# Patient Record
Sex: MALE | Race: OTHER | NOT HISPANIC OR LATINO
[De-identification: names, ages, dates, MRNs, and addresses within clinical notes are randomized per-mention and may not be internally consistent; named-entity substitution may affect disease eponyms.]

---

## 2017-02-24 ENCOUNTER — APPOINTMENT (OUTPATIENT)
Dept: OPHTHALMOLOGY | Facility: CLINIC | Age: 57
End: 2017-02-24

## 2017-02-28 ENCOUNTER — APPOINTMENT (OUTPATIENT)
Dept: OPHTHALMOLOGY | Facility: CLINIC | Age: 57
End: 2017-02-28

## 2017-03-28 ENCOUNTER — APPOINTMENT (OUTPATIENT)
Dept: OPHTHALMOLOGY | Facility: CLINIC | Age: 57
End: 2017-03-28

## 2017-05-09 ENCOUNTER — APPOINTMENT (OUTPATIENT)
Dept: OPHTHALMOLOGY | Facility: CLINIC | Age: 57
End: 2017-05-09

## 2017-05-23 ENCOUNTER — APPOINTMENT (OUTPATIENT)
Dept: OPHTHALMOLOGY | Facility: CLINIC | Age: 57
End: 2017-05-23

## 2017-08-29 ENCOUNTER — APPOINTMENT (OUTPATIENT)
Dept: OPHTHALMOLOGY | Facility: CLINIC | Age: 57
End: 2017-08-29

## 2017-12-04 ENCOUNTER — FORM ENCOUNTER (OUTPATIENT)
Age: 57
End: 2017-12-04

## 2017-12-05 ENCOUNTER — APPOINTMENT (OUTPATIENT)
Age: 57
End: 2017-12-05
Payer: MEDICAID

## 2017-12-05 PROCEDURE — 36215Z: CUSTOM | Mod: 59

## 2017-12-05 PROCEDURE — 36902Z: CUSTOM

## 2017-12-21 ENCOUNTER — APPOINTMENT (OUTPATIENT)
Age: 57
End: 2017-12-21
Payer: MEDICAID

## 2017-12-21 PROCEDURE — 36902Z: CUSTOM

## 2018-02-06 ENCOUNTER — APPOINTMENT (OUTPATIENT)
Dept: VASCULAR SURGERY | Facility: CLINIC | Age: 58
End: 2018-02-06

## 2018-03-30 ENCOUNTER — APPOINTMENT (OUTPATIENT)
Dept: VASCULAR SURGERY | Facility: CLINIC | Age: 58
End: 2018-03-30
Payer: MEDICAID

## 2018-03-30 PROCEDURE — 93990 DOPPLER FLOW TESTING: CPT

## 2018-04-03 ENCOUNTER — APPOINTMENT (OUTPATIENT)
Dept: ENDOVASCULAR SURGERY | Facility: CLINIC | Age: 58
End: 2018-04-03

## 2018-04-30 ENCOUNTER — FORM ENCOUNTER (OUTPATIENT)
Age: 58
End: 2018-04-30

## 2018-05-01 ENCOUNTER — APPOINTMENT (OUTPATIENT)
Age: 58
End: 2018-05-01
Payer: MEDICAID

## 2018-05-01 PROCEDURE — 36902Z: CUSTOM

## 2018-05-01 PROCEDURE — 36215Z: CUSTOM | Mod: 59

## 2018-07-03 ENCOUNTER — APPOINTMENT (OUTPATIENT)
Dept: VASCULAR SURGERY | Facility: CLINIC | Age: 58
End: 2018-07-03

## 2018-07-12 ENCOUNTER — APPOINTMENT (OUTPATIENT)
Dept: VASCULAR SURGERY | Facility: CLINIC | Age: 58
End: 2018-07-12
Payer: MEDICARE

## 2018-07-12 PROCEDURE — 93990 DOPPLER FLOW TESTING: CPT

## 2018-07-13 ENCOUNTER — APPOINTMENT (OUTPATIENT)
Dept: VASCULAR SURGERY | Facility: CLINIC | Age: 58
End: 2018-07-13

## 2018-10-18 ENCOUNTER — APPOINTMENT (OUTPATIENT)
Dept: VASCULAR SURGERY | Facility: CLINIC | Age: 58
End: 2018-10-18
Payer: MEDICARE

## 2018-10-18 VITALS
BODY MASS INDEX: 23.77 KG/M2 | HEIGHT: 71 IN | HEART RATE: 68 BPM | WEIGHT: 169.75 LBS | DIASTOLIC BLOOD PRESSURE: 72 MMHG | SYSTOLIC BLOOD PRESSURE: 124 MMHG

## 2018-10-18 DIAGNOSIS — Z86.718 PERSONAL HISTORY OF OTHER VENOUS THROMBOSIS AND EMBOLISM: ICD-10-CM

## 2018-10-18 DIAGNOSIS — B19.20 UNSPECIFIED VIRAL HEPATITIS C W/OUT HEPATIC COMA: ICD-10-CM

## 2018-10-18 DIAGNOSIS — K80.20 CALCULUS OF GALLBLADDER W/OUT CHOLECYSTITIS W/OUT OBSTRUCTION: ICD-10-CM

## 2018-10-18 PROCEDURE — 93990 DOPPLER FLOW TESTING: CPT

## 2018-10-18 PROCEDURE — 99213 OFFICE O/P EST LOW 20 MIN: CPT

## 2018-10-18 RX ORDER — PREDNISONE 20 MG/1
20 TABLET ORAL
Qty: 6 | Refills: 3 | Status: COMPLETED | COMMUNITY
Start: 2017-12-01 | End: 2018-10-18

## 2018-10-18 RX ORDER — INSULIN ASPART 100 [IU]/ML
100 INJECTION, SOLUTION INTRAVENOUS; SUBCUTANEOUS
Refills: 0 | Status: ACTIVE | COMMUNITY

## 2018-10-18 RX ORDER — LIDOCAINE AND PRILOCAINE 25; 25 MG/G; MG/G
2.5-2.5 CREAM TOPICAL
Refills: 0 | Status: ACTIVE | COMMUNITY

## 2018-10-18 RX ORDER — CLOTRIMAZOLE AND BETAMETHASONE DIPROPIONATE 10; .5 MG/G; MG/G
1-0.05 CREAM TOPICAL TWICE DAILY
Qty: 90 | Refills: 2 | Status: COMPLETED | COMMUNITY
Start: 2018-04-03 | End: 2018-10-18

## 2018-11-14 ENCOUNTER — FORM ENCOUNTER (OUTPATIENT)
Age: 58
End: 2018-11-14

## 2018-11-15 ENCOUNTER — APPOINTMENT (OUTPATIENT)
Dept: ENDOVASCULAR SURGERY | Facility: CLINIC | Age: 58
End: 2018-11-15
Payer: MEDICARE

## 2018-11-15 PROCEDURE — 36902Z: CUSTOM

## 2019-02-14 ENCOUNTER — APPOINTMENT (OUTPATIENT)
Dept: VASCULAR SURGERY | Facility: CLINIC | Age: 59
End: 2019-02-14

## 2019-02-28 ENCOUNTER — APPOINTMENT (OUTPATIENT)
Dept: VASCULAR SURGERY | Facility: CLINIC | Age: 59
End: 2019-02-28
Payer: MEDICARE

## 2019-02-28 VITALS
DIASTOLIC BLOOD PRESSURE: 83 MMHG | WEIGHT: 169 LBS | BODY MASS INDEX: 23.66 KG/M2 | HEART RATE: 71 BPM | HEIGHT: 71 IN | SYSTOLIC BLOOD PRESSURE: 159 MMHG

## 2019-02-28 PROCEDURE — 93990 DOPPLER FLOW TESTING: CPT

## 2019-02-28 PROCEDURE — 99213 OFFICE O/P EST LOW 20 MIN: CPT

## 2019-02-28 NOTE — HISTORY OF PRESENT ILLNESS
[FreeTextEntry1] : 58-year-old gentleman currently on hemodialysis via left radiocephalic AV fistula. At this time patient reports intermittent clots seen during hemodialysis. He is here for evaluation. [] : left radiocephalic fistula

## 2019-02-28 NOTE — PHYSICAL EXAM
[Thrill] : thrill [Pulsatile Thrill] : no pulsatile thrill [Bleeding] : no bleeding [Hand well perfused] : hand well perfused [2+] : left 2+ [Normal] : coordination grossly intact, no focal deficits

## 2019-02-28 NOTE — DISCUSSION/SUMMARY
[FreeTextEntry1] : Patient underwent a duplex study which shows scattered stenoses throughout. \par no intervention needed at this time

## 2019-05-28 ENCOUNTER — APPOINTMENT (OUTPATIENT)
Dept: VASCULAR SURGERY | Facility: CLINIC | Age: 59
End: 2019-05-28
Payer: MEDICARE

## 2019-05-28 VITALS
BODY MASS INDEX: 23.27 KG/M2 | DIASTOLIC BLOOD PRESSURE: 91 MMHG | HEIGHT: 71 IN | TEMPERATURE: 97.9 F | HEART RATE: 71 BPM | SYSTOLIC BLOOD PRESSURE: 149 MMHG | WEIGHT: 166.23 LBS

## 2019-05-28 PROCEDURE — 93990 DOPPLER FLOW TESTING: CPT

## 2019-05-28 PROCEDURE — 99213 OFFICE O/P EST LOW 20 MIN: CPT

## 2019-05-28 NOTE — DISCUSSION/SUMMARY
[FreeTextEntry1] : Patient underwent a duplex study which shows scattered stenoses throughout. \par Very low flow detected. We'll schedule  fistulogram\par

## 2019-05-28 NOTE — PHYSICAL EXAM
[Thrill] : thrill [Pulsatile Thrill] : no pulsatile thrill [Bleeding] : no bleeding [Hand well perfused] : hand well perfused [2+] : left 2+ [Normal] : normoactive bowel sounds, soft and nontender, no hepatosplenomegaly or masses appreciated

## 2019-06-20 ENCOUNTER — APPOINTMENT (OUTPATIENT)
Dept: ENDOVASCULAR SURGERY | Facility: CLINIC | Age: 59
End: 2019-06-20
Payer: MEDICARE

## 2019-06-20 VITALS
HEART RATE: 83 BPM | DIASTOLIC BLOOD PRESSURE: 80 MMHG | WEIGHT: 166 LBS | HEIGHT: 71 IN | TEMPERATURE: 97.8 F | BODY MASS INDEX: 23.24 KG/M2 | SYSTOLIC BLOOD PRESSURE: 150 MMHG | OXYGEN SATURATION: 98 % | RESPIRATION RATE: 18 BRPM

## 2019-06-20 DIAGNOSIS — Z87.891 PERSONAL HISTORY OF NICOTINE DEPENDENCE: ICD-10-CM

## 2019-06-20 DIAGNOSIS — I10 ESSENTIAL (PRIMARY) HYPERTENSION: ICD-10-CM

## 2019-06-20 PROCEDURE — 36902Z: CUSTOM

## 2019-06-20 NOTE — HISTORY OF PRESENT ILLNESS
[] : left radiocephalic fistula [FreeTextEntry1] : alert and oriented x 3 \par accompanied by sister\par no reported falls \par reported blood sugar 151 [FreeTextEntry5] : yesterday 11 pm [FreeTextEntry6] : Dr Graves [FreeTextEntry4] : yesterday

## 2019-06-20 NOTE — PAST MEDICAL HISTORY
[No therapy indicated for cases scheduled for less than one hour] : No therapy indicated for cases scheduled for less than one hour. [Increasing age ( >40 years old)] : Increasing age ( >40 years old) [FreeTextEntry1] : Malignant Hyperthermia Screening Tool and Risk of Bleeding Assessment \par \par \par \par Mr. MARIETTA MAGALLANES denies family of unexpected death following Anesthesia or Exercise.\par Denies Family history of Malignant Hyperthermia, Muscle or Neuromuscular disorder and High Temperature  following exercise.\par \par Mr. MARIETTA MAGALLANES denies history of Muscle Spasm, Dark or Chocolate- Colored and Unanticipated fever immediately following anaesthesia or serious exercise.\par Mr. MARIETTA MAGALLANES also denies bleeding tendencies/Risks of Bleeding.\par

## 2019-06-20 NOTE — PROCEDURE
[Resume diet] : resume diet [Site check for bleeding/hematoma/thrill/bruit] : Site check for bleeding/hematoma/thrill/bruit [Vital signs on admission the q 15 mins x2] : Vital signs on admission the q 15 mins x2 [FSBS] : FSBS [FreeTextEntry1] : left arm fistula/fistulogram/angioplasty

## 2019-06-20 NOTE — ASSESSMENT
[FreeTextEntry1] : Pt with stenosis in fistula detected on surveillance for fistulogram and possible intervention.  Consent obtained and reinforced.  Will give IV steroids and benadryl prophylactically for allergy history.  Fistulogram demonstrates several stenoses within juxtaanastamotic region and distal body PTA to 6mm and 7 mm respectively with good result.  EBL 5 cc.  Full report to follow.

## 2019-08-15 ENCOUNTER — FORM ENCOUNTER (OUTPATIENT)
Age: 59
End: 2019-08-15

## 2019-08-15 ENCOUNTER — APPOINTMENT (OUTPATIENT)
Dept: VASCULAR SURGERY | Facility: CLINIC | Age: 59
End: 2019-08-15
Payer: MEDICARE

## 2019-08-15 PROCEDURE — 93990 DOPPLER FLOW TESTING: CPT

## 2019-08-15 PROCEDURE — 99213 OFFICE O/P EST LOW 20 MIN: CPT

## 2019-08-15 NOTE — PHYSICAL EXAM
[Thrill] : thrill [Hand well perfused] : hand well perfused [2+] : right 2+ [Normal] : coordination grossly intact, no focal deficits [Pulsatile Thrill] : no pulsatile thrill [Aneurysm] : no aneurysm [Bleeding] : no bleeding [Ulcer] : no ulcer [de-identified] : intact

## 2019-08-15 NOTE — HISTORY OF PRESENT ILLNESS
[] : left radiocephalic fistula [FreeTextEntry1] : 59 yo male with history of esrd on hd presents for follow up of left upper extremity radial cephalic avf.  pt states that he has been having trouble with hd via the left upper extremity avf.  they have been pulling clots that has been interfering with hd.  \par

## 2019-08-16 ENCOUNTER — APPOINTMENT (OUTPATIENT)
Dept: ENDOVASCULAR SURGERY | Facility: CLINIC | Age: 59
End: 2019-08-16
Payer: MEDICARE

## 2019-08-16 VITALS
SYSTOLIC BLOOD PRESSURE: 123 MMHG | BODY MASS INDEX: 23.24 KG/M2 | HEIGHT: 71 IN | RESPIRATION RATE: 16 BRPM | TEMPERATURE: 98.2 F | OXYGEN SATURATION: 100 % | WEIGHT: 166 LBS | DIASTOLIC BLOOD PRESSURE: 80 MMHG | HEART RATE: 97 BPM

## 2019-08-16 DIAGNOSIS — N18.6 END STAGE RENAL DISEASE: ICD-10-CM

## 2019-08-16 DIAGNOSIS — Z99.2 END STAGE RENAL DISEASE: ICD-10-CM

## 2019-08-16 PROCEDURE — 36215Z: CUSTOM | Mod: 59

## 2019-08-16 PROCEDURE — 36902Z: CUSTOM

## 2019-08-16 RX ORDER — FOLIC ACID/VIT B COMPLEX AND C 0.8 MG
TABLET ORAL
Refills: 0 | Status: ACTIVE | COMMUNITY

## 2019-08-16 RX ORDER — DILTIAZEM HYDROCHLORIDE 360 MG/1
360 CAPSULE, COATED, EXTENDED RELEASE ORAL
Refills: 0 | Status: DISCONTINUED | COMMUNITY
End: 2019-08-16

## 2019-08-16 NOTE — REASON FOR VISIT
[Other ___] : a [unfilled] visit for [Inadequate Flow within AVF] : inadequate flow within AVF [Other: ___] : [unfilled] [FreeTextEntry2] : stenosis on duplex

## 2019-08-16 NOTE — PROCEDURE
[FSBS] : FSBS [Resume diet] : resume diet [Site check for bleeding/hematoma/thrill/bruit] : Site check for bleeding/hematoma/thrill/bruit [Vital signs on admission the q 15 mins x2] : Vital signs on admission the q 15 mins x2 [FreeTextEntry1] : left arm fistula/fistulogram/angioplasty

## 2019-08-16 NOTE — HISTORY OF PRESENT ILLNESS
[] : left radiocephalic fistula [FreeTextEntry1] : 2016 Dr. Duarte [FreeTextEntry4] : today [FreeTextEntry5] : 7pm 8/15/2019 [FreeTextEntry6] : Dr Khan

## 2019-08-16 NOTE — PAST MEDICAL HISTORY
[Increasing age ( >40 years old)] : Increasing age ( >40 years old) [No therapy indicated for cases scheduled for less than one hour] : No therapy indicated for cases scheduled for less than one hour. [FreeTextEntry1] : Malignant Hyperthermia Screening Tool and Risk of Bleeding Assessment \par \par Mr. MARIETTA MAGALLANES denies family of unexpected death following Anesthesia or Exercise.\par Denies Family history of Malignant Hyperthermia, Muscle or Neuromuscular disorder and High Temperature  following exercise.\par \par Mr. MARIETTA MAGALLANES denies history of Muscle Spasm, Dark or Chocolate- Colored and Unanticipated fever immediately following anaesthesia or serious exercise.\par Mr. MARIETTA MAGALLANES also denies bleeding tendencies/Risks of Bleeding.\par

## 2019-09-19 ENCOUNTER — APPOINTMENT (OUTPATIENT)
Dept: ENDOVASCULAR SURGERY | Facility: CLINIC | Age: 59
End: 2019-09-19
Payer: MEDICARE

## 2019-09-19 VITALS
DIASTOLIC BLOOD PRESSURE: 74 MMHG | HEIGHT: 71 IN | SYSTOLIC BLOOD PRESSURE: 119 MMHG | OXYGEN SATURATION: 100 % | WEIGHT: 166 LBS | HEART RATE: 77 BPM | BODY MASS INDEX: 23.24 KG/M2 | TEMPERATURE: 97.6 F | RESPIRATION RATE: 15 BRPM

## 2019-09-19 PROCEDURE — 36902Z: CUSTOM

## 2019-09-20 NOTE — ASSESSMENT
[FreeTextEntry1] : Fistulogram demonstrates moderate stenoses in distal body PTA to 7 mm with good result.  EBL - minimal.  Full report to follow.

## 2019-09-20 NOTE — HISTORY OF PRESENT ILLNESS
[] : left radiocephalic fistula [FreeTextEntry1] : 2016 Dr. Duarte [FreeTextEntry4] : yesterday  [FreeTextEntry5] : yesterday at 9 pm [FreeTextEntry6] : Dr Khan

## 2019-09-20 NOTE — PROCEDURE
[Resume diet] : resume diet [FSBS] : FSBS [Site check for bleeding/hematoma/thrill/bruit] : Site check for bleeding/hematoma/thrill/bruit [Vital signs on admission the q 15 mins x2] : Vital signs on admission the q 15 mins x2 [FreeTextEntry1] : left arm fistula/fistulogram/angioplasty

## 2019-11-19 ENCOUNTER — APPOINTMENT (OUTPATIENT)
Dept: VASCULAR SURGERY | Facility: CLINIC | Age: 59
End: 2019-11-19
Payer: MEDICARE

## 2019-11-19 PROCEDURE — 99213 OFFICE O/P EST LOW 20 MIN: CPT

## 2019-11-19 PROCEDURE — 93990 DOPPLER FLOW TESTING: CPT

## 2019-11-19 NOTE — PHYSICAL EXAM
[Pulsatile Thrill] : no pulsatile thrill [Thrill] : thrill [Bleeding] : no bleeding [Aneurysm] : no aneurysm [Ulcer] : no ulcer [Hand well perfused] : hand well perfused [2+] : left 2+ [Normal] : coordination grossly intact, no focal deficits [de-identified] : intact

## 2019-11-19 NOTE — DISCUSSION/SUMMARY
[FreeTextEntry1] : 59 yo male with history of esrd on hd presents for follow up of left upper extremity radial cephalic avf\par duplex shows patent avf \par no intervention needed

## 2020-03-03 ENCOUNTER — APPOINTMENT (OUTPATIENT)
Dept: VASCULAR SURGERY | Facility: CLINIC | Age: 60
End: 2020-03-03
Payer: MEDICARE

## 2020-03-03 PROCEDURE — 93923 UPR/LXTR ART STDY 3+ LVLS: CPT

## 2020-03-03 PROCEDURE — 99213 OFFICE O/P EST LOW 20 MIN: CPT

## 2020-03-03 PROCEDURE — 93990 DOPPLER FLOW TESTING: CPT | Mod: 59

## 2020-03-03 NOTE — HISTORY OF PRESENT ILLNESS
[FreeTextEntry1] : 60 yo male with history of esrd on hd presents for follow up of left upper extremity radial cephalic avf.  pt states that he has been having no trouble with hd via the left upper extremity avf. Patient is complaining of left foot pain. He states the pain is so severe he takes him upt [] : left radiocephalic fistula

## 2020-03-03 NOTE — PHYSICAL EXAM
[Thrill] : thrill [Pulsatile Thrill] : no pulsatile thrill [Aneurysm] : no aneurysm [Bleeding] : no bleeding [Hand well perfused] : hand well perfused [Ulcer] : no ulcer [2+] : left 2+ [0] : left 0 [de-identified] : intact  [Normal] : coordination grossly intact, no focal deficits [de-identified] : Gangrenous ulcer left first toe

## 2020-03-03 NOTE — DISCUSSION/SUMMARY
[FreeTextEntry1] : 57 yo male with history of esrd on hd presents for follow up of left upper extremity radial cephalic avf\par duplex shows patent avf \par Patient also underwent arterial Dopplers which show severe disease of the left lower extremity. Will schedule patient for left leg angiogram.

## 2020-03-12 ENCOUNTER — FORM ENCOUNTER (OUTPATIENT)
Age: 60
End: 2020-03-12

## 2020-03-12 ENCOUNTER — APPOINTMENT (OUTPATIENT)
Dept: ENDOVASCULAR SURGERY | Facility: CLINIC | Age: 60
End: 2020-03-12
Payer: MEDICARE

## 2020-03-13 ENCOUNTER — APPOINTMENT (OUTPATIENT)
Dept: ENDOVASCULAR SURGERY | Facility: CLINIC | Age: 60
End: 2020-03-13
Payer: MEDICARE

## 2020-03-13 ENCOUNTER — LABORATORY RESULT (OUTPATIENT)
Age: 60
End: 2020-03-13

## 2020-03-13 VITALS
OXYGEN SATURATION: 97 % | WEIGHT: 166 LBS | SYSTOLIC BLOOD PRESSURE: 164 MMHG | TEMPERATURE: 97.8 F | RESPIRATION RATE: 16 BRPM | BODY MASS INDEX: 23.24 KG/M2 | DIASTOLIC BLOOD PRESSURE: 95 MMHG | HEART RATE: 90 BPM | HEIGHT: 71 IN

## 2020-03-13 PROCEDURE — 37228Z: CUSTOM | Mod: 59

## 2020-03-13 PROCEDURE — 75625 CONTRAST EXAM ABDOMINL AORTA: CPT

## 2020-03-13 PROCEDURE — 37225Z: CUSTOM

## 2020-03-13 NOTE — PHYSICAL EXAM
[Pulsatile Thrill] : no pulsatile thrill [Aneurysm] : no aneurysm [Bleeding] : no bleeding [Ulcer] : no ulcer [de-identified] : Gangrenous ulcer left first toe [de-identified] : intact

## 2020-03-13 NOTE — HISTORY OF PRESENT ILLNESS
[FreeTextEntry1] : 2016 Dr. Duarte [FreeTextEntry4] : 3/11/2020  [FreeTextEntry5] : yesterday at 5pm [FreeTextEntry6] : Dr Khan

## 2020-03-13 NOTE — PAST MEDICAL HISTORY
[FreeTextEntry1] : Malignant Hyperthermia Screening Tool and Risk of Bleeding Assessment \par \par Mr. MARIETTA MAGALLANES denies family of unexpected death following Anesthesia or Exercise.\par Denies Family history of Malignant Hyperthermia, Muscle or Neuromuscular disorder and High Temperature  following exercise.\par \par Mr. MARIETTA MAGALLANES denies history of Muscle Spasm, Dark or Chocolate- Colored and Unanticipated fever immediately following anaesthesia or serious exercise.\par Mr. MARIETTA MAGALLANES also denies bleeding tendencies/Risks of Bleeding.\par

## 2020-03-13 NOTE — DISCUSSION/SUMMARY
[FreeTextEntry1] : 59 yo male with history of esrd on hd presents for follow up of left upper extremity radial cephalic avf\par duplex shows patent avf with >75% stenosis at the juxta-anastomosis with pratial thrombus in the proximal / mid forearm with flow rate of 350 \par will arrange for fistulagram tomorrow pt advised not to eat or drink anything after midnight 
Patient

## 2020-05-28 ENCOUNTER — APPOINTMENT (OUTPATIENT)
Dept: VASCULAR SURGERY | Facility: CLINIC | Age: 60
End: 2020-05-28
Payer: MEDICARE

## 2020-05-28 PROCEDURE — 99213 OFFICE O/P EST LOW 20 MIN: CPT

## 2020-05-28 PROCEDURE — 93926 LOWER EXTREMITY STUDY: CPT

## 2020-05-28 PROCEDURE — 93971 EXTREMITY STUDY: CPT

## 2020-05-28 NOTE — HISTORY OF PRESENT ILLNESS
[FreeTextEntry1] : 59-year-old gentleman well-known to us on hemodialysis.  Patient developed rest pain and ischemic ulceration of the left foot.  Several weeks ago patient underwent angiogram with angioplasty of the left popliteal artery which had previously been occluded.  He presents to the office today complaining of worsening of his left foot pain.

## 2020-05-28 NOTE — PHYSICAL EXAM
[JVD] : no jugular venous distention  [Normal Breath Sounds] : Normal breath sounds [Normal Rate and Rhythm] : normal rate and rhythm [2+] : left 2+ [Ankle Swelling (On Exam)] : not present [Varicose Veins Of Lower Extremities] : not present [] : not present [Abdomen Tenderness] : ~T ~M No abdominal tenderness [Skin Ulcer] : ulcer [No Rash or Lesion] : No rash or lesion [Alert] : alert [Calm] : calm [Skin Induration] : no induration [de-identified] : Appears well

## 2020-05-28 NOTE — ASSESSMENT
[FreeTextEntry1] : Patient underwent a duplex study which shows reocclusion of his popliteal artery.  His posterior tibial artery appears patent.  At this time would recommend patient undergo bypass to the posterior tibial artery.  We will arrange for next week.

## 2020-05-31 ENCOUNTER — INPATIENT (INPATIENT)
Facility: HOSPITAL | Age: 60
LOS: 10 days | Discharge: NURSING, REHAB OR LONG-TERM CARE | DRG: 252 | End: 2020-06-11
Attending: SURGERY | Admitting: SURGERY
Payer: MEDICARE

## 2020-05-31 VITALS
DIASTOLIC BLOOD PRESSURE: 93 MMHG | HEART RATE: 93 BPM | WEIGHT: 167.77 LBS | HEIGHT: 71 IN | OXYGEN SATURATION: 98 % | SYSTOLIC BLOOD PRESSURE: 194 MMHG | TEMPERATURE: 98 F | RESPIRATION RATE: 19 BRPM

## 2020-05-31 DIAGNOSIS — I73.9 PERIPHERAL VASCULAR DISEASE, UNSPECIFIED: ICD-10-CM

## 2020-05-31 DIAGNOSIS — Z98.890 OTHER SPECIFIED POSTPROCEDURAL STATES: Chronic | ICD-10-CM

## 2020-05-31 LAB
ALBUMIN SERPL ELPH-MCNC: 4.3 G/DL — SIGNIFICANT CHANGE UP (ref 3.3–5)
ALP SERPL-CCNC: 140 U/L — HIGH (ref 40–120)
ALT FLD-CCNC: 9 U/L — LOW (ref 10–45)
ANION GAP SERPL CALC-SCNC: 18 MMOL/L — HIGH (ref 5–17)
APTT BLD: 28 SEC — SIGNIFICANT CHANGE UP (ref 27.5–36.3)
AST SERPL-CCNC: 7 U/L — LOW (ref 10–40)
BILIRUB SERPL-MCNC: 0.3 MG/DL — SIGNIFICANT CHANGE UP (ref 0.2–1.2)
BLD GP AB SCN SERPL QL: NEGATIVE — SIGNIFICANT CHANGE UP
BUN SERPL-MCNC: 74 MG/DL — HIGH (ref 7–23)
CALCIUM SERPL-MCNC: 9.7 MG/DL — SIGNIFICANT CHANGE UP (ref 8.4–10.5)
CHLORIDE SERPL-SCNC: 97 MMOL/L — SIGNIFICANT CHANGE UP (ref 96–108)
CO2 SERPL-SCNC: 24 MMOL/L — SIGNIFICANT CHANGE UP (ref 22–31)
CREAT SERPL-MCNC: 11.6 MG/DL — HIGH (ref 0.5–1.3)
GLUCOSE SERPL-MCNC: 108 MG/DL — HIGH (ref 70–99)
HCT VFR BLD CALC: 44.7 % — SIGNIFICANT CHANGE UP (ref 39–50)
HGB BLD-MCNC: 13.7 G/DL — SIGNIFICANT CHANGE UP (ref 13–17)
INR BLD: 0.87 RATIO — LOW (ref 0.88–1.16)
MCHC RBC-ENTMCNC: 24.2 PG — LOW (ref 27–34)
MCHC RBC-ENTMCNC: 30.6 GM/DL — LOW (ref 32–36)
MCV RBC AUTO: 79.1 FL — LOW (ref 80–100)
NRBC # BLD: 0 /100 WBCS — SIGNIFICANT CHANGE UP (ref 0–0)
PLATELET # BLD AUTO: 206 K/UL — SIGNIFICANT CHANGE UP (ref 150–400)
POTASSIUM SERPL-MCNC: 4.6 MMOL/L — SIGNIFICANT CHANGE UP (ref 3.5–5.3)
POTASSIUM SERPL-SCNC: 4.6 MMOL/L — SIGNIFICANT CHANGE UP (ref 3.5–5.3)
PROT SERPL-MCNC: 7.5 G/DL — SIGNIFICANT CHANGE UP (ref 6–8.3)
PROTHROM AB SERPL-ACNC: 9.9 SEC — LOW (ref 10–12.9)
RBC # BLD: 5.65 M/UL — SIGNIFICANT CHANGE UP (ref 4.2–5.8)
RBC # FLD: 16.2 % — HIGH (ref 10.3–14.5)
RH IG SCN BLD-IMP: POSITIVE — SIGNIFICANT CHANGE UP
RH IG SCN BLD-IMP: POSITIVE — SIGNIFICANT CHANGE UP
SARS-COV-2 RNA SPEC QL NAA+PROBE: SIGNIFICANT CHANGE UP
SODIUM SERPL-SCNC: 139 MMOL/L — SIGNIFICANT CHANGE UP (ref 135–145)
WBC # BLD: 6.18 K/UL — SIGNIFICANT CHANGE UP (ref 3.8–10.5)
WBC # FLD AUTO: 6.18 K/UL — SIGNIFICANT CHANGE UP (ref 3.8–10.5)

## 2020-05-31 PROCEDURE — 99285 EMERGENCY DEPT VISIT HI MDM: CPT | Mod: CS,GC

## 2020-05-31 PROCEDURE — 71045 X-RAY EXAM CHEST 1 VIEW: CPT | Mod: 26

## 2020-05-31 RX ORDER — INSULIN LISPRO 100/ML
VIAL (ML) SUBCUTANEOUS AT BEDTIME
Refills: 0 | Status: DISCONTINUED | OUTPATIENT
Start: 2020-05-31 | End: 2020-06-01

## 2020-05-31 RX ORDER — DIPHENHYDRAMINE HCL 50 MG
50 CAPSULE ORAL ONCE
Refills: 0 | Status: COMPLETED | OUTPATIENT
Start: 2020-06-01 | End: 2020-06-01

## 2020-05-31 RX ORDER — DEXTROSE 50 % IN WATER 50 %
25 SYRINGE (ML) INTRAVENOUS ONCE
Refills: 0 | Status: DISCONTINUED | OUTPATIENT
Start: 2020-05-31 | End: 2020-05-31

## 2020-05-31 RX ORDER — DEXTROSE 50 % IN WATER 50 %
15 SYRINGE (ML) INTRAVENOUS ONCE
Refills: 0 | Status: DISCONTINUED | OUTPATIENT
Start: 2020-05-31 | End: 2020-05-31

## 2020-05-31 RX ORDER — HEPARIN SODIUM 5000 [USP'U]/ML
5000 INJECTION INTRAVENOUS; SUBCUTANEOUS EVERY 8 HOURS
Refills: 0 | Status: DISCONTINUED | OUTPATIENT
Start: 2020-05-31 | End: 2020-06-03

## 2020-05-31 RX ORDER — ENOXAPARIN SODIUM 100 MG/ML
40 INJECTION SUBCUTANEOUS DAILY
Refills: 0 | Status: DISCONTINUED | OUTPATIENT
Start: 2020-05-31 | End: 2020-05-31

## 2020-05-31 RX ORDER — INSULIN LISPRO 100/ML
VIAL (ML) SUBCUTANEOUS
Refills: 0 | Status: DISCONTINUED | OUTPATIENT
Start: 2020-05-31 | End: 2020-06-01

## 2020-05-31 RX ORDER — GLUCAGON INJECTION, SOLUTION 0.5 MG/.1ML
1 INJECTION, SOLUTION SUBCUTANEOUS ONCE
Refills: 0 | Status: DISCONTINUED | OUTPATIENT
Start: 2020-05-31 | End: 2020-06-03

## 2020-05-31 RX ORDER — DEXTROSE 50 % IN WATER 50 %
12.5 SYRINGE (ML) INTRAVENOUS ONCE
Refills: 0 | Status: DISCONTINUED | OUTPATIENT
Start: 2020-05-31 | End: 2020-05-31

## 2020-05-31 RX ORDER — SODIUM CHLORIDE 9 MG/ML
1000 INJECTION, SOLUTION INTRAVENOUS
Refills: 0 | Status: DISCONTINUED | OUTPATIENT
Start: 2020-05-31 | End: 2020-05-31

## 2020-05-31 RX ADMIN — Medication 0.1 MILLIGRAM(S): at 20:12

## 2020-05-31 RX ADMIN — Medication 50 MILLIGRAM(S): at 23:15

## 2020-05-31 NOTE — ED ADULT NURSE NOTE - OBJECTIVE STATEMENT
60 y/o Male PMH ESRD on dialysis M/W/F with fistula to the L. arm, HTN, glaucoma, DM2 ON insulin, Hep C, PAD, DVT s/p thrombectomy sent o ED by his vascular surgeon for stent placement post ballooning that was done with tibial bypass now with worsening left leg claudication. Pt reports pain started in January this year, worse with walking, mostly in left leg and thigh. He had balloon angioplasty in March, improving his pain of LLE, but Duplex recently showed re-occlusion. Denies headaches, F/C, dizziness, syncope, CP, N/V, abdominal pain, dysuria, changes in BM, peripheral swelling, skin changes. no cough, fevers, sick contacts or recent travel. VS stable. safety maintained. tba.

## 2020-05-31 NOTE — H&P ADULT - NSHPLABSRESULTS_GEN_ALL_CORE
13.7   6.18  )-----------( 206      ( 31 May 2020 15:59 )             44.7     05-31    139  |  97  |  74<H>  ----------------------------<  108<H>  4.6   |  24  |  11.60<H>    Ca    9.7      31 May 2020 15:59    TPro  7.5  /  Alb  4.3  /  TBili  0.3  /  DBili  x   /  AST  7<L>  /  ALT  9<L>  /  AlkPhos  140<H>  05-31

## 2020-05-31 NOTE — ED PROVIDER NOTE - CLINICAL SUMMARY MEDICAL DECISION MAKING FREE TEXT BOX
Ana M PGY-3: 58 yo M w/PMHX ESRD 2/2 HTN HD M/W/F, glaucoma, DM2 ON insulin, Hep C, PAD, rt DVT s/p thrombectomy presenting for posterior tibial bypass per vascular. Ana M PGY-3: 60 yo M w/PMHX ESRD 2/2 HTN HD M/W/F, glaucoma, DM2 ON insulin, Hep C, PAD, rt DVT s/p thrombectomy presenting for posterior tibial bypass per vascular.    RONEN Brown MD: Pt is a 58 y/o male w/PMHX ESRD 2/2 HTN HD M/W/F, glaucoma, DM2 ON insulin, Hep C, PAD, rt DVT s/p thrombectomy presenting for worsening left leg claudication. Pt reports pain started in January this year, worse with walking, mostly in left leg and thigh. He had balloon angioplasty in March, improving his pain of LLE, but Duplex recently showed re-occlusion. Denies headaches, F/C, dizziness, syncope, CP, N/V, abdominal pain, dysuria, changes in BM, peripheral swelling, skin changes. Has mild chest tightness when this occurs. Pt advised by his vascular surgeon to come in. Plan: preop labs, d/w Vascular surgery, likely TBA for bypass

## 2020-05-31 NOTE — H&P ADULT - ASSESSMENT
59M PMH ESRD (on HD M/W/F), glaucoma, DMII, HCV, PAD, R DVT s/p thrombectomy, presenting with LLE pain 2/2 PAD    - Admit to Vascular Surgery  - CTA Aorta with BL LE runoff  - Cardiology optimization appreciated  - Nephrology c/s for HD management  - Continue home medications  - Discussed with attending Dr. Aden Topete PGY3  Vascular Surgery  [id number]

## 2020-05-31 NOTE — ED PROVIDER NOTE - OBJECTIVE STATEMENT
60 yo M w/PMHX ESRD 2/2 HTN HD M/W/F, glaucoma, DM2 ON insulin, Hep C, PAD, rt DVT s/p thrombectomy presenting after being asked to by vascular surgeon for posterior tibial bypass after seeing him for worsening left leg claudication. Pt reports pain started in January this year, worse with walking, mostly in left leg and thigh. He had balloon angioplasty in March, improving his pain of LLE, but Duplex recently showed re-occlusion. Denies headaches, F/C, dizziness, syncope, CP, N/V, abdominal pain, dysuria, changes in BM, peripheral swelling, skin changes. Has mild chest tightness when this occurs.  Vascular: Aden  PMD: Binh  Renal: Juan Jose  Meds: calcium acetate, catapres, iron tabs, magnesium, vitamin D, novolog sliding scale, lantus, B12

## 2020-05-31 NOTE — ED PROVIDER NOTE - ATTENDING CONTRIBUTION TO CARE
I saw and evaluated patient with resident. I discussed H+P and MDM with resident. I agree with the statements made by the resident unless otherwise noted.    The care of this patient was in support of the [facility] countermeasures to Covid-19.

## 2020-05-31 NOTE — H&P ADULT - NSHPPHYSICALEXAM_GEN_ALL_CORE
Gen: Laying in bed, in NAD  Resp: Nonlabored  CV: RRR  Abd: Soft, NT/ND  Ext:  BL palpable femoral pulses  BL PT signal  L DP signal

## 2020-05-31 NOTE — ED ADULT TRIAGE NOTE - CHIEF COMPLAINT QUOTE
nontraumatic eloy leg pain, worse with walking x 2 wks, (seen by dr dietz x 2 mos ago "ballooned my leg", unrelieved with tylenol/lidocaine, +hx DVT (HD: MW in [place])

## 2020-05-31 NOTE — H&P ADULT - HISTORY OF PRESENT ILLNESS
59M PMH ESRD (on HD M/W/F), glaucoma, DMII, HCV, PAD, R DVT s/p thrombectomy, presenting with LLE pain. Patient states that he developed pain in January of this year, which is worse with walking and is located in the upper leg/thigh. Patient is s/p LLE angioplasty in March with some improvement in LLE pain, however in office duplex recently showed re-occlusion coinciding with return of symptoms. Patient is planned for a LLE bypass this week.    Upon arrival to [facility] ED, AVSS. Afebrile. Normotensive.

## 2020-06-01 DIAGNOSIS — I10 ESSENTIAL (PRIMARY) HYPERTENSION: ICD-10-CM

## 2020-06-01 DIAGNOSIS — N25.81 SECONDARY HYPERPARATHYROIDISM OF RENAL ORIGIN: ICD-10-CM

## 2020-06-01 DIAGNOSIS — N18.6 END STAGE RENAL DISEASE: ICD-10-CM

## 2020-06-01 DIAGNOSIS — I73.9 PERIPHERAL VASCULAR DISEASE, UNSPECIFIED: ICD-10-CM

## 2020-06-01 DIAGNOSIS — E11.9 TYPE 2 DIABETES MELLITUS WITHOUT COMPLICATIONS: ICD-10-CM

## 2020-06-01 PROBLEM — H40.9 UNSPECIFIED GLAUCOMA: Chronic | Status: ACTIVE | Noted: 2020-05-31

## 2020-06-01 PROBLEM — N19 UNSPECIFIED KIDNEY FAILURE: Chronic | Status: ACTIVE | Noted: 2020-05-31

## 2020-06-01 LAB
A1C WITH ESTIMATED AVERAGE GLUCOSE RESULT: 7.2 % — HIGH (ref 4–5.6)
ANION GAP SERPL CALC-SCNC: 19 MMOL/L — HIGH (ref 5–17)
ANION GAP SERPL CALC-SCNC: 22 MMOL/L — HIGH (ref 5–17)
B-OH-BUTYR SERPL-SCNC: 0.2 MMOL/L — SIGNIFICANT CHANGE UP
BUN SERPL-MCNC: 110 MG/DL — HIGH (ref 7–23)
BUN SERPL-MCNC: 86 MG/DL — HIGH (ref 7–23)
CALCIUM SERPL-MCNC: 9.1 MG/DL — SIGNIFICANT CHANGE UP (ref 8.4–10.5)
CALCIUM SERPL-MCNC: 9.2 MG/DL — SIGNIFICANT CHANGE UP (ref 8.4–10.5)
CHLORIDE SERPL-SCNC: 89 MMOL/L — LOW (ref 96–108)
CHLORIDE SERPL-SCNC: 97 MMOL/L — SIGNIFICANT CHANGE UP (ref 96–108)
CO2 SERPL-SCNC: 18 MMOL/L — LOW (ref 22–31)
CO2 SERPL-SCNC: 19 MMOL/L — LOW (ref 22–31)
CREAT SERPL-MCNC: 12.49 MG/DL — HIGH (ref 0.5–1.3)
CREAT SERPL-MCNC: 13.18 MG/DL — HIGH (ref 0.5–1.3)
ESTIMATED AVERAGE GLUCOSE: 160 MG/DL — HIGH (ref 68–114)
GLUCOSE BLDC GLUCOMTR-MCNC: 101 MG/DL — HIGH (ref 70–99)
GLUCOSE BLDC GLUCOMTR-MCNC: 201 MG/DL — HIGH (ref 70–99)
GLUCOSE BLDC GLUCOMTR-MCNC: 397 MG/DL — HIGH (ref 70–99)
GLUCOSE BLDC GLUCOMTR-MCNC: 400 MG/DL — HIGH (ref 70–99)
GLUCOSE BLDC GLUCOMTR-MCNC: 424 MG/DL — HIGH (ref 70–99)
GLUCOSE BLDC GLUCOMTR-MCNC: 429 MG/DL — HIGH (ref 70–99)
GLUCOSE SERPL-MCNC: 194 MG/DL — HIGH (ref 70–99)
GLUCOSE SERPL-MCNC: 377 MG/DL — HIGH (ref 70–99)
HCT VFR BLD CALC: 45.8 % — SIGNIFICANT CHANGE UP (ref 39–50)
HCV AB S/CO SERPL IA: 12.3 S/CO — HIGH (ref 0–0.99)
HCV AB SERPL-IMP: REACTIVE
HGB BLD-MCNC: 13.6 G/DL — SIGNIFICANT CHANGE UP (ref 13–17)
INR BLD: 0.86 RATIO — LOW (ref 0.88–1.16)
MAGNESIUM SERPL-MCNC: 3.4 MG/DL — HIGH (ref 1.6–2.6)
MCHC RBC-ENTMCNC: 23.6 PG — LOW (ref 27–34)
MCHC RBC-ENTMCNC: 29.7 GM/DL — LOW (ref 32–36)
MCV RBC AUTO: 79.5 FL — LOW (ref 80–100)
NRBC # BLD: 0 /100 WBCS — SIGNIFICANT CHANGE UP (ref 0–0)
PHOSPHATE SERPL-MCNC: 5.8 MG/DL — HIGH (ref 2.5–4.5)
PLATELET # BLD AUTO: 217 K/UL — SIGNIFICANT CHANGE UP (ref 150–400)
POTASSIUM SERPL-MCNC: 5.6 MMOL/L — HIGH (ref 3.5–5.3)
POTASSIUM SERPL-MCNC: 5.6 MMOL/L — HIGH (ref 3.5–5.3)
POTASSIUM SERPL-SCNC: 5.6 MMOL/L — HIGH (ref 3.5–5.3)
POTASSIUM SERPL-SCNC: 5.6 MMOL/L — HIGH (ref 3.5–5.3)
PROTHROM AB SERPL-ACNC: 9.8 SEC — LOW (ref 10–13.1)
RBC # BLD: 5.76 M/UL — SIGNIFICANT CHANGE UP (ref 4.2–5.8)
RBC # FLD: 16.1 % — HIGH (ref 10.3–14.5)
SODIUM SERPL-SCNC: 129 MMOL/L — LOW (ref 135–145)
SODIUM SERPL-SCNC: 135 MMOL/L — SIGNIFICANT CHANGE UP (ref 135–145)
WBC # BLD: 6.91 K/UL — SIGNIFICANT CHANGE UP (ref 3.8–10.5)
WBC # FLD AUTO: 6.91 K/UL — SIGNIFICANT CHANGE UP (ref 3.8–10.5)

## 2020-06-01 PROCEDURE — 99222 1ST HOSP IP/OBS MODERATE 55: CPT | Mod: GC

## 2020-06-01 PROCEDURE — 75635 CT ANGIO ABDOMINAL ARTERIES: CPT | Mod: 26

## 2020-06-01 PROCEDURE — 93970 EXTREMITY STUDY: CPT | Mod: 26

## 2020-06-01 PROCEDURE — 93306 TTE W/DOPPLER COMPLETE: CPT | Mod: 26

## 2020-06-01 RX ORDER — INSULIN LISPRO 100/ML
10 VIAL (ML) SUBCUTANEOUS
Refills: 0 | Status: DISCONTINUED | OUTPATIENT
Start: 2020-06-01 | End: 2020-06-02

## 2020-06-01 RX ORDER — INSULIN GLARGINE 100 [IU]/ML
30 INJECTION, SOLUTION SUBCUTANEOUS AT BEDTIME
Refills: 0 | Status: DISCONTINUED | OUTPATIENT
Start: 2020-06-01 | End: 2020-06-02

## 2020-06-01 RX ORDER — INSULIN LISPRO 100/ML
VIAL (ML) SUBCUTANEOUS
Refills: 0 | Status: DISCONTINUED | OUTPATIENT
Start: 2020-06-01 | End: 2020-06-02

## 2020-06-01 RX ORDER — INSULIN GLARGINE 100 [IU]/ML
5 INJECTION, SOLUTION SUBCUTANEOUS ONCE
Refills: 0 | Status: COMPLETED | OUTPATIENT
Start: 2020-06-01 | End: 2020-06-01

## 2020-06-01 RX ORDER — INSULIN GLARGINE 100 [IU]/ML
25 INJECTION, SOLUTION SUBCUTANEOUS AT BEDTIME
Refills: 0 | Status: DISCONTINUED | OUTPATIENT
Start: 2020-06-01 | End: 2020-06-01

## 2020-06-01 RX ORDER — HYDRALAZINE HCL 50 MG
10 TABLET ORAL ONCE
Refills: 0 | Status: COMPLETED | OUTPATIENT
Start: 2020-06-01 | End: 2020-06-01

## 2020-06-01 RX ORDER — INSULIN LISPRO 100/ML
VIAL (ML) SUBCUTANEOUS AT BEDTIME
Refills: 0 | Status: DISCONTINUED | OUTPATIENT
Start: 2020-06-01 | End: 2020-06-03

## 2020-06-01 RX ORDER — SODIUM ZIRCONIUM CYCLOSILICATE 10 G/10G
10 POWDER, FOR SUSPENSION ORAL ONCE
Refills: 0 | Status: COMPLETED | OUTPATIENT
Start: 2020-06-01 | End: 2020-06-01

## 2020-06-01 RX ORDER — INSULIN LISPRO 100/ML
9 VIAL (ML) SUBCUTANEOUS
Refills: 0 | Status: DISCONTINUED | OUTPATIENT
Start: 2020-06-01 | End: 2020-06-01

## 2020-06-01 RX ADMIN — Medication 50 MILLIGRAM(S): at 04:47

## 2020-06-01 RX ADMIN — INSULIN GLARGINE 5 UNIT(S): 100 INJECTION, SOLUTION SUBCUTANEOUS at 22:58

## 2020-06-01 RX ADMIN — Medication 50 MILLIGRAM(S): at 10:24

## 2020-06-01 RX ADMIN — Medication 6: at 21:50

## 2020-06-01 RX ADMIN — Medication 5: at 12:51

## 2020-06-01 RX ADMIN — Medication 0.1 MILLIGRAM(S): at 21:52

## 2020-06-01 RX ADMIN — Medication 0.1 MILLIGRAM(S): at 10:24

## 2020-06-01 RX ADMIN — Medication 2: at 08:13

## 2020-06-01 RX ADMIN — INSULIN GLARGINE 25 UNIT(S): 100 INJECTION, SOLUTION SUBCUTANEOUS at 21:50

## 2020-06-01 RX ADMIN — SODIUM ZIRCONIUM CYCLOSILICATE 10 GRAM(S): 10 POWDER, FOR SUSPENSION ORAL at 12:51

## 2020-06-01 RX ADMIN — Medication 6: at 18:53

## 2020-06-01 NOTE — CONSULT NOTE ADULT - PROBLEM SELECTOR RECOMMENDATION 9
Awaiting CTA today and scheduled for LLE bypasss surgery Wednesday   Check TTE. IF normal LVEF as expected, may proceed with acceptable cardiac risk.  Would add ASA 81 mg daily if ok with surgery.

## 2020-06-01 NOTE — PROVIDER CONTACT NOTE (OTHER) - ACTION/TREATMENT ORDERED:
MD notified. hydralazine iv pushx1 ordered, pt then refused saying the medication gives him a reaction /makes his ears ring. Attempted to re-notify provider about patients refusal 3 times. refused med MD notified. hydralazine iv pushx1 ordered, pt then refused saying the medication gives him a reaction /makes his ears ring. as per vascular of systolic <180 will re attempt to push hydralazine.

## 2020-06-01 NOTE — PROGRESS NOTE ADULT - SUBJECTIVE AND OBJECTIVE BOX
Surgery Daily Progress Note     60yo Male    --------------------------------------------------------------------------------------------------------------------  SUBJECTIVE / 24H EVENTS  Patient seen and examined on morning rounds. Admitted overnight with LLE pain. Pt continues endorsing pain this AM. Denies CP, SOB, N/V. Premedicated for iodine allergy.       OBJECTIVE:    VITAL SIGNS:  T(C): 36.9 (06-01-20 @ 04:13), Max: 36.9 (06-01-20 @ 04:13)  HR: 77 (06-01-20 @ 04:13) (71 - 93)  BP: 177/68 (06-01-20 @ 04:13) (172/75 - 194/93)  RR: 19 (06-01-20 @ 04:13) (15 - 19)  SpO2: 94% (06-01-20 @ 04:13) (94% - 100%)  Daily Height in cm: 180.34 (31 May 2020 13:06)    Daily       PHYSICAL EXAM:  Gen: NAD  Resp: Respirations unlabored.  Card: RRR.  GI: Soft. Nontender. Nondistended.  Ext: R DP, PT dopplerable. Left PT dopplerable      05-31-20 @ 07:01  -  06-01-20 @ 06:46  --------------------------------------------------------  IN:    Oral Fluid: 500 mL  Total IN: 500 mL    OUT:    Voided: 3 mL  Total OUT: 3 mL    Total NET: 497 mL          LAB VALUES:  06-01    135  |  97  |  86<H>  ----------------------------<  194<H>  5.6<H>   |  19<L>  |  12.49<H>    Ca    9.2      01 Jun 2020 06:06  Phos  5.8     06-01  Mg     3.4     06-01    TPro  7.5  /  Alb  4.3  /  TBili  0.3  /  DBili  x   /  AST  7<L>  /  ALT  9<L>  /  AlkPhos  140<H>  05-31                               13.6   6.91  )-----------( 217      ( 01 Jun 2020 06:08 )             45.8     LIVER FUNCTIONS - ( 31 May 2020 15:59 )  Alb: 4.3 g/dL / Pro: 7.5 g/dL / ALK PHOS: 140 U/L / ALT: 9 U/L / AST: 7 U/L / GGT: x           PT/INR - ( 31 May 2020 15:59 )   PT: 9.9 sec;   INR: 0.87 ratio         PTT - ( 31 May 2020 15:59 )  PTT:28.0 sec            MICROBIOLOGY:      RADIOLOGY:  PACS Image: Image(s) Available (05-31-20 @ 15:08)        MEDICATIONS  (STANDING):  cloNIDine 0.1 milliGRAM(s) Oral at bedtime  diphenhydrAMINE   Injectable 50 milliGRAM(s) IV Push once  heparin   Injectable 5000 Unit(s) SubCutaneous every 8 hours  insulin lispro (HumaLOG) corrective regimen sliding scale   SubCutaneous three times a day before meals  insulin lispro (HumaLOG) corrective regimen sliding scale   SubCutaneous at bedtime  predniSONE   Tablet 50 milliGRAM(s) Oral once    MEDICATIONS  (PRN):  glucagon  Injectable 1 milliGRAM(s) IntraMuscular once PRN Glucose LESS THAN 70 milligrams/deciliter

## 2020-06-01 NOTE — CONSULT NOTE ADULT - SUBJECTIVE AND OBJECTIVE BOX
CHIEF COMPLAINT: PAD    HISTORY OF PRESENT ILLNESS:  59M PMH ESRD (on HD M/W/F), glaucoma, DMII, HCV, PAD, R DVT s/p thrombectomy, presenting with LLE pain. Patient states that he developed pain in January of this year, which is worse with walking and is located in the upper leg/thigh. Patient is s/p LLE angioplasty in March with some improvement in LLE pain, however in office duplex recently showed re-occlusion coinciding with return of symptoms. Patient is planned for a LLE bypass this week.        PAST MEDICAL & SURGICAL HISTORY:  Glaucoma  Diabetes  HTN (hypertension)  Renal failure: on dialysis  H/O right wrist surgery: 1994          MEDICATIONS:  cloNIDine 0.1 milliGRAM(s) Oral at bedtime  heparin   Injectable 5000 Unit(s) SubCutaneous every 8 hours      diphenhydrAMINE   Injectable 50 milliGRAM(s) IV Push once        glucagon  Injectable 1 milliGRAM(s) IntraMuscular once PRN  insulin lispro (HumaLOG) corrective regimen sliding scale   SubCutaneous three times a day before meals  insulin lispro (HumaLOG) corrective regimen sliding scale   SubCutaneous at bedtime  predniSONE   Tablet 50 milliGRAM(s) Oral once        FAMILY HISTORY:      SOCIAL HISTORY:    [ ] Non-smoker  [ ] Smoker  [ ] Alcohol    Allergies    aspirin (Rash; Urticaria; Hives)    Intolerances    	    REVIEW OF SYSTEMS:  CONSTITUTIONAL: No fever, weight loss, or fatigue  EYES: No eye pain, visual disturbances, or discharge  ENMT:  No difficulty hearing, tinnitus, vertigo; No sinus or throat pain  NECK: No pain or stiffness  RESPIRATORY: No cough, wheezing, chills or hemoptysis; No Shortness of Breath  CARDIOVASCULAR: No chest pain, palpitations, passing out, dizziness, or leg swelling  GASTROINTESTINAL: No abdominal or epigastric pain. No nausea, vomiting, or hematemesis; No diarrhea or constipation. No melena or hematochezia.  GENITOURINARY: No dysuria, frequency, hematuria, or incontinence  NEUROLOGICAL: No headaches, memory loss, loss of strength, numbness, or tremors  SKIN: No itching, burning, rashes, or lesions   LYMPH Nodes: No enlarged glands  ENDOCRINE: No heat or cold intolerance; No hair loss  MUSCULOSKELETAL: No joint pain or swelling; No muscle, back, or extremity pain  PSYCHIATRIC: No depression, anxiety, mood swings, or difficulty sleeping  HEME/LYMPH: No easy bruising, or bleeding gums  ALLERY AND IMMUNOLOGIC: No hives or eczema	    [ ] All others negative	  [ ] Unable to obtain    PHYSICAL EXAM:  T(C): 36.4 (06-01-20 @ 08:17), Max: 36.9 (06-01-20 @ 04:13)  HR: 76 (06-01-20 @ 08:17) (71 - 93)  BP: 173/83 (06-01-20 @ 08:17) (172/75 - 194/93)  RR: 18 (06-01-20 @ 08:17) (15 - 19)  SpO2: 97% (06-01-20 @ 08:17) (94% - 100%)  Wt(kg): --  I&O's Summary    31 May 2020 07:01  -  01 Jun 2020 07:00  --------------------------------------------------------  IN: 500 mL / OUT: 3 mL / NET: 497 mL        Appearance: Normal	  HEENT:   Normal oral mucosa, PERRL, EOMI	  Lymphatic: No lymphadenopathy  Cardiovascular: Normal S1 S2, No JVD, No murmurs, No edema  Respiratory: Lungs clear to auscultation	  Psychiatry: A & O x 3, Mood & affect appropriate  Gastrointestinal:  Soft, Non-tender, + BS	  Skin: No rashes, No ecchymoses, No cyanosis	  Neurologic: Non-focal  Extremities: Normal range of motion, No clubbing, cyanosis or edema  Vascular: Peripheral pulses palpable 2+ bilaterally    TELEMETRY: 	    ECG:  	  RADIOLOGY:  < from: Xray Chest 1 View AP/PA (05.31.20 @ 15:08) >    EXAM:  XR CHEST AP OR PA 1V                            PROCEDURE DATE:  05/31/2020            INTERPRETATION:  CLINICAL INFORMATION: Lower extremity pain. Preoperative imaging.    TECHNIQUE: Single frontal view of the chest.    COMPARISON: None.    FINDINGS:     The lungs are clear.   The heart size is normal.   The visualized osseous structures are unremarkable.    IMPRESSION:     Clear lungs.                JAKI VELA M.D., RADIOLOGY RESIDENT  This document has been electronically signed.  NORRIS VILLARREAL M.D., ATTENDING RADIOLOGIST  This document has been electronically signed. May 31 2020  7:59PM                < end of copied text >    OTHER: 	  	  LABS:	 	    CARDIAC MARKERS:    COVID-19 PCR . (05.31.20 @ 15:59)    COVID-19 PCR: NotDetec: This test has been validated by [employer] to be accurate;  though it has not been FDA cleared/approved by the usual pathway.  As with all laboratory tests, results should be correlated with clinical  findings.  https://www.fda.gov/media/143811/download  https://www.fda.gov/media/758572/download                                  13.6 6.91  )-----------( 217      ( 01 Jun 2020 06:08 )             45.8     06-01    135  |  97  |  86<H>  ----------------------------<  194<H>  5.6<H>   |  19<L>  |  12.49<H>    Ca    9.2      01 Jun 2020 06:06  Phos  5.8     06-01  Mg     3.4     06-01    TPro  7.5  /  Alb  4.3  /  TBili  0.3  /  DBili  x   /  AST  7<L>  /  ALT  9<L>  /  AlkPhos  140<H>  05-31    proBNP:   Lipid Profile:   HgA1c:   TSH: CHIEF COMPLAINT: PAD    HISTORY OF PRESENT ILLNESS:  59M PMH ESRD (on HD M/W/F), glaucoma, DMII, HCV, PAD, R DVT s/p thrombectomy, presenting with LLE pain. Patient states that he developed pain in January of this year, which is worse with walking and is located in the upper leg/thigh. Patient is s/p LLE angioplasty in March with some improvement in LLE pain, however in office duplex recently showed re-occlusion coinciding with return of symptoms. Patient is planned for a LLE bypass this week.  He states to walk everyday more than a mile with no anginal symptoms.   Denies any known history of cardiac issues.           PAST MEDICAL & SURGICAL HISTORY:  Glaucoma  Diabetes  HTN (hypertension)  Renal failure: on dialysis  H/O right wrist surgery: 1994          MEDICATIONS:  cloNIDine 0.1 milliGRAM(s) Oral at bedtime  heparin   Injectable 5000 Unit(s) SubCutaneous every 8 hours      diphenhydrAMINE   Injectable 50 milliGRAM(s) IV Push once        glucagon  Injectable 1 milliGRAM(s) IntraMuscular once PRN  insulin lispro (HumaLOG) corrective regimen sliding scale   SubCutaneous three times a day before meals  insulin lispro (HumaLOG) corrective regimen sliding scale   SubCutaneous at bedtime  predniSONE   Tablet 50 milliGRAM(s) Oral once        FAMILY HISTORY:      SOCIAL HISTORY:    [ ] Non-smoker  [ ] Smoker  [ ] Alcohol    Allergies    aspirin (Rash; Urticaria; Hives)    Intolerances    	    REVIEW OF SYSTEMS:  CONSTITUTIONAL: No fever, weight loss, +fatigue  EYES: No eye pain, visual disturbances, or discharge  ENMT:  No difficulty hearing, tinnitus, vertigo; No sinus or throat pain  NECK: No pain or stiffness  RESPIRATORY: No cough, wheezing, chills or hemoptysis; No Shortness of Breath  CARDIOVASCULAR: No chest pain, palpitations, passing out, dizziness, or leg swelling  GASTROINTESTINAL: No abdominal or epigastric pain. No nausea, vomiting, or hematemesis; No diarrhea or constipation. No melena or hematochezia.  GENITOURINARY: No dysuria, frequency, hematuria, or incontinence  NEUROLOGICAL: No headaches, memory loss, loss of strength, numbness, or tremors  SKIN: No itching, burning, rashes, or lesions   LYMPH Nodes: No enlarged glands  ENDOCRINE: No heat or cold intolerance; No hair loss  MUSCULOSKELETAL:+ joint pain or swelling; + muscle, back, or extremity pain  PSYCHIATRIC: No depression, anxiety, mood swings, or difficulty sleeping  HEME/LYMPH: No easy bruising, or bleeding gums  ALLERY AND IMMUNOLOGIC: No hives or eczema	    [ ] All others negative	  [ ] Unable to obtain    PHYSICAL EXAM:  T(C): 36.4 (06-01-20 @ 08:17), Max: 36.9 (06-01-20 @ 04:13)  HR: 76 (06-01-20 @ 08:17) (71 - 93)  BP: 173/83 (06-01-20 @ 08:17) (172/75 - 194/93)  RR: 18 (06-01-20 @ 08:17) (15 - 19)  SpO2: 97% (06-01-20 @ 08:17) (94% - 100%)  Wt(kg): --  I&O's Summary    31 May 2020 07:01  -  01 Jun 2020 07:00  --------------------------------------------------------  IN: 500 mL / OUT: 3 mL / NET: 497 mL        Appearance: NAD	  HEENT:   Normal oral mucosa, PERRL, EOMI	  Lymphatic: No lymphadenopathy  Cardiovascular: Normal S1 S2, No JVD, No murmurs, No edema  Respiratory: Lungs clear to auscultation	  Psychiatry: A & O x 3, Mood & affect appropriate  Gastrointestinal:  Soft, Non-tender, + BS	  Skin: No rashes, No ecchymoses, No cyanosis	  Neurologic: Non-focal  Extremities: Normal range of motion, No clubbing, +LUE AV graft   Vascular: Decreased LLE distal pulses    TELEMETRY: 	    ECG:  	NSR no acute ischemic stt changes   RADIOLOGY:  < from: Xray Chest 1 View AP/PA (05.31.20 @ 15:08) >    EXAM:  XR CHEST AP OR PA 1V                            PROCEDURE DATE:  05/31/2020            INTERPRETATION:  CLINICAL INFORMATION: Lower extremity pain. Preoperative imaging.    TECHNIQUE: Single frontal view of the chest.    COMPARISON: None.    FINDINGS:     The lungs are clear.   The heart size is normal.   The visualized osseous structures are unremarkable.    IMPRESSION:     Clear lungs.                JAKI VELA M.D., RADIOLOGY RESIDENT  This document has been electronically signed.  NORRIS VILLARREAL M.D., ATTENDING RADIOLOGIST  This document has been electronically signed. May 31 2020  7:59PM                < end of copied text >    OTHER: 	  	  LABS:	 	    CARDIAC MARKERS:    COVID-19 PCR . (05.31.20 @ 15:59)    COVID-19 PCR: NotDetec: This test has been validated by [employer] to be accurate;  though it has not been FDA cleared/approved by the usual pathway.  As with all laboratory tests, results should be correlated with clinical  findings.  https://www.fda.gov/media/920834/download  https://www.fda.gov/media/609708/download                                  13.6   6.91  )-----------( 217      ( 01 Jun 2020 06:08 )             45.8     06-01    135  |  97  |  86<H>  ----------------------------<  194<H>  5.6<H>   |  19<L>  |  12.49<H>    Ca    9.2      01 Jun 2020 06:06  Phos  5.8     06-01  Mg     3.4     06-01    TPro  7.5  /  Alb  4.3  /  TBili  0.3  /  DBili  x   /  AST  7<L>  /  ALT  9<L>  /  AlkPhos  140<H>  05-31    proBNP:   Lipid Profile:   HgA1c:   TSH:

## 2020-06-01 NOTE — CONSULT NOTE ADULT - SUBJECTIVE AND OBJECTIVE BOX
[facility] DIVISION OF KIDNEY DISEASES AND HYPERTENSION -- INITIAL CONSULT NOTE  --------------------------------------------------------------------------------  Juice Hahn   Nephrology Fellow  Pager NS: [phone]/ [facility]: [phone]  (After 5 pm or on weekends please page the on-call fellow)    HPI: Patient is a 59M PMH ESRD (on HD M/W/F), glaucoma, DMII, HCV, PAD, R DVT s/p thrombectomy, presenting with LLE pain. Patient is s/p LLE angioplasty in March with some improvement in LLE pain, however recent outpt duplex showed re-occlusion coinciding with return of symptoms. Patient is planned for a LLE bypass this week, admitted to vascular service.         PAST HISTORY  --------------------------------------------------------------------------------  PAST MEDICAL & SURGICAL HISTORY:  Glaucoma  Diabetes  HTN (hypertension)  Renal failure: on dialysis  H/O right wrist surgery: 1994    FAMILY HISTORY:    PAST SOCIAL HISTORY:    ALLERGIES & MEDICATIONS  --------------------------------------------------------------------------------  Allergies    aspirin (Rash; Urticaria; Hives)    Intolerances      Standing Inpatient Medications  cloNIDine 0.1 milliGRAM(s) Oral at bedtime  diphenhydrAMINE   Injectable 50 milliGRAM(s) IV Push once  heparin   Injectable 5000 Unit(s) SubCutaneous every 8 hours  insulin lispro (HumaLOG) corrective regimen sliding scale   SubCutaneous three times a day before meals  insulin lispro (HumaLOG) corrective regimen sliding scale   SubCutaneous at bedtime  predniSONE   Tablet 50 milliGRAM(s) Oral once    PRN Inpatient Medications  glucagon  Injectable 1 milliGRAM(s) IntraMuscular once PRN      REVIEW OF SYSTEMS  --------------------------------------------------------------------------------  Gen: No fevers/chills  Head/Eyes/Ears/Mouth: No headache; Normal hearing; Normal vision    Respiratory: No dyspnea, cough  CV: No chest pain  GI: No abdominal pain, diarrhea, constipation, nausea, vomiting  : No increased frequency, dysuria  MSK: No joint pain/swelling; no edema    All other systems were reviewed and are negative, except as noted.    VITALS/PHYSICAL EXAM  --------------------------------------------------------------------------------  T(C): 36.9 (06-01-20 @ 04:13), Max: 36.9 (06-01-20 @ 04:13)  HR: 77 (06-01-20 @ 04:13) (71 - 93)  BP: 177/68 (06-01-20 @ 04:13) (172/75 - 194/93)  RR: 19 (06-01-20 @ 04:13) (15 - 19)  SpO2: 94% (06-01-20 @ 04:13) (94% - 100%)  Wt(kg): --  Height (cm): 180.34 (05-31-20 @ 13:06)  Weight (kg): 76.1 (05-31-20 @ 13:06)  BMI (kg/m2): 23.4 (05-31-20 @ 13:06)  BSA (m2): 1.96 (05-31-20 @ 13:06)      05-31-20 @ 07:01  -  06-01-20 @ 07:00  --------------------------------------------------------  IN: 500 mL / OUT: 3 mL / NET: 497 mL      Physical Exam:    	Gen: NAD, well-appearing  	HEENT: Anicteric   	Pulm: CTA B/L  	CV: RRR, S1S2; no rub  	Abd: +BS, soft, nontender/nondistended       	: No suprapubic tenderness  	MSK: Warm, no clubbing, intact strength; no edema  	Neuro: No focal deficits, AOX3, no asterixis       	Vascular Access:      LABS/STUDIES  --------------------------------------------------------------------------------              13.6   6.91  >-----------<  217      [06-01-20 @ 06:08]              45.8     135  |  97  |  86  ----------------------------<  194      [06-01-20 @ 06:06]  5.6   |  19  |  12.49        Ca     9.2     [06-01-20 @ 06:06]      Mg     3.4     [06-01-20 @ 06:06]      Phos  5.8     [06-01-20 @ 06:06]    TPro  7.5  /  Alb  4.3  /  TBili  0.3  /  DBili  x   /  AST  7   /  ALT  9   /  AlkPhos  140  [05-31-20 @ 15:59]    PT/INR: PT 9.9  , INR 0.87       [05-31-20 @ 15:59]  PTT: 28.0       [05-31-20 @ 15:59]      Creatinine Trend:  SCr 12.49 [06-01 @ 06:06]  SCr 11.60 [05-31 @ 15:59] [facility] DIVISION OF KIDNEY DISEASES AND HYPERTENSION -- INITIAL CONSULT NOTE  --------------------------------------------------------------------------------  Juice Hahn   Nephrology Fellow  Pager NS: [phone]/ [facility]: [phone]  (After 5 pm or on weekends please page the on-call fellow)    HPI: Patient is a 59M PMH ESRD (on HD M/W/F), glaucoma, DMII, HCV, PAD, R DVT s/p thrombectomy, presenting with LLE pain. Patient is s/p LLE angioplasty in March with some improvement in LLE pain, however recent outpt duplex showed re-occlusion coinciding with return of symptoms. Patient is planned for a LLE bypass this week, admitted to vascular service. Receives HD via LUE AVF at [facility]. Last HD on 5/29. Labs today notable for K of 5.6, BUN: 86, 12.49mg/dl. Patient refusing dialysis today as he is going for CTA. Discussed the risks of refusing HD, patient verbalized understanding. States if he dies then he dies. Is agreeable to take Lokelma and HD tmrw AM.        PAST HISTORY  --------------------------------------------------------------------------------  PAST MEDICAL & SURGICAL HISTORY:  Glaucoma  Diabetes  HTN (hypertension)  Renal failure: on dialysis  H/O right wrist surgery: 1994    FAMILY HISTORY:    PAST SOCIAL HISTORY: No Smoking or ETOH use.    ALLERGIES & MEDICATIONS  --------------------------------------------------------------------------------  Allergies    aspirin (Rash; Urticaria; Hives)    Intolerances      Standing Inpatient Medications  cloNIDine 0.1 milliGRAM(s) Oral at bedtime  diphenhydrAMINE   Injectable 50 milliGRAM(s) IV Push once  heparin   Injectable 5000 Unit(s) SubCutaneous every 8 hours  insulin lispro (HumaLOG) corrective regimen sliding scale   SubCutaneous three times a day before meals  insulin lispro (HumaLOG) corrective regimen sliding scale   SubCutaneous at bedtime  predniSONE   Tablet 50 milliGRAM(s) Oral once    PRN Inpatient Medications  glucagon  Injectable 1 milliGRAM(s) IntraMuscular once PRN      REVIEW OF SYSTEMS  --------------------------------------------------------------------------------  Gen: No lethargy  Respiratory: No dyspnea  CV: No chest pain  GI: No abdominal pain/ diarrhea   MSK: No edema  Neuro: No dizziness  Heme: No bleeding    All other systems were reviewed and are negative, except as noted.    VITALS/PHYSICAL EXAM  --------------------------------------------------------------------------------  T(C): 36.9 (06-01-20 @ 04:13), Max: 36.9 (06-01-20 @ 04:13)  HR: 77 (06-01-20 @ 04:13) (71 - 93)  BP: 177/68 (06-01-20 @ 04:13) (172/75 - 194/93)  RR: 19 (06-01-20 @ 04:13) (15 - 19)  SpO2: 94% (06-01-20 @ 04:13) (94% - 100%)  Wt(kg): --  Height (cm): 180.34 (05-31-20 @ 13:06)  Weight (kg): 76.1 (05-31-20 @ 13:06)  BMI (kg/m2): 23.4 (05-31-20 @ 13:06)  BSA (m2): 1.96 (05-31-20 @ 13:06)      05-31-20 @ 07:01  -  06-01-20 @ 07:00  --------------------------------------------------------  IN: 500 mL / OUT: 3 mL / NET: 497 mL      Physical Exam:    	Gen: NAD, well-appearing  	HEENT: Anicteric   	Pulm: CTA B/L  	CV: RRR, S1S2; no rub  	Abd: +BS, soft, nontender/nondistended       	: No suprapubic tenderness  	MSK: Warm, no edema  	Neuro: No focal deficits, AOX3      	Vascular Access: LUE AVF with palpable thrill      LABS/STUDIES  --------------------------------------------------------------------------------              13.6   6.91  >-----------<  217      [06-01-20 @ 06:08]              45.8     135  |  97  |  86  ----------------------------<  194      [06-01-20 @ 06:06]  5.6   |  19  |  12.49        Ca     9.2     [06-01-20 @ 06:06]      Mg     3.4     [06-01-20 @ 06:06]      Phos  5.8     [06-01-20 @ 06:06]    TPro  7.5  /  Alb  4.3  /  TBili  0.3  /  DBili  x   /  AST  7   /  ALT  9   /  AlkPhos  140  [05-31-20 @ 15:59]    PT/INR: PT 9.9  , INR 0.87       [05-31-20 @ 15:59]  PTT: 28.0       [05-31-20 @ 15:59]      Creatinine Trend:  SCr 12.49 [06-01 @ 06:06]  SCr 11.60 [05-31 @ 15:59]

## 2020-06-01 NOTE — PROGRESS NOTE ADULT - ASSESSMENT
59M PMH ESRD (on HD M/W/F), glaucoma, DMII, HCV, PAD, R DVT s/p thrombectomy, presenting with LLE pain 2/2 PAD    - c/w premedication for iodine allergy  - CTA Aorta with BL LE runoff @ 11  - Cardiology optimization appreciated  - Nephrology c/s for HD management (HD M, W, F)  - Continue home medications      Vascular Surgery  [id number]

## 2020-06-01 NOTE — CONSULT NOTE ADULT - ASSESSMENT
59M PMH ESRD (on HD M/W/F), glaucoma, DMII, HCV, PAD, R DVT s/p thrombectomy, presenting with LLE pain. Patient states that he developed pain in January of this year, which is worse with walking and is located in the upper leg/thigh. Patient is s/p LLE angioplasty in March with some improvement in LLE pain, however in office duplex recently showed re-occlusion coinciding with return of symptoms. Patient is planned for a LLE bypass this week.

## 2020-06-01 NOTE — CHART NOTE - NSCHARTNOTEFT_GEN_A_CORE
Thoroughly reviewed risks and benefits of RRT/HD with patient. He is agreeable to continue with his dialytic therapy. All questions answered.      Juice Hahn   Nephrology Fellow  Pager NS: [phone]/ [facility]: [phone]  (After 5 pm or on weekends please page the on-call fellow)

## 2020-06-01 NOTE — CHART NOTE - NSCHARTNOTEFT_GEN_A_CORE
59M PMH ESRD (on HD M/W/F), glaucoma, DMII, HCV, PAD, R DVT s/p thrombectomy, presenting with LLE pain.   Per primary team patient taking Lantus 45units qhs and various doses of Novolog (up to 40units premeal).    Recommend Lantus 30 units qhs  Recommend Humalog 10units premeal   Recommend moderate correctional premeal and qhs  Please check 3am FS and cover with mod correctional scale  FS premeal and qhs  FS goal 100-180  Please Obtain BHB and   Please obtain BMP and BHB to r/o DKA    Official consult to follow tomorrow    Eric Arcos MD  Endocrine Fellow   Pager

## 2020-06-01 NOTE — CONSULT NOTE ADULT - ASSESSMENT
59y M with ESRD on HD      #ESRD       Pt. with ESRD on HD three times a week (MWF). Last HD was on 5/29 via LUE AVF. Pt. clinically stable. Labs reviewed. K elevated at 5.6, patient refusing maintenance HD today.  Discussed the risks of refusing HD, patient verbalized understanding. States if he dies then he dies. Is agreeable to take Lokelma 10g and HD tmrw AM.      #HTN  BP at target range. Monitor BP on current BP medication. Low salt diet.    #Anemia  Patient with anemia in the setting of ESRD. Hemoglobin above target range. Will hold DEBORA. Monitor hemoglobin.    #Secondary hyperparathyroidism  Pt. on phosphate binders with meals. Recommend checking serum phosphorus level. Low phosphorus diet.

## 2020-06-02 ENCOUNTER — TRANSCRIPTION ENCOUNTER (OUTPATIENT)
Age: 60
End: 2020-06-02

## 2020-06-02 DIAGNOSIS — E78.5 HYPERLIPIDEMIA, UNSPECIFIED: ICD-10-CM

## 2020-06-02 DIAGNOSIS — I10 ESSENTIAL (PRIMARY) HYPERTENSION: ICD-10-CM

## 2020-06-02 DIAGNOSIS — E10.65 TYPE 1 DIABETES MELLITUS WITH HYPERGLYCEMIA: ICD-10-CM

## 2020-06-02 LAB
ANION GAP SERPL CALC-SCNC: 22 MMOL/L — HIGH (ref 5–17)
APTT BLD: 25.5 SEC — LOW (ref 27.5–36.3)
BLD GP AB SCN SERPL QL: NEGATIVE — SIGNIFICANT CHANGE UP
BUN SERPL-MCNC: 121 MG/DL — HIGH (ref 7–23)
CALCIUM SERPL-MCNC: 8.5 MG/DL — SIGNIFICANT CHANGE UP (ref 8.4–10.5)
CHLORIDE SERPL-SCNC: 91 MMOL/L — LOW (ref 96–108)
CO2 SERPL-SCNC: 17 MMOL/L — LOW (ref 22–31)
CREAT SERPL-MCNC: 14.41 MG/DL — HIGH (ref 0.5–1.3)
GLUCOSE BLDC GLUCOMTR-MCNC: 142 MG/DL — HIGH (ref 70–99)
GLUCOSE BLDC GLUCOMTR-MCNC: 191 MG/DL — HIGH (ref 70–99)
GLUCOSE BLDC GLUCOMTR-MCNC: 200 MG/DL — HIGH (ref 70–99)
GLUCOSE BLDC GLUCOMTR-MCNC: 267 MG/DL — HIGH (ref 70–99)
GLUCOSE BLDC GLUCOMTR-MCNC: 331 MG/DL — HIGH (ref 70–99)
GLUCOSE SERPL-MCNC: 354 MG/DL — HIGH (ref 70–99)
HCT VFR BLD CALC: 38.3 % — LOW (ref 39–50)
HCV RNA FLD QL NAA+PROBE: SIGNIFICANT CHANGE UP
HCV RNA SPEC QL PROBE+SIG AMP: SIGNIFICANT CHANGE UP
HGB BLD-MCNC: 12.2 G/DL — LOW (ref 13–17)
INR BLD: 0.86 RATIO — LOW (ref 0.88–1.16)
MAGNESIUM SERPL-MCNC: 3.4 MG/DL — HIGH (ref 1.6–2.6)
MCHC RBC-ENTMCNC: 24.4 PG — LOW (ref 27–34)
MCHC RBC-ENTMCNC: 31.9 GM/DL — LOW (ref 32–36)
MCV RBC AUTO: 76.6 FL — LOW (ref 80–100)
NRBC # BLD: 0 /100 WBCS — SIGNIFICANT CHANGE UP (ref 0–0)
PHOSPHATE SERPL-MCNC: 5.9 MG/DL — HIGH (ref 2.5–4.5)
PLATELET # BLD AUTO: 222 K/UL — SIGNIFICANT CHANGE UP (ref 150–400)
POTASSIUM SERPL-MCNC: 4.8 MMOL/L — SIGNIFICANT CHANGE UP (ref 3.5–5.3)
POTASSIUM SERPL-SCNC: 4.8 MMOL/L — SIGNIFICANT CHANGE UP (ref 3.5–5.3)
PROTHROM AB SERPL-ACNC: 9.8 SEC — LOW (ref 10–12.9)
RBC # BLD: 5 M/UL — SIGNIFICANT CHANGE UP (ref 4.2–5.8)
RBC # FLD: 16.3 % — HIGH (ref 10.3–14.5)
RH IG SCN BLD-IMP: POSITIVE — SIGNIFICANT CHANGE UP
SARS-COV-2 RNA SPEC QL NAA+PROBE: SIGNIFICANT CHANGE UP
SODIUM SERPL-SCNC: 130 MMOL/L — LOW (ref 135–145)
WBC # BLD: 11.8 K/UL — HIGH (ref 3.8–10.5)
WBC # FLD AUTO: 11.8 K/UL — HIGH (ref 3.8–10.5)

## 2020-06-02 PROCEDURE — 75625 CONTRAST EXAM ABDOMINL AORTA: CPT | Mod: 26

## 2020-06-02 PROCEDURE — 36245 INS CATH ABD/L-EXT ART 1ST: CPT

## 2020-06-02 PROCEDURE — 90935 HEMODIALYSIS ONE EVALUATION: CPT | Mod: GC

## 2020-06-02 PROCEDURE — 75710 ARTERY X-RAYS ARM/LEG: CPT | Mod: 26,LT

## 2020-06-02 PROCEDURE — 76937 US GUIDE VASCULAR ACCESS: CPT | Mod: 26

## 2020-06-02 RX ORDER — INSULIN LISPRO 100/ML
12 VIAL (ML) SUBCUTANEOUS
Refills: 0 | Status: DISCONTINUED | OUTPATIENT
Start: 2020-06-02 | End: 2020-06-03

## 2020-06-02 RX ORDER — INSULIN GLARGINE 100 [IU]/ML
15 INJECTION, SOLUTION SUBCUTANEOUS AT BEDTIME
Refills: 0 | Status: DISCONTINUED | OUTPATIENT
Start: 2020-06-02 | End: 2020-06-02

## 2020-06-02 RX ORDER — HYDROCORTISONE 20 MG
200 TABLET ORAL ONCE
Refills: 0 | Status: COMPLETED | OUTPATIENT
Start: 2020-06-02 | End: 2020-06-02

## 2020-06-02 RX ORDER — HYDRALAZINE HCL 50 MG
10 TABLET ORAL ONCE
Refills: 0 | Status: COMPLETED | OUTPATIENT
Start: 2020-06-02 | End: 2020-06-02

## 2020-06-02 RX ORDER — INSULIN GLARGINE 100 [IU]/ML
20 INJECTION, SOLUTION SUBCUTANEOUS AT BEDTIME
Refills: 0 | Status: DISCONTINUED | OUTPATIENT
Start: 2020-06-02 | End: 2020-06-02

## 2020-06-02 RX ORDER — LABETALOL HCL 100 MG
5 TABLET ORAL ONCE
Refills: 0 | Status: COMPLETED | OUTPATIENT
Start: 2020-06-02 | End: 2020-06-02

## 2020-06-02 RX ORDER — INSULIN LISPRO 100/ML
VIAL (ML) SUBCUTANEOUS EVERY 6 HOURS
Refills: 0 | Status: DISCONTINUED | OUTPATIENT
Start: 2020-06-02 | End: 2020-06-03

## 2020-06-02 RX ORDER — DIPHENHYDRAMINE HCL 50 MG
50 CAPSULE ORAL ONCE
Refills: 0 | Status: COMPLETED | OUTPATIENT
Start: 2020-06-02 | End: 2020-06-02

## 2020-06-02 RX ORDER — INSULIN GLARGINE 100 [IU]/ML
25 INJECTION, SOLUTION SUBCUTANEOUS AT BEDTIME
Refills: 0 | Status: DISCONTINUED | OUTPATIENT
Start: 2020-06-02 | End: 2020-06-03

## 2020-06-02 RX ADMIN — Medication 2: at 19:43

## 2020-06-02 RX ADMIN — Medication 200 MILLIGRAM(S): at 13:04

## 2020-06-02 RX ADMIN — Medication 10 MILLIGRAM(S): at 03:39

## 2020-06-02 RX ADMIN — Medication 200 MILLIGRAM(S): at 16:11

## 2020-06-02 RX ADMIN — Medication 50 MILLIGRAM(S): at 15:10

## 2020-06-02 RX ADMIN — Medication 4: at 03:23

## 2020-06-02 RX ADMIN — Medication 5 MILLIGRAM(S): at 19:38

## 2020-06-02 RX ADMIN — INSULIN GLARGINE 25 UNIT(S): 100 INJECTION, SOLUTION SUBCUTANEOUS at 23:05

## 2020-06-02 RX ADMIN — Medication 10 UNIT(S): at 08:07

## 2020-06-02 RX ADMIN — Medication 0.1 MILLIGRAM(S): at 12:53

## 2020-06-02 RX ADMIN — Medication 6: at 08:08

## 2020-06-02 NOTE — CONSULT NOTE ADULT - PROBLEM SELECTOR PROBLEM 3
Anemia due to chronic kidney disease, on chronic dialysis
HTN (hypertension)
Hyperlipidemia, unspecified hyperlipidemia type

## 2020-06-02 NOTE — CONSULT NOTE ADULT - ASSESSMENT
59M with history of DM1, ESRD (on HD M/W/F), glaucoma, HCV, PAD, R DVT s/p thrombectomy, presenting with LLE pain. Patient states that he developed pain in January of this year, which is worse with walking and is located in the upper leg/thigh. Patient is s/p LLE angioplasty in March with some improvement in LLE pain, however in office duplex recently showed re-occlusion coinciding with return of symptoms. Patient is planned for a LLE bypass tmw, 6/3/19. Endocrine consult requested for management of DM.  Note patient received Prednisone 50mg x3 doses for premedication in the early morning of 6/1/20. Patient again received Hydrocortisone 200mg  at 1pm and is scheduled to receive 200mfg at 4pm.     uncontrolled DM1   A1c 7.2%  FS premeal and qhs   FS goal 100-180  As patient will be NPO after midnight for LLE bypass, recommend decrease Lantus to 25units qhs (patient given 30 units last night, however still woke up hyperglycemic will need to increase basal dose in the future). Please do not hold basal insulin as patient can go into DKA  Recommend increase Humalog to 12 units premeal, hold if patient is NPO  Continue mod correctional scales   On discharge patient can follow up with outpt endocrine, Dr Hanks   Recommend to continue basal, bolus on discharge, dose to be determined    HTN   goal 130/80, at goal   Continue Clonidine 59M with history of DM1, ESRD (on HD M/W/F), glaucoma, HCV, PAD, R DVT s/p thrombectomy, presenting with LLE pain. Patient states that he developed pain in January of this year, which is worse with walking and is located in the upper leg/thigh. Patient is s/p LLE angioplasty in March with some improvement in LLE pain, however in office duplex recently showed re-occlusion coinciding with return of symptoms. Patient is planned for a LLE bypass tmw, 6/3/19. Endocrine consult requested for management of DM.  Note patient received Prednisone 50mg x3 doses for premedication in the early morning of 6/1/20. Patient again received Hydrocortisone 200mg  at 1pm and is scheduled to receive 200mfg at 4pm.     uncontrolled DM1   A1c 7.2%  FS premeal and qhs   FS goal 100-180  As patient will be NPO after midnight for LLE bypass, recommend decrease Lantus to 20 units qhs (patient given 30 units last night, however still woke up hyperglycemic will need to increase basal dose in the future). Please do not hold basal insulin as patient can go into DKA  Recommend increase Humalog to 12 units premeal, hold if patient is NPO  Continue mod correctional scales   On discharge patient can follow up with outpt endocrine, Dr Hanks   Recommend to continue basal, bolus on discharge, dose to be determined    HTN   goal 130/80, at goal   Continue Clonidine 59M with history of DM1, ESRD (on HD M/W/F), glaucoma, HCV, PAD, R DVT s/p thrombectomy, presenting with LLE pain. Patient states that he developed pain in January of this year, which is worse with walking and is located in the upper leg/thigh. Patient is s/p LLE angioplasty in March with some improvement in LLE pain, however in office duplex recently showed re-occlusion coinciding with return of symptoms. Patient is planned for a LLE bypass tmw, 6/3/19. Endocrine consult requested for management of DM.  Note patient received Prednisone 50mg x3 doses for premedication in the early morning of 6/1/20. Patient again received Hydrocortisone 200mg  at 1pm and is scheduled to receive 200mfg at 4pm.     uncontrolled DM1   A1c 7.2%  FS premeal and qhs   FS goal 100-180  As patient will be NPO after midnight for LLE bypass, recommend decrease Lantus to 25 units qhs (patient given 30 units last night, however still woke up hyperglycemic will need to increase basal dose in the future). Please do not hold basal insulin as patient can go into DKA  Recommend increase Humalog to 12 units premeal, hold when patient is NPO  Continue mod correctional scales premeal and qhs   *While NPO please change FS to q6h and cover with mod correctional scale q6h  On discharge patient can follow up with outpt endocrine, Dr Hanks   Recommend to continue basal, bolus on discharge, dose to be determined    HTN   goal 130/80, at goal   Continue Clonidine     HLD  consider lipid panel   If LDL<70, consider starting statin           Eric Arcos MD  Endocrine Fellow   Pager  59M with history of DM1, ESRD (on HD M/W/F), glaucoma, HCV, PAD, R DVT s/p thrombectomy, presenting with LLE pain. Patient states that he developed pain in January of this year, which is worse with walking and is located in the upper leg/thigh. Patient is s/p LLE angioplasty in March with some improvement in LLE pain, however in office duplex recently showed re-occlusion coinciding with return of symptoms. Patient is planned for a LLE bypass tmw, 6/3/19. Endocrine consult requested for management of DM.  Note patient received Prednisone 50mg x3 doses for premedication in the early morning of 6/1/20. Patient again received Hydrocortisone 200mg  at 1pm and is scheduled to receive 200mfg at 4pm.     uncontrolled DM1   A1c 7.2%  FS premeal and qhs   FS goal 100-180  As patient will be NPO after midnight for LLE bypass, recommend decrease Lantus to 25 units qhs (patient given 30 units last night, however still woke up hyperglycemic will need to increase basal dose in the future). Please do not hold basal insulin as patient can go into DKA  Recommend increase Humalog to 12 units premeal, hold when patient is NPO  Continue mod correctional scales premeal and qhs   *While NPO please change FS to q6h and cover with mod correctional scale q6h  On discharge patient can follow up with outpt endocrine, Dr Hanks   Recommend to continue basal, bolus on discharge, dose to be determined    HTN   goal 130/80, at goal   Continue Clonidine     HLD  consider lipid panel   If LDL<70, consider starting statin       * Note will be signed by attending tomorrow as patient was off the floor during rounds     Eric Arcos MD  Endocrine Fellow   Pager

## 2020-06-02 NOTE — PROGRESS NOTE ADULT - SUBJECTIVE AND OBJECTIVE BOX
Subjective: Patient seen and examined. No new events except as noted.   feels ok   For HD today       REVIEW OF SYSTEMS:    CONSTITUTIONAL:+ weakness, fevers or chills  EYES/ENT: No visual changes;  No vertigo or throat pain   NECK: No pain or stiffness  RESPIRATORY: No cough, wheezing, hemoptysis; No shortness of breath  CARDIOVASCULAR: No chest pain or palpitations  GASTROINTESTINAL: No abdominal or epigastric pain. No nausea, vomiting, or hematemesis; No diarrhea or constipation. No melena or hematochezia.  GENITOURINARY: No dysuria, frequency or hematuria  NEUROLOGICAL: No numbness or weakness  SKIN: No itching, burning, rashes, or lesions   All other review of systems is negative unless indicated above.    MEDICATIONS:  MEDICATIONS  (STANDING):  cloNIDine 0.1 milliGRAM(s) Oral two times a day  heparin   Injectable 5000 Unit(s) SubCutaneous every 8 hours  insulin glargine Injectable (LANTUS) 30 Unit(s) SubCutaneous at bedtime  insulin lispro (HumaLOG) corrective regimen sliding scale   SubCutaneous three times a day before meals  insulin lispro (HumaLOG) corrective regimen sliding scale   SubCutaneous at bedtime  insulin lispro Injectable (HumaLOG) 10 Unit(s) SubCutaneous three times a day before meals      PHYSICAL EXAM:  T(C): 36.4 (06-02-20 @ 04:19), Max: 36.7 (06-01-20 @ 12:56)  HR: 76 (06-02-20 @ 04:19) (63 - 81)  BP: 143/60 (06-02-20 @ 04:19) (143/60 - 195/82)  RR: 18 (06-02-20 @ 04:19) (18 - 18)  SpO2: 97% (06-02-20 @ 04:19) (96% - 98%)  Wt(kg): --  I&O's Summary    01 Jun 2020 07:01  -  02 Jun 2020 07:00  --------------------------------------------------------  IN: 680 mL / OUT: 603 mL / NET: 77 mL        Appearance: NAD	  HEENT:   Normal oral mucosa, PERRL, EOMI	  Lymphatic: No lymphadenopathy  Cardiovascular: Normal S1 S2, No JVD, No murmurs, No edema  Respiratory: Lungs clear to auscultation	  Psychiatry: A & O x 3, Mood & affect appropriate  Gastrointestinal:  Soft, Non-tender, + BS	  Skin: No rashes, No ecchymoses, No cyanosis	  Neurologic: Non-focal  Extremities: Normal range of motion, No clubbing, +LUE AV graft   Vascular: Decreased LLE distal pulses        LABS:    CARDIAC MARKERS:                                13.6   6.91  )-----------( 217      ( 01 Jun 2020 06:08 )             45.8     06-01    129<L>  |  89<L>  |  110<H>  ----------------------------<  377<H>  5.6<H>   |  18<L>  |  13.18<H>    Ca    9.1      01 Jun 2020 21:10  Phos  5.8     06-01  Mg     3.4     06-01    TPro  7.5  /  Alb  4.3  /  TBili  0.3  /  DBili  x   /  AST  7<L>  /  ALT  9<L>  /  AlkPhos  140<H>  05-31    proBNP:   Lipid Profile:   HgA1c:   TSH:             TELEMETRY: 	    ECG:  	  RADIOLOGY: < from: CT Angio Abd Aorta w/run-off w/ IV Cont (06.01.20 @ 11:24) >    EXAM:  CT ANGIO ABD AOR W RUN(W)AW IC                            PROCEDURE DATE:  06/01/2020            INTERPRETATION:  CLINICAL INFORMATION: Left lower extremity peripheral arterial disease.    COMPARISON: None.    CT ANGIOGRAM ABDOMEN, PELVIS, AND LOWER EXTREMITIES:    PROCEDURE:   Initially, nonenhanced CT was obtained through the calves. Then, following the rapid administration of intravenous contrast, CT angiography was performed through the abdomen, pelvis, and lower extremities down to the toes.  Delayed images through the calves were also obtained. Sagittal and coronal reformats as well as 3D reconstructions were performed.    125 mls of Omnipaque 350 was administered intravenously without complication and 25 mls were discarded.    FINDINGS:    CENTRAL ARTERIAL SYSTEM:     Normal caliber abdominal aorta with atherosclerotic disease. No aneurysm or dissection. Patent celiac, SMA, renal arteries and CARI. Replaced right hepatic artery off of the SMA.    RIGHT LOWER EXTREMITY:    Atherosclerotic changes of the right common iliac artery which is otherwise patent. Moderate to severe stenosis at the origin and midportion of the right internal iliac artery which is otherwise patent. Right external iliac artery is patent. Patent common femoral artery and deep femoral artery. Right superficial femoral artery demonstrates multifocal mild stenoses, otherwise patent. Multifocal stenoses involving the popliteal artery which is otherwise patent. Right anterior tibial artery is patent at the origin and not visualized distally. Patent tibioperoneal trunk, right posterior tibial and peroneal arteries to the level of the foot.    LEFT LOWER EXTREMITY:    Atherosclerotic disease of the left common iliac artery which is otherwise patent. Moderate-to-severe stenosis at the origin of the left internal iliac artery which is otherwise patent. Patent left external iliac artery. Patent left common femoral artery. Left deep femoral artery is patent at the origin and occluded distally. Multifocal stenoses of the left superficial femoral artery which is otherwise patent. Multifocal stenoses involving the left popliteal artery with occlusion in the mid portion. Evaluation of the distal arteries is limited secondary to diffuse calcifications. Nonvisualized left anterior tibial artery. Reconstitution of the left posterior tibial and peroneal arteries secondary to collaterals with opacification to the level of the foot.    ADDITIONAL FINDINGS: Cholelithiasis. Bilateral renal cysts and subcentimeter hypoattenuating lesions too small to characterize. No hydronephrosis. Diffuse wall thickening of the urinary bladder may be in part related to underdistention. Enlarged prostate. Subcentimeter retroperitoneal nodes.    IMPRESSION:     Atherosclerotic changes of the abdominal aorta and its branches.    Two vessel run off to the right foot.     Occlusion of the distal left deep femoral artery. Occlusion of the distal popliteal artery with reconstitution of the distal arteries and two vessel run off to the left foot.       PAM TELLEZ M.D., ATTENDING RADIOLOGIST  This document has been electronically signed. Jun 1 2020 12:45PM                < end of copied text >    DIAGNOSTIC TESTING:  [ ] Echocardiogram:  < from: Transthoracic Echocardiogram (06.01.20 @ 14:24) >    Patient name: MARIETTA MAGALLANES  YOB: 1960   Age: 59 (M)   MR#: [id number]  Study Date: 6/1/2020  Location: [address] [place], [place] [id number]: [facility]  Study quality: Technically fair  Referring Physician: Ok Samaniego MD  Blood Pressure: 168/80 mmHg  Height: 180 cm  Weight: 76 kg  BSA: 2 m2  ------------------------------------------------------------------------  PROCEDURE: Transthoracic echocardiogram with 2-D, M-Mode  and complete spectral and color flow Doppler.  INDICATION: Cardiac murmur, unspecified (R01.1)  ------------------------------------------------------------------------  Dimensions:    Normal Values:  LA:     3.3    2.0 - 4.0 cm  Ao:     3.4    2.0 - 3.8 cm  SEPTUM: 1.6    0.6 - 1.2 cm  PWT:    1.0    0.6 - 1.1 cm  LVIDd:  4.5    3.0 - 5.6 cm  LVIDs:  2.3    1.8 - 4.0 cm  Derived variables:  LVMI: 124 g/m2  RWT: 0.46  Fractional short: 48 %  EF (Visual Estimate): 70-75 %  ------------------------------------------------------------------------  Observations:  Mitral Valve: Mitral annular calcification. Mild mitral  regurgitation.  Aortic Valve/Aorta: Calcified aortic valve.  Aortic Root: 3.4 cm.  LVOT diameter: 2 cm.  Left Atrium: Normal left atrium.  LA volume index = 29  cc/m2.  Left Ventricle: Normal left ventricular systolic function.  No segmental wall motion abnormalities. Mild concentric  left ventricular hypertrophy.  Right Heart: Normal right atrium. The right ventricle is  not well visualized; grossly normal right ventricular  systolic function. Normal tricuspid valve. Normal pulmonic  valve. Minimal pulmonic regurgitation.  Pericardium/Pleura: Normal pericardium with no pericardial  effusion.  Hemodynamic: Estimated right atrial pressure is 8 mm Hg.  ------------------------------------------------------------------------  Conclusions:  1. Mild concentric left ventricular hypertrophy.  2. Normal left ventricular systolic function. No segmental  wall motion abnormalities.  *** No previous Echo exam.  ------------------------------------------------------------------------  Confirmed on  6/1/2020 - 16:54:14 by Mervin Delgado M.D.  ------------------------------------------------------------------------    < end of copied text >    [ ]  Catheterization:  [ ] Stress Test:    OTHER:

## 2020-06-02 NOTE — PROVIDER CONTACT NOTE (OTHER) - ACTION/TREATMENT ORDERED:
Pt educated on risk of elevated b/p. PA made aware of pt's refusal of med intervention. Will recheck b/p at 0300 per patient's request. Will continue to monitor.

## 2020-06-02 NOTE — CHART NOTE - NSCHARTNOTEFT_GEN_A_CORE
Preop Dx: LLE rest pain  Surgeon: Randal Aguirre  Procedure: Fem-distal bypass    Vital Signs Last 24 Hrs  T(C): 36.6 (2020 11:05), Max: 36.6 (2020 11:05)  T(F): 97.8 (2020 11:05), Max: 97.8 (2020 11:05)  HR: 87 (2020 11:05) (63 - 87)  BP: 117/69 (2020 11:05) (117/69 - 195/82)  BP(mean): --  RR: 18 (2020 11:05) (17 - 18)  SpO2: 98% (2020 11:05) (96% - 100%)                        12.2   11.80 )-----------( 222      ( 2020 08:57 )             38.3     06-02    130<L>  |  91<L>  |  121<H>  ----------------------------<  354<H>  4.8   |  17<L>  |  14.41<H>    Ca    8.5      2020 08:57  Phos  5.9     06-02  Mg     3.4     06-02      PT/INR - ( 2020 07:30 )   PT: 9.8 sec;   INR: 0.86 ratio           Daily     Daily Weight in k.8 (2020 11:05)    EK/31 NSR  CXR:  Clear lungs  Type and Screen: ,         A/P: 59y Male     - OR 2020 for L fem-distal bypass with Dr. Randal Aguirre  - NPO past midnight, except medications  - IVF while NPO  - Consent signed and in chart  - Medical clearance for OR

## 2020-06-02 NOTE — PROGRESS NOTE ADULT - PROBLEM SELECTOR PLAN 1
For LLE bypass surgery. Acceptable cardiac risk to proceed. Has normal biventricular size and function with METS > 4 and no anginal symptoms.

## 2020-06-02 NOTE — PRE-ANESTHESIA EVALUATION ADULT - NSANTHPMHFT_GEN_ALL_CORE
59M PMH ESRD (on HD M/W/F), glaucoma, DMII, HCV, PAD s/p LLE angioplasty in March, R DVT s/p thrombectomy, presenting with LLE pain w/outpatient duplex revealing re-occlusion. CTA showed occlusion of the distal left deep femoral artery. Occlusion of the distal popliteal artery with reconstitution of the distal arteries and two vessel run off to the left foot. METs > 4. TTE on 6/1 with mild LVH, normal LV systolic function, no major valvular abnormalities.
PMH ESRD (on HD M/W/F), glaucoma, DMII, HCV, PAD, R DVT s/p thrombectomy, presenting with LLE pain

## 2020-06-02 NOTE — CONSULT NOTE ADULT - SUBJECTIVE AND OBJECTIVE BOX
HPI: 59M PMH ESRD (on HD M/W/F), glaucoma, DMII, HCV, PAD, R DVT s/p thrombectomy, presenting with LLE pain. Patient states that he developed pain in January of this year, which is worse with walking and is located in the upper leg/thigh. Patient is s/p LLE angioplasty in March with some improvement in LLE pain, however in office duplex recently showed re-occlusion coinciding with return of symptoms. Patient is planned for a LLE bypass this week.          PAST MEDICAL & SURGICAL HISTORY:  Glaucoma  DM2  HTN   Renal failure: on dialysis MWF  H/O right wrist surgery: 1994    FAMILY HISTORY:    Social History:    Outpatient Medications:  · 	calcium acetate 667 mg oral tablet: 3 tab(s) orally 3 times a day  · 	Catapres 0.1 mg oral tablet: 1 tab(s) orally once a day (at bedtime)  · 	NovoLOG 100 units/mL subcutaneous solution: subcutaneous once a day (at bedtime)  · 	Lantus 100 units/mL subcutaneous solution: subcutaneous once a day (at bedtime)    MEDICATIONS  (STANDING):  cloNIDine 0.1 milliGRAM(s) Oral two times a day  diphenhydrAMINE   Injectable 50 milliGRAM(s) IV Push once  heparin   Injectable 5000 Unit(s) SubCutaneous every 8 hours  hydrocortisone sodium succinate Injectable 200 milliGRAM(s) IV Push once  insulin glargine Injectable (LANTUS) 15 Unit(s) SubCutaneous at bedtime  insulin lispro (HumaLOG) corrective regimen sliding scale   SubCutaneous three times a day before meals  insulin lispro (HumaLOG) corrective regimen sliding scale   SubCutaneous at bedtime  insulin lispro Injectable (HumaLOG) 10 Unit(s) SubCutaneous three times a day before meals    MEDICATIONS  (PRN):  glucagon  Injectable 1 milliGRAM(s) IntraMuscular once PRN Glucose LESS THAN 70 milligrams/deciliter      Allergies    aspirin (Rash; Urticaria; Hives)    Intolerances      Review of Systems:  Constitutional: No fever  Eyes: No blurry vision  Neuro: No tremors  HEENT: No pain  Cardiovascular: No chest pain, palpitations  Respiratory: No SOB, no cough  GI: No nausea, vomiting, abdominal pain  : No dysuria  Skin: no rash  Psych: no depression  Endocrine: no polyuria, polydipsia  Hem/lymph: no swelling  Osteoporosis: no fractures    ALL OTHER SYSTEMS REVIEWED AND NEGATIVE      PHYSICAL EXAM:  VITALS: T(C): 36.6 (06-02-20 @ 11:05)  T(F): 97.8 (06-02-20 @ 11:05), Max: 97.8 (06-01-20 @ 16:00)  HR: 87 (06-02-20 @ 11:05) (63 - 87)  BP: 117/69 (06-02-20 @ 11:05) (117/69 - 195/82)  RR:  (17 - 18)  SpO2:  (96% - 100%)  Wt(kg): --  GENERAL: NAD, well-groomed, well-developed  EYES: No proptosis, no lid lag, anicteric  HEENT:  Atraumatic, Normocephalic, moist mucous membranes  THYROID: Normal size, no palpable nodules  RESPIRATORY: Clear to auscultation bilaterally; No rales, rhonchi, wheezing  CARDIOVASCULAR: Regular rate and rhythm; No murmurs; no peripheral edema  GI: Soft, nontender, non distended, normal bowel sounds  SKIN: Dry, intact, No rashes or lesions  MUSCULOSKELETAL: Full range of motion, normal strength  NEURO: sensation intact, extraocular movements intact, no tremor  PSYCH: Alert and oriented x 3, normal affect, normal mood  CUSHING'S SIGNS: no striae    POCT Blood Glucose.: 267 mg/dL (06-02-20 @ 08:05)  POCT Blood Glucose.: 331 mg/dL (06-02-20 @ 03:15)  POCT Blood Glucose.: 400 mg/dL (06-01-20 @ 21:13)  POCT Blood Glucose.: 424 mg/dL (06-01-20 @ 19:14)  POCT Blood Glucose.: 429 mg/dL (06-01-20 @ 18:31)  POCT Blood Glucose.: 397 mg/dL (06-01-20 @ 12:48)  POCT Blood Glucose.: 201 mg/dL (06-01-20 @ 08:07)  POCT Blood Glucose.: 101 mg/dL (05-31-20 @ 19:47)                            12.2   11.80 )-----------( 222      ( 02 Jun 2020 08:57 )             38.3       06-02    130<L>  |  91<L>  |  121<H>  ----------------------------<  354<H>  4.8   |  17<L>  |  14.41<H>    EGFR if : 4<L>  EGFR if non : 3<L>    Ca    8.5      06-02  Mg     3.4     06-02  Phos  5.9     06-02    TPro  7.5  /  Alb  4.3  /  TBili  0.3  /  DBili  x   /  AST  7<L>  /  ALT  9<L>  /  AlkPhos  140<H>  05-31 HPI: 59M with history of DM1, ESRD (on HD M/W/F), glaucoma, HCV, PAD, R DVT s/p thrombectomy, presenting with LLE pain. Patient states that he developed pain in January of this year, which is worse with walking and is located in the upper leg/thigh. Patient is s/p LLE angioplasty in March with some improvement in LLE pain, however in office duplex recently showed re-occlusion coinciding with return of symptoms. Patient is planned for a LLE bypass tmw, 6/3/19. Endocrine consult requested for management of DM.     Endocrine history:   Endocrine: Dr Hanks, only seen once   Patient reports he was diagnosed with DM 30 years ago as he was having increased polyuria.   Patient reports he was told he had DM1 on 2007 when he was started on insulin. Patient reports he takes Lantus 12-15 units qhs and Novolog premeal on sliding scale ( -150 8-10units, -200 15units...)  Checks FS 4 times daily. Reports FS range from .   Patient reports history of b/l glaucoma. Last ophthalmology visit was 3 years ago.  Reports numbness and tingling in LLE.  Reports history of DM2 in brother.   Quit smoking in 2007. Social drinker.   D: B: egg, cream of wheat, rye bread  L: does not eat, D: rice and fish.  Reports he does not snack     PAST MEDICAL & SURGICAL HISTORY:  Glaucoma  DM2  HTN   Renal failure: on dialysis MWF  H/O right wrist surgery: 1994    FAMILY HISTORY:  History of DM in brother     Social History:  Quit smoking cigarettes in 2007. Social alcohol use.     Outpatient Medications:  · 	calcium acetate 667 mg oral tablet: 3 tab(s) orally 3 times a day  · 	Catapres 0.1 mg oral tablet: 1 tab(s) orally once a day (at bedtime)  · 	NovoLOG 100 units/mL subcutaneous solution: subcutaneous once a day (at bedtime)  · 	Lantus 100 units/mL subcutaneous solution: 15units subcutaneous once a day (at bedtime)    MEDICATIONS  (STANDING):  cloNIDine 0.1 milliGRAM(s) Oral two times a day  diphenhydrAMINE   Injectable 50 milliGRAM(s) IV Push once  heparin   Injectable 5000 Unit(s) SubCutaneous every 8 hours  hydrocortisone sodium succinate Injectable 200 milliGRAM(s) IV Push once  insulin glargine Injectable (LANTUS) 15 Unit(s) SubCutaneous at bedtime  insulin lispro (HumaLOG) corrective regimen sliding scale   SubCutaneous three times a day before meals  insulin lispro (HumaLOG) corrective regimen sliding scale   SubCutaneous at bedtime  insulin lispro Injectable (HumaLOG) 10 Unit(s) SubCutaneous three times a day before meals    MEDICATIONS  (PRN):  glucagon  Injectable 1 milliGRAM(s) IntraMuscular once PRN Glucose LESS THAN 70 milligrams/deciliter    Allergies  aspirin (Rash; Urticaria; Hives)    Review of Systems:  Constitutional: No fever  Eyes: No blurry vision  Neuro: No tremors  HEENT: No pain  Cardiovascular: No chest pain, palpitations, LLE pain   Respiratory: No SOB, no cough  GI: No nausea, vomiting, abdominal pain  : No dysuria  Skin: no rash  Psych: no depression  Endocrine: no polyuria, polydipsia  Hem/lymph: no swelling    ALL OTHER SYSTEMS REVIEWED AND NEGATIVE      PHYSICAL EXAM:  VITALS: T(C): 36.6 (06-02-20 @ 11:05)  T(F): 97.8 (06-02-20 @ 11:05), Max: 97.8 (06-01-20 @ 16:00)  HR: 87 (06-02-20 @ 11:05) (63 - 87)  BP: 117/69 (06-02-20 @ 11:05) (117/69 - 195/82)  RR:  (17 - 18)  SpO2:  (96% - 100%)  Wt(kg): 76kg   GENERAL: NAD, well-groomed, well-developed  EYES: No proptosis, anicteric  HEENT:  Atraumatic, Normocephalic, moist mucous membranes  THYROID: Normal size, no palpable nodules, nontender   RESPIRATORY: Clear to auscultation bilaterally; No rales, rhonchi, wheezing  CARDIOVASCULAR: Regular rate and rhythm; No murmurs; no peripheral edema, L AVF   GI: Soft, nontender, non distended  SKIN: Skin on LE dry   MUSCULOSKELETAL: Full range of motion, normal strength  NEURO: sensation intact, extraocular movements intact, no tremor  PSYCH: Alert and oriented x 3, reactive affect     POCT Blood Glucose.: 267 mg/dL (06-02-20 @ 08:05) H10+6  POCT Blood Glucose.: 331 mg/dL (06-02-20 @ 03:15) H4   POCT Blood Glucose.: 400 mg/dL (06-01-20 @ 21:13) H6  POCT Blood Glucose.: 424 mg/dL (06-01-20 @ 19:14) H6   POCT Blood Glucose.: 429 mg/dL (06-01-20 @ 18:31)   POCT Blood Glucose.: 397 mg/dL (06-01-20 @ 12:48) H4   POCT Blood Glucose.: 201 mg/dL (06-01-20 @ 08:07)  POCT Blood Glucose.: 101 mg/dL (05-31-20 @ 19:47)                            12.2   11.80 )-----------( 222      ( 02 Jun 2020 08:57 )             38.3       06-02    130<L>  |  91<L>  |  121<H>  ----------------------------<  354<H>  4.8   |  17<L>  |  14.41<H>    EGFR if : 4<L>  EGFR if non : 3<L>    Ca    8.5      06-02  Mg     3.4     06-02  Phos  5.9     06-02    TPro  7.5  /  Alb  4.3  /  TBili  0.3  /  DBili  x   /  AST  7<L>  /  ALT  9<L>  /  AlkPhos  140<H>  05-31

## 2020-06-02 NOTE — PROGRESS NOTE ADULT - SUBJECTIVE AND OBJECTIVE BOX
SURGERY DAILY PROGRESS NOTE:       SUBJECTIVE/ROS: Patient feels well. Denies nausea, vomiting, chest pain, shortness of breath. Denies left lower extremity pain at rest.       MEDICATIONS  (STANDING):  cloNIDine 0.1 milliGRAM(s) Oral two times a day  heparin   Injectable 5000 Unit(s) SubCutaneous every 8 hours  insulin glargine Injectable (LANTUS) 30 Unit(s) SubCutaneous at bedtime  insulin lispro (HumaLOG) corrective regimen sliding scale   SubCutaneous three times a day before meals  insulin lispro (HumaLOG) corrective regimen sliding scale   SubCutaneous at bedtime  insulin lispro Injectable (HumaLOG) 10 Unit(s) SubCutaneous three times a day before meals    MEDICATIONS  (PRN):  glucagon  Injectable 1 milliGRAM(s) IntraMuscular once PRN Glucose LESS THAN 70 milligrams/deciliter      OBJECTIVE:    Vital Signs Last 24 Hrs  T(C): 36.4 (2020 08:25), Max: 36.7 (2020 12:56)  T(F): 97.5 (2020 08:25), Max: 98.1 (2020 12:56)  HR: 75 (2020 08:25) (63 - 81)  BP: 151/77 (2020 08:25) (143/60 - 195/82)  BP(mean): --  RR: 17 (2020 08:25) (17 - 18)  SpO2: 100% (2020 08:25) (96% - 100%)        I&O's Detail    2020 07:01  -  2020 07:00  --------------------------------------------------------  IN:    Oral Fluid: 680 mL  Total IN: 680 mL    OUT:    Voided: 603 mL  Total OUT: 603 mL    Total NET: 77 mL          Daily     Daily Weight in k.6 (2020 08:25)    LABS:                        12.2   11.80 )-----------( 222      ( 2020 08:57 )             38.3     06-01    129<L>  |  89<L>  |  110<H>  ----------------------------<  377<H>  5.6<H>   |  18<L>  |  13.18<H>    Ca    9.1      2020 21:10  Phos  5.8       Mg     3.4         TPro  7.5  /  Alb  4.3  /  TBili  0.3  /  DBili  x   /  AST  7<L>  /  ALT  9<L>  /  AlkPhos  140<H>      PT/INR - ( 2020 07:30 )   PT: 9.8 sec;   INR: 0.86 ratio         PTT - ( 31 May 2020 15:59 )  PTT:28.0 sec      < from: CT Angio Abd Aorta w/run-off w/ IV Cont (20 @ 11:24) >    EXAM:  CT ANGIO ABD AOR W RUN(W)AW IC                            PROCEDURE DATE:  2020            INTERPRETATION:  CLINICAL INFORMATION: Left lower extremity peripheral arterial disease.    COMPARISON: None.    CT ANGIOGRAM ABDOMEN, PELVIS, AND LOWER EXTREMITIES:    PROCEDURE:   Initially, nonenhanced CT was obtained through the calves. Then, following the rapid administration of intravenous contrast, CT angiography was performed through the abdomen, pelvis, and lower extremities down to the toes.  Delayed images through the calves were also obtained. Sagittal and coronal reformats as well as 3D reconstructions were performed.    125 mls of Omnipaque 350 was administered intravenously without complication and 25 mls were discarded.    FINDINGS:    CENTRAL ARTERIAL SYSTEM:     Normal caliber abdominal aorta with atherosclerotic disease. No aneurysm or dissection. Patent celiac, SMA, renal arteries and CARI. Replaced right hepatic artery off of the SMA.    RIGHT LOWER EXTREMITY:    Atherosclerotic changes of the right common iliac artery which is otherwise patent. Moderate to severe stenosis at the origin and midportion of the right internal iliac artery which is otherwise patent. Right external iliac artery is patent. Patent common femoral artery and deep femoral artery. Right superficial femoral artery demonstrates multifocal mild stenoses, otherwise patent. Multifocal stenoses involving the popliteal artery which is otherwise patent. Right anterior tibial artery is patent at the origin and not visualized distally. Patent tibioperoneal trunk, right posterior tibial and peroneal arteries to the level of the foot.    LEFT LOWER EXTREMITY:    Atherosclerotic disease of the left common iliac artery which is otherwise patent. Moderate-to-severe stenosis at the origin of the left internal iliac artery which is otherwise patent. Patent left external iliac artery. Patent left common femoral artery. Left deep femoral artery is patent at the origin and occluded distally. Multifocal stenoses of the left superficial femoral artery which is otherwise patent. Multifocal stenoses involving the left popliteal artery with occlusion in the mid portion. Evaluation of the distal arteries is limited secondary to diffuse calcifications. Nonvisualized left anterior tibial artery. Reconstitution of the left posterior tibial and peroneal arteries secondary to collaterals with opacification to the level of the foot.    ADDITIONAL FINDINGS: Cholelithiasis. Bilateral renal cysts and subcentimeter hypoattenuating lesions too small to characterize. No hydronephrosis. Diffuse wall thickening of the urinary bladder may be in part related to underdistention. Enlarged prostate. Subcentimeter retroperitoneal nodes.    IMPRESSION:     Atherosclerotic changes of the abdominal aorta and its branches.    Two vessel run off to the right foot.     Occlusion of the distal left deep femoral artery. Occlusion of the distal popliteal artery with reconstitution of the distal arteries and two vessel run off to the left foot.                     PAM TELLEZ M.D., ATTENDING RADIOLOGIST  This document has been electronically signed. 2020 12:45PM                PHYSICAL EXAM:  Gen: NAD  Resp: Respirations unlabored.  Card: RRR.  GI: Soft. Nontender. Nondistended.  Ext: R DP, PT dopplerable. Left PT dopplerable

## 2020-06-02 NOTE — PROGRESS NOTE ADULT - SUBJECTIVE AND OBJECTIVE BOX
Vascular & Interventional Radiology Post-Procedure Note    Pre-Procedure Diagnosis: LLE arterial occlusion  Post-Procedure Diagnosis: Same as pre.  Indications for Procedure: pre-op for surgical intervention tomorrow    Attending: Castro  Resident: Samantha    Procedure Details/Findings: LLE angiogram performed. there was good aortic flow into the b/l lower extremities. there was multi-focal mild to moderate stenosis of the left SFA. there was occlusion of the popliteal artery above the knee. there was re-constitution of the anterior and posterior tibial arteries due to collateral vessels. there was reconstitution of the dorsalis pedis artery due to a collateral vessel of the posterior tibial artery.    Complications: none  Estimated Blood Loss: Minimal  Specimen: none  Contrast: 105cc of Omni 240  Sedation: administered by the anesthesiologist.  Patient Condition/Disposition: stable. recovery for 1 hour then back to floors.    Plan:   - strict bedrest with punctured extremity extended for 4 hours.  - keep dressing site c/d/i  - rest of care as per primary team    call IR at [phone] with any questions or concerns.    Gus Singh  ESIR/PGY5 Resident Vascular & Interventional Radiology Post-Procedure Note    Pre-Procedure Diagnosis: LLE arterial occlusion  Post-Procedure Diagnosis: Same as pre.  Indications for Procedure: pre-op for surgical intervention tomorrow    Attending: Castro  Resident: Samantha    Procedure Details/Findings: LLE angiogram performed. there was good aortic flow into the b/l lower extremities. there was multi-focal mild to moderate stenosis of the left SFA. there was focal occlusion of the mid-distal popliteal artery. there was re-constitution of the peroneal and posterior tibial arteries due to collateral vessels. there was reconstitution of the dorsalis pedis artery due to a collateral vessel of the peroneal artery.    Complications: none  Estimated Blood Loss: Minimal  Specimen: none  Contrast: 105cc of Omni 240  Sedation: administered by the anesthesiologist.  Patient Condition/Disposition: stable. recovery for 1 hour then back to floors.    Plan:   - strict bedrest with punctured extremity extended for 4 hours.  - keep dressing site c/d/i  - rest of care as per primary team    call IR at [phone] with any questions or concerns.    Gus Singh  ESIR/PGY5 Resident

## 2020-06-02 NOTE — CONSULT NOTE ADULT - ATTENDING COMMENTS
Agree with assessment and plan as above by Dr. Arcos. Reviewed all pertinent labs, glucose values, and imaging studies. Modifications made as indicated above.     Sanjay Murphy D.O  [phone]
Advanced care planning was discussed with patient and family.  Advanced care planning forms were reviewed and discussed as appropriate.  Differential diagnosis and plan of care discussed with patient after the evaluation.   Pain assessed and judicious use of narcotics when appropriate was discussed.  Importance of Fall prevention discussed.  Counseling on Smoking and Alcohol cessation was offered when appropriate.  Counseling on Diet, exercise, and medication compliance was done.

## 2020-06-02 NOTE — CHART NOTE - NSCHARTNOTEFT_GEN_A_CORE
Patient made NPO  Last meal at 8am  IR going to perform LLE angio today after 4pm (need to wait 8 hours after last meal)  Plan for OR tomorrow for LLE bypass  1/2 dose lantus ordered for tonight as patient will be NPO at midnight

## 2020-06-02 NOTE — PROGRESS NOTE ADULT - ASSESSMENT
59y M with ESRD on HD      #ESRD     Pt. with ESRD on HD three times a week (MWF). Last HD was on 5/29 via LUE AVF. Pt. clinically stable. Labs reviewed. Receiving HD today, UF lowered due to hypotension/dizziness    #HTN  BP at target range. Monitor BP on current BP medication. Low salt diet. Patient is on catapress .1mg at bedtime, currently ordered for BID.    #Anemia  Patient with anemia in the setting of ESRD. Hemoglobin above target range. Will hold DEBORA for now. Monitor hemoglobin.  -Check iron studies including ferritin    #Secondary hyperparathyroidism  Pt. on phosphate binders with meals. Recommend checking serum phosphorus level. Low phosphorus diet.

## 2020-06-02 NOTE — PROGRESS NOTE ADULT - SUBJECTIVE AND OBJECTIVE BOX
Vascular & Interventional Radiology Pre-Procedure Note    Procedure Name: Image Guided LLE angiogram    HPI: 59y Male with left distal popliteal artery occlusion here for LLE angiogram.    Allergies: aspirin (Rash; Urticaria; Hives)  iodine (Rash; Urticaria)    Medications (Abx/Ccardiac/Aanticoagulation/Blood Products)  cloNIDine: 0.1 milliGRAM(s) Oral (05-31 @ 20:12)  cloNIDine: 0.1 milliGRAM(s) Oral (06-02 @ 12:53)  hydrALAZINE Injectable: 10 milliGRAM(s) IV Push (06-02 @ 03:39)    Data:  180.34  76.1  T(C): 36.6  HR: 87  BP: 117/69  RR: 18  SpO2: 98%  Exam:  General:   Chest:  Abdomen:   Extremities:    -WBC 11.80 / HgB 12.2 / Hct 38.3 / Plt 222  -Na 130 / Cl 91 /  / Glucose 354  -K 4.8 / CO2 17 / Cr 14.41  -ALT -- / Alk Phos -- / T.Bili --  -INR0.86    Imaging:   < from: CT Angio Abd Aorta w/run-off w/ IV Cont (06.01.20 @ 11:24) >  EXAM:  CT ANGIO ABD AOR W RUN(W)AW IC                            PROCEDURE DATE:  06/01/2020        INTERPRETATION:  CLINICAL INFORMATION: Left lower extremity peripheral arterial disease.    COMPARISON: None.    CT ANGIOGRAM ABDOMEN, PELVIS, AND LOWER EXTREMITIES:    PROCEDURE:   Initially, nonenhanced CT was obtained through the calves. Then, following the rapid administration of intravenous contrast, CT angiography was performed through the abdomen, pelvis, and lower extremities down to the toes.  Delayed images through the calves were also obtained. Sagittal and coronal reformats as well as 3D reconstructions were performed.    125 mls of Omnipaque 350 was administered intravenously without complication and 25 mls were discarded.    FINDINGS:    CENTRAL ARTERIAL SYSTEM:     Normal caliber abdominal aorta with atherosclerotic disease. No aneurysm or dissection. Patent celiac, SMA, renal arteries and CARI. Replaced right hepatic artery off of the SMA.    RIGHT LOWER EXTREMITY:    Atherosclerotic changes of the right common iliac artery which is otherwise patent. Moderate to severe stenosis at the origin and midportion of the right internal iliac artery which is otherwise patent. Right external iliac artery is patent. Patent common femoral artery and deep femoral artery. Right superficial femoral artery demonstrates multifocal mild stenoses, otherwise patent. Multifocal stenoses involving the popliteal artery which is otherwise patent. Right anterior tibial artery is patent at the origin and not visualized distally. Patent tibioperoneal trunk, right posterior tibial and peroneal arteries to the level of the foot.    LEFT LOWER EXTREMITY:    Atherosclerotic disease of the left common iliac artery which is otherwise patent. Moderate-to-severe stenosis at the origin of the left internal iliac artery which is otherwise patent. Patent left external iliac artery. Patent left common femoral artery. Left deep femoral artery is patent at the origin and occluded distally. Multifocal stenoses of the left superficial femoral artery which is otherwise patent. Multifocal stenoses involving the left popliteal artery with occlusion in the mid portion. Evaluation of the distal arteries is limited secondary to diffuse calcifications. Nonvisualized left anterior tibial artery. Reconstitution of the left posterior tibial and peroneal arteries secondary to collaterals with opacification to the level of the foot.    ADDITIONAL FINDINGS: Cholelithiasis. Bilateral renal cysts and subcentimeter hypoattenuating lesions too small to characterize. No hydronephrosis. Diffuse wall thickening of the urinary bladder may be in part related to underdistention. Enlarged prostate. Subcentimeter retroperitoneal nodes.    IMPRESSION:     Atherosclerotic changes of the abdominal aorta and its branches.    Two vessel run off to the right foot.     Occlusion of the distal left deep femoral artery. Occlusion of the distal popliteal artery with reconstitution of the distal arteries and two vessel run off to the left foot.         PAM TELLEZ M.D., ATTENDING RADIOLOGIST  This document has been electronically signed. Jun 1 2020 12:45PM    < end of copied text >      Plan:   -59y Male presents for LLE angiogram.  -Risks/Benefits/alternatives explained with the patient and witnessed informed consent obtained.

## 2020-06-02 NOTE — PROGRESS NOTE ADULT - ASSESSMENT
59M PMH ESRD (on HD M/W/F), glaucoma, DMII, HCV, PAD, R DVT s/p thrombectomy, presenting with LLE pain 2/2 PAD.    - will call IR for LLE angiogram today  - OR planning for tomorrow for left fem-distal bypass  - Cardiology optimization appreciated  - Nephrology c/s for HD management (HD M, W, F)  - Continue home medications  - covid test to be done today      Vascular Surgery  [id number] 59M PMH ESRD (on HD M/W/F), glaucoma, DMII, HCV, PAD, R DVT s/p thrombectomy, presenting with LLE pain 2/2 PAD.    - will call IR for LLE angiogram today  - OR planning for tomorrow for left fem-distal bypass  - Cardiology optimization appreciated  - Nephrology c/s for HD management (HD M, W, F)  - endocrine c/s called for uncontrolled FS, follow their ongoing recommendations   - Continue home medications  - covid test to be done today      Vascular Surgery  [id number]

## 2020-06-02 NOTE — PROGRESS NOTE ADULT - SUBJECTIVE AND OBJECTIVE BOX
[facility] DIVISION OF KIDNEY DISEASES AND HYPERTENSION -- FOLLOW UP NOTE  --------------------------------------------------------------------------------  Juice Hahn   Nephrology Fellow  Pager NS: [phone]/ [facility]: [phone]  (After 5 pm or on weekends please page the on-call fellow)    24 hour events/subjective: Patient seen and examined at the bedside. Currently on HD, complaining of dizziness, BP soft: 100's, UF stopped. Planned for bypass surgery tmrw per vascular resident.         PAST HISTORY  --------------------------------------------------------------------------------  No significant changes to PMH, PSH, FHx, SHx, unless otherwise noted    ALLERGIES & MEDICATIONS  --------------------------------------------------------------------------------  Allergies    aspirin (Rash; Urticaria; Hives)    Intolerances      Standing Inpatient Medications  cloNIDine 0.1 milliGRAM(s) Oral two times a day  diphenhydrAMINE   Injectable 50 milliGRAM(s) IV Push once  heparin   Injectable 5000 Unit(s) SubCutaneous every 8 hours  hydrocortisone sodium succinate Injectable 200 milliGRAM(s) IV Push once  insulin glargine Injectable (LANTUS) 15 Unit(s) SubCutaneous at bedtime  insulin lispro (HumaLOG) corrective regimen sliding scale   SubCutaneous three times a day before meals  insulin lispro (HumaLOG) corrective regimen sliding scale   SubCutaneous at bedtime  insulin lispro Injectable (HumaLOG) 10 Unit(s) SubCutaneous three times a day before meals    PRN Inpatient Medications  glucagon  Injectable 1 milliGRAM(s) IntraMuscular once PRN      REVIEW OF SYSTEMS  --------------------------------------------------------------------------------  Gen: + dizziness   Respiratory: No dyspnea  CV: No chest pain  GI: No abdominal pain/ diarrhea   MSK: + LE edema  Heme: No bleeding    All other systems were reviewed and are negative, except as noted.      >>> <<<    VITALS/PHYSICAL EXAM  --------------------------------------------------------------------------------  T(C): 36.6 (06-02-20 @ 11:05), Max: 36.6 (06-01-20 @ 16:00)  HR: 87 (06-02-20 @ 11:05) (63 - 87)  BP: 117/69 (06-02-20 @ 11:05) (117/69 - 195/82)  RR: 18 (06-02-20 @ 11:05) (17 - 18)  SpO2: 98% (06-02-20 @ 11:05) (96% - 100%)  Wt(kg): --        06-01-20 @ 07:01  -  06-02-20 @ 07:00  --------------------------------------------------------  IN: 680 mL / OUT: 603 mL / NET: 77 mL    06-02-20 @ 07:01  -  06-02-20 @ 13:08  --------------------------------------------------------  IN: 0 mL / OUT: 1800 mL / NET: -1800 mL      Physical Exam:    	Gen: NAD, well-appearing  	HEENT: Anicteric   	Pulm: CTA B/L  	CV: RRR, S1S2; no rub  	Abd: +BS, soft, nontender/nondistended       	: No suprapubic tenderness  	MSK: Warm, 1+ edema  	Neuro: No focal deficits, AOX3, no asterixis      	Vascular Access: [facility]      LABS/STUDIES  --------------------------------------------------------------------------------              12.2   11.80 >-----------<  222      [06-02-20 @ 08:57]              38.3     130  |  91  |  121  ----------------------------<  354      [06-02-20 @ 08:57]  4.8   |  17  |  14.41        Ca     8.5     [06-02-20 @ 08:57]      Mg     3.4     [06-02-20 @ 08:57]      Phos  5.9     [06-02-20 @ 08:57]    TPro  7.5  /  Alb  4.3  /  TBili  0.3  /  DBili  x   /  AST  7   /  ALT  9   /  AlkPhos  140  [05-31-20 @ 15:59]    PT/INR: PT 9.8  , INR 0.86       [06-01-20 @ 07:30]  PTT: 28.0       [05-31-20 @ 15:59]      Creatinine Trend:  SCr 14.41 [06-02 @ 08:57]  SCr 13.18 [06-01 @ 21:10]  SCr 12.49 [06-01 @ 06:06]  SCr 11.60 [05-31 @ 15:59]          HCV 12.30, Reactive      [06-01-20 @ 08:28]

## 2020-06-02 NOTE — PRE-ANESTHESIA EVALUATION ADULT - NSANTHOSAYNRD_GEN_A_CORE
No. QUE screening performed.  STOP BANG Legend: 0-2 = LOW Risk; 3-4 = INTERMEDIATE Risk; 5-8 = HIGH Risk
No. QUE screening performed.  STOP BANG Legend: 0-2 = LOW Risk; 3-4 = INTERMEDIATE Risk; 5-8 = HIGH Risk

## 2020-06-02 NOTE — PROVIDER CONTACT NOTE (OTHER) - ACTION/TREATMENT ORDERED:
MARIBELL Tony requested RN obtain pt's PCP and endocrinologist contact info. Endocrinology consulted as per PA. Stat BMP and Beta-Hydroxy Butyrate ordered and obtained. Pt's insulin regimen adjusted.

## 2020-06-03 ENCOUNTER — RESULT REVIEW (OUTPATIENT)
Age: 60
End: 2020-06-03

## 2020-06-03 ENCOUNTER — APPOINTMENT (OUTPATIENT)
Dept: VASCULAR SURGERY | Facility: HOSPITAL | Age: 60
End: 2020-06-03
Payer: MEDICARE

## 2020-06-03 LAB
ANION GAP SERPL CALC-SCNC: 22 MMOL/L — HIGH (ref 5–17)
ANION GAP SERPL CALC-SCNC: 25 MMOL/L — HIGH (ref 5–17)
APTT BLD: 24.5 SEC — LOW (ref 27.5–36.3)
BASOPHILS # BLD AUTO: 0.04 K/UL — SIGNIFICANT CHANGE UP (ref 0–0.2)
BASOPHILS NFR BLD AUTO: 0.3 % — SIGNIFICANT CHANGE UP (ref 0–2)
BLD GP AB SCN SERPL QL: NEGATIVE — SIGNIFICANT CHANGE UP
BUN SERPL-MCNC: 89 MG/DL — HIGH (ref 7–23)
BUN SERPL-MCNC: 90 MG/DL — HIGH (ref 7–23)
CALCIUM SERPL-MCNC: 7.9 MG/DL — LOW (ref 8.4–10.5)
CALCIUM SERPL-MCNC: 8.4 MG/DL — SIGNIFICANT CHANGE UP (ref 8.4–10.5)
CHLORIDE SERPL-SCNC: 93 MMOL/L — LOW (ref 96–108)
CHLORIDE SERPL-SCNC: 95 MMOL/L — LOW (ref 96–108)
CO2 SERPL-SCNC: 17 MMOL/L — LOW (ref 22–31)
CO2 SERPL-SCNC: 18 MMOL/L — LOW (ref 22–31)
CREAT SERPL-MCNC: 11.38 MG/DL — HIGH (ref 0.5–1.3)
CREAT SERPL-MCNC: 11.74 MG/DL — HIGH (ref 0.5–1.3)
EOSINOPHIL # BLD AUTO: 0.02 K/UL — SIGNIFICANT CHANGE UP (ref 0–0.5)
EOSINOPHIL NFR BLD AUTO: 0.2 % — SIGNIFICANT CHANGE UP (ref 0–6)
GAS PNL BLDA: SIGNIFICANT CHANGE UP
GAS PNL BLDA: SIGNIFICANT CHANGE UP
GLUCOSE BLDC GLUCOMTR-MCNC: 129 MG/DL — HIGH (ref 70–99)
GLUCOSE BLDC GLUCOMTR-MCNC: 139 MG/DL — HIGH (ref 70–99)
GLUCOSE BLDC GLUCOMTR-MCNC: 304 MG/DL — HIGH (ref 70–99)
GLUCOSE BLDC GLUCOMTR-MCNC: 82 MG/DL — SIGNIFICANT CHANGE UP (ref 70–99)
GLUCOSE BLDC GLUCOMTR-MCNC: 92 MG/DL — SIGNIFICANT CHANGE UP (ref 70–99)
GLUCOSE SERPL-MCNC: 277 MG/DL — HIGH (ref 70–99)
GLUCOSE SERPL-MCNC: 99 MG/DL — SIGNIFICANT CHANGE UP (ref 70–99)
HCT VFR BLD CALC: 36.8 % — LOW (ref 39–50)
HCT VFR BLD CALC: 39 % — SIGNIFICANT CHANGE UP (ref 39–50)
HGB BLD-MCNC: 11.7 G/DL — LOW (ref 13–17)
HGB BLD-MCNC: 12.4 G/DL — LOW (ref 13–17)
IMM GRANULOCYTES NFR BLD AUTO: 0.4 % — SIGNIFICANT CHANGE UP (ref 0–1.5)
INR BLD: 0.91 RATIO — SIGNIFICANT CHANGE UP (ref 0.88–1.16)
LYMPHOCYTES # BLD AUTO: 1.27 K/UL — SIGNIFICANT CHANGE UP (ref 1–3.3)
LYMPHOCYTES # BLD AUTO: 9.7 % — LOW (ref 13–44)
MAGNESIUM SERPL-MCNC: 2.9 MG/DL — HIGH (ref 1.6–2.6)
MCHC RBC-ENTMCNC: 24.4 PG — LOW (ref 27–34)
MCHC RBC-ENTMCNC: 24.4 PG — LOW (ref 27–34)
MCHC RBC-ENTMCNC: 31.8 GM/DL — LOW (ref 32–36)
MCHC RBC-ENTMCNC: 31.8 GM/DL — LOW (ref 32–36)
MCV RBC AUTO: 76.7 FL — LOW (ref 80–100)
MCV RBC AUTO: 76.8 FL — LOW (ref 80–100)
MONOCYTES # BLD AUTO: 1.19 K/UL — HIGH (ref 0–0.9)
MONOCYTES NFR BLD AUTO: 9.1 % — SIGNIFICANT CHANGE UP (ref 2–14)
NEUTROPHILS # BLD AUTO: 10.46 K/UL — HIGH (ref 1.8–7.4)
NEUTROPHILS NFR BLD AUTO: 80.3 % — HIGH (ref 43–77)
NRBC # BLD: 0 /100 WBCS — SIGNIFICANT CHANGE UP (ref 0–0)
NRBC # BLD: 0 /100 WBCS — SIGNIFICANT CHANGE UP (ref 0–0)
PHOSPHATE SERPL-MCNC: 8.9 MG/DL — HIGH (ref 2.5–4.5)
PLATELET # BLD AUTO: 185 K/UL — SIGNIFICANT CHANGE UP (ref 150–400)
PLATELET # BLD AUTO: 187 K/UL — SIGNIFICANT CHANGE UP (ref 150–400)
POTASSIUM SERPL-MCNC: 3.8 MMOL/L — SIGNIFICANT CHANGE UP (ref 3.5–5.3)
POTASSIUM SERPL-MCNC: 4.7 MMOL/L — SIGNIFICANT CHANGE UP (ref 3.5–5.3)
POTASSIUM SERPL-SCNC: 3.8 MMOL/L — SIGNIFICANT CHANGE UP (ref 3.5–5.3)
POTASSIUM SERPL-SCNC: 4.7 MMOL/L — SIGNIFICANT CHANGE UP (ref 3.5–5.3)
PROTHROM AB SERPL-ACNC: 10.4 SEC — SIGNIFICANT CHANGE UP (ref 10–13.1)
RBC # BLD: 4.8 M/UL — SIGNIFICANT CHANGE UP (ref 4.2–5.8)
RBC # BLD: 5.08 M/UL — SIGNIFICANT CHANGE UP (ref 4.2–5.8)
RBC # FLD: 16.6 % — HIGH (ref 10.3–14.5)
RBC # FLD: 16.7 % — HIGH (ref 10.3–14.5)
RH IG SCN BLD-IMP: POSITIVE — SIGNIFICANT CHANGE UP
SODIUM SERPL-SCNC: 133 MMOL/L — LOW (ref 135–145)
SODIUM SERPL-SCNC: 137 MMOL/L — SIGNIFICANT CHANGE UP (ref 135–145)
WBC # BLD: 13.03 K/UL — HIGH (ref 3.8–10.5)
WBC # BLD: 8.94 K/UL — SIGNIFICANT CHANGE UP (ref 3.8–10.5)
WBC # FLD AUTO: 13.03 K/UL — HIGH (ref 3.8–10.5)
WBC # FLD AUTO: 8.94 K/UL — SIGNIFICANT CHANGE UP (ref 3.8–10.5)

## 2020-06-03 PROCEDURE — 88304 TISSUE EXAM BY PATHOLOGIST: CPT | Mod: 26

## 2020-06-03 PROCEDURE — 88311 DECALCIFY TISSUE: CPT | Mod: 26

## 2020-06-03 PROCEDURE — 35587 VEIN BYP POP-TIBL PERONEAL: CPT | Mod: LT

## 2020-06-03 PROCEDURE — 35303 RECHANNELING OF ARTERY: CPT | Mod: LT

## 2020-06-03 PROCEDURE — 99232 SBSQ HOSP IP/OBS MODERATE 35: CPT | Mod: GC

## 2020-06-03 RX ORDER — ONDANSETRON 8 MG/1
4 TABLET, FILM COATED ORAL ONCE
Refills: 0 | Status: DISCONTINUED | OUTPATIENT
Start: 2020-06-03 | End: 2020-06-03

## 2020-06-03 RX ORDER — INSULIN LISPRO 100/ML
VIAL (ML) SUBCUTANEOUS EVERY 6 HOURS
Refills: 0 | Status: DISCONTINUED | OUTPATIENT
Start: 2020-06-03 | End: 2020-06-04

## 2020-06-03 RX ORDER — ACETAMINOPHEN 500 MG
975 TABLET ORAL EVERY 6 HOURS
Refills: 0 | Status: DISCONTINUED | OUTPATIENT
Start: 2020-06-03 | End: 2020-06-06

## 2020-06-03 RX ORDER — ACETAMINOPHEN 500 MG
1000 TABLET ORAL EVERY 6 HOURS
Refills: 0 | Status: DISCONTINUED | OUTPATIENT
Start: 2020-06-03 | End: 2020-06-03

## 2020-06-03 RX ORDER — INSULIN LISPRO 100/ML
VIAL (ML) SUBCUTANEOUS AT BEDTIME
Refills: 0 | Status: DISCONTINUED | OUTPATIENT
Start: 2020-06-03 | End: 2020-06-04

## 2020-06-03 RX ORDER — INSULIN GLARGINE 100 [IU]/ML
25 INJECTION, SOLUTION SUBCUTANEOUS AT BEDTIME
Refills: 0 | Status: DISCONTINUED | OUTPATIENT
Start: 2020-06-03 | End: 2020-06-04

## 2020-06-03 RX ORDER — INSULIN LISPRO 100/ML
12 VIAL (ML) SUBCUTANEOUS
Refills: 0 | Status: DISCONTINUED | OUTPATIENT
Start: 2020-06-03 | End: 2020-06-04

## 2020-06-03 RX ORDER — OXYCODONE HYDROCHLORIDE 5 MG/1
5 TABLET ORAL EVERY 4 HOURS
Refills: 0 | Status: DISCONTINUED | OUTPATIENT
Start: 2020-06-03 | End: 2020-06-04

## 2020-06-03 RX ORDER — GLUCAGON INJECTION, SOLUTION 0.5 MG/.1ML
1 INJECTION, SOLUTION SUBCUTANEOUS ONCE
Refills: 0 | Status: DISCONTINUED | OUTPATIENT
Start: 2020-06-03 | End: 2020-06-11

## 2020-06-03 RX ORDER — HEPARIN SODIUM 5000 [USP'U]/ML
5000 INJECTION INTRAVENOUS; SUBCUTANEOUS EVERY 8 HOURS
Refills: 0 | Status: DISCONTINUED | OUTPATIENT
Start: 2020-06-03 | End: 2020-06-11

## 2020-06-03 RX ORDER — HYDROMORPHONE HYDROCHLORIDE 2 MG/ML
0.5 INJECTION INTRAMUSCULAR; INTRAVENOUS; SUBCUTANEOUS ONCE
Refills: 0 | Status: DISCONTINUED | OUTPATIENT
Start: 2020-06-03 | End: 2020-06-03

## 2020-06-03 RX ORDER — HYDROMORPHONE HYDROCHLORIDE 2 MG/ML
0.5 INJECTION INTRAMUSCULAR; INTRAVENOUS; SUBCUTANEOUS
Refills: 0 | Status: DISCONTINUED | OUTPATIENT
Start: 2020-06-03 | End: 2020-06-03

## 2020-06-03 RX ORDER — OXYCODONE HYDROCHLORIDE 5 MG/1
10 TABLET ORAL EVERY 4 HOURS
Refills: 0 | Status: DISCONTINUED | OUTPATIENT
Start: 2020-06-03 | End: 2020-06-04

## 2020-06-03 RX ADMIN — Medication 0: at 21:51

## 2020-06-03 RX ADMIN — OXYCODONE HYDROCHLORIDE 10 MILLIGRAM(S): 5 TABLET ORAL at 14:24

## 2020-06-03 RX ADMIN — Medication 975 MILLIGRAM(S): at 23:48

## 2020-06-03 RX ADMIN — INSULIN GLARGINE 25 UNIT(S): 100 INJECTION, SOLUTION SUBCUTANEOUS at 21:51

## 2020-06-03 RX ADMIN — Medication 0.1 MILLIGRAM(S): at 05:03

## 2020-06-03 RX ADMIN — Medication 0.1 MILLIGRAM(S): at 16:12

## 2020-06-03 RX ADMIN — HYDROMORPHONE HYDROCHLORIDE 0.5 MILLIGRAM(S): 2 INJECTION INTRAMUSCULAR; INTRAVENOUS; SUBCUTANEOUS at 13:17

## 2020-06-03 RX ADMIN — Medication 8: at 06:11

## 2020-06-03 RX ADMIN — Medication 975 MILLIGRAM(S): at 17:31

## 2020-06-03 NOTE — CHART NOTE - NSCHARTNOTEFT_GEN_A_CORE
Patient is 4 hours s/p LLE pop-PT bypass with GSV graft. Patient complains of severe leg pain, but requests oral pain medication because he has pain in his arm when IV medications are injected. He denies HA/dizziness/SOB/n/v/d/c. Patient states that he hasn't been able to urinate (patient is a dialysis patient and went to dialysis yesterday).    Vital Signs Last 24 Hrs  T(C): 36.6 (03 Jun 2020 14:07), Max: 36.7 (02 Jun 2020 21:30)  T(F): 97.9 (03 Jun 2020 14:07), Max: 98.1 (03 Jun 2020 06:36)  HR: 85 (03 Jun 2020 14:07) (70 - 85)  BP: 138/70 (03 Jun 2020 14:07) (130/63 - 206/79)  BP(mean): 85 (03 Jun 2020 14:07) (85 - 133)  RR: 16 (03 Jun 2020 14:07) (14 - 18)  SpO2: 96% (03 Jun 2020 14:07) (95% - 100%)      Physical Exam:  General: A&Ox3, NAD  Resp: CTA b/l  Cardiac: RRR, S1 and S2, no m/r/g  GI: abdomen soft, NT/ND  LLE: palpable AT, PT signals present; dressings c/d/i                          11.7   13.03 )-----------( 185      ( 03 Jun 2020 12:19 )             36.8       06-03    137  |  95<L>  |  90<H>  ----------------------------<  99  3.8   |  17<L>  |  11.74<H>    Ca    8.4      03 Jun 2020 12:19  Phos  8.9     06-03  Mg     2.9     06-03        PT/INR - ( 03 Jun 2020 08:18 )   PT: 10.4 sec;   INR: 0.91 ratio         PTT - ( 03 Jun 2020 08:18 )  PTT:24.5 sec        ( 03 Jun 2020 10:25 ) pH: 7.34  /  pCO2: 38    /  pO2: 202   / HCO3: 20    / Base Excess: -4.5  /  SaO2: 99              ( 03 Jun 2020 08:45 ) pH: 7.35  /  pCO2: 38    /  pO2: 208   / HCO3: 20    / Base Excess: -4.2  /  SaO2: 99                  Assessment: 60yo M 4 hours post op from above stated procedure. Patient complains of pain.    Plan:  CLD, ADAT  Pain Control  F/u AM labs  Monitor Vitals    Vascular   [id number]

## 2020-06-03 NOTE — PROGRESS NOTE ADULT - SUBJECTIVE AND OBJECTIVE BOX
Vascular Surgery Progress Note    SUBJECTIVE: Pt seen and examined at bedside. There were no acute events overnight. pt's pain is controlled. Pt states his skin was itchy this morning. Pt denies nausea, vomiting, chest pain, shortness of breath, fevers or chills, numbness/tingling, cold extremities.         Vital Signs Last 24 Hrs  T(C): 36.7 (03 Jun 2020 06:36), Max: 36.7 (02 Jun 2020 21:30)  T(F): 98.1 (03 Jun 2020 06:36), Max: 98.1 (03 Jun 2020 06:36)  HR: 74 (03 Jun 2020 06:36) (70 - 87)  BP: 160/82 (03 Jun 2020 06:36) (117/69 - 206/79)  BP(mean): 117 (02 Jun 2020 21:40) (117 - 133)  RR: 18 (03 Jun 2020 06:36) (14 - 18)  SpO2: 100% (03 Jun 2020 06:36) (95% - 100%)  I&O's Summary    02 Jun 2020 07:01  -  03 Jun 2020 07:00  --------------------------------------------------------  IN: 240 mL / OUT: 1800 mL / NET: -1560 mL      I&O's Detail    02 Jun 2020 07:01  -  03 Jun 2020 07:00  --------------------------------------------------------  IN:    Oral Fluid: 240 mL  Total IN: 240 mL    OUT:    Other: 1800 mL  Total OUT: 1800 mL    Total NET: -1560 mL          Labs:                         12.4   8.94  )-----------( 187      ( 03 Jun 2020 05:16 )             39.0     06-03    133<L>  |  93<L>  |  89<H>  ----------------------------<  277<H>  4.7   |  18<L>  |  11.38<H>    Ca    7.9<L>      03 Jun 2020 05:16  Phos  8.9     06-03  Mg     2.9     06-03      PT/INR - ( 02 Jun 2020 16:39 )   PT: 9.8 sec;   INR: 0.86 ratio         PTT - ( 02 Jun 2020 16:39 )  PTT:25.5 sePHYSICAL EXAM:    Physical Exam:  General: NAD, A&O x 4  Respiratory: non-labor breathing   Cardiovascular: RRR  Gastrointestinal: abd soft, ND/NT  Extremities: groin soft b/l. No active or induration at right groin access. + DP/PT signals b/l.

## 2020-06-03 NOTE — PROGRESS NOTE ADULT - ASSESSMENT
59M with history of DM1, ESRD (on HD M/W/F), glaucoma, HCV, PAD, R DVT s/p thrombectomy, presenting with LLE pain s/p LLE bypass today, 6/3. Patient received hydrocortisone yesterday after for premedication. AM .      uncontrolled DM1   A1c 7.2%  FS premeal and qhs   FS goal 100-180  Carb consistent diet, however patient reports he does not want to eat much as he does not one to have to move to go to bathroom   Recommend Lantus 30 units qhs, anticipate patient will require more Lantus as appetite improves , Please do not hold basal insulin as patient can go into DKA  Continue Humalog 12 units premeal, hold when patient is NPO or has poor appetite   Continue mod correctional scales premeal and qhs   On discharge patient can follow up with outpt endocrine, Dr Hanks   Recommend to continue basal, bolus on discharge, dose to be determined    HTN   goal 130/80, at goal   Continue Clonidine     HLD  consider lipid panel   If LDL<70, consider starting statin         Eric Arcos MD  Endocrine Fellow   Pager  59M with history of DM1, ESRD (on HD M/W/F), glaucoma, HCV, PAD, R DVT s/p thrombectomy, presenting with LLE pain s/p LLE bypass today, 6/3. Patient received hydrocortisone yesterday after for premedication. AM .      uncontrolled DM1   A1c 7.2%  FS premeal and qhs   FS goal 100-180  On clear liquids, however patient reports he does not want to eat much as he does not one to have to move to go to bathroom   Recommend Lantus 30 units qhs, anticipate patient will require more Lantus as appetite improves , Please do not hold basal insulin as patient can go into DKA  Continue Humalog 12 units premeal, hold when patient is NPO or has poor appetite   Continue mod correctional scales premeal and qhs   On discharge patient can follow up with outpt endocrine, Dr Hanks   Recommend to continue basal, bolus on discharge, dose to be determined    HTN   goal 130/80, at goal   Continue Clonidine     HLD  consider lipid panel   If LDL<70, consider starting statin         Eric Arcos MD  Endocrine Fellow   Pager  59M with history of DM1, ESRD (on HD M/W/F), glaucoma, HCV, PAD, R DVT s/p thrombectomy, presenting with LLE pain s/p LLE bypass today, 6/3. Patient received hydrocortisone yesterday after for premedication. AM .      uncontrolled DM1   A1c 7.2%  FS premeal and qhs   FS goal 100-180  On clear liquids, however patient reports he does not want to eat much as he does not one to have to move to go to bathroom   Recommend Lantus 30 units qhs, anticipate patient will require more Lantus as appetite improves , Please do not hold basal insulin as patient can go into DKA  Decrease Humalog to 10 units premeal, hold when patient is NPO or has poor appetite   Continue mod correctional scales premeal and qhs   On discharge patient can follow up with outpt endocrine, Dr Hanks   Recommend to continue basal, bolus on discharge, dose to be determined    HTN   goal 130/80, at goal   Continue Clonidine     HLD  consider lipid panel   If LDL<70, consider starting statin         Eric Arcos MD  Endocrine Fellow   Pager

## 2020-06-03 NOTE — PROGRESS NOTE ADULT - SUBJECTIVE AND OBJECTIVE BOX
Chief Complaint: hyperglycemia in patient with DM1     History: Patient seem and examined at bedside. s/p LLE bypass earlier today. Noted drinking clear liquids without abdominal pain. Reports feeling shaky after procedure, which he believes is due to low blood sugar.    MEDICATIONS  (STANDING):  acetaminophen   Tablet .. 975 milliGRAM(s) Oral every 6 hours  cloNIDine 0.1 milliGRAM(s) Oral daily  heparin   Injectable 5000 Unit(s) SubCutaneous every 8 hours  HYDROmorphone  Injectable 0.5 milliGRAM(s) IV Push once  insulin glargine Injectable (LANTUS) 25 Unit(s) SubCutaneous at bedtime  insulin lispro (HumaLOG) corrective regimen sliding scale   SubCutaneous at bedtime  insulin lispro (HumaLOG) corrective regimen sliding scale   SubCutaneous every 6 hours  insulin lispro Injectable (HumaLOG) 12 Unit(s) SubCutaneous three times a day before meals    MEDICATIONS  (PRN):  glucagon  Injectable 1 milliGRAM(s) IntraMuscular once PRN Glucose LESS THAN 70 milligrams/deciliter  oxyCODONE    IR 5 milliGRAM(s) Oral every 4 hours PRN Moderate Pain (4 - 6)  oxyCODONE    IR 10 milliGRAM(s) Oral every 4 hours PRN Severe Pain (7 - 10)    PHYSICAL EXAM:  VITALS: T(C): 36.6 (06-03-20 @ 14:07)  T(F): 97.9 (06-03-20 @ 14:07), Max: 98.1 (06-03-20 @ 06:36)  HR: 85 (06-03-20 @ 14:07) (70 - 85)  BP: 138/70 (06-03-20 @ 14:07) (130/63 - 206/79)  RR:  (14 - 18)  SpO2:  (95% - 100%)  Wt(kg): 76kg   GENERAL: NAD, well-groomed, well-developed  RESPIRATORY: Clear to auscultation bilaterally;  CARDIOVASCULAR: Regular rate and rhythm; No murmurs; no peripheral edema  GI: Soft, nontender, non distended,  SKIN: left medial leg covered with gauze from procedure   PSYCH: Alert and oriented x 3, reactive affect     POCT Blood Glucose.: 82 mg/dL (06-03-20 @ 14:29)  POCT Blood Glucose.: 92 mg/dL (06-03-20 @ 12:06)  POCT Blood Glucose.: 304 mg/dL (06-03-20 @ 06:08) H8   POCT Blood Glucose.: 200 mg/dL (06-02-20 @ 22:30) L25   POCT Blood Glucose.: 191 mg/dL (06-02-20 @ 18:52) H 2   POCT Blood Glucose.: 142 mg/dL (06-02-20 @ 13:18)  POCT Blood Glucose.: 267 mg/dL (06-02-20 @ 08:05)  POCT Blood Glucose.: 331 mg/dL (06-02-20 @ 03:15)  POCT Blood Glucose.: 400 mg/dL (06-01-20 @ 21:13)  POCT Blood Glucose.: 424 mg/dL (06-01-20 @ 19:14)  POCT Blood Glucose.: 429 mg/dL (06-01-20 @ 18:31)  POCT Blood Glucose.: 397 mg/dL (06-01-20 @ 12:48)  POCT Blood Glucose.: 201 mg/dL (06-01-20 @ 08:07)  POCT Blood Glucose.: 101 mg/dL (05-31-20 @ 19:47)      06-03    137  |  95<L>  |  90<H>  ----------------------------<  99  3.8   |  17<L>  |  11.74<H>    EGFR if : 5<L>  EGFR if non : 4<L>    Ca    8.4      06-03  Mg     2.9     06-03  Phos  8.9     06-03    TPro  7.5  /  Alb  4.3  /  TBili  0.3  /  DBili  x   /  AST  7<L>  /  ALT  9<L>  /  AlkPhos  140<H>  05-31          Thyroid Function Tests:

## 2020-06-03 NOTE — PROGRESS NOTE ADULT - ASSESSMENT
A/P: 59y Male with PMH ESRD (on HD M/W/F), glaucoma, DMII, HCV, PAD, R DVT s/p thrombectomy, presenting with LLE pain 2/2 PAD. now s/p LLE angiogram by IR POD 1, he is doing well    - OR today for left fem-distal bypass  - Cardiology optimization appreciated  - Nephrology c/s for HD management (HD M, W, F)  - endocrine c/s called for uncontrolled FS, follow their ongoing recommendations   - Continue home medications  - covid test to be done today      Vascular Surgery  [id number]

## 2020-06-03 NOTE — BRIEF OPERATIVE NOTE - NSICDXBRIEFPROCEDURE_GEN_ALL_CORE_FT
PROCEDURES:  Creation of bypass from popliteal artery to posterior tibial artery using graft 03-Jun-2020 10:50:31  Mansoor Barber

## 2020-06-03 NOTE — PROGRESS NOTE ADULT - SUBJECTIVE AND OBJECTIVE BOX
STATUS POST:  LLE angiogram by IR  POST OPERATIVE DAY #: 0    pt seen and examined at bedside. c/o LLE pain similar as prior the procedure. controlled with PO pain meds.  denies dizziness, SOB, CP or palpiattions.     Vital Signs Last 24 Hrs  T(C): 36.6 (02 Jun 2020 23:30), Max: 36.7 (02 Jun 2020 21:30)  T(F): 97.8 (02 Jun 2020 23:30), Max: 98 (02 Jun 2020 21:30)  HR: 75 (02 Jun 2020 23:30) (67 - 87)  BP: 157/70 (02 Jun 2020 23:30) (117/69 - 206/79)  BP(mean): 117 (02 Jun 2020 21:40) (117 - 133)  RR: 16 (02 Jun 2020 23:30) (14 - 18)  SpO2: 97% (02 Jun 2020 23:30) (95% - 100%)  I&O's Summary    01 Jun 2020 07:01  -  02 Jun 2020 07:00  --------------------------------------------------------  IN: 680 mL / OUT: 603 mL / NET: 77 mL    02 Jun 2020 07:01  -  03 Jun 2020 00:49  --------------------------------------------------------  IN: 240 mL / OUT: 1800 mL / NET: -1560 mL      I&O's Detail    01 Jun 2020 07:01  -  02 Jun 2020 07:00  --------------------------------------------------------  IN:    Oral Fluid: 680 mL  Total IN: 680 mL    OUT:    Voided: 603 mL  Total OUT: 603 mL    Total NET: 77 mL      02 Jun 2020 07:01  -  03 Jun 2020 00:49  --------------------------------------------------------  IN:    Oral Fluid: 240 mL  Total IN: 240 mL    OUT:    Other: 1800 mL  Total OUT: 1800 mL    Total NET: -1560 mL    MEDICATIONS  (STANDING):  cloNIDine 0.1 milliGRAM(s) Oral daily  heparin   Injectable 5000 Unit(s) SubCutaneous every 8 hours  insulin glargine Injectable (LANTUS) 25 Unit(s) SubCutaneous at bedtime  insulin lispro (HumaLOG) corrective regimen sliding scale   SubCutaneous at bedtime  insulin lispro (HumaLOG) corrective regimen sliding scale   SubCutaneous every 6 hours  insulin lispro Injectable (HumaLOG) 12 Unit(s) SubCutaneous three times a day before meals    MEDICATIONS  (PRN):  glucagon  Injectable 1 milliGRAM(s) IntraMuscular once PRN Glucose LESS THAN 70 milligrams/deciliter      LABS:                        12.2   11.80 )-----------( 222      ( 02 Jun 2020 08:57 )             38.3     06-02    130<L>  |  91<L>  |  121<H>  ----------------------------<  354<H>  4.8   |  17<L>  |  14.41<H>    Ca    8.5      02 Jun 2020 08:57  Phos  5.9     06-02  Mg     3.4     06-02      PT/INR - ( 02 Jun 2020 16:39 )   PT: 9.8 sec;   INR: 0.86 ratio         PTT - ( 02 Jun 2020 16:39 )  PTT:25.5 sec      RADIOLOGY & ADDITIONAL STUDIES:    PHYSICAL EXAM:      Constitutional: NAD, A&O x 4  Respiratory: non-labor breathing   Cardiovascular: RRR  Gastrointestinal: abd soft, ND/NT  Extremities: groin soft b/l. No active or induration at right groin access. + DP/PT signals b/l.     A/P: 59y Male with PMH ESRD (on HD M/W/F), glaucoma, DMII, HCV, PAD, R DVT s/p thrombectomy, presenting with LLE pain 2/2 PAD. now s/p LLE angiogram by IR POD#0    - Diet: renal/diabetic diet. NPO after midnight. IVF  - Activity: flat 4 hours post-op. then OOB as tolerated.   - OR for left fem-tibial bypass tomorrow.   - Labs: f/u AM labs  - Pain control   - DVT ppx: [facility]    Vascular Surgery   [id number]

## 2020-06-04 ENCOUNTER — TRANSCRIPTION ENCOUNTER (OUTPATIENT)
Age: 60
End: 2020-06-04

## 2020-06-04 LAB
ANION GAP SERPL CALC-SCNC: 20 MMOL/L — HIGH (ref 5–17)
BASOPHILS # BLD AUTO: 0.03 K/UL — SIGNIFICANT CHANGE UP (ref 0–0.2)
BASOPHILS NFR BLD AUTO: 0.4 % — SIGNIFICANT CHANGE UP (ref 0–2)
BUN SERPL-MCNC: 101 MG/DL — HIGH (ref 7–23)
CALCIUM SERPL-MCNC: 7.4 MG/DL — LOW (ref 8.4–10.5)
CHLORIDE SERPL-SCNC: 97 MMOL/L — SIGNIFICANT CHANGE UP (ref 96–108)
CO2 SERPL-SCNC: 20 MMOL/L — LOW (ref 22–31)
CREAT SERPL-MCNC: 13.81 MG/DL — HIGH (ref 0.5–1.3)
EOSINOPHIL # BLD AUTO: 0.04 K/UL — SIGNIFICANT CHANGE UP (ref 0–0.5)
EOSINOPHIL NFR BLD AUTO: 0.5 % — SIGNIFICANT CHANGE UP (ref 0–6)
GLUCOSE BLDC GLUCOMTR-MCNC: 103 MG/DL — HIGH (ref 70–99)
GLUCOSE BLDC GLUCOMTR-MCNC: 170 MG/DL — HIGH (ref 70–99)
GLUCOSE BLDC GLUCOMTR-MCNC: 210 MG/DL — HIGH (ref 70–99)
GLUCOSE BLDC GLUCOMTR-MCNC: 264 MG/DL — HIGH (ref 70–99)
GLUCOSE BLDC GLUCOMTR-MCNC: 271 MG/DL — HIGH (ref 70–99)
GLUCOSE BLDC GLUCOMTR-MCNC: 83 MG/DL — SIGNIFICANT CHANGE UP (ref 70–99)
GLUCOSE SERPL-MCNC: 101 MG/DL — HIGH (ref 70–99)
HCT VFR BLD CALC: 35.4 % — LOW (ref 39–50)
HGB BLD-MCNC: 11.2 G/DL — LOW (ref 13–17)
IMM GRANULOCYTES NFR BLD AUTO: 0.4 % — SIGNIFICANT CHANGE UP (ref 0–1.5)
LYMPHOCYTES # BLD AUTO: 0.9 K/UL — LOW (ref 1–3.3)
LYMPHOCYTES # BLD AUTO: 12.2 % — LOW (ref 13–44)
MAGNESIUM SERPL-MCNC: 2.8 MG/DL — HIGH (ref 1.6–2.6)
MCHC RBC-ENTMCNC: 24.6 PG — LOW (ref 27–34)
MCHC RBC-ENTMCNC: 31.6 GM/DL — LOW (ref 32–36)
MCV RBC AUTO: 77.8 FL — LOW (ref 80–100)
MONOCYTES # BLD AUTO: 1.14 K/UL — HIGH (ref 0–0.9)
MONOCYTES NFR BLD AUTO: 15.4 % — HIGH (ref 2–14)
NEUTROPHILS # BLD AUTO: 5.25 K/UL — SIGNIFICANT CHANGE UP (ref 1.8–7.4)
NEUTROPHILS NFR BLD AUTO: 71.1 % — SIGNIFICANT CHANGE UP (ref 43–77)
NRBC # BLD: 0 /100 WBCS — SIGNIFICANT CHANGE UP (ref 0–0)
PHOSPHATE SERPL-MCNC: 8.4 MG/DL — HIGH (ref 2.5–4.5)
PLATELET # BLD AUTO: 169 K/UL — SIGNIFICANT CHANGE UP (ref 150–400)
POTASSIUM SERPL-MCNC: 4.8 MMOL/L — SIGNIFICANT CHANGE UP (ref 3.5–5.3)
POTASSIUM SERPL-SCNC: 4.8 MMOL/L — SIGNIFICANT CHANGE UP (ref 3.5–5.3)
RBC # BLD: 4.55 M/UL — SIGNIFICANT CHANGE UP (ref 4.2–5.8)
RBC # FLD: 17.1 % — HIGH (ref 10.3–14.5)
SODIUM SERPL-SCNC: 137 MMOL/L — SIGNIFICANT CHANGE UP (ref 135–145)
WBC # BLD: 7.39 K/UL — SIGNIFICANT CHANGE UP (ref 3.8–10.5)
WBC # FLD AUTO: 7.39 K/UL — SIGNIFICANT CHANGE UP (ref 3.8–10.5)

## 2020-06-04 PROCEDURE — 99232 SBSQ HOSP IP/OBS MODERATE 35: CPT | Mod: GC

## 2020-06-04 PROCEDURE — 90935 HEMODIALYSIS ONE EVALUATION: CPT | Mod: GC

## 2020-06-04 RX ORDER — OXYCODONE HYDROCHLORIDE 5 MG/1
10 TABLET ORAL EVERY 4 HOURS
Refills: 0 | Status: DISCONTINUED | OUTPATIENT
Start: 2020-06-04 | End: 2020-06-04

## 2020-06-04 RX ORDER — INSULIN GLARGINE 100 [IU]/ML
15 INJECTION, SOLUTION SUBCUTANEOUS AT BEDTIME
Refills: 0 | Status: DISCONTINUED | OUTPATIENT
Start: 2020-06-04 | End: 2020-06-11

## 2020-06-04 RX ORDER — INSULIN LISPRO 100/ML
VIAL (ML) SUBCUTANEOUS AT BEDTIME
Refills: 0 | Status: DISCONTINUED | OUTPATIENT
Start: 2020-06-04 | End: 2020-06-10

## 2020-06-04 RX ORDER — OXYCODONE AND ACETAMINOPHEN 5; 325 MG/1; MG/1
2 TABLET ORAL EVERY 6 HOURS
Refills: 0 | Status: DISCONTINUED | OUTPATIENT
Start: 2020-06-04 | End: 2020-06-05

## 2020-06-04 RX ORDER — CHLORHEXIDINE GLUCONATE 213 G/1000ML
1 SOLUTION TOPICAL DAILY
Refills: 0 | Status: DISCONTINUED | OUTPATIENT
Start: 2020-06-04 | End: 2020-06-11

## 2020-06-04 RX ORDER — OXYCODONE AND ACETAMINOPHEN 5; 325 MG/1; MG/1
1 TABLET ORAL EVERY 4 HOURS
Refills: 0 | Status: DISCONTINUED | OUTPATIENT
Start: 2020-06-04 | End: 2020-06-04

## 2020-06-04 RX ORDER — INSULIN LISPRO 100/ML
VIAL (ML) SUBCUTANEOUS
Refills: 0 | Status: DISCONTINUED | OUTPATIENT
Start: 2020-06-04 | End: 2020-06-10

## 2020-06-04 RX ORDER — INSULIN GLARGINE 100 [IU]/ML
22 INJECTION, SOLUTION SUBCUTANEOUS AT BEDTIME
Refills: 0 | Status: DISCONTINUED | OUTPATIENT
Start: 2020-06-04 | End: 2020-06-04

## 2020-06-04 RX ORDER — INSULIN GLARGINE 100 [IU]/ML
30 INJECTION, SOLUTION SUBCUTANEOUS AT BEDTIME
Refills: 0 | Status: DISCONTINUED | OUTPATIENT
Start: 2020-06-04 | End: 2020-06-04

## 2020-06-04 RX ORDER — INSULIN GLARGINE 100 [IU]/ML
20 INJECTION, SOLUTION SUBCUTANEOUS AT BEDTIME
Refills: 0 | Status: DISCONTINUED | OUTPATIENT
Start: 2020-06-04 | End: 2020-06-04

## 2020-06-04 RX ORDER — INSULIN LISPRO 100/ML
10 VIAL (ML) SUBCUTANEOUS
Refills: 0 | Status: DISCONTINUED | OUTPATIENT
Start: 2020-06-04 | End: 2020-06-04

## 2020-06-04 RX ORDER — ONDANSETRON 8 MG/1
4 TABLET, FILM COATED ORAL ONCE
Refills: 0 | Status: COMPLETED | OUTPATIENT
Start: 2020-06-04 | End: 2020-06-05

## 2020-06-04 RX ORDER — INSULIN LISPRO 100/ML
7 VIAL (ML) SUBCUTANEOUS
Refills: 0 | Status: DISCONTINUED | OUTPATIENT
Start: 2020-06-04 | End: 2020-06-11

## 2020-06-04 RX ORDER — INSULIN LISPRO 100/ML
5 VIAL (ML) SUBCUTANEOUS
Refills: 0 | Status: DISCONTINUED | OUTPATIENT
Start: 2020-06-04 | End: 2020-06-04

## 2020-06-04 RX ADMIN — Medication 975 MILLIGRAM(S): at 05:28

## 2020-06-04 RX ADMIN — Medication 975 MILLIGRAM(S): at 17:09

## 2020-06-04 RX ADMIN — Medication 3: at 18:38

## 2020-06-04 RX ADMIN — Medication 975 MILLIGRAM(S): at 12:26

## 2020-06-04 RX ADMIN — CHLORHEXIDINE GLUCONATE 1 APPLICATION(S): 213 SOLUTION TOPICAL at 05:33

## 2020-06-04 RX ADMIN — INSULIN GLARGINE 22 UNIT(S): 100 INJECTION, SOLUTION SUBCUTANEOUS at 21:16

## 2020-06-04 RX ADMIN — OXYCODONE HYDROCHLORIDE 10 MILLIGRAM(S): 5 TABLET ORAL at 14:18

## 2020-06-04 RX ADMIN — OXYCODONE HYDROCHLORIDE 5 MILLIGRAM(S): 5 TABLET ORAL at 10:50

## 2020-06-04 NOTE — PHYSICAL THERAPY INITIAL EVALUATION ADULT - ADDITIONAL COMMENTS
Pt lives in a house w/ 5 steps to enter and 13 steps to negotiate to get to second floor where bedroom/bathroom is located. PTA pt (I) w/ functional mobility & ADL w/o AD.

## 2020-06-04 NOTE — PROGRESS NOTE ADULT - ASSESSMENT
59yM POD1 LLE pop-PT bypass with GSV graft. PMH ESRD (on HD M/W/F), glaucoma, DMII, HCV, PAD, R DVT s/p thrombectomy, presenting with LLE pain 2/2 PAD.    - Pain control with around the clock tylenol and oxycodone PRN  - PT recs for dispo  - follow-up with [facility]      Vascular Surgery  [id number]

## 2020-06-04 NOTE — DISCHARGE NOTE PROVIDER - NSDCCPTREATMENT_GEN_ALL_CORE_FT
PRINCIPAL PROCEDURE  Procedure: Creation of bypass from popliteal artery to posterior tibial artery using graft  Findings and Treatment: PRINCIPAL PROCEDURE  Procedure: Creation of bypass from popliteal artery to posterior tibial artery using graft  Findings and Treatment:       SECONDARY PROCEDURE  Procedure: Fistulogram  Findings and Treatment:

## 2020-06-04 NOTE — DISCHARGE NOTE PROVIDER - NSDCFUADDAPPT_GEN_ALL_CORE_FT
Please follow up with Dr. Samaniego within 1-2 weeks after discharge from the hospital. You may call [phone] to schedule an appointment.     Please followup with your Primary Care Physician within one to two weeks to update your medical records regarding this hospitalization and to review all your current medications and dosages.  Call their office for appointment. Please follow up with Dr. Samaniego within 1-2 weeks after discharge from the hospital. You may call [phone] to schedule an appointment.     Please followup with your Primary Care Physician within one to two weeks to update your medical records regarding this hospitalization and to review all your current medications and dosages.  Call their office for appointment.    Please resume your usual hemodialysis schedule.

## 2020-06-04 NOTE — PROGRESS NOTE ADULT - SUBJECTIVE AND OBJECTIVE BOX
Chief Complaint: Management of patient with DM1    History: Patient seen and examined at bedside.     MEDICATIONS  (STANDING):  acetaminophen   Tablet .. 975 milliGRAM(s) Oral every 6 hours  chlorhexidine 4% Liquid 1 Application(s) Topical daily  cloNIDine 0.1 milliGRAM(s) Oral daily  heparin   Injectable 5000 Unit(s) SubCutaneous every 8 hours  insulin glargine Injectable (LANTUS) 30 Unit(s) SubCutaneous at bedtime  insulin lispro (HumaLOG) corrective regimen sliding scale   SubCutaneous at bedtime  insulin lispro (HumaLOG) corrective regimen sliding scale   SubCutaneous every 6 hours  insulin lispro Injectable (HumaLOG) 10 Unit(s) SubCutaneous three times a day before meals    MEDICATIONS  (PRN):  glucagon  Injectable 1 milliGRAM(s) IntraMuscular once PRN Glucose LESS THAN 70 milligrams/deciliter  oxyCODONE    IR 5 milliGRAM(s) Oral every 4 hours PRN Moderate Pain (4 - 6)  oxyCODONE    IR 10 milliGRAM(s) Oral every 4 hours PRN Severe Pain (7 - 10)    PHYSICAL EXAM:  VITALS: T(C): 37.1 (06-04-20 @ 13:19)  T(F): 98.8 (06-04-20 @ 13:19), Max: 98.8 (06-04-20 @ 13:19)  HR: 97 (06-04-20 @ 13:19) (78 - 100)  BP: 165/85 (06-04-20 @ 13:19) (148/76 - 180/95)  RR:  (16 - 18)  SpO2:  (95% - 100%)  Wt(kg): 76kg   GENERAL: NAD, well-groomed, well-developed  RESPIRATORY: Clear to auscultation bilaterally  CARDIOVASCULAR: Regular rate and rhythm; No murmurs; no peripheral edema, left medial leg covered with gauze from surgery   GI: Soft, nontender, non distended  SKIN: Dry, intact, No rashes or lesions  MUSCULOSKELETAL: limited in LLE due to pain   NEURO: sensation intact, extraocular movements intact, no tremor, normal reflexes  PSYCH: Alert and oriented x 3, reactive affect     POCT Blood Glucose.: 83 mg/dL (06-04-20 @ 12:17)  POCT Blood Glucose.: 103 mg/dL (06-04-20 @ 07:31)  POCT Blood Glucose.: 139 mg/dL (06-03-20 @ 21:47) L25  POCT Blood Glucose.: 129 mg/dL (06-03-20 @ 16:55)  POCT Blood Glucose.: 82 mg/dL (06-03-20 @ 14:29)  POCT Blood Glucose.: 92 mg/dL (06-03-20 @ 12:06)  POCT Blood Glucose.: 304 mg/dL (06-03-20 @ 06:08)  POCT Blood Glucose.: 200 mg/dL (06-02-20 @ 22:30)  POCT Blood Glucose.: 191 mg/dL (06-02-20 @ 18:52)  POCT Blood Glucose.: 142 mg/dL (06-02-20 @ 13:18)  POCT Blood Glucose.: 267 mg/dL (06-02-20 @ 08:05)  POCT Blood Glucose.: 331 mg/dL (06-02-20 @ 03:15)  POCT Blood Glucose.: 400 mg/dL (06-01-20 @ 21:13)  POCT Blood Glucose.: 424 mg/dL (06-01-20 @ 19:14)  POCT Blood Glucose.: 429 mg/dL (06-01-20 @ 18:31)      06-04    137  |  97  |  101<H>  ----------------------------<  101<H>  4.8   |  20<L>  |  13.81<H>    EGFR if : 4<L>  EGFR if non : 3<L>    Ca    7.4<L>      06-04  Mg     2.8     06-04  Phos  8.4     06-04

## 2020-06-04 NOTE — CHART NOTE - NSCHARTNOTEFT_GEN_A_CORE
Patient is now refusing to take 20U of Lantus at bedtime because . Pt states "20units is too much now, he just did it, my , he needs to change it to 15units now". Spoke with the patient again about the recommendations of the Endocrinology team and addressed the risks and benefits of reducing the amount of insulin and the patient continues to refuse to take 22U of Lantus and will only take 15U now.  Provider re-ordered for 15U of Lantus.     Vascular surgery  [id number]

## 2020-06-04 NOTE — PROGRESS NOTE ADULT - SUBJECTIVE AND OBJECTIVE BOX
VASCULAR SURGERY DAILY PROGRESS NOTE:    Subjective:  Patient seen and examined. Reports significant pain associated with IV pain medications. Tolerating clear liquid diet.     Exam:  General: A&Ox3, NAD  Resp: CTA b/l  Cardiac: RRR, S1 and S2, no m/r/g  GI: abdomen soft, NT/ND  LLE: AT, PT signals present; dressings c/d/i    Vital Signs Last 24 Hrs  T(C): 36.7 (04 Jun 2020 04:14), Max: 36.9 (03 Jun 2020 23:59)  T(F): 98.1 (04 Jun 2020 04:14), Max: 98.4 (03 Jun 2020 23:59)  HR: 78 (04 Jun 2020 04:14) (72 - 94)  BP: 155/73 (04 Jun 2020 04:14) (130/63 - 180/95)  BP(mean): 85 (03 Jun 2020 14:07) (85 - 108)  RR: 16 (04 Jun 2020 04:14) (14 - 16)  SpO2: 98% (04 Jun 2020 04:14) (95% - 100%)    I&O's Detail    03 Jun 2020 07:01  -  04 Jun 2020 07:00  --------------------------------------------------------  IN:    Oral Fluid: 200 mL  Total IN: 200 mL    OUT:  Total OUT: 0 mL    Total NET: 200 mL            LABS:                        11.7   13.03 )-----------( 185      ( 03 Jun 2020 12:19 )             36.8     06-03    137  |  95<L>  |  90<H>  ----------------------------<  99  3.8   |  17<L>  |  11.74<H>    Ca    8.4      03 Jun 2020 12:19  Phos  8.9     06-03  Mg     2.9     06-03      PT/INR - ( 03 Jun 2020 08:18 )   PT: 10.4 sec;   INR: 0.91 ratio         PTT - ( 03 Jun 2020 08:18 )  PTT:24.5 sec

## 2020-06-04 NOTE — DISCHARGE NOTE PROVIDER - HOSPITAL COURSE
59M PMH ESRD (on HD M/W/F), glaucoma, DMII, HCV, PAD, R DVT s/p thrombectomy, presented with LLE pain. Patient developed pain in January of this year, which is worse with walking and is located in the upper leg/thigh. Patient is s/p LLE angioplasty in March with some improvement in LLE pain, however in office duplex recently showed re-occlusion coinciding with return of symptoms. Patient underwent LLE pop-PT bypass with GSV graft on 6/3/2020 with triphasic PT signal detected at end of case. Post operative the patient was sent to the PACU, the patient was hemodynamically stable and sent to a surgical floor. The patient had daily wound care and was seen by physical therapy who recommended home. The patient's pain was controlled by IV pain medications transitioned to po narcotics. The patient was advanced to regular diet and tolerated it well. The patient was hemodynamically stable and placed on home medications. The patient was told to follow up with Dr. Samaniego in 2 weeks and had no other issues. 59M PMH ESRD (on HD M/W/F), glaucoma, DMII, HCV, PAD, R DVT s/p thrombectomy, presented with LLE pain. Patient developed pain in January of this year, which is worse with walking and is located in the upper leg/thigh. Patient is s/p LLE angioplasty in March with some improvement in LLE pain, however in office duplex recently showed re-occlusion coinciding with return of symptoms. Patient underwent LLE pop-PT bypass with GSV graft on 6/3/2020 with triphasic PT signal detected at end of case. Post operative the patient was sent to the PACU, the patient was hemodynamically stable and sent to a surgical floor. The patient had daily wound care and was seen by physical therapy who recommended SANJUANA. The patient's pain was controlled by IV pain medications transitioned to po narcotics. The patient was advanced to regular diet and tolerated it well. The patient was hemodynamically stable and placed on home medications. The patient was told to follow up with Dr. Samaniego in 2 weeks.         Pt recommended SANJUANA but patient initially refused but then agreed and set up by case amanagement. 59M PMH ESRD (on HD M/W/F), glaucoma, DMII, HCV, PAD, R DVT s/p thrombectomy, presented with LLE pain. Patient developed pain in January of this year, which is worse with walking and is located in the upper leg/thigh. Patient is s/p LLE angioplasty in March with some improvement in LLE pain, however in office duplex recently showed re-occlusion coinciding with return of symptoms. Patient underwent LLE pop-PT bypass with GSV graft on 6/3/2020 with triphasic PT signal detected at end of case. Post operative the patient was sent to the PACU, the patient was hemodynamically stable and sent to a surgical floor. The patient had daily wound care and was seen by physical therapy who recommended SANJUANA. The patient's pain was controlled by IV pain medications transitioned to po narcotics. The patient was advanced to regular diet and tolerated it well. The patient was hemodynamically stable and placed on home medications. The patient was told to follow up with Dr. Samaniego in 2 weeks.         Pt recommended SANJUANA, which was set up by case management. 59M PMH ESRD (on HD M/W/F), glaucoma, DMII, HCV, PAD, R DVT s/p thrombectomy, presented with LLE pain. Patient developed pain in January of this year, which is worse with walking and is located in the upper leg/thigh. Patient is s/p LLE angioplasty in March with some improvement in LLE pain, however in office duplex recently showed re-occlusion coinciding with return of symptoms. Patient underwent LLE pop-PT bypass with GSV graft on 6/3/2020 with triphasic PT signal detected at end of case. Post operative the patient was sent to the PACU, the patient was hemodynamically stable and sent to a surgical floor. The patient had daily wound care and was seen by physical therapy who recommended SANJUANA. The patient's pain was controlled by IV pain medications transitioned to po narcotics. The patient was advanced to regular diet and tolerated it well. The patient was hemodynamically stable and placed on home medications.         On 6/11 pt underwent fistulogram with interventional radiology 2/2 suspected AVF dysfunction. Mild stenosis was noted at the anastomosis and proximal outflow tract. Successful balloon angioplasty was done.  Pt recommended SANJUANA, which was set up by case management. The patient was told to follow up with Dr. Samaniego in 2 weeks.

## 2020-06-04 NOTE — PHYSICAL THERAPY INITIAL EVALUATION ADULT - ACTIVE RANGE OF MOTION EXAMINATION, REHAB EVAL
LLE limited by pain/Right LE Active ROM was WFL (within functional limits)/bilateral upper extremity Active ROM was WFL (within functional limits)

## 2020-06-04 NOTE — DISCHARGE NOTE PROVIDER - NSDCMRMEDTOKEN_GEN_ALL_CORE_FT
calcium acetate 667 mg oral tablet: 3 tab(s) orally 3 times a day  Catapres 0.1 mg oral tablet: 1 tab(s) orally once a day (at bedtime)  Lantus 100 units/mL subcutaneous solution: subcutaneous once a day (at bedtime)  NovoLOG 100 units/mL subcutaneous solution: subcutaneous once a day (at bedtime) acetaminophen 325 mg oral tablet: 2 tab(s) orally every 6 hours. Do not exceed 4000mg daily.  calcium acetate 667 mg oral tablet: 3 tab(s) orally 3 times a day  Catapres 0.1 mg oral tablet: 1 tab(s) orally once a day (at bedtime)  Lantus 100 units/mL subcutaneous solution: subcutaneous once a day (at bedtime)  NovoLOG 100 units/mL subcutaneous solution: subcutaneous once a day (at bedtime)  oxycodone-acetaminophen 5 mg-300 mg oral tablet: 1 tab(s) orally every 6 hours, As Needed -for severe pain MDD:4 tabs acetaminophen 325 mg oral tablet: 2 tab(s) orally every 6 hours. Do not exceed 4000mg daily.  calcium acetate 667 mg oral tablet: 3 tab(s) orally 3 times a day  Catapres 0.1 mg oral tablet: 1 tab(s) orally once a day (at bedtime)  Lantus 100 units/mL subcutaneous solution: subcutaneous once a day (at bedtime)  NovoLOG 100 units/mL subcutaneous solution: subcutaneous once a day (at bedtime)  oxycodone-acetaminophen 5 mg-300 mg oral tablet: 1 tab(s) orally every 6 hours, As Needed -for severe pain MDD:4 tabs   polyethylene glycol 3350 oral powder for reconstitution: 17 gram(s) orally 2 times a day  senna oral tablet: 2 tab(s) orally once a day (at bedtime) acetaminophen 325 mg oral tablet: 2 tab(s) orally every 6 hours. Do not exceed 4000mg daily.  bisacodyl 10 mg rectal suppository: 1 suppository(ies) rectal once  calcium acetate 667 mg oral tablet: 3 tab(s) orally 3 times a day  cloNIDine 0.1 mg oral tablet: 1 tab(s) orally 2 times a day  Lantus 100 units/mL subcutaneous solution: 15 unit(s) subcutaneous once a day (at bedtime)  NovoLOG 100 units/mL subcutaneous solution: 7 unit(s) subcutaneous 3 times a day before meals   oxycodone-acetaminophen 5 mg-300 mg oral tablet: 1 tab(s) orally every 6 hours, As Needed -for severe pain MDD:4 tabs   polyethylene glycol 3350 oral powder for reconstitution: 17 gram(s) orally 2 times a day  senna oral tablet: 2 tab(s) orally once a day (at bedtime) acetaminophen 325 mg oral tablet: 2 tab(s) orally every 6 hours. Do not exceed 4000mg daily.  calcium acetate 667 mg oral tablet: 3 tab(s) orally 3 times a day  cloNIDine 0.1 mg oral tablet: 1 tab(s) orally 2 times a day  Lantus 100 units/mL subcutaneous solution: 15 unit(s) subcutaneous once a day (at bedtime)  NovoLOG 100 units/mL subcutaneous solution: 7 unit(s) subcutaneous 3 times a day before meals   oxycodone-acetaminophen 5 mg-300 mg oral tablet: 1 tab(s) orally every 6 hours, As Needed -for severe pain MDD:4 tabs   polyethylene glycol 3350 oral powder for reconstitution: 17 gram(s) orally 2 times a day  senna oral tablet: 2 tab(s) orally once a day (at bedtime)

## 2020-06-04 NOTE — PROGRESS NOTE ADULT - SUBJECTIVE AND OBJECTIVE BOX
[facility] DIVISION OF KIDNEY DISEASES AND HYPERTENSION -- FOLLOW UP NOTE  --------------------------------------------------------------------------------  Juice Hahn   Nephrology Fellow  Pager NS: [phone]/ [facility]: [phone]  (After 5 pm or on weekends please page the on-call fellow)    24 hour events/subjective: Patient seen and examined at the bedside. Currently receiving HD. Endorsing pain in his LLE.         PAST HISTORY  --------------------------------------------------------------------------------  No significant changes to PMH, PSH, FHx, SHx, unless otherwise noted    ALLERGIES & MEDICATIONS  --------------------------------------------------------------------------------  Allergies    aspirin (Rash; Urticaria; Hives)  iodine (Rash; Urticaria)    Intolerances      Standing Inpatient Medications  acetaminophen   Tablet .. 975 milliGRAM(s) Oral every 6 hours  chlorhexidine 4% Liquid 1 Application(s) Topical daily  cloNIDine 0.1 milliGRAM(s) Oral daily  heparin   Injectable 5000 Unit(s) SubCutaneous every 8 hours  insulin glargine Injectable (LANTUS) 25 Unit(s) SubCutaneous at bedtime  insulin lispro (HumaLOG) corrective regimen sliding scale   SubCutaneous at bedtime  insulin lispro (HumaLOG) corrective regimen sliding scale   SubCutaneous every 6 hours  insulin lispro Injectable (HumaLOG) 10 Unit(s) SubCutaneous three times a day before meals    PRN Inpatient Medications  glucagon  Injectable 1 milliGRAM(s) IntraMuscular once PRN  oxyCODONE    IR 5 milliGRAM(s) Oral every 4 hours PRN  oxyCODONE    IR 10 milliGRAM(s) Oral every 4 hours PRN      REVIEW OF SYSTEMS  --------------------------------------------------------------------------------  Gen: No lethargy  Respiratory: No dyspnea  CV: No chest pain  GI: No abdominal pain/ diarrhea   MSK: + LLE pain  Neuro: No dizziness  Heme: No bleeding    All other systems were reviewed and are negative, except as noted.    >>> <<<    VITALS/PHYSICAL EXAM  --------------------------------------------------------------------------------  T(C): 36.7 (06-04-20 @ 04:14), Max: 36.9 (06-03-20 @ 23:59)  HR: 78 (06-04-20 @ 04:14) (72 - 94)  BP: 155/73 (06-04-20 @ 04:14) (130/63 - 180/95)  RR: 16 (06-04-20 @ 04:14) (14 - 16)  SpO2: 98% (06-04-20 @ 04:14) (95% - 100%)  Wt(kg): --  Height (cm): 180.34 (06-02-20 @ 21:40)  Weight (kg): 76.1 (06-02-20 @ 21:40)  BMI (kg/m2): 23.4 (06-02-20 @ 21:40)  BSA (m2): 1.96 (06-02-20 @ 21:40)      06-03-20 @ 07:01  -  06-04-20 @ 07:00  --------------------------------------------------------  IN: 200 mL / OUT: 0 mL / NET: 200 mL      Physical Exam:    	Gen: NAD, well-appearing  	HEENT: Anicteric   	Pulm: CTA B/L  	CV: RRR, S1S2; no rub  	Abd: +BS, soft, nontender/nondistended       	: No suprapubic tenderness  	MSK: Warm, no edema  	Neuro: AOX3      	Vascular Access: CARMELA RAY      LABS/STUDIES  --------------------------------------------------------------------------------              11.7   13.03 >-----------<  185      [06-03-20 @ 12:19]              36.8     137  |  95  |  90  ----------------------------<  99      [06-03-20 @ 12:19]  3.8   |  17  |  11.74        Ca     8.4     [06-03-20 @ 12:19]      Mg     2.9     [06-03-20 @ 05:16]      Phos  8.9     [06-03-20 @ 05:16]      PT/INR: PT 10.4 , INR 0.91       [06-03-20 @ 08:18]  PTT: 24.5       [06-03-20 @ 08:18]      Creatinine Trend:  SCr 11.74 [06-03 @ 12:19]  SCr 11.38 [06-03 @ 05:16]  SCr 14.41 [06-02 @ 08:57]  SCr 13.18 [06-01 @ 21:10]  SCr 12.49 [06-01 @ 06:06]          HCV 12.30, Reactive      [06-01-20 @ 08:28]

## 2020-06-04 NOTE — DISCHARGE NOTE PROVIDER - CARE PROVIDER_API CALL
Ok Samaniego  VASCULAR SURGERY  [address] [facility] [address]  [place] [address]  Phone: [phone]  Fax: [phone]  Follow Up Time: 2 weeks Ok Samaniego  VASCULAR SURGERY  [address] [facility] [address]  [place] [address]  Phone: [phone]  Fax: [phone]  Follow Up Time: 2 weeks    Billy Aguirre  [address]  [place] [address]  Phone: [phone]  Fax: [phone]  Follow Up Time: 2 weeks

## 2020-06-04 NOTE — PROGRESS NOTE ADULT - SUBJECTIVE AND OBJECTIVE BOX
Subjective: Patient seen and examined. No new events except as noted.   POD 1  bypass from popliteal artery to posterior tibial artery  Resting comfortably   tolerating PO bypass from popliteal artery to posterior tibial artery  pain stable       REVIEW OF SYSTEMS:    CONSTITUTIONAL: + weakness, fevers or chills  EYES/ENT: No visual changes;  No vertigo or throat pain   NECK: No pain or stiffness  RESPIRATORY: No cough, wheezing, hemoptysis; No shortness of breath  CARDIOVASCULAR: No chest pain or palpitations  GASTROINTESTINAL: No abdominal or epigastric pain. No nausea, vomiting, or hematemesis; No diarrhea or constipation. No melena or hematochezia.  GENITOURINARY: No dysuria, frequency or hematuria  NEUROLOGICAL: No numbness or weakness  SKIN: No itching, burning, rashes, or lesions   All other review of systems is negative unless indicated above.    MEDICATIONS:  MEDICATIONS  (STANDING):  acetaminophen   Tablet .. 975 milliGRAM(s) Oral every 6 hours  chlorhexidine 4% Liquid 1 Application(s) Topical daily  cloNIDine 0.1 milliGRAM(s) Oral daily  heparin   Injectable 5000 Unit(s) SubCutaneous every 8 hours  insulin glargine Injectable (LANTUS) 30 Unit(s) SubCutaneous at bedtime  insulin lispro (HumaLOG) corrective regimen sliding scale   SubCutaneous at bedtime  insulin lispro (HumaLOG) corrective regimen sliding scale   SubCutaneous every 6 hours  insulin lispro Injectable (HumaLOG) 10 Unit(s) SubCutaneous three times a day before meals      PHYSICAL EXAM:  T(C): 36.8 (06-04-20 @ 08:25), Max: 36.9 (06-03-20 @ 23:59)  HR: 89 (06-04-20 @ 08:25) (72 - 94)  BP: 156/82 (06-04-20 @ 08:25) (130/63 - 180/95)  RR: 18 (06-04-20 @ 08:25) (14 - 18)  SpO2: 97% (06-04-20 @ 08:25) (95% - 100%)  Wt(kg): --  I&O's Summary    03 Jun 2020 07:01  -  04 Jun 2020 07:00  --------------------------------------------------------  IN: 200 mL / OUT: 0 mL / NET: 200 mL          Appearance: Normal	  HEENT:   Normal oral mucosa, PERRL, EOMI	  Lymphatic: No lymphadenopathy , no edema  Cardiovascular: Normal S1 S2, No JVD, No murmurs , Peripheral pulses palpable 2+ bilaterally  Respiratory: Lungs clear to auscultation, normal effort 	  Gastrointestinal:  Soft, Non-tender, + BS	  Skin: No rashes, No ecchymoses, No cyanosis, warm to touch  Musculoskeletal: Normal range of motion, normal strength  Psychiatry:  Mood & affect appropriate  Ext: LLE: AT, PT signals present; dressings c/d/i    LABS:    CARDIAC MARKERS:                                11.2   7.39  )-----------( 169      ( 04 Jun 2020 09:00 )             35.4     06-04    137  |  97  |  101<H>  ----------------------------<  101<H>  4.8   |  20<L>  |  13.81<H>    Ca    7.4<L>      04 Jun 2020 09:00  Phos  8.4     06-04  Mg     2.8     06-04      proBNP:   Lipid Profile:   HgA1c:   TSH:             TELEMETRY: 	    ECG:  	  RADIOLOGY:   DIAGNOSTIC TESTING:  [ ] Echocardiogram:  [ ]  Catheterization:  [ ] Stress Test:    OTHER:

## 2020-06-04 NOTE — PROVIDER CONTACT NOTE (MEDICATION) - ASSESSMENT
pt now refusing 20units as newly ordered by MD. Pt . Pt states "20units is too much now, he just did it, my , he needs to change it to 15units now". Pt reeducated on endocrine recommendations and on how lantus works. Pt insist "I know my body and that is too much"

## 2020-06-04 NOTE — DIETITIAN INITIAL EVALUATION ADULT. - REASON INDICATOR FOR ASSESSMENT
Assessment warranted for length of stay. Per chart: pt is a 59 y.o M with PMH ESRD of HD, T2DM, HTN, CV, PAD, R DVT s/p thrombectomy, presenting with LLE pain. Pt s/p LLE pop-PT bypass with GSV graft on 6/3.

## 2020-06-04 NOTE — DIETITIAN INITIAL EVALUATION ADULT. - PHYSICAL APPEARANCE
other (specify) Pt appears well nourished. Per RN assessment pt with no noted pressure injuries. Per flow sheets no noted edema.  Ht: 71 inches Wt: 178.5 pounds (6/4, post-HD) BMI: 24.9 kg/m2 IBW: 172 pounds (+/-10%) %IBW: 103%

## 2020-06-04 NOTE — PHYSICAL THERAPY INITIAL EVALUATION ADULT - PLANNED THERAPY INTERVENTIONS, PT EVAL
balance training/bed mobility training/GOAL: pt will negotiate a flight of steps (I) w/i 2wks/gait training/transfer training

## 2020-06-04 NOTE — DISCHARGE NOTE PROVIDER - NSDCFUADDINST_GEN_ALL_CORE_FT
You may take 650-1000 mg of tylenol every 4-6 hours. Do not exceed 4 grams of tylenol daily. Take oxycodone as needed for severe pain, one tab every 4-6 hours. Do not exceed 6 tabs daily.     Keep dressing dry for 48 hours. You may remove the outer dressing at that time and shower. You have bandages overlying your incisions called steri strips. Do not remove the steri strips. They will fall off on their own and if not, they will be removed in the office. When showering, avoid rubbing soap into the steri strips and pat dry afterwards. After surgery, some blood may escape from under the tape, which is normal.     The patient may resume a regular diet and activity. Take medications as prescribed.     Patient may shower 24-48 hours after surgery. Pat dry surgical sites. You may take 650-1000 mg of tylenol every 4-6 hours. Do not exceed 4 grams of tylenol daily. Take oxycodone as needed for severe pain, one tab every 4-6 hours. Do not exceed 6 tabs daily.

## 2020-06-04 NOTE — PROGRESS NOTE ADULT - ASSESSMENT
59y M with ESRD on HD      #ESRD     Pt. with ESRD on HD three times a week (MWF). Last HD was on 6/2 via LUE AVF. Pt. clinically stable. Labs reviewed. Receiving HD today  Might have component of recirculation given the high BUN, adjusted HD prescription and increased dialyzer size.    #HTN  BP at target range. Monitor BP on current BP medication. Low salt diet.     #Anemia  Patient with anemia in the setting of ESRD. Hemoglobin above target range. Will hold DEBORA for now. Monitor hemoglobin.  -Check iron studies including ferritin    #Secondary hyperparathyroidism  Pt. on phosphate binders with meals. Recommend checking serum phosphorus level. Low phosphorus diet. 59y M with ESRD on HD      #ESRD     Pt. with ESRD on HD three times a week (MWF). Last HD was on 6/2 via LUE AVF. Pt. clinically stable. Labs reviewed. Receiving HD today  Might have component of recirculation given the high BUN, adjusted HD prescription and increased dialyzer size.    #HTN  BP at target range. Monitor BP on current BP medication. Low salt diet.     #Anemia  Patient with anemia in the setting of ESRD. Hemoglobin above target range. Will hold DEBORA for now. Monitor hemoglobin.  -Check iron studies including ferritin    #Secondary hyperparathyroidism  Pt. not on phosphate binders with meals. Recommend checking serum phosphorus level daily. Please start phoslo 2 pills tID with meals  Low phosphorus diet.

## 2020-06-04 NOTE — PROVIDER CONTACT NOTE (MEDICATION) - ASSESSMENT
Pt scheduled to receive 22units of lantus. Pt refusing 22units, requesting 20units. Pt states " I know my body, 22units is too much, they have to be careful with me, I will crash." Pt states "I will only take 20 units, 22 is too much. As per Endocrine recommendations, recommended dose 22units. Pt refused

## 2020-06-04 NOTE — DISCHARGE NOTE PROVIDER - NSDCCPCAREPLAN_GEN_ALL_CORE_FT
PRINCIPAL DISCHARGE DIAGNOSIS  Diagnosis: Claudication of lower extremity  Assessment and Plan of Treatment: PRINCIPAL DISCHARGE DIAGNOSIS  Diagnosis: Claudication of lower extremity  Assessment and Plan of Treatment: post op care   change left leg dressing daily   follow up with Dr. Samaniego in 1-2 weeks   notfiy Dr. Manzo if develop fevers, chills, worsening leg pain, numbness/ tingling, drainage from wound  Please follow up with your regular medical doctor in 1 week, please call to schedule an appointment to discuss recent hospitalization         SECONDARY DISCHARGE DIAGNOSES  Diagnosis: Diabetes  Assessment and Plan of Treatment: follow up with your endocrinoligst Dr. Hanks in 1-2 weeks call to schedule an appointment    Diagnosis: ESRD (end stage renal disease) on dialysis  Assessment and Plan of Treatment: ESRD (end stage renal disease) on dialysis  continue diaylsis monday, wednesday, friday   follow up with your nephrologist in 1 week call to schedule an appointment    Diagnosis: Hypertension, unspecified type  Assessment and Plan of Treatment: Hypertension, unspecified type  continue clonidine   follow up with Dr. Aguirre in 1-2 weeks PRINCIPAL DISCHARGE DIAGNOSIS  Diagnosis: Claudication of lower extremity  Assessment and Plan of Treatment: post op care   Your incision is closed with staples. These will be removed in the office. You may dress the incision with clean guaze for comfort if you would like. You may shower as usual and allow soapy water to run down the incision. Pat dry when finished. Do not submerge the incision in pools, tubs, etc. for 4-6 weeks.  follow up with Dr. Samaniego in 1-2 weeks   notfiy Dr. Manzo if develop fevers, chills, worsening leg pain, numbness/ tingling, drainage from wound  Please follow up with your regular medical doctor in 1 week, please call to schedule an appointment to discuss recent hospitalization         SECONDARY DISCHARGE DIAGNOSES  Diagnosis: Diabetes  Assessment and Plan of Treatment: follow up with your endocrinoligst Dr. Hanks in 1-2 weeks call to schedule an appointment    Diagnosis: Hypertension, unspecified type  Assessment and Plan of Treatment: Hypertension, unspecified type  continue clonidine   follow up with Dr. Aguirre in 1-2 weeks    Diagnosis: ESRD (end stage renal disease) on dialysis  Assessment and Plan of Treatment: ESRD (end stage renal disease) on dialysis  continue diaylsis monday, wednesday, friday   follow up with your nephrologist in 1 week call to schedule an appointment PRINCIPAL DISCHARGE DIAGNOSIS  Diagnosis: Claudication of lower extremity  Assessment and Plan of Treatment: post op care   Your incision is closed with staples. These will be removed in the office. You may dress the incision with clean guaze for comfort if you would like. You may shower as usual and allow soapy water to run down the incision. Pat dry when finished. Do not submerge the incision in pools, tubs, etc. for 4-6 weeks.  follow up with Dr. Samaniego in 1-2 weeks   notfiy Dr. Manzo if develop fevers, chills, worsening leg pain, numbness/ tingling, drainage from wound  Please follow up with your regular medical doctor in 1 week, please call to schedule an appointment to discuss recent hospitalization         SECONDARY DISCHARGE DIAGNOSES  Diagnosis: Diabetes  Assessment and Plan of Treatment: follow up with your endocrinoligst Dr. Haley in 1-2 weeks call to schedule an appointment  Lantus 15 units at bedtime   humolog 7 units pre meal   moderate dose sldiing scale       Diagnosis: ESRD (end stage renal disease) on dialysis  Assessment and Plan of Treatment: ESRD (end stage renal disease) on dialysis  continue diaylsis monday, wednesday, friday   follow up with your nephrologist in 1 week call to schedule an appointment    Diagnosis: Hypertension, unspecified type  Assessment and Plan of Treatment: Hypertension, unspecified type  continue clonidine   follow up with Dr. Aguirre in 1-2 weeks

## 2020-06-04 NOTE — DIETITIAN INITIAL EVALUATION ADULT. - PERTINENT LABORATORY DATA
06-04 Na 137 mmol/L Glu 101 mg/dL<H> K+ 4.8 mmol/L Cr  13.81 mg/dL<H>  mg/dL<H> Phos 8.4 mg/dL<H> Hgb 11.2 g/dL<L> Hct 35.4 %<L>  HgA1c 7.2%  CAPILLARY BLOOD GLUCOSE  POCT Blood Glucose.: 83 mg/dL (04 Jun 2020 12:17)  POCT Blood Glucose.: 103 mg/dL (04 Jun 2020 07:31)  POCT Blood Glucose.: 139 mg/dL (03 Jun 2020 21:47)  POCT Blood Glucose.: 129 mg/dL (03 Jun 2020 16:55)

## 2020-06-04 NOTE — DISCHARGE NOTE PROVIDER - PROVIDER TOKENS
PROVIDER:[TOKEN:[[id number]],FOLLOWUP:[2 weeks]] PROVIDER:[TOKEN:[[id number]],FOLLOWUP:[2 weeks]],PROVIDER:[TOKEN:[[id number]],FOLLOWUP:[2 weeks]]

## 2020-06-04 NOTE — DISCHARGE NOTE PROVIDER - CARE PROVIDERS DIRECT ADDRESSES
[email or web address],DirectAddress_Unknown

## 2020-06-04 NOTE — PHYSICAL THERAPY INITIAL EVALUATION ADULT - PERTINENT HX OF CURRENT PROBLEM, REHAB EVAL
59yoM PMH ESRD (on HD M/W/F), glaucoma, DMII, HCV, PAD, R DVT s/p thrombectomy, p/w LLE pain. Pt states that he developed pain in January of this year, which is worse w/ walking & is located in the upper leg/thigh. Pt is s/p LLE angioplasty in March w/ some improvement in LLE pain, however in office duplex recently showed re-occlusion coinciding with return of symptoms. Pt now s/p LE pop-PT bypass with GSV graft 6/3/20.

## 2020-06-04 NOTE — PROGRESS NOTE ADULT - ASSESSMENT
59M with history of DM1, ESRD (on HD M/W/F), glaucoma, HCV, PAD, R DVT s/p thrombectomy, presenting with LLE pain s/p LLE bypass today, 6/3. Patient received hydrocortisone yesterday after for premedication. AM .      uncontrolled DM1   A1c 7.2%  FS premeal and qhs   FS goal 100-180  Patient on clear liquids overnight, started on diet today.    although at goal would recommend decrease Lantus to 20 units qhs, Please do not hold basal insulin as patient can go into DKA  Decrease Humalog to 5units premeal, hold when patient is NPO or has poor appetite   Decrease to low correctional scales premeal and qhs   On discharge patient can follow up with outpt endocrine, Dr Hanks   Recommend to continue basal, bolus on discharge, dose to be determined    HTN   goal 130/80, at goal   Continue Clonidine     HLD  consider lipid panel   If LDL<70, consider starting statin         Eric Arcos MD  Endocrine Fellow   Pager  59M with history of DM1, ESRD (on HD M/W/F), glaucoma, HCV, PAD, R DVT s/p thrombectomy, presenting with LLE pain s/p LLE bypass 6/3, POD 1.      uncontrolled DM1   A1c 7.2%  FS premeal and qhs   FS goal 100-180  Patient on clear liquids overnight, started on diet today.    although at goal would recommend decrease Lantus to 20 units qhs, Please do not hold basal insulin as patient can go into DKA  Decrease Humalog to 5units premeal, hold when patient is NPO or has poor appetite   Decrease to low correctional scales premeal and qhs   On discharge patient can follow up with outpt endocrine, Dr Hanks   Recommend to continue basal, bolus on discharge, dose to be determined    HTN   goal 130/80, at goal   Continue Clonidine     HLD  consider lipid panel   If LDL<70, consider starting statin       to be discussed with attending       Eric Arcos MD  Endocrine Fellow   Pager  59M with history of DM1, ESRD (on HD M/W/F), glaucoma, HCV, PAD, R DVT s/p thrombectomy, presenting with LLE pain s/p LLE bypass 6/3, POD 1.      uncontrolled DM1   A1c 7.2%  FS premeal and qhs   FS goal 100-180  Patient on clear liquids overnight, started on diet today.    although at goal would recommend decrease Lantus to 22 units qhs, Please do not hold basal insulin as patient can go into DKA  Decrease Humalog to 5units premeal, hold when patient is NPO or has poor appetite   Decrease to low correctional scales premeal and qhs   On discharge patient can follow up with outpt endocrine, Dr Hanks   Recommend to continue basal, bolus on discharge, dose to be determined    HTN   goal 130/80, at goal   Continue Clonidine     HLD  consider lipid panel   If LDL<70, consider starting statin       to be discussed with attending       Eric Arcos MD  Endocrine Fellow   Pager  59M with history of DM1, ESRD (on HD M/W/F), glaucoma, HCV, PAD, R DVT s/p thrombectomy, presenting with LLE pain s/p LLE bypass 6/3, POD 1.      uncontrolled DM1   A1c 7.2%  FS premeal and qhs   FS goal 100-180  Patient on clear liquids overnight, started on diet today.    although at goal would recommend decrease Lantus to 22 units qhs, Please do not hold basal insulin as patient can go into DKA  Decrease Humalog to 7 units premeal, hold when patient is NPO or has poor appetite   Decrease to low correctional scales premeal and qhs   On discharge patient can follow up with outpt endocrine, Dr Hanks   Recommend to continue basal, bolus on discharge, dose to be determined    HTN   goal 130/80, at goal   Continue Clonidine     HLD  consider lipid panel   If LDL<70, consider starting statin       to be discussed with attending       Eric Arcos MD  Endocrine Fellow   Pager

## 2020-06-04 NOTE — DIETITIAN INITIAL EVALUATION ADULT. - ADD RECOMMEND
1. Continue Consistent Carbohydrate Renal diet.  2. Add Nepro 1x daily (425 kcals, 19 g protein) to promote PO intake. 3. Consider phosphate binder in setting of elevated phos. 4.  Continue to obtain pre and post-HD weights.  5.  Add Nephro-edy. 6. Will continue to monitor PO intake, labs, weights, BM, skin, clinical course.

## 2020-06-05 LAB
ANION GAP SERPL CALC-SCNC: 16 MMOL/L — SIGNIFICANT CHANGE UP (ref 5–17)
BUN SERPL-MCNC: 61 MG/DL — HIGH (ref 7–23)
CALCIUM SERPL-MCNC: 8.2 MG/DL — LOW (ref 8.4–10.5)
CHLORIDE SERPL-SCNC: 93 MMOL/L — LOW (ref 96–108)
CO2 SERPL-SCNC: 24 MMOL/L — SIGNIFICANT CHANGE UP (ref 22–31)
CREAT SERPL-MCNC: 10.62 MG/DL — HIGH (ref 0.5–1.3)
GLUCOSE BLDC GLUCOMTR-MCNC: 104 MG/DL — HIGH (ref 70–99)
GLUCOSE BLDC GLUCOMTR-MCNC: 131 MG/DL — HIGH (ref 70–99)
GLUCOSE BLDC GLUCOMTR-MCNC: 141 MG/DL — HIGH (ref 70–99)
GLUCOSE BLDC GLUCOMTR-MCNC: 155 MG/DL — HIGH (ref 70–99)
GLUCOSE SERPL-MCNC: 165 MG/DL — HIGH (ref 70–99)
HBV SURFACE AB SER-ACNC: 464.5 MIU/ML — SIGNIFICANT CHANGE UP
HBV SURFACE AG SER-ACNC: SIGNIFICANT CHANGE UP
HCT VFR BLD CALC: 33.7 % — LOW (ref 39–50)
HCV AB S/CO SERPL IA: 13.23 S/CO — HIGH (ref 0–0.99)
HCV AB SERPL-IMP: REACTIVE
HGB BLD-MCNC: 10.7 G/DL — LOW (ref 13–17)
MAGNESIUM SERPL-MCNC: 2.3 MG/DL — SIGNIFICANT CHANGE UP (ref 1.6–2.6)
MCHC RBC-ENTMCNC: 24.5 PG — LOW (ref 27–34)
MCHC RBC-ENTMCNC: 31.8 GM/DL — LOW (ref 32–36)
MCV RBC AUTO: 77.3 FL — LOW (ref 80–100)
NRBC # BLD: 0 /100 WBCS — SIGNIFICANT CHANGE UP (ref 0–0)
PHOSPHATE SERPL-MCNC: 6.5 MG/DL — HIGH (ref 2.5–4.5)
PLATELET # BLD AUTO: 137 K/UL — LOW (ref 150–400)
POTASSIUM SERPL-MCNC: 4.5 MMOL/L — SIGNIFICANT CHANGE UP (ref 3.5–5.3)
POTASSIUM SERPL-SCNC: 4.5 MMOL/L — SIGNIFICANT CHANGE UP (ref 3.5–5.3)
RBC # BLD: 4.36 M/UL — SIGNIFICANT CHANGE UP (ref 4.2–5.8)
RBC # FLD: 16.6 % — HIGH (ref 10.3–14.5)
SODIUM SERPL-SCNC: 133 MMOL/L — LOW (ref 135–145)
SURGICAL PATHOLOGY STUDY: SIGNIFICANT CHANGE UP
WBC # BLD: 6.63 K/UL — SIGNIFICANT CHANGE UP (ref 3.8–10.5)
WBC # FLD AUTO: 6.63 K/UL — SIGNIFICANT CHANGE UP (ref 3.8–10.5)

## 2020-06-05 PROCEDURE — 90935 HEMODIALYSIS ONE EVALUATION: CPT | Mod: GC

## 2020-06-05 PROCEDURE — 99232 SBSQ HOSP IP/OBS MODERATE 35: CPT | Mod: GC

## 2020-06-05 RX ORDER — ACETAMINOPHEN 500 MG
2 TABLET ORAL
Qty: 0 | Refills: 0 | DISCHARGE
Start: 2020-06-05

## 2020-06-05 RX ORDER — CALCIUM ACETATE 667 MG
1334 TABLET ORAL
Refills: 0 | Status: DISCONTINUED | OUTPATIENT
Start: 2020-06-05 | End: 2020-06-10

## 2020-06-05 RX ADMIN — Medication 7 UNIT(S): at 07:56

## 2020-06-05 RX ADMIN — Medication 1: at 18:35

## 2020-06-05 RX ADMIN — Medication 975 MILLIGRAM(S): at 06:29

## 2020-06-05 RX ADMIN — Medication 1334 MILLIGRAM(S): at 18:34

## 2020-06-05 RX ADMIN — OXYCODONE AND ACETAMINOPHEN 2 TABLET(S): 5; 325 TABLET ORAL at 13:51

## 2020-06-05 RX ADMIN — Medication 7 UNIT(S): at 18:34

## 2020-06-05 RX ADMIN — INSULIN GLARGINE 15 UNIT(S): 100 INJECTION, SOLUTION SUBCUTANEOUS at 00:15

## 2020-06-05 RX ADMIN — Medication 7 UNIT(S): at 13:46

## 2020-06-05 RX ADMIN — Medication 975 MILLIGRAM(S): at 18:14

## 2020-06-05 RX ADMIN — Medication 1334 MILLIGRAM(S): at 13:46

## 2020-06-05 RX ADMIN — INSULIN GLARGINE 15 UNIT(S): 100 INJECTION, SOLUTION SUBCUTANEOUS at 21:34

## 2020-06-05 RX ADMIN — Medication 0.1 MILLIGRAM(S): at 01:13

## 2020-06-05 RX ADMIN — CHLORHEXIDINE GLUCONATE 1 APPLICATION(S): 213 SOLUTION TOPICAL at 06:30

## 2020-06-05 RX ADMIN — ONDANSETRON 4 MILLIGRAM(S): 8 TABLET, FILM COATED ORAL at 16:07

## 2020-06-05 NOTE — PROGRESS NOTE ADULT - ASSESSMENT
59M with history of DM1, ESRD (on HD M/W/F), glaucoma, HCV, PAD, R DVT s/p thrombectomy, presenting with LLE pain s/p LLE bypass 6/3, POD 1.      uncontrolled DM1   A1c 7.2%  FS premeal and qhs   FS goal 100-180  On carb consistent diet   Continue Lantus 15units qhs, pls dont hold as patient can go into DKA   Continue Humalog 7 units premeal, hold when patient is NPO or has poor appetite   Continue low correctional scales premeal and qhs   On discharge patient can follow up with outpt endocrine, Dr Hanks   Recommend to continue basal, bolus on discharge, dose to be determined    HTN   goal 130/80, elevated, could be component of pain elevating bp?  Continue Clonidine   Renal recommend starting nifedipine     HLD  consider lipid panel   If LDL<70, consider starting statin         Eric Arcos MD  Endocrine Fellow   Pager

## 2020-06-05 NOTE — PROGRESS NOTE ADULT - SUBJECTIVE AND OBJECTIVE BOX
Chief Complaint: management of patient with DM1    History: Patient seen and examined at bedside. Noted to be in pain during visit. Patient reports he is tolerating diet however having nausea.   Last night patient refused Lantus 22 units therefore Lantus 15units qhs given. AM fasting .     MEDICATIONS  (STANDING):  acetaminophen   Tablet .. 975 milliGRAM(s) Oral every 6 hours  calcium acetate 1334 milliGRAM(s) Oral three times a day with meals  chlorhexidine 4% Liquid 1 Application(s) Topical daily  cloNIDine 0.1 milliGRAM(s) Oral daily  heparin   Injectable 5000 Unit(s) SubCutaneous every 8 hours  insulin glargine Injectable (LANTUS) 15 Unit(s) SubCutaneous at bedtime  insulin lispro (HumaLOG) corrective regimen sliding scale   SubCutaneous three times a day before meals  insulin lispro (HumaLOG) corrective regimen sliding scale   SubCutaneous at bedtime  insulin lispro Injectable (HumaLOG) 7 Unit(s) SubCutaneous three times a day before meals    MEDICATIONS  (PRN):  glucagon  Injectable 1 milliGRAM(s) IntraMuscular once PRN Glucose LESS THAN 70 milligrams/deciliter  oxycodone    5 mG/acetaminophen 325 mG 1 Tablet(s) Oral every 4 hours PRN Moderate Pain (4 - 6)  oxycodone    5 mG/acetaminophen 325 mG 2 Tablet(s) Oral every 6 hours PRN Severe Pain (7 - 10)    PHYSICAL EXAM:  VITALS: T(C): 37.5 (06-05-20 @ 13:33)  T(F): 99.5 (06-05-20 @ 13:33), Max: 99.5 (06-05-20 @ 13:33)  HR: 102 (06-05-20 @ 14:21) (92 - 102)  BP: 177/80 (06-05-20 @ 14:21) (148/79 - 177/80)  RR:  (18 - 18)  SpO2:  (94% - 98%)  Wt(kg): 76kg   GENERAL: Mild distress due to pain , well-groomed, well-developed  RESPIRATORY: Clear to auscultation bilaterally  CARDIOVASCULAR: Regular rate and rhythm; No murmurs; no peripheral edema  GI: Soft, nontender, non distended, normal bowel sounds  MUSCULOSKELETAL: ROM in LLE limited due to pain   PSYCH: Alert and oriented x 3, reactive affect     POCT Blood Glucose.: 131 mg/dL (06-05-20 @ 13:31) H7   POCT Blood Glucose.: 141 mg/dL (06-05-20 @ 07:46) H7   POCT Blood Glucose.: 170 mg/dL (06-04-20 @ 23:20) L15   POCT Blood Glucose.: 210 mg/dL (06-04-20 @ 21:06)  POCT Blood Glucose.: 264 mg/dL (06-04-20 @ 18:33)  POCT Blood Glucose.: 271 mg/dL (06-04-20 @ 17:16) H3   POCT Blood Glucose.: 83 mg/dL (06-04-20 @ 12:17) H0   POCT Blood Glucose.: 103 mg/dL (06-04-20 @ 07:31)  POCT Blood Glucose.: 139 mg/dL (06-03-20 @ 21:47)  POCT Blood Glucose.: 129 mg/dL (06-03-20 @ 16:55)  POCT Blood Glucose.: 82 mg/dL (06-03-20 @ 14:29)  POCT Blood Glucose.: 92 mg/dL (06-03-20 @ 12:06)  POCT Blood Glucose.: 304 mg/dL (06-03-20 @ 06:08)  POCT Blood Glucose.: 200 mg/dL (06-02-20 @ 22:30)  POCT Blood Glucose.: 191 mg/dL (06-02-20 @ 18:52)      06-05    133<L>  |  93<L>  |  61<H>  ----------------------------<  165<H>  4.5   |  24  |  10.62<H>    EGFR if : 5<L>  EGFR if non : 5<L>    Ca    8.2<L>      06-05  Mg     2.3     06-05  Phos  6.5     06-05

## 2020-06-05 NOTE — PROGRESS NOTE ADULT - ASSESSMENT
59y M with ESRD on HD      #ESRD     Pt. with ESRD on HD three times a week (MWF). Last HD was on 6/4 via [facility]. Pt. clinically stable. Labs reviewed. Receiving HD today to keep pt on MWF schedule  Might have component of recirculation given the high BUN, adjusted HD prescription and increased dialyzer size yesterday, awaiting for AM labs today    #HTN  BP elevated, monitor BP on current BP medication. Low salt diet.   Will attempt to UF more with HD    #Anemia  Patient with anemia in the setting of ESRD. Hemoglobin above target range. Will hold DEBORA for now, if drops below 11 will start low dose EPO. Monitor hemoglobin.  -Check iron studies including ferritin    #Secondary hyperparathyroidism  - Phos elevated to 8's. Started phoslo 1334mg TID. Monitor phos QD, low phosphorus diet. 59y M with ESRD on HD      #ESRD     Pt. with ESRD on HD three times a week (MWF). Last HD was on 6/4 via [facility]. Pt. clinically stable. Labs reviewed. Receiving HD today to keep pt on MWF schedule  Might have component of recirculation given the high BUN, adjusted HD prescription and increased dialyzer size yesterday, awaiting for AM labs today    #HTN  BP elevated, monitor BP on current BP medication. Low salt diet.   Will attempt to UF more with HD today    #Anemia  Patient with anemia in the setting of ESRD. Hemoglobin above target range. 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 Monitor hemoglobin.  -Check iron studies including ferritin    #Secondary hyperparathyroidism  - Phos elevated to 8's. Started phoslo 1334mg TID. Monitor phos QD, low phosphorus diet. 59y M with ESRD on HD      #ESRD     Pt. with ESRD on HD three times a week (MWF). Last HD was on 6/4 via [facility]. Pt. clinically stable. Labs reviewed. Receiving HD today to keep pt on MWF schedule  Might have component of recirculation given the high BUN, adjusted HD prescription and increased dialyzer size     #HTN  BP elevated, monitor BP on current BP medication. Low salt diet.   Will attempt to UF more with HD today  Please start nifedipine 30 mg     #Anemia  Patient with anemia in the setting of ESRD. Hemoglobin above target range. 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 Monitor hemoglobin.  -Check iron studies including ferritin    #Secondary hyperparathyroidism  - Phos elevated to 8's, now improved. Maintain on   phoslo 1334mg TID. Monitor phos QD, low phosphorus diet.

## 2020-06-05 NOTE — PROGRESS NOTE ADULT - SUBJECTIVE AND OBJECTIVE BOX
VASCULAR SURGERY DAILY PROGRESS NOTE:    Subjective:  Patient seen and examined. Pt refusing rehab and pain medication overnight. Denies SOB, CP, N/V. Agreeable to trying pain meds this AM.    Exam:  General: A&Ox3, NAD  Resp: CTA b/l  Cardiac: RRR  GI: abdomen soft, NT/ND  LLE: AT, PT signals present; dressings c/d/i    Vital Signs Last 24 Hrs  T(C): 37.1 (05 Jun 2020 04:09), Max: 37.1 (04 Jun 2020 13:19)  T(F): 98.8 (05 Jun 2020 04:09), Max: 98.8 (04 Jun 2020 13:19)  HR: 92 (05 Jun 2020 04:09) (92 - 105)  BP: 155/78 (05 Jun 2020 04:09) (132/75 - 174/74)  BP(mean): --  RR: 18 (05 Jun 2020 04:09) (18 - 18)  SpO2: 98% (05 Jun 2020 04:09) (94% - 100%)    I&O's Detail    04 Jun 2020 07:01  -  05 Jun 2020 07:00  --------------------------------------------------------  IN:    Oral Fluid: 240 mL    Other: 800 mL  Total IN: 1040 mL    OUT:    Other: 1700 mL    Voided: 50 mL  Total OUT: 1750 mL    Total NET: -710 mL          LABS:                          11.2   7.39  )-----------( 169      ( 04 Jun 2020 09:00 )             35.4     06-04    137  |  97  |  101<H>  ----------------------------<  101<H>  4.8   |  20<L>  |  13.81<H>    Ca    7.4<L>      04 Jun 2020 09:00  Phos  8.4     06-04  Mg     2.8     06-04

## 2020-06-05 NOTE — PROGRESS NOTE ADULT - SUBJECTIVE AND OBJECTIVE BOX
Subjective: Patient seen and examined. No new events except as noted.   no cp or sob   LLE pain     REVIEW OF SYSTEMS:    CONSTITUTIONAL: +weakness, fevers or chills  EYES/ENT: No visual changes;  No vertigo or throat pain   NECK: No pain or stiffness  RESPIRATORY: No cough, wheezing, hemoptysis; No shortness of breath  CARDIOVASCULAR: No chest pain or palpitations  GASTROINTESTINAL: No abdominal or epigastric pain. No nausea, vomiting, or hematemesis; No diarrhea or constipation. No melena or hematochezia.  GENITOURINARY: No dysuria, frequency or hematuria  NEUROLOGICAL: No numbness or weakness  SKIN: No itching, burning, rashes, or lesions   All other review of systems is negative unless indicated above.    MEDICATIONS:  MEDICATIONS  (STANDING):  acetaminophen   Tablet .. 975 milliGRAM(s) Oral every 6 hours  calcium acetate 1334 milliGRAM(s) Oral three times a day with meals  chlorhexidine 4% Liquid 1 Application(s) Topical daily  cloNIDine 0.1 milliGRAM(s) Oral daily  heparin   Injectable 5000 Unit(s) SubCutaneous every 8 hours  insulin glargine Injectable (LANTUS) 15 Unit(s) SubCutaneous at bedtime  insulin lispro (HumaLOG) corrective regimen sliding scale   SubCutaneous three times a day before meals  insulin lispro (HumaLOG) corrective regimen sliding scale   SubCutaneous at bedtime  insulin lispro Injectable (HumaLOG) 7 Unit(s) SubCutaneous three times a day before meals  ondansetron    Tablet 4 milliGRAM(s) Oral once      PHYSICAL EXAM:  T(C): 36.6 (06-05-20 @ 11:45), Max: 37.1 (06-04-20 @ 13:19)  HR: 97 (06-05-20 @ 11:45) (92 - 105)  BP: 156/97 (06-05-20 @ 11:45) (132/75 - 174/74)  RR: 18 (06-05-20 @ 11:45) (18 - 18)  SpO2: 97% (06-05-20 @ 11:45) (94% - 98%)  Wt(kg): --  I&O's Summary    04 Jun 2020 07:01  -  05 Jun 2020 07:00  --------------------------------------------------------  IN: 1040 mL / OUT: 1750 mL / NET: -710 mL    05 Jun 2020 07:01  -  05 Jun 2020 12:04  --------------------------------------------------------  IN: 0 mL / OUT: 1000 mL / NET: -1000 mL            Appearance: Normal	  HEENT:   Normal oral mucosa, PERRL, EOMI	  Lymphatic: No lymphadenopathy , no edema  Cardiovascular: Normal S1 S2, No JVD, No murmurs , Peripheral pulses palpable 2+ bilaterally  Respiratory: Lungs clear to auscultation, normal effort 	  Gastrointestinal:  Soft, Non-tender, + BS	  Skin: No rashes, No ecchymoses, No cyanosis, warm to touch  Musculoskeletal: Normal range of motion, normal strength  Psychiatry:  Mood & affect appropriate  Ext: LLE: AT, PT signals present; dressings c/d/i    LABS:    CARDIAC MARKERS:                                10.7   6.63  )-----------( 137      ( 05 Jun 2020 09:46 )             33.7     06-05    133<L>  |  93<L>  |  61<H>  ----------------------------<  165<H>  4.5   |  24  |  10.62<H>    Ca    8.2<L>      05 Jun 2020 09:45  Phos  6.5     06-05  Mg     2.3     06-05      proBNP:   Lipid Profile:   HgA1c:   TSH:             TELEMETRY: 	    ECG:  	  RADIOLOGY:   DIAGNOSTIC TESTING:  [ ] Echocardiogram:  [ ]  Catheterization:  [ ] Stress Test:    OTHER:

## 2020-06-05 NOTE — PROGRESS NOTE ADULT - ASSESSMENT
59yM  PMH ESRD (on HD M/W/F), glaucoma, DMII, HCV, PAD, R DVT s/p thrombectomy. POD2 s/p LLE pop-PT bypass with GSV graft.    - Pain control with around the clock tylenol and oxycodone PRN  - Pt refusing rehab- will discharge to home today  - HD today if BG okay      Vascular Surgery  [id number] 59yM  PMH ESRD (on HD M/W/F), glaucoma, DMII, HCV, PAD, R DVT s/p thrombectomy. POD2 s/p LLE pop-PT bypass with GSV graft.    - Pain control with around the clock tylenol and oxycodone PRN  - Pt initially refusing rehab but now agreeable  - Pt refusing prophylactic heparin, stating he understands the risks of not taking it and says "if I die, I die."  - HD today       Vascular Surgery  [id number]

## 2020-06-05 NOTE — PROGRESS NOTE ADULT - SUBJECTIVE AND OBJECTIVE BOX
[facility] DIVISION OF KIDNEY DISEASES AND HYPERTENSION -- FOLLOW UP NOTE  --------------------------------------------------------------------------------  Juice Hahn   Nephrology Fellow  Pager NS: [phone]/ [facility]: [phone]  (After 5 pm or on weekends please page the on-call fellow)    24 hour events/subjective: Patient seen and examined at the bedside. Endorsing pain in his LLE. Short HD session today to keep pt on MWF schedule        PAST HISTORY  --------------------------------------------------------------------------------  No significant changes to PMH, PSH, FHx, SHx, unless otherwise noted    ALLERGIES & MEDICATIONS  --------------------------------------------------------------------------------  Allergies    aspirin (Rash; Urticaria; Hives)  iodine (Rash; Urticaria)    Intolerances      Standing Inpatient Medications  acetaminophen   Tablet .. 975 milliGRAM(s) Oral every 6 hours  chlorhexidine 4% Liquid 1 Application(s) Topical daily  cloNIDine 0.1 milliGRAM(s) Oral daily  heparin   Injectable 5000 Unit(s) SubCutaneous every 8 hours  insulin glargine Injectable (LANTUS) 15 Unit(s) SubCutaneous at bedtime  insulin lispro (HumaLOG) corrective regimen sliding scale   SubCutaneous three times a day before meals  insulin lispro (HumaLOG) corrective regimen sliding scale   SubCutaneous at bedtime  insulin lispro Injectable (HumaLOG) 7 Unit(s) SubCutaneous three times a day before meals  ondansetron    Tablet 4 milliGRAM(s) Oral once    PRN Inpatient Medications  glucagon  Injectable 1 milliGRAM(s) IntraMuscular once PRN  oxycodone    5 mG/acetaminophen 325 mG 1 Tablet(s) Oral every 4 hours PRN  oxycodone    5 mG/acetaminophen 325 mG 2 Tablet(s) Oral every 6 hours PRN      REVIEW OF SYSTEMS  --------------------------------------------------------------------------------  Gen: No lethargy  Respiratory: No dyspnea  CV: No chest pain  GI: No abdominal pain/ diarrhea   MSK: + LLE pain  Neuro: No dizziness  Heme: No bleeding    All other systems were reviewed and are negative, except as noted.      >>> <<<    VITALS/PHYSICAL EXAM  --------------------------------------------------------------------------------  T(C): 37.1 (06-05-20 @ 04:09), Max: 37.1 (06-04-20 @ 13:19)  HR: 92 (06-05-20 @ 04:09) (89 - 105)  BP: 155/78 (06-05-20 @ 04:09) (132/75 - 174/74)  RR: 18 (06-05-20 @ 04:09) (18 - 18)  SpO2: 98% (06-05-20 @ 04:09) (94% - 100%)  Wt(kg): --        06-04-20 @ 07:01  -  06-05-20 @ 07:00  --------------------------------------------------------  IN: 1040 mL / OUT: 1750 mL / NET: -710 mL      Physical Exam:    	Gen: NAD, well-appearing  	HEENT: Anicteric   	Pulm: CTA B/L  	CV: RRR, S1S2; no rub  	Abd: +BS, soft, nontender/nondistended       	: No suprapubic tenderness  	MSK: Warm, + pain in LLE  	Neuro: AOX3      	Vascular Access: LUE AVF      LABS/STUDIES  --------------------------------------------------------------------------------              11.2   7.39  >-----------<  169      [06-04-20 @ 09:00]              35.4     137  |  97  |  101  ----------------------------<  101      [06-04-20 @ 09:00]  4.8   |  20  |  13.81        Ca     7.4     [06-04-20 @ 09:00]      Mg     2.8     [06-04-20 @ 09:00]      Phos  8.4     [06-04-20 @ 09:00]      PT/INR: PT 10.4 , INR 0.91       [06-03-20 @ 08:18]  PTT: 24.5       [06-03-20 @ 08:18]      Creatinine Trend:  SCr 13.81 [06-04 @ 09:00]  SCr 11.74 [06-03 @ 12:19]  SCr 11.38 [06-03 @ 05:16]  SCr 14.41 [06-02 @ 08:57]  SCr 13.18 [06-01 @ 21:10]          HCV 12.30, Reactive      [06-01-20 @ 08:28]

## 2020-06-06 LAB
ANION GAP SERPL CALC-SCNC: 16 MMOL/L — SIGNIFICANT CHANGE UP (ref 5–17)
BUN SERPL-MCNC: 46 MG/DL — HIGH (ref 7–23)
CALCIUM SERPL-MCNC: 9 MG/DL — SIGNIFICANT CHANGE UP (ref 8.4–10.5)
CHLORIDE SERPL-SCNC: 90 MMOL/L — LOW (ref 96–108)
CO2 SERPL-SCNC: 25 MMOL/L — SIGNIFICANT CHANGE UP (ref 22–31)
CREAT SERPL-MCNC: 8.76 MG/DL — HIGH (ref 0.5–1.3)
GLUCOSE BLDC GLUCOMTR-MCNC: 102 MG/DL — HIGH (ref 70–99)
GLUCOSE BLDC GLUCOMTR-MCNC: 137 MG/DL — HIGH (ref 70–99)
GLUCOSE BLDC GLUCOMTR-MCNC: 155 MG/DL — HIGH (ref 70–99)
GLUCOSE BLDC GLUCOMTR-MCNC: 181 MG/DL — HIGH (ref 70–99)
GLUCOSE SERPL-MCNC: 83 MG/DL — SIGNIFICANT CHANGE UP (ref 70–99)
HCT VFR BLD CALC: 36.3 % — LOW (ref 39–50)
HGB BLD-MCNC: 11.7 G/DL — LOW (ref 13–17)
MAGNESIUM SERPL-MCNC: 2.4 MG/DL — SIGNIFICANT CHANGE UP (ref 1.6–2.6)
MCHC RBC-ENTMCNC: 25.4 PG — LOW (ref 27–34)
MCHC RBC-ENTMCNC: 32.2 GM/DL — SIGNIFICANT CHANGE UP (ref 32–36)
MCV RBC AUTO: 78.7 FL — LOW (ref 80–100)
NRBC # BLD: 0 /100 WBCS — SIGNIFICANT CHANGE UP (ref 0–0)
PHOSPHATE SERPL-MCNC: 7.1 MG/DL — HIGH (ref 2.5–4.5)
PLATELET # BLD AUTO: 147 K/UL — LOW (ref 150–400)
POTASSIUM SERPL-MCNC: 4.6 MMOL/L — SIGNIFICANT CHANGE UP (ref 3.5–5.3)
POTASSIUM SERPL-SCNC: 4.6 MMOL/L — SIGNIFICANT CHANGE UP (ref 3.5–5.3)
RBC # BLD: 4.61 M/UL — SIGNIFICANT CHANGE UP (ref 4.2–5.8)
RBC # FLD: 16.9 % — HIGH (ref 10.3–14.5)
SODIUM SERPL-SCNC: 131 MMOL/L — LOW (ref 135–145)
WBC # BLD: 9.31 K/UL — SIGNIFICANT CHANGE UP (ref 3.8–10.5)
WBC # FLD AUTO: 9.31 K/UL — SIGNIFICANT CHANGE UP (ref 3.8–10.5)

## 2020-06-06 RX ORDER — ACETAMINOPHEN 500 MG
650 TABLET ORAL EVERY 6 HOURS
Refills: 0 | Status: DISCONTINUED | OUTPATIENT
Start: 2020-06-06 | End: 2020-06-11

## 2020-06-06 RX ORDER — DIPHENHYDRAMINE HCL 50 MG
25 CAPSULE ORAL ONCE
Refills: 0 | Status: COMPLETED | OUTPATIENT
Start: 2020-06-06 | End: 2020-06-06

## 2020-06-06 RX ADMIN — Medication 7 UNIT(S): at 08:28

## 2020-06-06 RX ADMIN — Medication 1: at 17:53

## 2020-06-06 RX ADMIN — Medication 1334 MILLIGRAM(S): at 13:21

## 2020-06-06 RX ADMIN — Medication 25 MILLIGRAM(S): at 22:50

## 2020-06-06 RX ADMIN — Medication 7 UNIT(S): at 17:53

## 2020-06-06 RX ADMIN — Medication 1334 MILLIGRAM(S): at 08:28

## 2020-06-06 RX ADMIN — Medication 975 MILLIGRAM(S): at 05:10

## 2020-06-06 RX ADMIN — Medication 650 MILLIGRAM(S): at 17:53

## 2020-06-06 RX ADMIN — INSULIN GLARGINE 15 UNIT(S): 100 INJECTION, SOLUTION SUBCUTANEOUS at 22:50

## 2020-06-06 RX ADMIN — Medication 650 MILLIGRAM(S): at 13:21

## 2020-06-06 RX ADMIN — Medication 1334 MILLIGRAM(S): at 17:53

## 2020-06-06 RX ADMIN — Medication 0.1 MILLIGRAM(S): at 05:10

## 2020-06-06 RX ADMIN — Medication 7 UNIT(S): at 13:21

## 2020-06-06 RX ADMIN — CHLORHEXIDINE GLUCONATE 1 APPLICATION(S): 213 SOLUTION TOPICAL at 12:12

## 2020-06-06 NOTE — PROGRESS NOTE ADULT - ASSESSMENT
59yM  PMH ESRD (on HD M/W/F), glaucoma, DMII, HCV, PAD, R DVT s/p thrombectomy. POD3 s/p LLE pop-PT bypass with GSV graft.    - Pain control with around the clock tylenol and oxycodone PRN  - Pt initially refusing rehab but now agreeable  - Pt refusing prophylactic heparin, stating he understands the risks of not taking it and says "if I die, I die."  - Dispo planning: awaiting rehab placement, needs HD bed      Vascular Surgery  [id number]

## 2020-06-06 NOTE — PROGRESS NOTE ADULT - SUBJECTIVE AND OBJECTIVE BOX
Subjective: Patient seen and examined. No new events except as noted.     REVIEW OF SYSTEMS:    CONSTITUTIONAL:+ weakness, fevers or chills  EYES/ENT: No visual changes;  No vertigo or throat pain   NECK: No pain or stiffness  RESPIRATORY: No cough, wheezing, hemoptysis; No shortness of breath  CARDIOVASCULAR: No chest pain or palpitations  GASTROINTESTINAL: No abdominal or epigastric pain. No nausea, vomiting, or hematemesis; No diarrhea or constipation. No melena or hematochezia.  GENITOURINARY: No dysuria, frequency or hematuria  NEUROLOGICAL: No numbness or weakness  SKIN: No itching, burning, rashes, or lesions   All other review of systems is negative unless indicated above.    MEDICATIONS:  MEDICATIONS  (STANDING):  acetaminophen   Tablet .. 650 milliGRAM(s) Oral every 6 hours  calcium acetate 1334 milliGRAM(s) Oral three times a day with meals  chlorhexidine 4% Liquid 1 Application(s) Topical daily  cloNIDine 0.1 milliGRAM(s) Oral daily  heparin   Injectable 5000 Unit(s) SubCutaneous every 8 hours  insulin glargine Injectable (LANTUS) 15 Unit(s) SubCutaneous at bedtime  insulin lispro (HumaLOG) corrective regimen sliding scale   SubCutaneous three times a day before meals  insulin lispro (HumaLOG) corrective regimen sliding scale   SubCutaneous at bedtime  insulin lispro Injectable (HumaLOG) 7 Unit(s) SubCutaneous three times a day before meals      PHYSICAL EXAM:  T(C): 36.8 (06-06-20 @ 15:55), Max: 37.2 (06-06-20 @ 04:23)  HR: 98 (06-06-20 @ 15:55) (90 - 98)  BP: 150/82 (06-06-20 @ 15:55) (117/66 - 150/82)  RR: 18 (06-06-20 @ 15:55) (18 - 20)  SpO2: 98% (06-06-20 @ 15:55) (95% - 100%)  Wt(kg): --  I&O's Summary    05 Jun 2020 07:01  -  06 Jun 2020 07:00  --------------------------------------------------------  IN: 606 mL / OUT: 1300 mL / NET: -694 mL    06 Jun 2020 07:01  -  06 Jun 2020 22:43  --------------------------------------------------------  IN: 240 mL / OUT: 300 mL / NET: -60 mL          Appearance: Normal	  HEENT:   Normal oral mucosa, PERRL, EOMI	  Lymphatic: No lymphadenopathy , no edema  Cardiovascular: Normal S1 S2, No JVD, No murmurs , Peripheral pulses palpable 2+ bilaterally  Respiratory: Lungs clear to auscultation, normal effort 	  Gastrointestinal:  Soft, Non-tender, + BS	  Skin: No rashes, No ecchymoses, No cyanosis, warm to touch  Musculoskeletal: Normal range of motion, normal strength  Psychiatry:  Mood & affect appropriate  Ext: No edema      LABS:    CARDIAC MARKERS:                                11.7   9.31  )-----------( 147      ( 06 Jun 2020 06:47 )             36.3     06-06    131<L>  |  90<L>  |  46<H>  ----------------------------<  83  4.6   |  25  |  8.76<H>    Ca    9.0      06 Jun 2020 06:46  Phos  7.1     06-06  Mg     2.4     06-06      proBNP:   Lipid Profile:   HgA1c:   TSH:             TELEMETRY: 	    ECG:  	  RADIOLOGY:   DIAGNOSTIC TESTING:  [ ] Echocardiogram:  [ ]  Catheterization:  [ ] Stress Test:    OTHER:

## 2020-06-06 NOTE — PROGRESS NOTE ADULT - SUBJECTIVE AND OBJECTIVE BOX
VASCULAR SURGERY DAILY PROGRESS NOTE:    Subjective:  Patient seen and examined. Pt agreeable to rehab now. Denies SOB, CP, N/V. Endorses better pain control.    Exam:  General: A&Ox3, NAD  Resp: CTA b/l  Cardiac: RRR  GI: abdomen soft, NT/ND  LLE: AT, PT signals present; dressings c/d/i    Vital Signs Last 24 Hrs  T(C): 37.2 (06 Jun 2020 04:23), Max: 37.5 (05 Jun 2020 13:33)  T(F): 99 (06 Jun 2020 04:23), Max: 99.5 (05 Jun 2020 13:33)  HR: 90 (06 Jun 2020 04:23) (90 - 102)  BP: 150/75 (06 Jun 2020 04:23) (139/75 - 177/80)  BP(mean): --  RR: 20 (06 Jun 2020 04:23) (18 - 20)  SpO2: 95% (06 Jun 2020 04:23) (92% - 100%)    I&O's Detail    05 Jun 2020 07:01  -  06 Jun 2020 07:00  --------------------------------------------------------  IN:    Oral Fluid: 606 mL  Total IN: 606 mL    OUT:    Other: 1000 mL    Voided: 300 mL  Total OUT: 1300 mL    Total NET: -694 mL          LABS:                                     11.7   9.31  )-----------( 147      ( 06 Jun 2020 06:47 )             36.3     06-06    131<L>  |  90<L>  |  46<H>  ----------------------------<  83  4.6   |  25  |  8.76<H>    Ca    9.0      06 Jun 2020 06:46  Phos  7.1     06-06  Mg     2.4     06-06

## 2020-06-07 LAB
ANION GAP SERPL CALC-SCNC: 20 MMOL/L — HIGH (ref 5–17)
BUN SERPL-MCNC: 78 MG/DL — HIGH (ref 7–23)
CALCIUM SERPL-MCNC: 8.7 MG/DL — SIGNIFICANT CHANGE UP (ref 8.4–10.5)
CHLORIDE SERPL-SCNC: 90 MMOL/L — LOW (ref 96–108)
CO2 SERPL-SCNC: 24 MMOL/L — SIGNIFICANT CHANGE UP (ref 22–31)
CREAT SERPL-MCNC: 11.37 MG/DL — HIGH (ref 0.5–1.3)
GLUCOSE BLDC GLUCOMTR-MCNC: 108 MG/DL — HIGH (ref 70–99)
GLUCOSE BLDC GLUCOMTR-MCNC: 122 MG/DL — HIGH (ref 70–99)
GLUCOSE BLDC GLUCOMTR-MCNC: 138 MG/DL — HIGH (ref 70–99)
GLUCOSE BLDC GLUCOMTR-MCNC: 196 MG/DL — HIGH (ref 70–99)
GLUCOSE SERPL-MCNC: 104 MG/DL — HIGH (ref 70–99)
HCT VFR BLD CALC: 33.2 % — LOW (ref 39–50)
HCV RNA FLD QL NAA+PROBE: SIGNIFICANT CHANGE UP
HCV RNA SPEC QL PROBE+SIG AMP: SIGNIFICANT CHANGE UP
HGB BLD-MCNC: 10.3 G/DL — LOW (ref 13–17)
MAGNESIUM SERPL-MCNC: 2.7 MG/DL — HIGH (ref 1.6–2.6)
MCHC RBC-ENTMCNC: 24.3 PG — LOW (ref 27–34)
MCHC RBC-ENTMCNC: 31 GM/DL — LOW (ref 32–36)
MCV RBC AUTO: 78.5 FL — LOW (ref 80–100)
NRBC # BLD: 0 /100 WBCS — SIGNIFICANT CHANGE UP (ref 0–0)
PHOSPHATE SERPL-MCNC: 7.9 MG/DL — HIGH (ref 2.5–4.5)
PLATELET # BLD AUTO: 199 K/UL — SIGNIFICANT CHANGE UP (ref 150–400)
POTASSIUM SERPL-MCNC: 4.7 MMOL/L — SIGNIFICANT CHANGE UP (ref 3.5–5.3)
POTASSIUM SERPL-SCNC: 4.7 MMOL/L — SIGNIFICANT CHANGE UP (ref 3.5–5.3)
RBC # BLD: 4.23 M/UL — SIGNIFICANT CHANGE UP (ref 4.2–5.8)
RBC # FLD: 16.4 % — HIGH (ref 10.3–14.5)
SODIUM SERPL-SCNC: 134 MMOL/L — LOW (ref 135–145)
WBC # BLD: 8.37 K/UL — SIGNIFICANT CHANGE UP (ref 3.8–10.5)
WBC # FLD AUTO: 8.37 K/UL — SIGNIFICANT CHANGE UP (ref 3.8–10.5)

## 2020-06-07 RX ORDER — DIPHENHYDRAMINE HCL 50 MG
25 CAPSULE ORAL EVERY 4 HOURS
Refills: 0 | Status: DISCONTINUED | OUTPATIENT
Start: 2020-06-07 | End: 2020-06-11

## 2020-06-07 RX ADMIN — Medication 650 MILLIGRAM(S): at 08:13

## 2020-06-07 RX ADMIN — Medication 7 UNIT(S): at 08:13

## 2020-06-07 RX ADMIN — Medication 7 UNIT(S): at 12:36

## 2020-06-07 RX ADMIN — Medication 25 MILLIGRAM(S): at 10:26

## 2020-06-07 RX ADMIN — Medication 650 MILLIGRAM(S): at 18:02

## 2020-06-07 RX ADMIN — Medication 1334 MILLIGRAM(S): at 18:02

## 2020-06-07 RX ADMIN — Medication 25 MILLIGRAM(S): at 22:28

## 2020-06-07 RX ADMIN — Medication 1: at 12:36

## 2020-06-07 RX ADMIN — Medication 1334 MILLIGRAM(S): at 08:14

## 2020-06-07 RX ADMIN — Medication 0.1 MILLIGRAM(S): at 08:13

## 2020-06-07 RX ADMIN — Medication 650 MILLIGRAM(S): at 12:36

## 2020-06-07 RX ADMIN — INSULIN GLARGINE 15 UNIT(S): 100 INJECTION, SOLUTION SUBCUTANEOUS at 22:05

## 2020-06-07 RX ADMIN — Medication 7 UNIT(S): at 18:02

## 2020-06-07 RX ADMIN — CHLORHEXIDINE GLUCONATE 1 APPLICATION(S): 213 SOLUTION TOPICAL at 12:37

## 2020-06-07 RX ADMIN — Medication 1334 MILLIGRAM(S): at 12:36

## 2020-06-07 NOTE — PROGRESS NOTE ADULT - SUBJECTIVE AND OBJECTIVE BOX
VASCULAR SURGERY DAILY PROGRESS NOTE:    Subjective:  Patient seen and examined. Pt agreeable to rehab now. Denies SOB, CP, N/V. Endorses better pain control.    Exam:  General: A&Ox3, NAD  Resp: CTA b/l  Cardiac: RRR  GI: abdomen soft, NT/ND  LLE: AT, PT signals present; dressings c/d/i    Vital Signs Last 24 Hrs  T(C): 36.9 (07 Jun 2020 05:06), Max: 36.9 (07 Jun 2020 05:06)  T(F): 98.4 (07 Jun 2020 05:06), Max: 98.4 (07 Jun 2020 05:06)  HR: 92 (07 Jun 2020 05:06) (92 - 98)  BP: 161/80 (07 Jun 2020 05:06) (117/66 - 161/80)  BP(mean): --  RR: 18 (07 Jun 2020 05:06) (18 - 18)  SpO2: 97% (07 Jun 2020 05:06) (96% - 98%)    I&O's Detail    05 Jun 2020 07:01  -  06 Jun 2020 07:00  --------------------------------------------------------  IN:    Oral Fluid: 606 mL  Total IN: 606 mL    OUT:    Other: 1000 mL    Voided: 300 mL  Total OUT: 1300 mL    Total NET: -694 mL      06 Jun 2020 07:01  -  07 Jun 2020 06:29  --------------------------------------------------------  IN:    Oral Fluid: 740 mL  Total IN: 740 mL    OUT:    Voided: 425 mL  Total OUT: 425 mL    Total NET: 315 mL            LABS:                        11.7   9.31  )-----------( 147      ( 06 Jun 2020 06:47 )             36.3     06-06    131<L>  |  90<L>  |  46<H>  ----------------------------<  83  4.6   |  25  |  8.76<H>    Ca    9.0      06 Jun 2020 06:46  Phos  7.1     06-06  Mg     2.4     06-06

## 2020-06-07 NOTE — PROGRESS NOTE ADULT - ASSESSMENT
59yM  PMH ESRD (on HD M/W/F), glaucoma, DMII, HCV, PAD, R DVT s/p thrombectomy. POD4 s/p LLE pop-PT bypass with GSV graft.    - Pain control with around the clock tylenol and oxycodone PRN  - Pt initially refusing rehab but now agreeable  - Pt refusing prophylactic heparin, stating he understands the risks of not taking it and says "if I die, I die."  - Dispo planning: awaiting rehab placement, needs HD bed      Vascular Surgery  [id number]

## 2020-06-07 NOTE — PROVIDER CONTACT NOTE (OTHER) - RECOMMENDATIONS
Insulin dosage adjusted to treat hyperglycemia, endocrinology consult.
PA assess pt.
notify PA
notify provider
have provider speak to patient

## 2020-06-07 NOTE — PROVIDER CONTACT NOTE (OTHER) - ACTION/TREATMENT ORDERED:
MD Queen states to wait to give blood pressure medication when tray comes up. Will give with tray. Day nurse aware.

## 2020-06-07 NOTE — PROGRESS NOTE ADULT - SUBJECTIVE AND OBJECTIVE BOX
Subjective: Patient seen and examined. No new events except as noted.   feels ok     REVIEW OF SYSTEMS:    CONSTITUTIONAL: No weakness, fevers or chills  EYES/ENT: No visual changes;  No vertigo or throat pain   NECK: No pain or stiffness  RESPIRATORY: No cough, wheezing, hemoptysis; No shortness of breath  CARDIOVASCULAR: No chest pain or palpitations  GASTROINTESTINAL: No abdominal or epigastric pain. No nausea, vomiting, or hematemesis; No diarrhea or constipation. No melena or hematochezia.  GENITOURINARY: No dysuria, frequency or hematuria  NEUROLOGICAL: No numbness or weakness  SKIN: No itching, burning, rashes, or lesions   All other review of systems is negative unless indicated above.    MEDICATIONS:  MEDICATIONS  (STANDING):  acetaminophen   Tablet .. 650 milliGRAM(s) Oral every 6 hours  calcium acetate 1334 milliGRAM(s) Oral three times a day with meals  chlorhexidine 4% Liquid 1 Application(s) Topical daily  cloNIDine 0.1 milliGRAM(s) Oral daily  heparin   Injectable 5000 Unit(s) SubCutaneous every 8 hours  insulin glargine Injectable (LANTUS) 15 Unit(s) SubCutaneous at bedtime  insulin lispro (HumaLOG) corrective regimen sliding scale   SubCutaneous three times a day before meals  insulin lispro (HumaLOG) corrective regimen sliding scale   SubCutaneous at bedtime  insulin lispro Injectable (HumaLOG) 7 Unit(s) SubCutaneous three times a day before meals      PHYSICAL EXAM:  T(C): 36.8 (06-07-20 @ 10:16), Max: 36.9 (06-07-20 @ 05:06)  HR: 99 (06-07-20 @ 10:16) (92 - 99)  BP: 136/68 (06-07-20 @ 10:16) (136/68 - 161/80)  RR: 18 (06-07-20 @ 10:16) (18 - 18)  SpO2: 99% (06-07-20 @ 10:16) (97% - 99%)  Wt(kg): --  I&O's Summary    06 Jun 2020 07:01  -  07 Jun 2020 07:00  --------------------------------------------------------  IN: 740 mL / OUT: 425 mL / NET: 315 mL          Appearance: Normal	  HEENT:   Normal oral mucosa, PERRL, EOMI	  Lymphatic: No lymphadenopathy , no edema  Cardiovascular: Normal S1 S2, No JVD, No murmurs , Peripheral pulses palpable 2+ bilaterally  Respiratory: Lungs clear to auscultation, normal effort 	  Gastrointestinal:  Soft, Non-tender, + BS	  Skin: No rashes, No ecchymoses, No cyanosis, warm to touch  Musculoskeletal: Normal range of motion, normal strength  Psychiatry:  Mood & affect appropriate  Ext: No edema      LABS:    CARDIAC MARKERS:                                10.3   8.37  )-----------( 199      ( 07 Jun 2020 07:06 )             33.2     06-07    134<L>  |  90<L>  |  78<H>  ----------------------------<  104<H>  4.7   |  24  |  11.37<H>    Ca    8.7      07 Jun 2020 07:06  Phos  7.9     06-07  Mg     2.7     06-07      proBNP:   Lipid Profile:   HgA1c:   TSH:             TELEMETRY: 	    ECG:  	  RADIOLOGY:   DIAGNOSTIC TESTING:  [ ] Echocardiogram:  [ ]  Catheterization:  [ ] Stress Test:    OTHER:

## 2020-06-08 LAB
ANION GAP SERPL CALC-SCNC: 23 MMOL/L — HIGH (ref 5–17)
BUN SERPL-MCNC: 102 MG/DL — HIGH (ref 7–23)
CALCIUM SERPL-MCNC: 8.7 MG/DL — SIGNIFICANT CHANGE UP (ref 8.4–10.5)
CHLORIDE SERPL-SCNC: 91 MMOL/L — LOW (ref 96–108)
CO2 SERPL-SCNC: 20 MMOL/L — LOW (ref 22–31)
CREAT SERPL-MCNC: 13.38 MG/DL — HIGH (ref 0.5–1.3)
GLUCOSE BLDC GLUCOMTR-MCNC: 117 MG/DL — HIGH (ref 70–99)
GLUCOSE BLDC GLUCOMTR-MCNC: 125 MG/DL — HIGH (ref 70–99)
GLUCOSE BLDC GLUCOMTR-MCNC: 127 MG/DL — HIGH (ref 70–99)
GLUCOSE BLDC GLUCOMTR-MCNC: 197 MG/DL — HIGH (ref 70–99)
GLUCOSE BLDC GLUCOMTR-MCNC: 238 MG/DL — HIGH (ref 70–99)
GLUCOSE SERPL-MCNC: 125 MG/DL — HIGH (ref 70–99)
HCT VFR BLD CALC: 32.9 % — LOW (ref 39–50)
HGB BLD-MCNC: 10.2 G/DL — LOW (ref 13–17)
MAGNESIUM SERPL-MCNC: 2.9 MG/DL — HIGH (ref 1.6–2.6)
MCHC RBC-ENTMCNC: 24.2 PG — LOW (ref 27–34)
MCHC RBC-ENTMCNC: 31 GM/DL — LOW (ref 32–36)
MCV RBC AUTO: 78 FL — LOW (ref 80–100)
NRBC # BLD: 0 /100 WBCS — SIGNIFICANT CHANGE UP (ref 0–0)
PHOSPHATE SERPL-MCNC: 8.5 MG/DL — HIGH (ref 2.5–4.5)
PLATELET # BLD AUTO: 257 K/UL — SIGNIFICANT CHANGE UP (ref 150–400)
POTASSIUM SERPL-MCNC: 5.2 MMOL/L — SIGNIFICANT CHANGE UP (ref 3.5–5.3)
POTASSIUM SERPL-SCNC: 5.2 MMOL/L — SIGNIFICANT CHANGE UP (ref 3.5–5.3)
RBC # BLD: 4.22 M/UL — SIGNIFICANT CHANGE UP (ref 4.2–5.8)
RBC # FLD: 16.1 % — HIGH (ref 10.3–14.5)
SODIUM SERPL-SCNC: 134 MMOL/L — LOW (ref 135–145)
WBC # BLD: 9.19 K/UL — SIGNIFICANT CHANGE UP (ref 3.8–10.5)
WBC # FLD AUTO: 9.19 K/UL — SIGNIFICANT CHANGE UP (ref 3.8–10.5)

## 2020-06-08 PROCEDURE — 90935 HEMODIALYSIS ONE EVALUATION: CPT | Mod: GC

## 2020-06-08 PROCEDURE — 99232 SBSQ HOSP IP/OBS MODERATE 35: CPT | Mod: GC

## 2020-06-08 RX ORDER — SENNA PLUS 8.6 MG/1
2 TABLET ORAL AT BEDTIME
Refills: 0 | Status: DISCONTINUED | OUTPATIENT
Start: 2020-06-08 | End: 2020-06-11

## 2020-06-08 RX ORDER — POLYETHYLENE GLYCOL 3350 17 G/17G
17 POWDER, FOR SOLUTION ORAL
Refills: 0 | Status: DISCONTINUED | OUTPATIENT
Start: 2020-06-08 | End: 2020-06-11

## 2020-06-08 RX ORDER — SENNA PLUS 8.6 MG/1
2 TABLET ORAL
Qty: 0 | Refills: 0 | DISCHARGE
Start: 2020-06-08

## 2020-06-08 RX ORDER — POLYETHYLENE GLYCOL 3350 17 G/17G
17 POWDER, FOR SOLUTION ORAL
Qty: 0 | Refills: 0 | DISCHARGE
Start: 2020-06-08

## 2020-06-08 RX ADMIN — Medication 0.1 MILLIGRAM(S): at 05:34

## 2020-06-08 RX ADMIN — Medication 0.1 MILLIGRAM(S): at 18:46

## 2020-06-08 RX ADMIN — Medication 650 MILLIGRAM(S): at 14:02

## 2020-06-08 RX ADMIN — Medication 1334 MILLIGRAM(S): at 14:02

## 2020-06-08 RX ADMIN — Medication 7 UNIT(S): at 14:02

## 2020-06-08 RX ADMIN — Medication 1334 MILLIGRAM(S): at 18:45

## 2020-06-08 RX ADMIN — Medication 7 UNIT(S): at 18:44

## 2020-06-08 RX ADMIN — Medication 25 MILLIGRAM(S): at 10:08

## 2020-06-08 RX ADMIN — Medication 1: at 18:45

## 2020-06-08 RX ADMIN — Medication 25 MILLIGRAM(S): at 21:25

## 2020-06-08 RX ADMIN — INSULIN GLARGINE 15 UNIT(S): 100 INJECTION, SOLUTION SUBCUTANEOUS at 21:25

## 2020-06-08 RX ADMIN — Medication 650 MILLIGRAM(S): at 18:45

## 2020-06-08 NOTE — PROGRESS NOTE ADULT - SUBJECTIVE AND OBJECTIVE BOX
VASCULAR SURGERY DAILY PROGRESS NOTE:    Subjective:  Patient seen and examined. Denies SOB, CP, N/V. Endorses good pain control.    Exam:  General: A&Ox3, NAD  Resp: breathing nonlabored  Cardiac: RRR  GI: abdomen soft, NT/ND  LLE: AT, PT signals present; OR dressing removed. Staple line intact with no swelling.    Vital Signs Last 24 Hrs  T(C): 36.8 (08 Jun 2020 05:29), Max: 36.9 (07 Jun 2020 23:49)  T(F): 98.2 (08 Jun 2020 05:29), Max: 98.5 (07 Jun 2020 23:49)  HR: 86 (08 Jun 2020 05:29) (80 - 88)  BP: 173/83 (08 Jun 2020 05:29) (135/65 - 176/79)  BP(mean): --  RR: 18 (08 Jun 2020 05:29) (18 - 18)  SpO2: 98% (08 Jun 2020 05:29) (98% - 99%)    I&O's Detail    07 Jun 2020 07:01  -  08 Jun 2020 07:00  --------------------------------------------------------  IN:    Oral Fluid: 600 mL  Total IN: 600 mL    OUT:    Voided: 200 mL  Total OUT: 200 mL    Total NET: 400 mL        LABS:                                   10.2   9.19  )-----------( 257      ( 08 Jun 2020 09:36 )             32.9     06-08    134<L>  |  91<L>  |  102<H>  ----------------------------<  125<H>  5.2   |  20<L>  |  13.38<H>    Ca    8.7      08 Jun 2020 09:36  Phos  8.5     06-08  Mg     2.9     06-08

## 2020-06-08 NOTE — PROVIDER CONTACT NOTE (OTHER) - ASSESSMENT
denies, n/v, headache, dizzines
Pt AxOx4, ambulating in room independently, pt in no distress at this time, able to maintain conversation, no diaphoresis, no fruity odor on breath. Pt stated he has his own insulin at bedside and stated he will use if insulin dosage not adjusted. RN informed pt not to use his own med and pt refused to give med to RN. Pt educated on importance of not self medicating. Pt requesting endocrinology consult. MARIBELL Tony made aware.
Pt BP elevated. Pt requesting clonidine
Pt resting comfortably, no c/o chest pain or SOB noted. Pt's b/p elevated throughout the day, pt refused hydralazine when offered previously and continues to refused medication intervention, pt agreed to have b/p rechecked at 0300 with fingerstick. Clonidine 0.1mg po administered at 2152.
pt had BP of 192/69 electronically, and 172/70 manually. pt denies pain or vision changes
pt. /86 and pt. refused heparin because it causes him to get itchy everywhere
/80. denies distress. denies pain. vding little amounts. +radial/dorsal pulses. States, "I will not take blood pressure medication on empty stomach. I will wait until I get food." Pt. educated on value of taking blood pressure medication

## 2020-06-08 NOTE — PROGRESS NOTE ADULT - ASSESSMENT
59M PMH ESRD (on HD M/W/F), glaucoma, DMII, HCV, PAD, R DVT s/p thrombectomy, presenting with LLE pain. Patient states that he developed pain in January of this year, which is worse with walking and is located in the upper leg/thigh. Patient is s/p LLE angioplasty in March with some improvement in LLE pain, however in office duplex recently showed re-occlusion coinciding with return of symptoms. Patient is s/p LLE bypass

## 2020-06-08 NOTE — PROGRESS NOTE ADULT - ASSESSMENT
59M with history of DM1, ESRD (on HD M/W/F), glaucoma, HCV, PAD, R DVT s/p thrombectomy, presenting with LLE pain s/p LLE bypass 6/3.      uncontrolled DM1   A1c 7.2%  FS premeal and qhs   FS goal 100-180  On carb consistent diet   Continue Lantus 15units qhs, pls dont hold as patient can go into DKA   Continue Humalog 7 units premeal, hold when patient is NPO or has poor appetite   Continue low correctional scales premeal and qhs   On discharge patient can follow up with outpt endocrine, Dr Haley  Patient is going to rehab, Can be discharged on Lantus 15units qhs and Humalog 7units premeal    HTN   goal 130/80, remains elevated   Renal adjusting regimen, now on Clonidine BID     HLD  consider lipid panel   If LDL<70, consider starting statin         Eric Arcos MD  Endocrine Fellow   Pager

## 2020-06-08 NOTE — PROGRESS NOTE ADULT - SUBJECTIVE AND OBJECTIVE BOX
Subjective: Patient seen and examined. No new events except as noted.   feels ok     REVIEW OF SYSTEMS:    CONSTITUTIONAL:+ weakness, fevers or chills  EYES/ENT: No visual changes;  No vertigo or throat pain   NECK: No pain or stiffness  RESPIRATORY: No cough, wheezing, hemoptysis; No shortness of breath  CARDIOVASCULAR: No chest pain or palpitations  GASTROINTESTINAL: No abdominal or epigastric pain. No nausea, vomiting, or hematemesis; No diarrhea or constipation. No melena or hematochezia.  GENITOURINARY: No dysuria, frequency or hematuria  NEUROLOGICAL: No numbness or weakness  SKIN: No itching, burning, rashes, or lesions   All other review of systems is negative unless indicated above.    MEDICATIONS:  MEDICATIONS  (STANDING):  acetaminophen   Tablet .. 650 milliGRAM(s) Oral every 6 hours  calcium acetate 1334 milliGRAM(s) Oral three times a day with meals  chlorhexidine 4% Liquid 1 Application(s) Topical daily  cloNIDine 0.1 milliGRAM(s) Oral two times a day  heparin   Injectable 5000 Unit(s) SubCutaneous every 8 hours  insulin glargine Injectable (LANTUS) 15 Unit(s) SubCutaneous at bedtime  insulin lispro (HumaLOG) corrective regimen sliding scale   SubCutaneous three times a day before meals  insulin lispro (HumaLOG) corrective regimen sliding scale   SubCutaneous at bedtime  insulin lispro Injectable (HumaLOG) 7 Unit(s) SubCutaneous three times a day before meals  polyethylene glycol 3350 17 Gram(s) Oral two times a day  senna 2 Tablet(s) Oral at bedtime      PHYSICAL EXAM:  T(C): 36.6 (06-08-20 @ 08:45), Max: 36.9 (06-07-20 @ 23:49)  HR: 90 (06-08-20 @ 08:45) (80 - 90)  BP: 186/100 (06-08-20 @ 08:45) (135/65 - 186/100)  RR: 18 (06-08-20 @ 08:45) (18 - 18)  SpO2: 98% (06-08-20 @ 08:45) (98% - 99%)  Wt(kg): --  I&O's Summary    07 Jun 2020 07:01  -  08 Jun 2020 07:00  --------------------------------------------------------  IN: 600 mL / OUT: 200 mL / NET: 400 mL          Appearance: Normal	  HEENT:   Normal oral mucosa, PERRL, EOMI	  Lymphatic: No lymphadenopathy , no edema  Cardiovascular: Normal S1 S2, No JVD, No murmurs , Peripheral pulses palpable 2+ bilaterally  Respiratory: Lungs clear to auscultation, normal effort 	  Gastrointestinal:  Soft, Non-tender, + BS	  Skin: No rashes, No ecchymoses, No cyanosis, warm to touch  Musculoskeletal: Normal range of motion, normal strength  Psychiatry:  Mood & affect appropriate  Ext: No edema      LABS:    CARDIAC MARKERS:                                10.2   9.19  )-----------( 257      ( 08 Jun 2020 09:36 )             32.9     06-08    134<L>  |  91<L>  |  102<H>  ----------------------------<  125<H>  5.2   |  20<L>  |  13.38<H>    Ca    8.7      08 Jun 2020 09:36  Phos  8.5     06-08  Mg     2.9     06-08      proBNP:   Lipid Profile:   HgA1c:   TSH:             TELEMETRY: 	    ECG:  	  RADIOLOGY:   DIAGNOSTIC TESTING:  [ ] Echocardiogram:  [ ]  Catheterization:  [ ] Stress Test:    OTHER:

## 2020-06-08 NOTE — PROGRESS NOTE ADULT - SUBJECTIVE AND OBJECTIVE BOX
Chief Complaint: Patient with DM2     History: Patient seen and examined at bedside. NAD. Reports appetite is good over the  weekend. Started to take pain medication with meals, which has improved nausea.     MEDICATIONS  (STANDING):  acetaminophen   Tablet .. 650 milliGRAM(s) Oral every 6 hours  calcium acetate 1334 milliGRAM(s) Oral three times a day with meals  chlorhexidine 4% Liquid 1 Application(s) Topical daily  cloNIDine 0.1 milliGRAM(s) Oral two times a day  heparin   Injectable 5000 Unit(s) SubCutaneous every 8 hours  insulin glargine Injectable (LANTUS) 15 Unit(s) SubCutaneous at bedtime  insulin lispro (HumaLOG) corrective regimen sliding scale   SubCutaneous three times a day before meals  insulin lispro (HumaLOG) corrective regimen sliding scale   SubCutaneous at bedtime  insulin lispro Injectable (HumaLOG) 7 Unit(s) SubCutaneous three times a day before meals  polyethylene glycol 3350 17 Gram(s) Oral two times a day  senna 2 Tablet(s) Oral at bedtime    MEDICATIONS  (PRN):  diphenhydrAMINE 25 milliGRAM(s) Oral every 4 hours PRN Rash and/or Itching  glucagon  Injectable 1 milliGRAM(s) IntraMuscular once PRN Glucose LESS THAN 70 milligrams/deciliter  oxycodone    5 mG/acetaminophen 325 mG 1 Tablet(s) Oral every 4 hours PRN Moderate Pain (4 - 6)  oxycodone    5 mG/acetaminophen 325 mG 2 Tablet(s) Oral every 6 hours PRN Severe Pain (7 - 10)    PHYSICAL EXAM:  VITALS: T(C): 37.1 (06-08-20 @ 12:47)  T(F): 98.8 (06-08-20 @ 12:47), Max: 98.8 (06-08-20 @ 12:47)  HR: 95 (06-08-20 @ 12:47) (84 - 95)  BP: 164/82 (06-08-20 @ 12:47) (150/78 - 186/100)  RR:  (18 - 18)  SpO2:  (96% - 99%)  Wt(kg): 76kg   GENERAL: NAD, well-groomed, well-developed  RESPIRATORY: Clear to auscultation bilaterally; No rales, rhonchi, wheezing, or rubs  CARDIOVASCULAR: Regular rate and rhythm; No murmurs; no peripheral edema, LLE lateral incision clean with no drainage, staples in place  GI: Soft, nontender, non distended  MUSCULOSKELETAL: LLE ROM limited due to pain   PSYCH: Alert and oriented x 3, reactive affect     POCT Blood Glucose.: 127 mg/dL (06-08-20 @ 12:53) H7  POCT Blood Glucose.: 125 mg/dL (06-08-20 @ 07:50) H0  POCT Blood Glucose.: 122 mg/dL (06-07-20 @ 21:29) L15  POCT Blood Glucose.: 108 mg/dL (06-07-20 @ 17:09) H7  POCT Blood Glucose.: 196 mg/dL (06-07-20 @ 12:31) H7+1  POCT Blood Glucose.: 138 mg/dL (06-07-20 @ 08:06) H7  POCT Blood Glucose.: 181 mg/dL (06-06-20 @ 22:29)  POCT Blood Glucose.: 155 mg/dL (06-06-20 @ 16:57)  POCT Blood Glucose.: 137 mg/dL (06-06-20 @ 12:44)  POCT Blood Glucose.: 102 mg/dL (06-06-20 @ 08:26)  POCT Blood Glucose.: 104 mg/dL (06-05-20 @ 21:10)  POCT Blood Glucose.: 155 mg/dL (06-05-20 @ 18:30)      06-08    134<L>  |  91<L>  |  102<H>  ----------------------------<  125<H>  5.2   |  20<L>  |  13.38<H>    EGFR if : 4<L>  EGFR if non : 4<L>    Ca    8.7      06-08  Mg     2.9     06-08  Phos  8.5     06-08 Chief Complaint: Patient with DM2     History: Patient seen and examined at bedside. NAD. Reports appetite is good over the  weekend. Started to take pain medication with meals, which has improved nausea.     MEDICATIONS  (STANDING):  acetaminophen   Tablet .. 650 milliGRAM(s) Oral every 6 hours  calcium acetate 1334 milliGRAM(s) Oral three times a day with meals  chlorhexidine 4% Liquid 1 Application(s) Topical daily  cloNIDine 0.1 milliGRAM(s) Oral two times a day  heparin   Injectable 5000 Unit(s) SubCutaneous every 8 hours  insulin glargine Injectable (LANTUS) 15 Unit(s) SubCutaneous at bedtime  insulin lispro (HumaLOG) corrective regimen sliding scale   SubCutaneous three times a day before meals  insulin lispro (HumaLOG) corrective regimen sliding scale   SubCutaneous at bedtime  insulin lispro Injectable (HumaLOG) 7 Unit(s) SubCutaneous three times a day before meals  polyethylene glycol 3350 17 Gram(s) Oral two times a day  senna 2 Tablet(s) Oral at bedtime    MEDICATIONS  (PRN):  diphenhydrAMINE 25 milliGRAM(s) Oral every 4 hours PRN Rash and/or Itching  glucagon  Injectable 1 milliGRAM(s) IntraMuscular once PRN Glucose LESS THAN 70 milligrams/deciliter  oxycodone    5 mG/acetaminophen 325 mG 1 Tablet(s) Oral every 4 hours PRN Moderate Pain (4 - 6)  oxycodone    5 mG/acetaminophen 325 mG 2 Tablet(s) Oral every 6 hours PRN Severe Pain (7 - 10)    PHYSICAL EXAM:  VITALS: T(C): 37.1 (06-08-20 @ 12:47)  T(F): 98.8 (06-08-20 @ 12:47), Max: 98.8 (06-08-20 @ 12:47)  HR: 95 (06-08-20 @ 12:47) (84 - 95)  BP: 164/82 (06-08-20 @ 12:47) (150/78 - 186/100)  RR:  (18 - 18)  SpO2:  (96% - 99%)  Wt(kg): 76kg   GENERAL: NAD, well-groomed, well-developed  RESPIRATORY: Clear to auscultation bilaterally; No rales, rhonchi, wheezing, or rubs  CARDIOVASCULAR: Regular rate and rhythm; No murmurs; no peripheral edema, LLE lateral incision clean with no drainage, staples in place  GI: Soft, nontender, non distended  PSYCH: Alert and oriented x 3, reactive affect     POCT Blood Glucose.: 127 mg/dL (06-08-20 @ 12:53) H7  POCT Blood Glucose.: 125 mg/dL (06-08-20 @ 07:50) H0  POCT Blood Glucose.: 122 mg/dL (06-07-20 @ 21:29) L15  POCT Blood Glucose.: 108 mg/dL (06-07-20 @ 17:09) H7  POCT Blood Glucose.: 196 mg/dL (06-07-20 @ 12:31) H7+1  POCT Blood Glucose.: 138 mg/dL (06-07-20 @ 08:06) H7  POCT Blood Glucose.: 181 mg/dL (06-06-20 @ 22:29)  POCT Blood Glucose.: 155 mg/dL (06-06-20 @ 16:57)  POCT Blood Glucose.: 137 mg/dL (06-06-20 @ 12:44)  POCT Blood Glucose.: 102 mg/dL (06-06-20 @ 08:26)  POCT Blood Glucose.: 104 mg/dL (06-05-20 @ 21:10)  POCT Blood Glucose.: 155 mg/dL (06-05-20 @ 18:30)      06-08    134<L>  |  91<L>  |  102<H>  ----------------------------<  125<H>  5.2   |  20<L>  |  13.38<H>    EGFR if : 4<L>  EGFR if non : 4<L>    Ca    8.7      06-08  Mg     2.9     06-08  Phos  8.5     06-08

## 2020-06-08 NOTE — PROGRESS NOTE ADULT - ASSESSMENT
59yM  PMH ESRD (on HD M/W/F), glaucoma, DMII, HCV, PAD, R DVT s/p thrombectomy. POD5 s/p LLE pop-PT bypass with GSV graft.    - Pain control with around the clock tylenol and oxycodone PRN  - Pt initially refusing rehab but now agreeable  - Pt refusing prophylactic heparin, stating he understands the risks of not taking it and says "if I die, I die."  - Dispo planning: awaiting rehab placement, needs HD bed      Vascular Surgery  [id number]

## 2020-06-08 NOTE — PROGRESS NOTE ADULT - ASSESSMENT
59y M with ESRD on HD      #ESRD     Pt. with ESRD on HD three times a week (MWF). Last HD was on 6/4 via LUE AVF. Pt. clinically stable. Labs reviewed. Might have component of recirculation given the high BUN, adjusted HD prescription and increased dialyzer size with improvement in clearance  - HD today    #HTN  BP elevated, on clonidine .1mg QD. Would favor switching agents, Please start nifedipine 30 mg and titrate up. Low salt diet.   HD with UF today       #Anemia  Patient with anemia in the setting of ESRD. Hemoglobin above target range. 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 Monitor hemoglobin, will restart EPO if Hgb drops less than 9  -Check iron studies including ferritin    #Secondary hyperparathyroidism  - Phos elevated to 8's, mildly improve. Maintain on  phoslo 1334mg TID. Monitor phos QD, low phosphorus diet. May require addition of renvela 59y M with ESRD on HD      #ESRD     Pt. with ESRD on HD three times a week (MWF). Last HD was on 6/4 via LUE AVF. Pt. clinically stable. Labs reviewed. Might have component of recirculation given the high BUN, adjusted HD prescription and increased dialyzer size with improvement in clearance  - HD today    #HTN  BP elevated, on clonidine .1mg QD. Patient states he has had allergies to prior BP medications such as atenolol, lisinopril, hydralazine, amlodipine. Has not been able to tolerate those medications. Please increase clonidine to .1mg BID. Low salt diet.   HD with UF today       #Anemia  Patient with anemia in the setting of ESRD. Hemoglobin above target range. 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 Monitor hemoglobin, will restart EPO if Hgb drops less than 9  -Check iron studies including ferritin    #Secondary hyperparathyroidism  - Phos elevated to 8's, mildly improve. Maintain on  phoslo 1334mg TID. Monitor phos QD, low phosphorus diet. May require addition of renvela 59y M with ESRD on HD      #ESRD     Pt. with ESRD on HD three times a week (MWF). Last HD was on 6/4 via LUE AVF. Pt. clinically stable. Labs reviewed. Might have component of recirculation given the high BUN, adjusted HD prescription and increased dialyzer size with improvement in clearance  - HD today    #HTN  BP elevated, on clonidine .1mg QD. Patient states he has had allergies to prior BP medications such as atenolol, lisinopril, hydralazine, amlodipine. Has not been able to tolerate those medications. Please increase clonidine to .1mg BID, would hold clonidine the morning of his HD session as he develop hypotension with his HD treatments. Low salt diet.   HD with UF today       #Anemia  Patient with anemia in the setting of ESRD. Hemoglobin above target range. 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 Monitor hemoglobin, will restart EPO if Hgb drops less than 9  -Check iron studies including ferritin    #Secondary hyperparathyroidism  - Phos elevated to 8's, mildly improve. Maintain on  phoslo 1334mg TID. Monitor phos QD, low phosphorus diet. May require addition of renvela if persistent

## 2020-06-08 NOTE — PROGRESS NOTE ADULT - SUBJECTIVE AND OBJECTIVE BOX
[facility] DIVISION OF KIDNEY DISEASES AND HYPERTENSION -- FOLLOW UP NOTE  --------------------------------------------------------------------------------  Juice Hahn   Nephrology Fellow  Pager NS: [phone]/ [facility]: [phone]  (After 5 pm or on weekends please page the on-call fellow)    24 hour events/subjective: Patient seen and examined at the bedside. Vital signs, labs, medications reviewed. BP poorly controlled        PAST HISTORY  --------------------------------------------------------------------------------  No significant changes to PMH, PSH, FHx, SHx, unless otherwise noted    ALLERGIES & MEDICATIONS  --------------------------------------------------------------------------------  Allergies    aspirin (Rash; Urticaria; Hives)  iodine (Rash; Urticaria)    Intolerances      Standing Inpatient Medications  acetaminophen   Tablet .. 650 milliGRAM(s) Oral every 6 hours  calcium acetate 1334 milliGRAM(s) Oral three times a day with meals  chlorhexidine 4% Liquid 1 Application(s) Topical daily  cloNIDine 0.1 milliGRAM(s) Oral daily  heparin   Injectable 5000 Unit(s) SubCutaneous every 8 hours  insulin glargine Injectable (LANTUS) 15 Unit(s) SubCutaneous at bedtime  insulin lispro (HumaLOG) corrective regimen sliding scale   SubCutaneous three times a day before meals  insulin lispro (HumaLOG) corrective regimen sliding scale   SubCutaneous at bedtime  insulin lispro Injectable (HumaLOG) 7 Unit(s) SubCutaneous three times a day before meals  polyethylene glycol 3350 17 Gram(s) Oral two times a day  senna 2 Tablet(s) Oral at bedtime    PRN Inpatient Medications  diphenhydrAMINE 25 milliGRAM(s) Oral every 4 hours PRN  glucagon  Injectable 1 milliGRAM(s) IntraMuscular once PRN  oxycodone    5 mG/acetaminophen 325 mG 1 Tablet(s) Oral every 4 hours PRN  oxycodone    5 mG/acetaminophen 325 mG 2 Tablet(s) Oral every 6 hours PRN      REVIEW OF SYSTEMS  --------------------------------------------------------------------------------  Gen: No lethargy  Respiratory: No dyspnea  CV: No chest pain  GI: No abdominal pain/ diarrhea   MSK: + LLE pain  Neuro: No dizziness  Heme: No bleeding    All other systems were reviewed and are negative, except as noted.      >>> <<<    VITALS/PHYSICAL EXAM  --------------------------------------------------------------------------------  T(C): 36.8 (06-08-20 @ 05:29), Max: 36.9 (06-07-20 @ 23:49)  HR: 86 (06-08-20 @ 05:29) (80 - 99)  BP: 173/83 (06-08-20 @ 05:29) (135/65 - 176/79)  RR: 18 (06-08-20 @ 05:29) (18 - 18)  SpO2: 98% (06-08-20 @ 05:29) (98% - 99%)  Wt(kg): --        06-07-20 @ 07:01  -  06-08-20 @ 07:00  --------------------------------------------------------  IN: 600 mL / OUT: 200 mL / NET: 400 mL      Physical Exam:    	Gen: NAD, well-appearing  	HEENT: Anicteric   	Pulm: CTA B/L  	CV: RRR, S1S2; no rub  	Abd: +BS, soft, nontender/nondistended       	: No suprapubic tenderness  	MSK: Warm, + pain in LLE  	Neuro: AOX3      	Vascular Access: LUE AVF      LABS/STUDIES  --------------------------------------------------------------------------------              10.3   8.37  >-----------<  199      [06-07-20 @ 07:06]              33.2     134  |  90  |  78  ----------------------------<  104      [06-07-20 @ 07:06]  4.7   |  24  |  11.37        Ca     8.7     [06-07-20 @ 07:06]      Mg     2.7     [06-07-20 @ 07:06]      Phos  7.9     [06-07-20 @ 07:06]            Creatinine Trend:  SCr 11.37 [06-07 @ 07:06]  SCr 8.76 [06-06 @ 06:46]  SCr 10.62 [06-05 @ 09:45]  SCr 13.81 [06-04 @ 09:00]  SCr 11.74 [06-03 @ 12:19]          HBsAb 464.5      [06-05-20 @ 13:20]  HBsAg Nonreact      [06-05-20 @ 13:20]  HCV 13.23, Reactive      [06-05-20 @ 13:20] [facility] DIVISION OF KIDNEY DISEASES AND HYPERTENSION -- FOLLOW UP NOTE  --------------------------------------------------------------------------------  Juice Hahn   Nephrology Fellow  Pager NS: [phone]/ [facility]: [phone]  (After 5 pm or on weekends please page the on-call fellow)    24 hour events/subjective: Patient seen and examined at the bedside. Vital signs, labs, medications reviewed. BP poorly controlled. Still endorsing pain in his LLE.        PAST HISTORY  --------------------------------------------------------------------------------  No significant changes to PMH, PSH, FHx, SHx, unless otherwise noted    ALLERGIES & MEDICATIONS  --------------------------------------------------------------------------------  Allergies    aspirin (Rash; Urticaria; Hives)  iodine (Rash; Urticaria)    Intolerances      Standing Inpatient Medications  acetaminophen   Tablet .. 650 milliGRAM(s) Oral every 6 hours  calcium acetate 1334 milliGRAM(s) Oral three times a day with meals  chlorhexidine 4% Liquid 1 Application(s) Topical daily  cloNIDine 0.1 milliGRAM(s) Oral daily  heparin   Injectable 5000 Unit(s) SubCutaneous every 8 hours  insulin glargine Injectable (LANTUS) 15 Unit(s) SubCutaneous at bedtime  insulin lispro (HumaLOG) corrective regimen sliding scale   SubCutaneous three times a day before meals  insulin lispro (HumaLOG) corrective regimen sliding scale   SubCutaneous at bedtime  insulin lispro Injectable (HumaLOG) 7 Unit(s) SubCutaneous three times a day before meals  polyethylene glycol 3350 17 Gram(s) Oral two times a day  senna 2 Tablet(s) Oral at bedtime    PRN Inpatient Medications  diphenhydrAMINE 25 milliGRAM(s) Oral every 4 hours PRN  glucagon  Injectable 1 milliGRAM(s) IntraMuscular once PRN  oxycodone    5 mG/acetaminophen 325 mG 1 Tablet(s) Oral every 4 hours PRN  oxycodone    5 mG/acetaminophen 325 mG 2 Tablet(s) Oral every 6 hours PRN      REVIEW OF SYSTEMS  --------------------------------------------------------------------------------  Gen: No lethargy  Respiratory: No dyspnea  CV: No chest pain  GI: No abdominal pain/ diarrhea   MSK: + LLE pain  Neuro: No dizziness  Heme: No bleeding    All other systems were reviewed and are negative, except as noted.      >>> <<<    VITALS/PHYSICAL EXAM  --------------------------------------------------------------------------------  T(C): 36.8 (06-08-20 @ 05:29), Max: 36.9 (06-07-20 @ 23:49)  HR: 86 (06-08-20 @ 05:29) (80 - 99)  BP: 173/83 (06-08-20 @ 05:29) (135/65 - 176/79)  RR: 18 (06-08-20 @ 05:29) (18 - 18)  SpO2: 98% (06-08-20 @ 05:29) (98% - 99%)  Wt(kg): --        06-07-20 @ 07:01  -  06-08-20 @ 07:00  --------------------------------------------------------  IN: 600 mL / OUT: 200 mL / NET: 400 mL      Physical Exam:    	Gen: NAD, well-appearing  	HEENT: Anicteric   	Pulm: CTA B/L  	CV: RRR, S1S2; no rub  	Abd: +BS, soft, nontender/nondistended       	: No suprapubic tenderness  	MSK: Warm, + pain in LLE  	Neuro: AOX3      	Vascular Access: CARMELA RAY      LABS/STUDIES  --------------------------------------------------------------------------------              10.3   8.37  >-----------<  199      [06-07-20 @ 07:06]              33.2     134  |  90  |  78  ----------------------------<  104      [06-07-20 @ 07:06]  4.7   |  24  |  11.37        Ca     8.7     [06-07-20 @ 07:06]      Mg     2.7     [06-07-20 @ 07:06]      Phos  7.9     [06-07-20 @ 07:06]            Creatinine Trend:  SCr 11.37 [06-07 @ 07:06]  SCr 8.76 [06-06 @ 06:46]  SCr 10.62 [06-05 @ 09:45]  SCr 13.81 [06-04 @ 09:00]  SCr 11.74 [06-03 @ 12:19]          HBsAb 464.5      [06-05-20 @ 13:20]  HBsAg Nonreact      [06-05-20 @ 13:20]  HCV 13.23, Reactive      [06-05-20 @ 13:20]

## 2020-06-09 LAB
ANION GAP SERPL CALC-SCNC: 18 MMOL/L — HIGH (ref 5–17)
BUN SERPL-MCNC: 76 MG/DL — HIGH (ref 7–23)
CALCIUM SERPL-MCNC: 9 MG/DL — SIGNIFICANT CHANGE UP (ref 8.4–10.5)
CHLORIDE SERPL-SCNC: 93 MMOL/L — LOW (ref 96–108)
CO2 SERPL-SCNC: 24 MMOL/L — SIGNIFICANT CHANGE UP (ref 22–31)
CREAT SERPL-MCNC: 10.65 MG/DL — HIGH (ref 0.5–1.3)
FERRITIN SERPL-MCNC: 53 NG/ML — SIGNIFICANT CHANGE UP (ref 30–400)
FOLATE SERPL-MCNC: 13.3 NG/ML — SIGNIFICANT CHANGE UP
GLUCOSE BLDC GLUCOMTR-MCNC: 112 MG/DL — HIGH (ref 70–99)
GLUCOSE BLDC GLUCOMTR-MCNC: 134 MG/DL — HIGH (ref 70–99)
GLUCOSE BLDC GLUCOMTR-MCNC: 142 MG/DL — HIGH (ref 70–99)
GLUCOSE BLDC GLUCOMTR-MCNC: 187 MG/DL — HIGH (ref 70–99)
GLUCOSE SERPL-MCNC: 129 MG/DL — HIGH (ref 70–99)
HCT VFR BLD CALC: 32 % — LOW (ref 39–50)
HGB BLD-MCNC: 10.2 G/DL — LOW (ref 13–17)
IRON SATN MFR SERPL: 11 % — LOW (ref 16–55)
IRON SATN MFR SERPL: 32 UG/DL — LOW (ref 45–165)
MAGNESIUM SERPL-MCNC: 2.6 MG/DL — SIGNIFICANT CHANGE UP (ref 1.6–2.6)
MCHC RBC-ENTMCNC: 24.6 PG — LOW (ref 27–34)
MCHC RBC-ENTMCNC: 31.9 GM/DL — LOW (ref 32–36)
MCV RBC AUTO: 77.1 FL — LOW (ref 80–100)
NRBC # BLD: 0 /100 WBCS — SIGNIFICANT CHANGE UP (ref 0–0)
PHOSPHATE SERPL-MCNC: 7.3 MG/DL — HIGH (ref 2.5–4.5)
PLATELET # BLD AUTO: 189 K/UL — SIGNIFICANT CHANGE UP (ref 150–400)
POTASSIUM SERPL-MCNC: 5 MMOL/L — SIGNIFICANT CHANGE UP (ref 3.5–5.3)
POTASSIUM SERPL-SCNC: 5 MMOL/L — SIGNIFICANT CHANGE UP (ref 3.5–5.3)
RBC # BLD: 4.15 M/UL — LOW (ref 4.2–5.8)
RBC # BLD: 4.15 M/UL — LOW (ref 4.2–5.8)
RBC # FLD: 16.1 % — HIGH (ref 10.3–14.5)
RETICS #: 72.6 K/UL — SIGNIFICANT CHANGE UP (ref 25–125)
RETICS/RBC NFR: 1.8 % — SIGNIFICANT CHANGE UP (ref 0.5–2.5)
SODIUM SERPL-SCNC: 135 MMOL/L — SIGNIFICANT CHANGE UP (ref 135–145)
TIBC SERPL-MCNC: 276 UG/DL — SIGNIFICANT CHANGE UP (ref 220–430)
UIBC SERPL-MCNC: 245 UG/DL — SIGNIFICANT CHANGE UP (ref 110–370)
VIT B12 SERPL-MCNC: 1106 PG/ML — SIGNIFICANT CHANGE UP (ref 232–1245)
WBC # BLD: 7.36 K/UL — SIGNIFICANT CHANGE UP (ref 3.8–10.5)
WBC # FLD AUTO: 7.36 K/UL — SIGNIFICANT CHANGE UP (ref 3.8–10.5)

## 2020-06-09 PROCEDURE — 99233 SBSQ HOSP IP/OBS HIGH 50: CPT | Mod: GC

## 2020-06-09 PROCEDURE — 99232 SBSQ HOSP IP/OBS MODERATE 35: CPT

## 2020-06-09 RX ORDER — IRON SUCROSE 20 MG/ML
200 INJECTION, SOLUTION INTRAVENOUS
Refills: 0 | Status: DISCONTINUED | OUTPATIENT
Start: 2020-06-09 | End: 2020-06-10

## 2020-06-09 RX ADMIN — Medication 0.1 MILLIGRAM(S): at 05:18

## 2020-06-09 RX ADMIN — Medication 0.1 MILLIGRAM(S): at 17:51

## 2020-06-09 RX ADMIN — Medication 25 MILLIGRAM(S): at 12:42

## 2020-06-09 RX ADMIN — INSULIN GLARGINE 15 UNIT(S): 100 INJECTION, SOLUTION SUBCUTANEOUS at 21:46

## 2020-06-09 RX ADMIN — Medication 1334 MILLIGRAM(S): at 17:51

## 2020-06-09 RX ADMIN — Medication 1334 MILLIGRAM(S): at 08:23

## 2020-06-09 RX ADMIN — Medication 25 MILLIGRAM(S): at 21:46

## 2020-06-09 RX ADMIN — Medication 7 UNIT(S): at 08:22

## 2020-06-09 RX ADMIN — Medication 7 UNIT(S): at 17:51

## 2020-06-09 RX ADMIN — Medication 650 MILLIGRAM(S): at 17:51

## 2020-06-09 RX ADMIN — Medication 650 MILLIGRAM(S): at 08:23

## 2020-06-09 NOTE — PROGRESS NOTE ADULT - SUBJECTIVE AND OBJECTIVE BOX
VASCULAR SURGERY DAILY PROGRESS NOTE:    Subjective:  Patient seen and examined. Denies SOB, CP, N/V. Endorses good pain control.    Exam:  General: A&Ox3, NAD  Resp: breathing nonlabored  Cardiac: RRR  GI: abdomen soft, NT/ND  LLE: AT, PT signals present; OR dressing removed. Staple line intact with no swelling.    Vital Signs Last 24 Hrs  T(C): 36.8 (09 Jun 2020 05:11), Max: 37.1 (08 Jun 2020 12:47)  T(F): 98.3 (09 Jun 2020 05:11), Max: 98.8 (08 Jun 2020 12:47)  HR: 79 (09 Jun 2020 05:11) (79 - 105)  BP: 161/80 (09 Jun 2020 05:11) (144/75 - 186/100)  BP(mean): --  RR: 18 (09 Jun 2020 05:11) (18 - 18)  SpO2: 97% (09 Jun 2020 05:11) (95% - 100%)    I&O's Detail    08 Jun 2020 07:01  -  09 Jun 2020 07:00  --------------------------------------------------------  IN:    Oral Fluid: 620 mL  Total IN: 620 mL    OUT:    Other: 1500 mL  Total OUT: 1500 mL    Total NET: -880 mL            LABS:                        10.2   7.36  )-----------( 189      ( 09 Jun 2020 07:07 )             32.0     06-09    135  |  93<L>  |  76<H>  ----------------------------<  129<H>  5.0   |  24  |  10.65<H>    Ca    9.0      09 Jun 2020 07:07  Phos  7.3     06-09  Mg     2.6     06-09

## 2020-06-09 NOTE — PROGRESS NOTE ADULT - PROBLEM SELECTOR PLAN 3
consider lipid panel   If LDL<70, consider starting statin     pager: [phone]   cell: [phone]  office: [phone]    -Y spent a total time of 25  mins with the patient at bedside/on unit of which more than 50% of time was spent on counseling/coordination of care.

## 2020-06-09 NOTE — PROGRESS NOTE ADULT - SUBJECTIVE AND OBJECTIVE BOX
Subjective: Patient seen and examined. No new events except as noted.   no cp or sob     REVIEW OF SYSTEMS:    CONSTITUTIONAL:+weakness, fevers or chills  EYES/ENT: No visual changes;  No vertigo or throat pain   NECK: No pain or stiffness  RESPIRATORY: No cough, wheezing, hemoptysis; No shortness of breath  CARDIOVASCULAR: No chest pain or palpitations  GASTROINTESTINAL: No abdominal or epigastric pain. No nausea, vomiting, or hematemesis; No diarrhea or constipation. No melena or hematochezia.  GENITOURINARY: No dysuria, frequency or hematuria  NEUROLOGICAL: No numbness or weakness  SKIN: No itching, burning, rashes, or lesions   All other review of systems is negative unless indicated above.    MEDICATIONS:  MEDICATIONS  (STANDING):  acetaminophen   Tablet .. 650 milliGRAM(s) Oral every 6 hours  calcium acetate 1334 milliGRAM(s) Oral three times a day with meals  chlorhexidine 4% Liquid 1 Application(s) Topical daily  cloNIDine 0.1 milliGRAM(s) Oral two times a day  heparin   Injectable 5000 Unit(s) SubCutaneous every 8 hours  insulin glargine Injectable (LANTUS) 15 Unit(s) SubCutaneous at bedtime  insulin lispro (HumaLOG) corrective regimen sliding scale   SubCutaneous three times a day before meals  insulin lispro (HumaLOG) corrective regimen sliding scale   SubCutaneous at bedtime  insulin lispro Injectable (HumaLOG) 7 Unit(s) SubCutaneous three times a day before meals  polyethylene glycol 3350 17 Gram(s) Oral two times a day  senna 2 Tablet(s) Oral at bedtime      PHYSICAL EXAM:  T(C): 36.9 (06-09-20 @ 08:38), Max: 37.1 (06-08-20 @ 12:47)  HR: 82 (06-09-20 @ 08:38) (79 - 105)  BP: 163/74 (06-09-20 @ 08:38) (144/75 - 170/84)  RR: 18 (06-09-20 @ 08:38) (18 - 18)  SpO2: 94% (06-09-20 @ 08:38) (94% - 100%)  Wt(kg): --  I&O's Summary    08 Jun 2020 07:01  -  09 Jun 2020 07:00  --------------------------------------------------------  IN: 620 mL / OUT: 1500 mL / NET: -880 mL          Appearance: Normal	  HEENT:   Normal oral mucosa, PERRL, EOMI	  Lymphatic: No lymphadenopathy , no edema  Cardiovascular: Normal S1 S2, No JVD, No murmurs , Peripheral pulses palpable 2+ bilaterally  Respiratory: Lungs clear to auscultation, normal effort 	  Gastrointestinal:  Soft, Non-tender, + BS	  Skin: No rashes, No ecchymoses, No cyanosis, warm to touch  Musculoskeletal: Normal range of motion, normal strength  Psychiatry:  Mood & affect appropriate  Ext: No edema      LABS:    CARDIAC MARKERS:                                10.2   7.36  )-----------( 189      ( 09 Jun 2020 07:07 )             32.0     06-09    135  |  93<L>  |  76<H>  ----------------------------<  129<H>  5.0   |  24  |  10.65<H>    Ca    9.0      09 Jun 2020 07:07  Phos  7.3     06-09  Mg     2.6     06-09      proBNP:   Lipid Profile:   HgA1c:   TSH:             TELEMETRY: 	    ECG:  	  RADIOLOGY:   DIAGNOSTIC TESTING:  [ ] Echocardiogram:  [ ]  Catheterization:  [ ] Stress Test:    OTHER:

## 2020-06-09 NOTE — PROGRESS NOTE ADULT - ASSESSMENT
59y M with ESRD on HD      #ESRD     Pt. with ESRD on HD three times a week (MWF). Last HD was on 6/6 via LUE AVF. Pt. clinically stable. Labs reviewed. BUN remains elevated post HD, patient highly catabolic. Endorses issues with elevated BUN outpt as well  - Recommend US of the AVF to assess for stenosis  - HD today    #HTN  BP elevated, on clonidine .1mg QD. Patient states he has had allergies to prior BP medications such as atenolol, lisinopril, hydralazine, amlodipine. Has not been able to tolerate those medications. On clonidine .1mg BID, would hold clonidine AM dose prior to HD. Low salt diet.   HD today       #Anemia  Patient with anemia in the setting of ESRD. Hemoglobin above target range. 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 Monitor hemoglobin, will restart EPO if Hgb drops less than 9  -Check iron studies including ferritin    #Secondary hyperparathyroidism  - Phos elevated to 8's, mildly improve. Maintain on  phoslo 1334mg TID. Monitor phos QD, low phosphorus diet. May require addition of Renvela if persistent

## 2020-06-09 NOTE — PROGRESS NOTE ADULT - SUBJECTIVE AND OBJECTIVE BOX
Diabetes Follow up note:    Chief complaint: Type 1 DM    Interval Hx: BG values 130s-190s over past 24 hours. Pt seen in HD unit during dialysis. Reports did not eat lunch today so he declined insulin injection.     Review of Systems:  General: denies pain  GI: Tolerating POs. Denies N/V/D/Abd pain  CV: Denies CP/SOB  ENDO: No S&Sx of hypoglycemia  MEDS:  insulin glargine Injectable (LANTUS) 15 Unit(s) SubCutaneous at bedtime  insulin lispro (HumaLOG) corrective regimen sliding scale   SubCutaneous three times a day before meals  insulin lispro (HumaLOG) corrective regimen sliding scale   SubCutaneous at bedtime  insulin lispro Injectable (HumaLOG) 7 Unit(s) SubCutaneous three times a day before meals      Allergies    aspirin (Rash; Urticaria; Hives)  iodine (Rash; Urticaria)      PE:  General: Male lying in bed. NAD>   Vital Signs Last 24 Hrs  T(C): 36.4 (09 Jun 2020 13:15), Max: 37 (09 Jun 2020 00:47)  T(F): 97.6 (09 Jun 2020 13:15), Max: 98.6 (09 Jun 2020 00:47)  HR: 84 (09 Jun 2020 13:15) (79 - 91)  BP: 168/84 (09 Jun 2020 13:15) (145/72 - 168/84)  BP(mean): --  RR: 18 (09 Jun 2020 13:15) (18 - 18)  SpO2: 97% (09 Jun 2020 13:15) (94% - 97%)  Abd: Soft, NT,ND,   Extremities: Warm. LLE lateral incision clean with no drainage, staples in place  Neuro: A&O X3    LABS:  POCT Blood Glucose.: 187 mg/dL (06-09-20 @ 12:27)  POCT Blood Glucose.: 134 mg/dL (06-09-20 @ 07:30)  POCT Blood Glucose.: 117 mg/dL (06-08-20 @ 21:15)  POCT Blood Glucose.: 197 mg/dL (06-08-20 @ 18:22)  POCT Blood Glucose.: 238 mg/dL (06-08-20 @ 17:04)  POCT Blood Glucose.: 127 mg/dL (06-08-20 @ 12:53)  POCT Blood Glucose.: 125 mg/dL (06-08-20 @ 07:50)  POCT Blood Glucose.: 122 mg/dL (06-07-20 @ 21:29)  POCT Blood Glucose.: 108 mg/dL (06-07-20 @ 17:09)  POCT Blood Glucose.: 196 mg/dL (06-07-20 @ 12:31)  POCT Blood Glucose.: 138 mg/dL (06-07-20 @ 08:06)  POCT Blood Glucose.: 181 mg/dL (06-06-20 @ 22:29)  POCT Blood Glucose.: 155 mg/dL (06-06-20 @ 16:57)                            10.2   7.36  )-----------( 189      ( 09 Jun 2020 07:07 )             32.0       06-09    135  |  93<L>  |  76<H>  ----------------------------<  129<H>  5.0   |  24  |  10.65<H>    Ca    9.0      09 Jun 2020 07:07  Phos  7.3     06-09  Mg     2.6     06-09        A1C with Estimated Average Glucose Result: 7.2 % (06-01-20 @ 08:31)          Contact number: leah [phone] or [phone]

## 2020-06-09 NOTE — PROGRESS NOTE ADULT - SUBJECTIVE AND OBJECTIVE BOX
[facility] DIVISION OF KIDNEY DISEASES AND HYPERTENSION -- FOLLOW UP NOTE  --------------------------------------------------------------------------------  Juice Hahn   Nephrology Fellow  Pager NS: [phone]/ [facility]: [phone]  (After 5 pm or on weekends please page the on-call fellow)    24 hour events/subjective: Patient seen and examined at the bedside. Endorsing itching today. Pain in LLE unchanged. BUN elevated post HD to 76 from 102 yest. K 5.0.         PAST HISTORY  --------------------------------------------------------------------------------  No significant changes to PMH, PSH, FHx, SHx, unless otherwise noted    ALLERGIES & MEDICATIONS  --------------------------------------------------------------------------------  Allergies    aspirin (Rash; Urticaria; Hives)  iodine (Rash; Urticaria)    Intolerances      Standing Inpatient Medications  acetaminophen   Tablet .. 650 milliGRAM(s) Oral every 6 hours  calcium acetate 1334 milliGRAM(s) Oral three times a day with meals  chlorhexidine 4% Liquid 1 Application(s) Topical daily  cloNIDine 0.1 milliGRAM(s) Oral two times a day  heparin   Injectable 5000 Unit(s) SubCutaneous every 8 hours  insulin glargine Injectable (LANTUS) 15 Unit(s) SubCutaneous at bedtime  insulin lispro (HumaLOG) corrective regimen sliding scale   SubCutaneous three times a day before meals  insulin lispro (HumaLOG) corrective regimen sliding scale   SubCutaneous at bedtime  insulin lispro Injectable (HumaLOG) 7 Unit(s) SubCutaneous three times a day before meals  polyethylene glycol 3350 17 Gram(s) Oral two times a day  senna 2 Tablet(s) Oral at bedtime    PRN Inpatient Medications  diphenhydrAMINE 25 milliGRAM(s) Oral every 4 hours PRN  glucagon  Injectable 1 milliGRAM(s) IntraMuscular once PRN  oxycodone    5 mG/acetaminophen 325 mG 1 Tablet(s) Oral every 4 hours PRN  oxycodone    5 mG/acetaminophen 325 mG 2 Tablet(s) Oral every 6 hours PRN      REVIEW OF SYSTEMS  --------------------------------------------------------------------------------  Gen: + itching  Respiratory: No dyspnea  CV: No chest pain  GI: No abdominal pain/ diarrhea   MSK: + LLE pain  Neuro: No dizziness  Heme: No bleeding    All other systems were reviewed and are negative, except as noted.    >>> <<<    VITALS/PHYSICAL EXAM  --------------------------------------------------------------------------------  T(C): 36.9 (06-09-20 @ 08:38), Max: 37.1 (06-08-20 @ 12:47)  HR: 82 (06-09-20 @ 08:38) (79 - 105)  BP: 163/74 (06-09-20 @ 08:38) (144/75 - 170/84)  RR: 18 (06-09-20 @ 08:38) (18 - 18)  SpO2: 94% (06-09-20 @ 08:38) (94% - 100%)  Wt(kg): --        06-08-20 @ 07:01  -  06-09-20 @ 07:00  --------------------------------------------------------  IN: 620 mL / OUT: 1500 mL / NET: -880 mL      Physical Exam:    	  	Gen: NAD, well-appearing  	HEENT: Anicteric   	Pulm: CTA B/L  	CV: RRR, S1S2; no rub  	Abd: +BS, soft, nontender/nondistended       	: No suprapubic tenderness  	MSK: Warm, + pain in LLE  	Neuro: AOX3      	Vascular Access: LUE AVF        LABS/STUDIES  --------------------------------------------------------------------------------              10.2   7.36  >-----------<  189      [06-09-20 @ 07:07]              32.0     135  |  93  |  76  ----------------------------<  129      [06-09-20 @ 07:07]  5.0   |  24  |  10.65        Ca     9.0     [06-09-20 @ 07:07]      Mg     2.6     [06-09-20 @ 07:07]      Phos  7.3     [06-09-20 @ 07:07]            Creatinine Trend:  SCr 10.65 [06-09 @ 07:07]  SCr 13.38 [06-08 @ 09:36]  SCr 11.37 [06-07 @ 07:06]  SCr 8.76 [06-06 @ 06:46]  SCr 10.62 [06-05 @ 09:45]        Ferritin 53      [06-09-20 @ 09:19]    HBsAb 464.5      [06-05-20 @ 13:20]  HBsAg Nonreact      [06-05-20 @ 13:20]  HCV 13.23, Reactive      [06-05-20 @ 13:20]

## 2020-06-09 NOTE — PROGRESS NOTE ADULT - ASSESSMENT
59M with history of DM1, ESRD (on HD M/W/F), glaucoma, HCV, PAD, R DVT s/p thrombectomy, presenting with LLE pain s/p LLE bypass 6/3. Tolerating POs w/fair BG control. BG goal (100-180mg/dl).

## 2020-06-09 NOTE — PROGRESS NOTE ADULT - ASSESSMENT
59yM  PMH ESRD (on HD M/W/F), glaucoma, DMII, HCV, PAD, R DVT s/p thrombectomy. POD6 s/p LLE pop-PT bypass with GSV graft.    - Pain control with around the clock tylenol and oxycodone PRN  - Pt initially refusing rehab but now agreeable  - Pt refusing prophylactic heparin, stating he understands the risks of not taking it and says "if I die, I die."  - Dispo planning: awaiting rehab placement, needs HD bed      Vascular Surgery  [id number]

## 2020-06-09 NOTE — PROGRESS NOTE ADULT - PROBLEM SELECTOR PLAN 1
-test BG AC/HS  -c/w Lantus 15 units QHS  -c/w Humalog 7 units AC meals (hold if not eating)  -c/w Humalog low correction scale AC and Low HS scale  On discharge patient can follow up with outpt endocrine, Dr Haley  Patient is going to rehab, Can be discharged on Lantus 15units qhs and Humalog 7units premeal

## 2020-06-09 NOTE — PROGRESS NOTE ADULT - PROBLEM/PLAN-1
FUTURE VISIT INFORMATION      FUTURE VISIT INFORMATION:    Date: 10/1/18    Time: 2:00PM    Location: [facility] [place] [facility]  REFERRAL INFORMATION:    Referring provider:  Noah Hoskins MD    Referring providers clinic:  [facility]    Reason for visit/diagnosis  Parathyroid     RECORDS REQUESTED FROM:       Clinic name Comments Records Status Imaging Status   [facility] 9/7/18 consult with Dr Hoskins EPIC    [facility] imaging 9/17/18 NM Parathyroid  7/7/14 CT [place] [facility] PACS                             RECORDS STATUS           DISPLAY PLAN FREE TEXT

## 2020-06-10 LAB
ANION GAP SERPL CALC-SCNC: 13 MMOL/L — SIGNIFICANT CHANGE UP (ref 5–17)
BUN SERPL-MCNC: 32 MG/DL — HIGH (ref 7–23)
BUN SERPL-MCNC: 34 MG/DL — HIGH (ref 7–23)
BUN SERPL-MCNC: 47 MG/DL — HIGH (ref 7–23)
BUN SERPL-MCNC: <4 MG/DL — LOW (ref 7–23)
CALCIUM SERPL-MCNC: 9.2 MG/DL — SIGNIFICANT CHANGE UP (ref 8.4–10.5)
CHLORIDE SERPL-SCNC: 96 MMOL/L — SIGNIFICANT CHANGE UP (ref 96–108)
CO2 SERPL-SCNC: 27 MMOL/L — SIGNIFICANT CHANGE UP (ref 22–31)
CREAT SERPL-MCNC: 7.84 MG/DL — HIGH (ref 0.5–1.3)
GLUCOSE BLDC GLUCOMTR-MCNC: 118 MG/DL — HIGH (ref 70–99)
GLUCOSE BLDC GLUCOMTR-MCNC: 139 MG/DL — HIGH (ref 70–99)
GLUCOSE BLDC GLUCOMTR-MCNC: 166 MG/DL — HIGH (ref 70–99)
GLUCOSE SERPL-MCNC: 120 MG/DL — HIGH (ref 70–99)
HCT VFR BLD CALC: 31.2 % — LOW (ref 39–50)
HGB BLD-MCNC: 9.9 G/DL — LOW (ref 13–17)
MAGNESIUM SERPL-MCNC: 2.5 MG/DL — SIGNIFICANT CHANGE UP (ref 1.6–2.6)
MCHC RBC-ENTMCNC: 24.5 PG — LOW (ref 27–34)
MCHC RBC-ENTMCNC: 31.7 GM/DL — LOW (ref 32–36)
MCV RBC AUTO: 77.2 FL — LOW (ref 80–100)
NRBC # BLD: 0 /100 WBCS — SIGNIFICANT CHANGE UP (ref 0–0)
PHOSPHATE SERPL-MCNC: 5.8 MG/DL — HIGH (ref 2.5–4.5)
PLATELET # BLD AUTO: 196 K/UL — SIGNIFICANT CHANGE UP (ref 150–400)
POTASSIUM SERPL-MCNC: 4.6 MMOL/L — SIGNIFICANT CHANGE UP (ref 3.5–5.3)
POTASSIUM SERPL-SCNC: 4.6 MMOL/L — SIGNIFICANT CHANGE UP (ref 3.5–5.3)
RBC # BLD: 4.04 M/UL — LOW (ref 4.2–5.8)
RBC # FLD: 15.9 % — HIGH (ref 10.3–14.5)
SARS-COV-2 RNA SPEC QL NAA+PROBE: SIGNIFICANT CHANGE UP
SODIUM SERPL-SCNC: 136 MMOL/L — SIGNIFICANT CHANGE UP (ref 135–145)
WBC # BLD: 7.55 K/UL — SIGNIFICANT CHANGE UP (ref 3.8–10.5)
WBC # FLD AUTO: 7.55 K/UL — SIGNIFICANT CHANGE UP (ref 3.8–10.5)

## 2020-06-10 PROCEDURE — 90935 HEMODIALYSIS ONE EVALUATION: CPT | Mod: GC

## 2020-06-10 PROCEDURE — 99232 SBSQ HOSP IP/OBS MODERATE 35: CPT

## 2020-06-10 RX ORDER — CALCIUM ACETATE 667 MG
2001 TABLET ORAL
Refills: 0 | Status: DISCONTINUED | OUTPATIENT
Start: 2020-06-10 | End: 2020-06-11

## 2020-06-10 RX ORDER — IRON SUCROSE 20 MG/ML
200 INJECTION, SOLUTION INTRAVENOUS ONCE
Refills: 0 | Status: COMPLETED | OUTPATIENT
Start: 2020-06-10 | End: 2020-06-10

## 2020-06-10 RX ORDER — INSULIN LISPRO 100/ML
VIAL (ML) SUBCUTANEOUS AT BEDTIME
Refills: 0 | Status: DISCONTINUED | OUTPATIENT
Start: 2020-06-10 | End: 2020-06-11

## 2020-06-10 RX ORDER — INSULIN LISPRO 100/ML
VIAL (ML) SUBCUTANEOUS
Refills: 0 | Status: DISCONTINUED | OUTPATIENT
Start: 2020-06-10 | End: 2020-06-11

## 2020-06-10 RX ORDER — DIPHENHYDRAMINE HCL 50 MG
50 CAPSULE ORAL ONCE
Refills: 0 | Status: COMPLETED | OUTPATIENT
Start: 2020-06-11 | End: 2020-06-11

## 2020-06-10 RX ADMIN — Medication 25 MILLIGRAM(S): at 21:17

## 2020-06-10 RX ADMIN — Medication 50 MILLIGRAM(S): at 21:09

## 2020-06-10 RX ADMIN — Medication 0.1 MILLIGRAM(S): at 16:45

## 2020-06-10 RX ADMIN — INSULIN GLARGINE 15 UNIT(S): 100 INJECTION, SOLUTION SUBCUTANEOUS at 21:09

## 2020-06-10 RX ADMIN — Medication 2001 MILLIGRAM(S): at 16:45

## 2020-06-10 RX ADMIN — Medication 650 MILLIGRAM(S): at 16:46

## 2020-06-10 RX ADMIN — Medication 7 UNIT(S): at 16:46

## 2020-06-10 RX ADMIN — Medication 1 ENEMA: at 21:09

## 2020-06-10 RX ADMIN — Medication 25 MILLIGRAM(S): at 08:42

## 2020-06-10 RX ADMIN — IRON SUCROSE 200 MILLIGRAM(S): 20 INJECTION, SOLUTION INTRAVENOUS at 11:49

## 2020-06-10 NOTE — PROGRESS NOTE ADULT - ASSESSMENT
59y M with ESRD on HD      #ESRD     Pt. with ESRD on HD three times a week (MWF). Last HD was on 6/9 via LUE AVF. Pt. clinically stable. Labs reviewed. BUN remains elevated post HD, patient highly catabolic. Endorses issues with elevated BUN outpt as well.  - Recommend US of the AVF to assess for stenosis  - HD today to keep on MWF schedule, will increase HD time to 210min  - Check recirculation labs    #HTN  BP elevated, on clonidine .1mg QD. Patient states he has had allergies to prior BP medications such as atenolol, lisinopril, hydralazine, amlodipine. Has not been able to tolerate those medications. On clonidine .1mg BID, would hold clonidine AM dose prior to HD. Low salt diet.   HD today       #Anemia  Patient with anemia in the setting of ESRD. Hemoglobin above target range. 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 Monitor hemoglobin, will restart EPO if Hgb drops less than 9  -Check iron studies including ferritin    #Secondary hyperparathyroidism  - Phos elevated to 8's, mildly improve. Please increase phoslo to 2001 mg TID as this is his home dose. Monitor phos QD, low phosphorus diet. 59y M with ESRD on HD      #ESRD     Pt. with ESRD on HD three times a week (MWF). Last HD was on 6/9 via LUE AVF. Pt. clinically stable. Labs reviewed. BUN remains elevated post HD, patient highly catabolic. Endorses issues with elevated BUN outpt as well.  - Recommend US of the AVF to assess for stenosis  - HD today to keep on MWF schedule, will increase HD time to 210min  - Check recirculation labs    #HTN  BP elevated, on clonidine .1mg QD. Patient states he has had allergies to prior BP medications such as atenolol, lisinopril, hydralazine, amlodipine. Has not been able to tolerate those medications. On clonidine .1mg BID, would hold clonidine AM dose prior to HD. Low salt diet.   HD today       #Anemia  Patient with anemia in the setting of ESRD. Hemoglobin above target range. 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 Monitor hemoglobin, will restart EPO if Hgb drops less than 9  - Iron studies consistent with iron deficiency, pt states he gets severe itching with IV iron repletion and takes PO iron supplements at home. Can restart at this time PO    #Secondary hyperparathyroidism  - Phos elevated to 8's, mildly improve. Please increase phoslo to 2001 mg TID as this is his home dose. Monitor phos QD, low phosphorus diet. 59y M with ESRD on HD      #ESRD     Pt. with ESRD on HD three times a week (MWF). Last HD was on 6/9 via LUE AVF. Pt. clinically stable. Labs reviewed. BUN remains elevated post HD, patient highly catabolic. Endorses issues with elevated BUN outpt as well.  - Recommend US of the AVF to assess for stenosis  - HD today to keep on MWF schedule, will increase HD time to 210min  - Recirculation studies with ~6% recirculation    #HTN  BP elevated, on clonidine .1mg QD. Patient states he has had allergies to prior BP medications such as atenolol, lisinopril, hydralazine, amlodipine. Has not been able to tolerate those medications. On clonidine .1mg BID, would hold clonidine AM dose prior to HD. Low salt diet.   HD today       #Anemia  Patient with anemia in the setting of ESRD. Hemoglobin above target range. 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 Monitor hemoglobin, will restart EPO if Hgb drops less than 9  - Iron studies consistent with iron deficiency, pt states he gets severe itching with IV iron repletion will give one dose today and assess his response.    #Secondary hyperparathyroidism  - Phos elevated to 8's, mildly improve. Please increase phoslo to 2001 mg TID as this is his home dose. Monitor phos QD, low phosphorus diet. 59y M with ESRD on HD      #ESRD     Pt. with ESRD on HD three times a week (MWF). Last HD was on 6/9 via LUE AVF. Pt. clinically stable. Labs reviewed. BUN remains elevated post HD, patient highly catabolic. Endorses issues with elevated BUN outpt as well.  - Recommend US of the AVF to assess for stenosis  - HD today to keep on MWF schedule, will increase HD time to 210min  - Recirculation studies with ~6% recirculation    #HTN  BP elevated, on clonidine .1mg QD. Patient states he has had allergies to prior BP medications such as atenolol, lisinopril, hydralazine, amlodipine. Has not been able to tolerate those medications. On clonidine .1mg BID, would hold clonidine AM dose prior to HD. Low salt diet.   HD today       #Anemia  Patient with anemia in the setting of ESRD. Hemoglobin above target range. 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 Monitor hemoglobin, will restart EPO if Hgb drops less than 9  - Iron studies consistent with iron deficiency, pt states he gets severe itching with IV iron repletion will give one dose today and assess his response.    #Secondary hyperparathyroidism  - Phos elevated to 8's, improved to 5.8 today.  Please increase phoslo to 2001 mg TID as this is his home dose. Monitor phos QD, low phosphorus diet.

## 2020-06-10 NOTE — PROGRESS NOTE ADULT - SUBJECTIVE AND OBJECTIVE BOX
Subjective: Patient seen and examined. No new events except as noted.     REVIEW OF SYSTEMS:    CONSTITUTIONAL:+ weakness, fevers or chills  EYES/ENT: No visual changes;  No vertigo or throat pain   NECK: No pain or stiffness  RESPIRATORY: No cough, wheezing, hemoptysis; No shortness of breath  CARDIOVASCULAR: No chest pain or palpitations  GASTROINTESTINAL: No abdominal or epigastric pain. No nausea, vomiting, or hematemesis; No diarrhea or constipation. No melena or hematochezia.  GENITOURINARY: No dysuria, frequency or hematuria  NEUROLOGICAL: No numbness or weakness  SKIN: No itching, burning, rashes, or lesions   All other review of systems is negative unless indicated above.    MEDICATIONS:  MEDICATIONS  (STANDING):  acetaminophen   Tablet .. 650 milliGRAM(s) Oral every 6 hours  calcium acetate 2001 milliGRAM(s) Oral three times a day with meals  chlorhexidine 4% Liquid 1 Application(s) Topical daily  cloNIDine 0.1 milliGRAM(s) Oral two times a day  heparin   Injectable 5000 Unit(s) SubCutaneous every 8 hours  insulin glargine Injectable (LANTUS) 15 Unit(s) SubCutaneous at bedtime  insulin lispro (HumaLOG) corrective regimen sliding scale   SubCutaneous three times a day before meals  insulin lispro (HumaLOG) corrective regimen sliding scale   SubCutaneous at bedtime  insulin lispro Injectable (HumaLOG) 7 Unit(s) SubCutaneous three times a day before meals  polyethylene glycol 3350 17 Gram(s) Oral two times a day  senna 2 Tablet(s) Oral at bedtime      PHYSICAL EXAM:  T(C): 36.9 (06-10-20 @ 08:20), Max: 37 (06-10-20 @ 04:39)  HR: 82 (06-10-20 @ 08:20) (82 - 89)  BP: 168/84 (06-10-20 @ 08:20) (158/76 - 170/87)  RR: 17 (06-10-20 @ 08:20) (17 - 18)  SpO2: 98% (06-10-20 @ 08:20) (97% - 100%)  Wt(kg): --  I&O's Summary    09 Jun 2020 07:01  -  10 Tu 2020 07:00  --------------------------------------------------------  IN: 1160 mL / OUT: 1300 mL / NET: -140 mL          Appearance: Normal	  HEENT:   Normal oral mucosa, PERRL, EOMI	  Lymphatic: No lymphadenopathy , no edema  Cardiovascular: Normal S1 S2, No JVD, No murmurs , Peripheral pulses palpable 2+ bilaterally  Respiratory: Lungs clear to auscultation, normal effort 	  Gastrointestinal:  Soft, Non-tender, + BS	  Skin: No rashes, No ecchymoses, No cyanosis, warm to touch  Musculoskeletal: Normal range of motion, normal strength  Psychiatry:  Mood & affect appropriate  Ext: No edema      LABS:    CARDIAC MARKERS:                                9.9    7.55  )-----------( 196      ( 10 Tu 2020 09:11 )             31.2     06-10    x   |  x   |  32<H>  ----------------------------<  x   x    |  x   |  x     Ca    9.2      10 Tu 2020 09:11  Phos  5.8     06-10  Mg     2.5     06-10      proBNP:   Lipid Profile:   HgA1c:   TSH:             TELEMETRY: 	    ECG:  	  RADIOLOGY:   DIAGNOSTIC TESTING:  [ ] Echocardiogram:  [ ]  Catheterization:  [ ] Stress Test:    OTHER:

## 2020-06-10 NOTE — PROGRESS NOTE ADULT - PROBLEM SELECTOR PLAN 1
-test BG AC/HS. Can change to q6h overnight if NPO  -c/w Lantus 15 units QHS. If NPO tonight would continue at current dose as pt will need prednisone as pre-med  -c/w Humalog 7 units AC meals  -change Humalog to moderate correction scale AC and Mod HS scale while on steroids. Can change to Humalog moderate scale Q6h if NPO  On discharge patient can follow up with outpt endocrine, Dr Haley  Patient is going to rehab, Can be discharged on Lantus 15units qhs and Humalog 7units premeal.

## 2020-06-10 NOTE — PROGRESS NOTE ADULT - SUBJECTIVE AND OBJECTIVE BOX
[facility] DIVISION OF KIDNEY DISEASES AND HYPERTENSION -- FOLLOW UP NOTE  --------------------------------------------------------------------------------  Juice Hahn   Nephrology Fellow  Pager NS: [phone]/ [facility]: [phone]  (After 5 pm or on weekends please page the on-call fellow)    24 hour events/subjective: Patient seen and examined at the bedside. Vital signs, labs, medications reviewed. Plan for maintenance HD today.        PAST HISTORY  --------------------------------------------------------------------------------  No significant changes to PMH, PSH, FHx, SHx, unless otherwise noted    ALLERGIES & MEDICATIONS  --------------------------------------------------------------------------------  Allergies    aspirin (Rash; Urticaria; Hives)  iodine (Rash; Urticaria)    Intolerances      Standing Inpatient Medications  acetaminophen   Tablet .. 650 milliGRAM(s) Oral every 6 hours  calcium acetate 1334 milliGRAM(s) Oral three times a day with meals  chlorhexidine 4% Liquid 1 Application(s) Topical daily  cloNIDine 0.1 milliGRAM(s) Oral two times a day  heparin   Injectable 5000 Unit(s) SubCutaneous every 8 hours  insulin glargine Injectable (LANTUS) 15 Unit(s) SubCutaneous at bedtime  insulin lispro (HumaLOG) corrective regimen sliding scale   SubCutaneous three times a day before meals  insulin lispro (HumaLOG) corrective regimen sliding scale   SubCutaneous at bedtime  insulin lispro Injectable (HumaLOG) 7 Unit(s) SubCutaneous three times a day before meals  polyethylene glycol 3350 17 Gram(s) Oral two times a day  senna 2 Tablet(s) Oral at bedtime    PRN Inpatient Medications  diphenhydrAMINE 25 milliGRAM(s) Oral every 4 hours PRN  glucagon  Injectable 1 milliGRAM(s) IntraMuscular once PRN  oxycodone    5 mG/acetaminophen 325 mG 1 Tablet(s) Oral every 4 hours PRN  oxycodone    5 mG/acetaminophen 325 mG 2 Tablet(s) Oral every 6 hours PRN      REVIEW OF SYSTEMS  --------------------------------------------------------------------------------  Gen: No lethargy  Respiratory: No dyspnea  CV: No chest pain  GI: No abdominal pain/ diarrhea   MSK: + LLE Pain with edema  Neuro: No dizziness  Heme: No bleeding    All other systems were reviewed and are negative, except as noted.      >>> <<<    VITALS/PHYSICAL EXAM  --------------------------------------------------------------------------------  T(C): 37 (06-10-20 @ 04:39), Max: 37 (06-10-20 @ 04:39)  HR: 88 (06-10-20 @ 04:39) (82 - 89)  BP: 158/76 (06-10-20 @ 04:39) (158/76 - 170/87)  RR: 18 (06-10-20 @ 04:39) (18 - 18)  SpO2: 98% (06-10-20 @ 04:39) (94% - 100%)  Wt(kg): --        06-09-20 @ 07:01  -  06-10-20 @ 07:00  --------------------------------------------------------  IN: 1160 mL / OUT: 1300 mL / NET: -140 mL      Physical Exam:    	Gen: NAD, well-appearing  	HEENT: Anicteric   	Pulm: CTA B/L  	CV: RRR, S1S2; no rub  	Abd: +BS, soft, nontender/nondistended       	: No suprapubic tenderness  	MSK: Warm, + pain in LLE  	Neuro: AOX3      	Vascular Access: LUE AVF      LABS/STUDIES  --------------------------------------------------------------------------------              10.2   7.36  >-----------<  189      [06-09-20 @ 07:07]              32.0     135  |  93  |  76  ----------------------------<  129      [06-09-20 @ 07:07]  5.0   |  24  |  10.65        Ca     9.0     [06-09-20 @ 07:07]      Mg     2.6     [06-09-20 @ 07:07]      Phos  7.3     [06-09-20 @ 07:07]            Creatinine Trend:  SCr 10.65 [06-09 @ 07:07]  SCr 13.38 [06-08 @ 09:36]  SCr 11.37 [06-07 @ 07:06]  SCr 8.76 [06-06 @ 06:46]  SCr 10.62 [06-05 @ 09:45]        Iron 32, TIBC 276, %sat 11      [06-09-20 @ 09:19]  Ferritin 53      [06-09-20 @ 09:19]    HBsAb 464.5      [06-05-20 @ 13:20]  HBsAg Nonreact      [06-05-20 @ 13:20]  HCV 13.23, Reactive      [06-05-20 @ 13:20]

## 2020-06-10 NOTE — CHART NOTE - NSCHARTNOTEFT_GEN_A_CORE
Nutrition Follow Up Note    Patient seen for length of stay follow up    Source: pt, EMR    Chart reviewed, events noted. Pt NPO today for IR procedure.    Diet : Diet, NPO:   Except Medications (06-10-20 @ 07:57)    Patient endorses some nausea he associates with hunger. No vomiting, last BM yesterday.    PO intake: Pt c/o hunger. States prior to today his intake had been good, reports eating "most of" his meals.  States appetite has somewhat improved.      Source for PO intake: pt, EMR    Enteral/Parenteral Nutrition: n/a    HD fluid removal per chart:  6/10 - 1 L   - 0.5L   - 1.5L    Daily Weight in k (06-10), Weight in k (06-10), Weight in k.3 (), Weight in k (), Weight in k.5 (), Weight in k.2 (), Weight in k.2 ()  weight fluctuating as expected pre and post-HD, will continue to monitor    Pertinent Medications: MEDICATIONS  (STANDING):  acetaminophen   Tablet .. 650 milliGRAM(s) Oral every 6 hours  calcium acetate 2001 milliGRAM(s) Oral three times a day with meals  chlorhexidine 4% Liquid 1 Application(s) Topical daily  cloNIDine 0.1 milliGRAM(s) Oral two times a day  heparin   Injectable 5000 Unit(s) SubCutaneous every 8 hours  insulin glargine Injectable (LANTUS) 15 Unit(s) SubCutaneous at bedtime  insulin lispro (HumaLOG) corrective regimen sliding scale   SubCutaneous three times a day before meals  insulin lispro (HumaLOG) corrective regimen sliding scale   SubCutaneous at bedtime  insulin lispro Injectable (HumaLOG) 7 Unit(s) SubCutaneous three times a day before meals  polyethylene glycol 3350 17 Gram(s) Oral two times a day  senna 2 Tablet(s) Oral at bedtime    MEDICATIONS  (PRN):  diphenhydrAMINE 25 milliGRAM(s) Oral every 4 hours PRN Rash and/or Itching  glucagon  Injectable 1 milliGRAM(s) IntraMuscular once PRN Glucose LESS THAN 70 milligrams/deciliter  oxycodone    5 mG/acetaminophen 325 mG 1 Tablet(s) Oral every 4 hours PRN Moderate Pain (4 - 6)  oxycodone    5 mG/acetaminophen 325 mG 2 Tablet(s) Oral every 6 hours PRN Severe Pain (7 - 10)    Pertinent Labs: 06-10 @ 09:20: Na --, BUN <4<L>, Cr --, BG --, K+ --, Phos --, Mg --, Alk Phos --, ALT/SGPT --, AST/SGOT --, HbA1c --  06-10 @ 09:17: Na --, BUN 32<H>, Cr --, BG --, K+ --, Phos --, Mg --, Alk Phos --, ALT/SGPT --, AST/SGOT --, HbA1c --  06-10 @ 09:16: Na --, BUN 34<H>, Cr --, BG --, K+ --, Phos --, Mg --, Alk Phos --, ALT/SGPT --, AST/SGOT --, HbA1c --  06-10 @ 09:11: Na 136, BUN 47<H>, Cr 7.84<H>, <H>, K+ 4.6, Phos 5.8<H>, Mg 2.5, Alk Phos --, ALT/SGPT --, AST/SGOT --, HbA1c --    Finger Sticks:  POCT Blood Glucose.: 118 mg/dL (06-10 @ 14:19)  POCT Blood Glucose.: 142 mg/dL ( @ 21:39)  POCT Blood Glucose.: 112 mg/dL ( @ 17:21)      Skin per nursing documentation: free of pressure injuries  Edema per nursing documentation: +1 right leg    Estimated Needs:   [x] no change since previous assessment    Previous Nutrition Diagnosis: Increased nutrient needs  Nutrition Diagnosis is: ongoing    New Nutrition Diagnosis: Food and Nutrition Related Knowledge Deficit  Related to: need for reinforcement diet education  As evidenced by: pt with questions regarding HD diet    Interventions: Provided education on renal diet for HD. Provided education on increased demand for kcal and protein intake to help prevent muscle/weight loss, reviewed foods high in potassium, phosphorus, and sodium, discussed foods recommended within therapeutic diet and protein portion sizing. Literature provided at bedside, pt amenable.    Recommend  1. When medically indicated, change diet to Consistent Carbohydrate Renal.  2. Add Nepro 1x daily (425 kcals, 19 g protein) for additional protein/calories - monitor for tolerance.  3. Provided HD diet education - pt made aware RD remains available for any questions.  4. Add Nephro-edy if feasible.  5. Continue PhosLo.    Monitoring and Evaluation:     Continue to monitor nutritional intake, tolerance to diet prescription, weights, labs, skin integrity    RD remains available upon request and will follow up per protocol    Stephanie Wynn RD, Pager # [phone]

## 2020-06-10 NOTE — PROGRESS NOTE ADULT - PROBLEM SELECTOR PLAN 3
consider lipid panel   If LDL<70, consider starting statin     discussed w/pt and team  pager: [phone]   cell: [phone]  office: [phone]    -I spent a total time of 25  mins with the patient at bedside/on unit of which more than 50% of time was spent on counseling/coordination of care.

## 2020-06-10 NOTE — PROGRESS NOTE ADULT - SUBJECTIVE AND OBJECTIVE BOX
VASCULAR SURGERY DAILY PROGRESS NOTE:    Subjective:  Patient seen and examined. Denies SOB, CP, N/V. Endorses good pain control.    Exam:  General: A&Ox3, NAD  Resp: breathing nonlabored  Cardiac: RRR  GI: abdomen soft, NT/ND  LLE: AT, PT signals present. Staple line intact with no swelling.    Vital Signs Last 24 Hrs  T(C): 37 (10 Tu 2020 04:39), Max: 37 (10 Tu 2020 04:39)  T(F): 98.6 (10 Tu 2020 04:39), Max: 98.6 (10 Tu 2020 04:39)  HR: 88 (10 Tu 2020 04:39) (82 - 89)  BP: 158/76 (10 Tu 2020 04:39) (158/76 - 170/87)  BP(mean): --  RR: 18 (10 Tu 2020 04:39) (18 - 18)  SpO2: 98% (10 Tu 2020 04:39) (94% - 100%)    I&O's Detail    08 Jun 2020 07:01  -  09 Jun 2020 07:00  --------------------------------------------------------  IN:    Oral Fluid: 620 mL  Total IN: 620 mL    OUT:    Other: 1500 mL  Total OUT: 1500 mL    Total NET: -880 mL      09 Jun 2020 07:01  -  10 Tu 2020 06:42  --------------------------------------------------------  IN:    Oral Fluid: 360 mL    Other: 800 mL  Total IN: 1160 mL    OUT:    Other: 1300 mL  Total OUT: 1300 mL    Total NET: -140 mL              LABS:                                   10.2   7.36  )-----------( 189      ( 09 Jun 2020 07:07 )             32.0     06-09    135  |  93<L>  |  76<H>  ----------------------------<  129<H>  5.0   |  24  |  10.65<H>    Ca    9.0      09 Jun 2020 07:07  Phos  7.3     06-09  Mg     2.6     06-09

## 2020-06-10 NOTE — PROGRESS NOTE ADULT - ASSESSMENT
59yM  PMH ESRD (on HD M/W/F), glaucoma, DMII, HCV, PAD, R DVT s/p thrombectomy. POD7 s/p LLE pop-PT bypass with GSV graft.    - Pain control with around the clock tylenol and oxycodone PRN  - Pt refusing prophylactic heparin, stating he understands the risks of not taking it and says "if I die, I die."  - Dispo planning: awaiting rehab placement, needs HD bed  - nephro requesting circulation study during HD this morning      Vascular Surgery  [id number]

## 2020-06-10 NOTE — PROGRESS NOTE ADULT - SUBJECTIVE AND OBJECTIVE BOX
Diabetes Follow up note:    Chief complaint: T1DM    Interval Hx: BG values at goal today. Pt made NPO this AM for possible procedure today. Seen at bedside. Requesting Prednisone prior to procedure as he has an iodine allergy. No hypoglycemia symptoms.     Review of Systems:  General: denies pain   GI: Tolerating POs. Denies N/V/D/Abd pain  CV: Denies CP/SOB  ENDO: No S&Sx of hypoglycemia  MEDS:  insulin glargine Injectable (LANTUS) 15 Unit(s) SubCutaneous at bedtime  insulin lispro (HumaLOG) corrective regimen sliding scale   SubCutaneous three times a day before meals  insulin lispro (HumaLOG) corrective regimen sliding scale   SubCutaneous at bedtime  insulin lispro Injectable (HumaLOG) 7 Unit(s) SubCutaneous three times a day before meals      Allergies    aspirin (Rash; Urticaria; Hives)  iodine (Rash; Urticaria)        PE:  General: Male lying in bed. NAD.   Vital Signs Last 24 Hrs  T(C): 37.3 (10 Tu 2020 14:15), Max: 37.3 (10 Tu 2020 14:15)  T(F): 99.1 (10 Tu 2020 14:15), Max: 99.1 (10 Tu 2020 14:15)  HR: 97 (10 Tu 2020 14:15) (82 - 100)  BP: 179/86 (10 Tu 2020 14:15) (158/76 - 179/86)  BP(mean): --  RR: 18 (10 Tu 2020 14:15) (17 - 18)  SpO2: 95% (10 Tu 2020 14:15) (95% - 100%)  Abd: Soft, NT,ND,   Extremities: Warm. LLE lateral incision clean with no drainage, staples in place  Neuro: A&O X3    LABS:  POCT Blood Glucose.: 118 mg/dL (06-10-20 @ 14:19)  POCT Blood Glucose.: 142 mg/dL (06-09-20 @ 21:39)  POCT Blood Glucose.: 112 mg/dL (06-09-20 @ 17:21)  POCT Blood Glucose.: 187 mg/dL (06-09-20 @ 12:27)  POCT Blood Glucose.: 134 mg/dL (06-09-20 @ 07:30)  POCT Blood Glucose.: 117 mg/dL (06-08-20 @ 21:15)  POCT Blood Glucose.: 197 mg/dL (06-08-20 @ 18:22)  POCT Blood Glucose.: 238 mg/dL (06-08-20 @ 17:04)  POCT Blood Glucose.: 127 mg/dL (06-08-20 @ 12:53)  POCT Blood Glucose.: 125 mg/dL (06-08-20 @ 07:50)  POCT Blood Glucose.: 122 mg/dL (06-07-20 @ 21:29)  POCT Blood Glucose.: 108 mg/dL (06-07-20 @ 17:09)                            9.9    7.55  )-----------( 196      ( 10 Tu 2020 09:11 )             31.2       06-10    x   |  x   |  <4<L>  ----------------------------<  x   x    |  x   |  x     Ca    9.2      10 Tu 2020 09:11  Phos  5.8     06-10  Mg     2.5     06-10      A1C with Estimated Average Glucose Result: 7.2 % (06-01-20 @ 08:31)          Contact number: leah [phone] or [phone]

## 2020-06-10 NOTE — PROGRESS NOTE ADULT - ASSESSMENT
59M with history of DM1, ESRD (on HD M/W/F), glaucoma, HCV, PAD, R DVT s/p thrombectomy, presenting with LLE pain s/p LLE bypass 6/3. Awaiting IR procedure on AVF. May need to be premedicated w/prednisone 2/2 Iodine allergy. BG values at goal today, NPO for procedure. BG goal (100-180mg/dl).

## 2020-06-11 ENCOUNTER — TRANSCRIPTION ENCOUNTER (OUTPATIENT)
Age: 60
End: 2020-06-11

## 2020-06-11 VITALS — SYSTOLIC BLOOD PRESSURE: 179 MMHG | HEART RATE: 106 BPM | DIASTOLIC BLOOD PRESSURE: 82 MMHG

## 2020-06-11 LAB
ANION GAP SERPL CALC-SCNC: 18 MMOL/L — HIGH (ref 5–17)
APTT BLD: 28.8 SEC — SIGNIFICANT CHANGE UP (ref 27.5–36.3)
BLD GP AB SCN SERPL QL: NEGATIVE — SIGNIFICANT CHANGE UP
BUN SERPL-MCNC: 47 MG/DL — HIGH (ref 7–23)
CALCIUM SERPL-MCNC: 9.5 MG/DL — SIGNIFICANT CHANGE UP (ref 8.4–10.5)
CHLORIDE SERPL-SCNC: 95 MMOL/L — LOW (ref 96–108)
CO2 SERPL-SCNC: 25 MMOL/L — SIGNIFICANT CHANGE UP (ref 22–31)
CREAT SERPL-MCNC: 6.62 MG/DL — HIGH (ref 0.5–1.3)
GLUCOSE BLDC GLUCOMTR-MCNC: 108 MG/DL — HIGH (ref 70–99)
GLUCOSE BLDC GLUCOMTR-MCNC: 230 MG/DL — HIGH (ref 70–99)
GLUCOSE BLDC GLUCOMTR-MCNC: 242 MG/DL — HIGH (ref 70–99)
GLUCOSE BLDC GLUCOMTR-MCNC: 248 MG/DL — HIGH (ref 70–99)
GLUCOSE BLDC GLUCOMTR-MCNC: 370 MG/DL — HIGH (ref 70–99)
GLUCOSE SERPL-MCNC: 231 MG/DL — HIGH (ref 70–99)
HCT VFR BLD CALC: 36.4 % — LOW (ref 39–50)
HGB BLD-MCNC: 11.1 G/DL — LOW (ref 13–17)
INR BLD: 0.94 RATIO — SIGNIFICANT CHANGE UP (ref 0.88–1.16)
MAGNESIUM SERPL-MCNC: 2.4 MG/DL — SIGNIFICANT CHANGE UP (ref 1.6–2.6)
MCHC RBC-ENTMCNC: 24.1 PG — LOW (ref 27–34)
MCHC RBC-ENTMCNC: 30.5 GM/DL — LOW (ref 32–36)
MCV RBC AUTO: 79 FL — LOW (ref 80–100)
NRBC # BLD: 0 /100 WBCS — SIGNIFICANT CHANGE UP (ref 0–0)
PHOSPHATE SERPL-MCNC: 5.5 MG/DL — HIGH (ref 2.5–4.5)
PLATELET # BLD AUTO: 202 K/UL — SIGNIFICANT CHANGE UP (ref 150–400)
POTASSIUM SERPL-MCNC: 5 MMOL/L — SIGNIFICANT CHANGE UP (ref 3.5–5.3)
POTASSIUM SERPL-SCNC: 5 MMOL/L — SIGNIFICANT CHANGE UP (ref 3.5–5.3)
PROTHROM AB SERPL-ACNC: 10.7 SEC — SIGNIFICANT CHANGE UP (ref 10–13.1)
RBC # BLD: 4.61 M/UL — SIGNIFICANT CHANGE UP (ref 4.2–5.8)
RBC # FLD: 15.8 % — HIGH (ref 10.3–14.5)
RH IG SCN BLD-IMP: POSITIVE — SIGNIFICANT CHANGE UP
SODIUM SERPL-SCNC: 138 MMOL/L — SIGNIFICANT CHANGE UP (ref 135–145)
WBC # BLD: 6.65 K/UL — SIGNIFICANT CHANGE UP (ref 3.8–10.5)
WBC # FLD AUTO: 6.65 K/UL — SIGNIFICANT CHANGE UP (ref 3.8–10.5)

## 2020-06-11 PROCEDURE — 83550 IRON BINDING TEST: CPT

## 2020-06-11 PROCEDURE — 86850 RBC ANTIBODY SCREEN: CPT

## 2020-06-11 PROCEDURE — 97162 PT EVAL MOD COMPLEX 30 MIN: CPT

## 2020-06-11 PROCEDURE — 36902 INTRO CATH DIALYSIS CIRCUIT: CPT

## 2020-06-11 PROCEDURE — 75625 CONTRAST EXAM ABDOMINL AORTA: CPT

## 2020-06-11 PROCEDURE — 85014 HEMATOCRIT: CPT

## 2020-06-11 PROCEDURE — 76937 US GUIDE VASCULAR ACCESS: CPT

## 2020-06-11 PROCEDURE — 36245 INS CATH ABD/L-EXT ART 1ST: CPT

## 2020-06-11 PROCEDURE — 84132 ASSAY OF SERUM POTASSIUM: CPT

## 2020-06-11 PROCEDURE — 82803 BLOOD GASES ANY COMBINATION: CPT

## 2020-06-11 PROCEDURE — 82962 GLUCOSE BLOOD TEST: CPT

## 2020-06-11 PROCEDURE — 99285 EMERGENCY DEPT VISIT HI MDM: CPT

## 2020-06-11 PROCEDURE — 82728 ASSAY OF FERRITIN: CPT

## 2020-06-11 PROCEDURE — 88304 TISSUE EXAM BY PATHOLOGIST: CPT

## 2020-06-11 PROCEDURE — C1769: CPT

## 2020-06-11 PROCEDURE — 82746 ASSAY OF FOLIC ACID SERUM: CPT

## 2020-06-11 PROCEDURE — 82330 ASSAY OF CALCIUM: CPT

## 2020-06-11 PROCEDURE — 85610 PROTHROMBIN TIME: CPT

## 2020-06-11 PROCEDURE — 82947 ASSAY GLUCOSE BLOOD QUANT: CPT

## 2020-06-11 PROCEDURE — 97116 GAIT TRAINING THERAPY: CPT

## 2020-06-11 PROCEDURE — 80048 BASIC METABOLIC PNL TOTAL CA: CPT

## 2020-06-11 PROCEDURE — 93005 ELECTROCARDIOGRAM TRACING: CPT

## 2020-06-11 PROCEDURE — P9047: CPT

## 2020-06-11 PROCEDURE — 82435 ASSAY OF BLOOD CHLORIDE: CPT

## 2020-06-11 PROCEDURE — 83735 ASSAY OF MAGNESIUM: CPT

## 2020-06-11 PROCEDURE — 86900 BLOOD TYPING SEROLOGIC ABO: CPT

## 2020-06-11 PROCEDURE — 85045 AUTOMATED RETICULOCYTE COUNT: CPT

## 2020-06-11 PROCEDURE — 99261: CPT

## 2020-06-11 PROCEDURE — 75635 CT ANGIO ABDOMINAL ARTERIES: CPT

## 2020-06-11 PROCEDURE — 85027 COMPLETE CBC AUTOMATED: CPT

## 2020-06-11 PROCEDURE — 93306 TTE W/DOPPLER COMPLETE: CPT

## 2020-06-11 PROCEDURE — U0003: CPT

## 2020-06-11 PROCEDURE — 87521 HEPATITIS C PROBE&RVRS TRNSC: CPT

## 2020-06-11 PROCEDURE — 75710 ARTERY X-RAYS ARM/LEG: CPT

## 2020-06-11 PROCEDURE — 84520 ASSAY OF UREA NITROGEN: CPT

## 2020-06-11 PROCEDURE — 87340 HEPATITIS B SURFACE AG IA: CPT

## 2020-06-11 PROCEDURE — 99232 SBSQ HOSP IP/OBS MODERATE 35: CPT

## 2020-06-11 PROCEDURE — 86901 BLOOD TYPING SEROLOGIC RH(D): CPT

## 2020-06-11 PROCEDURE — 86803 HEPATITIS C AB TEST: CPT

## 2020-06-11 PROCEDURE — C1725: CPT

## 2020-06-11 PROCEDURE — 93970 EXTREMITY STUDY: CPT

## 2020-06-11 PROCEDURE — 83540 ASSAY OF IRON: CPT

## 2020-06-11 PROCEDURE — 82607 VITAMIN B-12: CPT

## 2020-06-11 PROCEDURE — 84100 ASSAY OF PHOSPHORUS: CPT

## 2020-06-11 PROCEDURE — 97110 THERAPEUTIC EXERCISES: CPT

## 2020-06-11 PROCEDURE — 86706 HEP B SURFACE ANTIBODY: CPT

## 2020-06-11 PROCEDURE — 88311 DECALCIFY TISSUE: CPT

## 2020-06-11 PROCEDURE — C1894: CPT

## 2020-06-11 PROCEDURE — 83605 ASSAY OF LACTIC ACID: CPT

## 2020-06-11 PROCEDURE — C1887: CPT

## 2020-06-11 PROCEDURE — C1889: CPT

## 2020-06-11 PROCEDURE — 83036 HEMOGLOBIN GLYCOSYLATED A1C: CPT

## 2020-06-11 PROCEDURE — 85730 THROMBOPLASTIN TIME PARTIAL: CPT

## 2020-06-11 PROCEDURE — 82010 KETONE BODYS QUAN: CPT

## 2020-06-11 PROCEDURE — 71045 X-RAY EXAM CHEST 1 VIEW: CPT

## 2020-06-11 PROCEDURE — 82565 ASSAY OF CREATININE: CPT

## 2020-06-11 PROCEDURE — 84295 ASSAY OF SERUM SODIUM: CPT

## 2020-06-11 PROCEDURE — 80053 COMPREHEN METABOLIC PANEL: CPT

## 2020-06-11 PROCEDURE — C1760: CPT

## 2020-06-11 RX ORDER — INSULIN ASPART 100 [IU]/ML
0 INJECTION, SOLUTION SUBCUTANEOUS
Qty: 0 | Refills: 0 | DISCHARGE

## 2020-06-11 RX ORDER — INSULIN GLARGINE 100 [IU]/ML
0 INJECTION, SOLUTION SUBCUTANEOUS
Qty: 0 | Refills: 0 | DISCHARGE

## 2020-06-11 RX ADMIN — CHLORHEXIDINE GLUCONATE 1 APPLICATION(S): 213 SOLUTION TOPICAL at 12:36

## 2020-06-11 RX ADMIN — Medication 2001 MILLIGRAM(S): at 12:36

## 2020-06-11 RX ADMIN — Medication 650 MILLIGRAM(S): at 12:35

## 2020-06-11 RX ADMIN — Medication 25 MILLIGRAM(S): at 16:53

## 2020-06-11 RX ADMIN — Medication 4: at 12:35

## 2020-06-11 RX ADMIN — Medication 2001 MILLIGRAM(S): at 16:53

## 2020-06-11 RX ADMIN — Medication 7 UNIT(S): at 12:34

## 2020-06-11 RX ADMIN — Medication 7 UNIT(S): at 16:52

## 2020-06-11 RX ADMIN — Medication 10: at 16:52

## 2020-06-11 RX ADMIN — Medication 50 MILLIGRAM(S): at 08:20

## 2020-06-11 RX ADMIN — Medication 0.1 MILLIGRAM(S): at 17:47

## 2020-06-11 RX ADMIN — Medication 50 MILLIGRAM(S): at 01:32

## 2020-06-11 RX ADMIN — Medication 25 MILLIGRAM(S): at 06:51

## 2020-06-11 RX ADMIN — Medication 0.1 MILLIGRAM(S): at 06:49

## 2020-06-11 RX ADMIN — Medication 4: at 08:20

## 2020-06-11 NOTE — PROGRESS NOTE ADULT - PROBLEM SELECTOR PLAN 1
-test BG AC/HS  -c/w Lantus 15 units QHS  -c/w Humalog 7 units AC meals  -Can change back to Humalog low correction scale AC and Low HS scale  On discharge patient can follow up with outpt endocrine, Dr Haley  Patient is going to rehab, Can be discharged on Lantus 15units qhs and Humalog 7units premeal.

## 2020-06-11 NOTE — PROGRESS NOTE ADULT - SUBJECTIVE AND OBJECTIVE BOX
Diabetes Follow up note:    Chief complaint: T1DM    Interval Hx: Pt received 3 doses of Prednisone 50mg prior to Fistulogram w/balloon angioplasty. BG values in 200s today. Pt seen at bedside. Reports feeling well. Going to rehab this afternoon. Able to move leg w/less restriction today so feeling positive.     Review of Systems:  General: as above.   GI: Tolerating POs. Denies N/V/D/Abd pain  CV: Denies CP/SOB  ENDO: No S&Sx of hypoglycemia  MEDS:    insulin glargine Injectable (LANTUS) 15 Unit(s) SubCutaneous at bedtime  insulin lispro (HumaLOG) corrective regimen sliding scale   SubCutaneous three times a day before meals  insulin lispro (HumaLOG) corrective regimen sliding scale   SubCutaneous at bedtime  insulin lispro Injectable (HumaLOG) 7 Unit(s) SubCutaneous three times a day before meals      Allergies    aspirin (Rash; Urticaria; Hives)  iodine (Rash; Urticaria)        PE:  General: Male lying in bed. NAD>   Vital Signs Last 24 Hrs  T(C): 36.6 (11 Jun 2020 12:25), Max: 37.1 (10 Tu 2020 16:20)  T(F): 97.8 (11 Jun 2020 12:25), Max: 98.8 (10 Tu 2020 16:20)  HR: 71 (11 Jun 2020 12:25) (71 - 98)  BP: 163/83 (11 Jun 2020 12:25) (146/80 - 166/82)  BP(mean): --  RR: 16 (11 Jun 2020 12:25) (16 - 18)  SpO2: 99% (11 Jun 2020 12:25) (95% - 99%)  Abd: Soft, NT,ND,   Extremities: Warm. L leg Staple line through medial leg c/d/i. R UE AVF site w/dsg c/d/i  Neuro: A&O X3    LABS:  POCT Blood Glucose.: 230 mg/dL (06-11-20 @ 12:32)  POCT Blood Glucose.: 248 mg/dL (06-11-20 @ 09:12)  POCT Blood Glucose.: 242 mg/dL (06-11-20 @ 08:00)  POCT Blood Glucose.: 166 mg/dL (06-10-20 @ 20:55)  POCT Blood Glucose.: 139 mg/dL (06-10-20 @ 16:45)  POCT Blood Glucose.: 118 mg/dL (06-10-20 @ 14:19)  POCT Blood Glucose.: 142 mg/dL (06-09-20 @ 21:39)  POCT Blood Glucose.: 112 mg/dL (06-09-20 @ 17:21)  POCT Blood Glucose.: 187 mg/dL (06-09-20 @ 12:27)  POCT Blood Glucose.: 134 mg/dL (06-09-20 @ 07:30)  POCT Blood Glucose.: 117 mg/dL (06-08-20 @ 21:15)  POCT Blood Glucose.: 197 mg/dL (06-08-20 @ 18:22)  POCT Blood Glucose.: 238 mg/dL (06-08-20 @ 17:04)                            11.1   6.65  )-----------( 202      ( 11 Jun 2020 06:54 )             36.4       06-11    138  |  95<L>  |  47<H>  ----------------------------<  231<H>  5.0   |  25  |  6.62<H>    Ca    9.5      11 Jun 2020 06:52  Phos  5.5     06-11  Mg     2.4     06-11      A1C with Estimated Average Glucose Result: 7.2 % (06-01-20 @ 08:31)          Contact number: leah [phone] or [phone]

## 2020-06-11 NOTE — PROGRESS NOTE ADULT - SUBJECTIVE AND OBJECTIVE BOX
Subjective: Patient seen and examined. No new events except as noted.     REVIEW OF SYSTEMS:    CONSTITUTIONAL: No weakness, fevers or chills  EYES/ENT: No visual changes;  No vertigo or throat pain   NECK: No pain or stiffness  RESPIRATORY: No cough, wheezing, hemoptysis; No shortness of breath  CARDIOVASCULAR: No chest pain or palpitations  GASTROINTESTINAL: No abdominal or epigastric pain. No nausea, vomiting, or hematemesis; No diarrhea or constipation. No melena or hematochezia.  GENITOURINARY: No dysuria, frequency or hematuria  NEUROLOGICAL: No numbness or weakness  SKIN: No itching, burning, rashes, or lesions   All other review of systems is negative unless indicated above.    MEDICATIONS:  MEDICATIONS  (STANDING):  acetaminophen   Tablet .. 650 milliGRAM(s) Oral every 6 hours  bisacodyl Suppository 10 milliGRAM(s) Rectal once  calcium acetate 2001 milliGRAM(s) Oral three times a day with meals  chlorhexidine 4% Liquid 1 Application(s) Topical daily  cloNIDine 0.1 milliGRAM(s) Oral two times a day  heparin   Injectable 5000 Unit(s) SubCutaneous every 8 hours  insulin glargine Injectable (LANTUS) 15 Unit(s) SubCutaneous at bedtime  insulin lispro (HumaLOG) corrective regimen sliding scale   SubCutaneous three times a day before meals  insulin lispro (HumaLOG) corrective regimen sliding scale   SubCutaneous at bedtime  insulin lispro Injectable (HumaLOG) 7 Unit(s) SubCutaneous three times a day before meals  polyethylene glycol 3350 17 Gram(s) Oral two times a day  senna 2 Tablet(s) Oral at bedtime      PHYSICAL EXAM:  T(C): 36.7 (06-11-20 @ 06:22), Max: 37.3 (06-10-20 @ 14:15)  HR: 82 (06-11-20 @ 06:22) (82 - 100)  BP: 166/82 (06-11-20 @ 06:22) (146/80 - 179/86)  RR: 18 (06-11-20 @ 06:22) (18 - 18)  SpO2: 95% (06-11-20 @ 06:22) (95% - 96%)  Wt(kg): --  I&O's Summary    10 Tu 2020 07:01  -  11 Jun 2020 07:00  --------------------------------------------------------  IN: 720 mL / OUT: 1000 mL / NET: -280 mL          Appearance: NAD	  HEENT:   Normal oral mucosa, PERRL, EOMI	  Lymphatic: No lymphadenopathy , no edema  Cardiovascular: Normal S1 S2, No JVD, No murmurs , Peripheral pulses palpable 2+ bilaterally  Respiratory: Lungs clear to auscultation, normal effort 	  Gastrointestinal:  Soft, Non-tender, + BS	  Skin: No rashes, No ecchymoses, No cyanosis, warm to touch  Musculoskeletal: Normal range of motion, normal strength  Psychiatry:  Mood & affect appropriate  Ext: No edema      LABS:    CARDIAC MARKERS:                                11.1   6.65  )-----------( 202      ( 11 Jun 2020 06:54 )             36.4     06-11    138  |  95<L>  |  47<H>  ----------------------------<  231<H>  5.0   |  25  |  6.62<H>    Ca    9.5      11 Jun 2020 06:52  Phos  5.5     06-11  Mg     2.4     06-11      proBNP:   Lipid Profile:   HgA1c:   TSH:             TELEMETRY: 	    ECG:  	  RADIOLOGY:   DIAGNOSTIC TESTING:  [ ] Echocardiogram:  [ ]  Catheterization:  [ ] Stress Test:    OTHER:

## 2020-06-11 NOTE — CHART NOTE - NSCHARTNOTEFT_GEN_A_CORE
Vascular & Interventional Radiology Pre-Procedure Note    Procedure Name: Fistulogram, possible plasty    HPI: 59yM  PMH ESRD (on HD M/W/F), glaucoma, DMII, HCV, PAD, R DVT s/p thrombectomy. POD8 s/p LLE pop-PT bypass with GSV graft.    Allergies: aspirin (Rash; Urticaria; Hives)  iodine (Rash; Urticaria)    Medications (Abx/Cardiac/Anticoagulation/Blood Products)    cloNIDine: 0.1 milliGRAM(s) Oral (06-11 @ 06:49)    Data:    T(C): 36.7  HR: 82  BP: 166/82  RR: 18  SpO2: 95%    -WBC 6.65 / HgB 11.1 / Hct 36.4 / Plt 202  -Na 138 / Cl 95 / BUN 47 / Glucose 231  -K 5.0 / CO2 25 / Cr 6.62  -ALT -- / Alk Phos -- / T.Bili --    Imaging: reviewed    Plan:   -59y Male presents for fistulogram w/ possible angioplasty.  -Informed consent to be obtained. Vascular & Interventional Radiology Pre-Procedure Note    Procedure Name: Fistulogram, possible plasty    HPI: 59yM  PMH ESRD (on HD M/W/F), glaucoma, DMII, HCV, PAD, R DVT s/p thrombectomy. POD8 s/p LLE pop-PT bypass with GSV graft.    Allergies: aspirin (Rash; Urticaria; Hives)  iodine (Rash; Urticaria)    Medications (Abx/Cardiac/Anticoagulation/Blood Products)    cloNIDine: 0.1 milliGRAM(s) Oral (06-11 @ 06:49)    Data:    T(C): 36.7  HR: 82  BP: 166/82  RR: 18  SpO2: 95%    -WBC 6.65 / HgB 11.1 / Hct 36.4 / Plt 202  -Na 138 / Cl 95 / BUN 47 / Glucose 231  -K 5.0 / CO2 25 / Cr 6.62    Plan:   -59y Male presents for fistulogram w/ possible angioplasty.  -Informed consent to be obtained. Vascular & Interventional Radiology Pre-Procedure Note    Procedure Name: Fistulogram, possible plasty    HPI: 59yM  PMH ESRD (on HD M/W/F), glaucoma, DMII, HCV, PAD, R DVT s/p thrombectomy. POD8 s/p LLE pop-PT bypass with GSV graft.    Allergies: aspirin (Rash; Urticaria; Hives)  iodine (Rash; Urticaria)    Medications (Abx/Cardiac/Anticoagulation/Blood Products)    cloNIDine: 0.1 milliGRAM(s) Oral (06-11 @ 06:49)    Data:    T(C): 36.7  HR: 82  BP: 166/82  RR: 18  SpO2: 95%    -WBC 6.65 / HgB 11.1 / Hct 36.4 / Plt 202  -Na 138 / Cl 95 / BUN 47 / Glucose 231  -K 5.0 / CO2 25 / Cr 6.62    Plan:   -59y Male presents for fistulogram w/ possible angioplasty.  -Patient w/ iodine allergy. To get premedication.  -Informed consent to be obtained. Vascular & Interventional Radiology Pre-Procedure Note    Procedure Name: Fistulogram, possible plasty    HPI: 59yM  PMH ESRD (on HD M/W/F), glaucoma, DMII, HCV, PAD, R DVT s/p thrombectomy. POD8 s/p LLE pop-PT bypass with GSV graft.    Allergies: aspirin (Rash; Urticaria; Hives)  iodine (Rash; Urticaria)    Medications (Abx/Cardiac/Anticoagulation/Blood Products)    cloNIDine: 0.1 milliGRAM(s) Oral (06-11 @ 06:49)    Data:    T(C): 36.7  HR: 82  BP: 166/82  RR: 18  SpO2: 95%    -WBC 6.65 / HgB 11.1 / Hct 36.4 / Plt 202  -Na 138 / Cl 95 / BUN 47 / Glucose 231  -K 5.0 / CO2 25 / Cr 6.62    Plan:   -59y Male presents for fistulogram w/ possible angioplasty.  -Patient w/ iodine allergy. To get premedication.  -Informed consent to be obtained.    IR attending    As above, pt has received premedication for contrast allergy.  Will perform fistulogram and possible angioplasty.  Consent obtained.

## 2020-06-11 NOTE — DISCHARGE NOTE NURSING/CASE MANAGEMENT/SOCIAL WORK - PATIENT PORTAL LINK FT
You can access the FollowMyHealth Patient Portal offered by [facility] by registering at the following website: [email or web address]. By joining [facility]’s FollowMyHealth portal, you will also be able to view your health information using other applications (apps) compatible with our system.
You can access the FollowMyHealth Patient Portal offered by [facility] by registering at the following website: [email or web address]. By joining [facility]’s FollowMyHealth portal, you will also be able to view your health information using other applications (apps) compatible with our system.

## 2020-06-11 NOTE — CHART NOTE - NSCHARTNOTEFT_GEN_A_CORE
POST PROCEDURE CHECK IR    Procedure: Fistulogram with balloon angioplasty.   Attending: Dr. Andre  Date: 6/11/20    SUBJECTIVE: Patient seen and evaluated at the bedside. Patient doing well with no complaints. Patient states that he has no pain. Motor and sensation intact. Had a meal after the procedure and he tolerated with no nausea or vomiting. Patient feels ready to go to rehab.       Objective:   Vital Signs Last 24 Hrs  T(C): 36.6 (11 Jun 2020 12:25), Max: 37.3 (10 Ut 2020 14:15)  T(F): 97.8 (11 Jun 2020 12:25), Max: 99.1 (10 Tu 2020 14:15)  HR: 71 (11 Jun 2020 12:25) (71 - 98)  BP: 163/83 (11 Jun 2020 12:25) (146/80 - 179/86)  BP(mean): --  RR: 16 (11 Jun 2020 12:25) (16 - 18)  SpO2: 99% (11 Jun 2020 12:25) (95% - 99%)  I&O's Summary    10 Tu 2020 07:01  -  11 Jun 2020 07:00  --------------------------------------------------------  IN: 720 mL / OUT: 1000 mL / NET: -280 mL      I&O's Detail    10 Tu 2020 07:01  -  11 Jun 2020 07:00  --------------------------------------------------------  IN:    Oral Fluid: 720 mL  Total IN: 720 mL    OUT:    Other: 1000 mL  Total OUT: 1000 mL    Total NET: -280 mL            LABS:                        11.1   6.65  )-----------( 202      ( 11 Jun 2020 06:54 )             36.4     06-11    138  |  95<L>  |  47<H>  ----------------------------<  231<H>  5.0   |  25  |  6.62<H>    Ca    9.5      11 Jun 2020 06:52  Phos  5.5     06-11  Mg     2.4     06-11      PT/INR - ( 11 Jun 2020 08:35 )   PT: 10.7 sec;   INR: 0.94 ratio         PTT - ( 11 Jun 2020 08:35 )  PTT:28.8 sec      MEDICATIONS  (STANDING):  acetaminophen   Tablet .. 650 milliGRAM(s) Oral every 6 hours  bisacodyl Suppository 10 milliGRAM(s) Rectal once  calcium acetate 2001 milliGRAM(s) Oral three times a day with meals  chlorhexidine 4% Liquid 1 Application(s) Topical daily  cloNIDine 0.1 milliGRAM(s) Oral two times a day  heparin   Injectable 5000 Unit(s) SubCutaneous every 8 hours  insulin glargine Injectable (LANTUS) 15 Unit(s) SubCutaneous at bedtime  insulin lispro (HumaLOG) corrective regimen sliding scale   SubCutaneous three times a day before meals  insulin lispro (HumaLOG) corrective regimen sliding scale   SubCutaneous at bedtime  insulin lispro Injectable (HumaLOG) 7 Unit(s) SubCutaneous three times a day before meals  polyethylene glycol 3350 17 Gram(s) Oral two times a day  senna 2 Tablet(s) Oral at bedtime    MEDICATIONS  (PRN):  diphenhydrAMINE 25 milliGRAM(s) Oral every 4 hours PRN Rash and/or Itching  glucagon  Injectable 1 milliGRAM(s) IntraMuscular once PRN Glucose LESS THAN 70 milligrams/deciliter  oxycodone    5 mG/acetaminophen 325 mG 1 Tablet(s) Oral every 4 hours PRN Moderate Pain (4 - 6)  oxycodone    5 mG/acetaminophen 325 mG 2 Tablet(s) Oral every 6 hours PRN Severe Pain (7 - 10)      Physical exam  General: NAD, resting comfortably in bed  Respiratory: non labored breathing on room air  RUE: procedure dressing with a small amount of serosanguineous drainage, palpable thrill, arm and hand warm and well perfused.     Assessment/Plan:   59yM  PMH ESRD (on HD M/W/F), glaucoma, DMII, HCV, PAD, R DVT s/p thrombectomy. POD8 s/p LLE pop-PT bypass with GSV graft. Now s/p rAVF fistulogram and balloon angioplasty.   - Diet: Diabetic diet   - Activity- OOB with assistance  - D/C to rehab    Arielle Garcia PABertrandC   p [phone]

## 2020-06-11 NOTE — PROGRESS NOTE ADULT - PROBLEM SELECTOR PLAN 3
-check lipids as outpt  -may require statin therapy    pager: [phone]   cell: [phone]  office; [phone]    -I spent a total time of 26 mins with the patient at bedside/on unit of which more than 50% of time was spent on counseling/coordination of care.

## 2020-06-11 NOTE — PROGRESS NOTE ADULT - PROBLEM SELECTOR PROBLEM 4
Secondary hyperparathyroidism
Diabetes

## 2020-06-11 NOTE — PROGRESS NOTE ADULT - PROBLEM SELECTOR PROBLEM 2
HTN (hypertension)
Hypertension, unspecified type
ESRD (end stage renal disease) on dialysis
Hypertension, unspecified type

## 2020-06-11 NOTE — PROGRESS NOTE ADULT - PROBLEM SELECTOR PROBLEM 3
Anemia due to chronic kidney disease, on chronic dialysis
Hyperlipidemia, unspecified hyperlipidemia type
HTN (hypertension)
Hyperlipidemia, unspecified hyperlipidemia type

## 2020-06-11 NOTE — PROGRESS NOTE ADULT - SUBJECTIVE AND OBJECTIVE BOX
VASCULAR SURGERY DAILY PROGRESS NOTE:    Subjective:  Patient seen and examined. Reports pain is much improved this morning and is able to bend left knee far more than previously.     Exam:  Gen: NAD, resting in bed  Resp: Airway patent, non-labored respirations  Abd: Soft, ND, NT  Ext: WWP  Vasc: +signal DP/PT b/l    Vital Signs Last 24 Hrs  T(C): 36.7 (11 Jun 2020 06:22), Max: 37.3 (10 Tu 2020 14:15)  T(F): 98 (11 Jun 2020 06:22), Max: 99.1 (10 Tu 2020 14:15)  HR: 82 (11 Jun 2020 06:22) (82 - 100)  BP: 166/82 (11 Jun 2020 06:22) (146/80 - 179/86)  BP(mean): --  RR: 18 (11 Jun 2020 06:22) (17 - 18)  SpO2: 95% (11 Jun 2020 06:22) (95% - 98%)    I&O's Detail    09 Jun 2020 07:01  -  10 Tu 2020 07:00  --------------------------------------------------------  IN:    Oral Fluid: 360 mL    Other: 800 mL  Total IN: 1160 mL    OUT:    Other: 1300 mL  Total OUT: 1300 mL    Total NET: -140 mL      10 Tu 2020 07:01  -  11 Jun 2020 06:41  --------------------------------------------------------  IN:    Oral Fluid: 320 mL  Total IN: 320 mL    OUT:    Other: 1000 mL  Total OUT: 1000 mL    Total NET: -680 mL            LABS:                        9.9    7.55  )-----------( 196      ( 10 Tu 2020 09:11 )             31.2     06-10    x   |  x   |  <4<L>  ----------------------------<  x   x    |  x   |  x     Ca    9.2      10 Tu 2020 09:11  Phos  5.8     06-10  Mg     2.5     06-10

## 2020-06-11 NOTE — PROGRESS NOTE ADULT - REASON FOR ADMISSION
PAD

## 2020-06-11 NOTE — PROGRESS NOTE ADULT - ASSESSMENT
59M with history of DM1, ESRD (on HD M/W/F), glaucoma, HCV, PAD, R DVT s/p thrombectomy, presenting with LLE pain s/p LLE bypass 6/3 and balloon fistulogram today. Hyperglycemic s/p Prednisone 50mg x 3 doses. Expect steroid effect to dissipate over next 24 hours, previously controlled during hospitalization on current regimen. BG goal (100-180mg/dl).

## 2020-06-11 NOTE — PROGRESS NOTE ADULT - SUBJECTIVE AND OBJECTIVE BOX
Vascular & Interventional Radiology Post-Procedure Note    Pre-Procedure Diagnosis: Outflow stenosis  Post-Procedure Diagnosis: Same as pre.  Indications for Procedure: AVF dysfunction     : Jes  Assistant(s): Corry    Procedure Details/Findings: Mild stenosis noted at the anastomosis and proximal outflow tract. Successful balloon angioplasty.  Access (if applicable): L cephalic outflow     Complications: None  Estimated Blood Loss: 5 cc  Specimen: None  Contrast: 38 mL  Sedation: Per anes  Patient Condition/Disposition: Stable, IRS, Floor    Plan: Fistula may be used for dialysis.

## 2020-06-11 NOTE — PROGRESS NOTE ADULT - ASSESSMENT
59yM  PMH ESRD (on HD M/W/F), glaucoma, DMII, HCV, PAD, R DVT s/p thrombectomy. POD8 s/p LLE pop-PT bypass with GSV graft.    - Pain control with around the clock tylenol and oxycodone PRN  - Pt refusing prophylactic heparin, stating he understands the risks of not taking it and says "if I die, I die."  - Dispo planning: discharge to rehab today following procedure with IR  - IR this morning for possible balloon angioplasty for possible AVF stenosis     Vascular Surgery  [id number]

## 2020-06-11 NOTE — PROGRESS NOTE ADULT - ATTENDING COMMENTS
Agree with assessment and plan as above by Dr. Arcos. Reviewed all pertinent labs, glucose values, and imaging studies. Modifications made as indicated above. Continue Lantus 15 and Humalog 7 with meals. Adjust further as needed.    Sanjay Murphy D.O  [phone]
ESRD:   Seen at HD  Time cut by 30 minutes as patient became hypotensive ( completed 2.5 hours)  Given the elevated BUN, will schedule for HD again in am ( if patient agrees)   For LLE bypass in am   Rest per Dr Selma Quintero MD  O: [phone]  C: [phone]
ESRD:  Schedule for HD today as BUN remains up  Next session in am   Iron stores low: needs IV iron  Please check doppler of AVF   Rest per Dr Selma Quintero MD  O: [phone]  C: [phone]
Agree with assessment and plan as above by Dr. Arcos. Reviewed all pertinent labs, glucose values, and imaging studies. Modifications made as indicated above. Decrease Lantus and Humalog doses given borderline low glucose values. Will adjust further as needed.    Sanjay Murphy D.O  [phone]
Agree with assessment and plan as above by Dr. Arcos. Reviewed all pertinent labs, glucose values, and imaging studies. Modifications made as indicated above. Increase Lantus given high fasting glucose this morning. Lower Humalog as above given poor po intake.    Sanjay Murphy D.O  [phone]
ESRD:  Seen at HD  Complains of severe pain  Wants to cut short his time by 30 min  Will schedule for additional session in am   Please start bear Quintero MD  O:   C: [phone]
ESRD:  Seen at HD  Tolerating well   Maintain on current prescription  Please start nifedipine as BP remains above goal    Leslye Quintero MD  O: [phone]  C: 
ESRD:   seen at HD  Tolerating well  Continue with current prescription  F/U AVF doppler  Recirculation of 6%  IV iron ( if he tolerates)      Leslye Quintero MD  O: [phone]  C: [phone]
ESRD:  Seen at HD  Tolerating well   Maintain on current prescription  Rest per Dr Selma Quintero MD  O:   C: [phone]
Advanced care planning was discussed with patient and family.  Advanced care planning forms were reviewed and discussed as appropriate.  Differential diagnosis and plan of care discussed with patient after the evaluation.   Pain assessed and judicious use of narcotics when appropriate was discussed.  Importance of Fall prevention discussed.  Counseling on Smoking and Alcohol cessation was offered when appropriate.  Counseling on Diet, exercise, and medication compliance was done.

## 2020-06-11 NOTE — DISCHARGE NOTE NURSING/CASE MANAGEMENT/SOCIAL WORK - NSDCFUADDAPPT_GEN_ALL_CORE_FT
Please follow up with Dr. Samaniego within 1-2 weeks after discharge from the hospital. You may call [phone] to schedule an appointment.     Please followup with your Primary Care Physician within one to two weeks to update your medical records regarding this hospitalization and to review all your current medications and dosages.  Call their office for appointment.
Please follow up with Dr. Samaniego within 1-2 weeks after discharge from the hospital. You may call [phone] to schedule an appointment.     Please followup with your Primary Care Physician within one to two weeks to update your medical records regarding this hospitalization and to review all your current medications and dosages.  Call their office for appointment.    Please resume your usual hemodialysis schedule.

## 2020-07-02 ENCOUNTER — APPOINTMENT (OUTPATIENT)
Dept: VASCULAR SURGERY | Facility: CLINIC | Age: 60
End: 2020-07-02

## 2020-07-08 ENCOUNTER — APPOINTMENT (OUTPATIENT)
Dept: VASCULAR SURGERY | Facility: CLINIC | Age: 60
End: 2020-07-08
Payer: COMMERCIAL

## 2020-07-08 ENCOUNTER — APPOINTMENT (OUTPATIENT)
Dept: VASCULAR SURGERY | Facility: CLINIC | Age: 60
End: 2020-07-08
Payer: MEDICARE

## 2020-07-08 VITALS
SYSTOLIC BLOOD PRESSURE: 144 MMHG | BODY MASS INDEX: 23.24 KG/M2 | DIASTOLIC BLOOD PRESSURE: 80 MMHG | HEIGHT: 71 IN | TEMPERATURE: 98.9 F | WEIGHT: 166 LBS | HEART RATE: 97 BPM

## 2020-07-08 VITALS — TEMPERATURE: 98.9 F

## 2020-07-08 PROCEDURE — 93926 LOWER EXTREMITY STUDY: CPT

## 2020-07-08 PROCEDURE — 99024 POSTOP FOLLOW-UP VISIT: CPT

## 2020-07-08 NOTE — PHYSICAL EXAM
[JVD] : no jugular venous distention  [Ankle Swelling On The Left] : of the left ankle [Ankle Swelling (On Exam)] : present [2+] : left 2+ [] : not present [Ankle Swelling On The Right] : mild [Varicose Veins Of Lower Extremities] : not present [No Rash or Lesion] : No rash or lesion [Skin Ulcer] : no ulcer [de-identified] : appears well [de-identified] : incision clean and dry, staples in place

## 2020-07-08 NOTE — DISCUSSION/SUMMARY
[FreeTextEntry1] : 60 yo male with thrombosed left pop artery with rest pain underwent left lower extremity pop to pt artery bypass with gsv graft.  \par \par staples were removed\par duplex shows stenosis of the proximal sfa of about75% with patent above knee pop to pt bypass with vein, the bypass was noted to have decreased flow rates with intact proximal and distal anastomsis with triphasic flow proximal and distal to the bypass \par \par will start plavix \par pt to follow up in 2 weeks with repeat duplex

## 2020-07-08 NOTE — REASON FOR VISIT
[de-identified] : 6/3/20 [de-identified] : left lower extremity pop to pt artery bypass with gsv [de-identified] : pt without any complaints at this time \par pt currently in rehab states that he is now able to move his foot and walk for short distances\par pt denies any fevers or chills

## 2020-07-23 ENCOUNTER — APPOINTMENT (OUTPATIENT)
Dept: VASCULAR SURGERY | Facility: CLINIC | Age: 60
End: 2020-07-23
Payer: COMMERCIAL

## 2020-07-23 VITALS — TEMPERATURE: 98.2 F

## 2020-07-23 PROCEDURE — 93926 LOWER EXTREMITY STUDY: CPT | Mod: TC,59

## 2020-07-23 PROCEDURE — 93990 DOPPLER FLOW TESTING: CPT | Mod: TC

## 2020-07-23 PROCEDURE — 99024 POSTOP FOLLOW-UP VISIT: CPT

## 2020-07-23 PROCEDURE — 93990 DOPPLER FLOW TESTING: CPT

## 2020-07-23 NOTE — DISCUSSION/SUMMARY
[FreeTextEntry1] : 60 yo male with thrombosed left pop artery with rest pain underwent left lower extremity pop to pt artery bypass with gsv graft.  \par \par staples were removed\par duplex shows stenosis of the proximal sfa of about75% with patent above knee pop to pt bypass with vein, the bypass was noted to have decreased flow rates with intact proximal and distal anastomosis with triphasic flow proximal and distal to the bypass \par \par will start plavix \par pt to follow up in 2 weeks with repeat duplex

## 2020-07-23 NOTE — REASON FOR VISIT
[de-identified] : 6/3/20 [de-identified] : left lower extremity pop to pt artery bypass with gsv [de-identified] : pt without any complaints at this time \par pt currently in rehab states that he is now able to move his foot and walk for short distances\par pt denies any fevers or chills

## 2020-07-23 NOTE — PHYSICAL EXAM
[2+] : left 2+ [JVD] : no jugular venous distention  [Varicose Veins Of Lower Extremities] : not present [Ankle Swelling (On Exam)] : present [Ankle Swelling On The Left] : of the left ankle [Ankle Swelling On The Right] : mild [No Rash or Lesion] : No rash or lesion [] : not present [Skin Ulcer] : no ulcer [de-identified] : incision clean and dry, staples in place [de-identified] : appears well

## 2020-08-17 NOTE — DISCHARGE NOTE NURSING/CASE MANAGEMENT/SOCIAL WORK - NSTRANSFERBELONGINGSDISPO_GEN_A_NUR
[facility]  [address] [facility] [address]  Phone: [phone]  Fax: [phone] August 17, 2020    Naga Benavidez  [address] [place] [address] [address]  [facility] [address]      To Whom It May Concern:    Naga Benavidez has cerebral palsy and is wheelchair bound and requires assistance with activities of daily living in her apartment.  If you have any questions or concerns, please feel free to call my office.  Thank you!    Sincerely,        Kathrin Salazar MD     
with patient
not applicable

## 2020-09-03 ENCOUNTER — APPOINTMENT (OUTPATIENT)
Dept: VASCULAR SURGERY | Facility: CLINIC | Age: 60
End: 2020-09-03
Payer: MEDICARE

## 2020-09-03 VITALS — TEMPERATURE: 97.3 F

## 2020-09-03 PROCEDURE — 93926 LOWER EXTREMITY STUDY: CPT

## 2020-09-03 PROCEDURE — 99213 OFFICE O/P EST LOW 20 MIN: CPT

## 2020-09-03 NOTE — ASSESSMENT
[FreeTextEntry1] : Patient underwent a duplex study which shows severe stenosis in the distal portion of the bypass.  In addition there appears to be a proximal SFA stenosis which is proximal to the bypass graft.  We will schedule patient for an urgent left leg angiogram.

## 2020-09-03 NOTE — HISTORY OF PRESENT ILLNESS
[FreeTextEntry1] : 59-year-old gentleman status post left tibial artery bypass several months ago presents now for routine follow-up.  Patient states he has been having slightly more difficulty ambulating in the past several weeks.  No ulceration at this time

## 2020-09-03 NOTE — PHYSICAL EXAM
[Normal Breath Sounds] : Normal breath sounds [Normal Rate and Rhythm] : normal rate and rhythm [2+] : left 2+ [0] : left 0 [Ankle Swelling (On Exam)] : not present [Varicose Veins Of Lower Extremities] : not present [] : not present [No Rash or Lesion] : No rash or lesion [Abdomen Tenderness] : ~T ~M No abdominal tenderness [Skin Ulcer] : no ulcer [Alert] : alert [de-identified] : Appears well [Calm] : calm

## 2020-09-05 ENCOUNTER — APPOINTMENT (OUTPATIENT)
Dept: DISASTER EMERGENCY | Facility: CLINIC | Age: 60
End: 2020-09-05

## 2020-09-05 DIAGNOSIS — Z01.818 ENCOUNTER FOR OTHER PREPROCEDURAL EXAMINATION: ICD-10-CM

## 2020-09-06 LAB — SARS-COV-2 N GENE NPH QL NAA+PROBE: NOT DETECTED

## 2020-09-08 ENCOUNTER — APPOINTMENT (OUTPATIENT)
Dept: ENDOVASCULAR SURGERY | Facility: CLINIC | Age: 60
End: 2020-09-08
Payer: MEDICARE

## 2020-09-08 NOTE — HISTORY OF PRESENT ILLNESS
[] : left radiocephalic fistula [FreeTextEntry1] : 2016 Dr. Duarte [FreeTextEntry5] : yesterday at  [FreeTextEntry6] : Dr Khan

## 2020-09-08 NOTE — PROCEDURE
[FSBS] : FSBS [D/C IV on discharge] : D/C IV on discharge [Resume diet] : resume diet [Vital signs on admission the q 15 mins x2] : Vital signs on admission the q 15 mins x2 [Site check for bleeding/hematoma/thrill/bruit] : Site check for bleeding/hematoma/thrill/bruit [FreeTextEntry1] : aortogram, left leg angiogram,

## 2020-09-08 NOTE — PHYSICAL EXAM
[Thrill] : thrill [Hand well perfused] : hand well perfused [2+] : right 2+ [Normal] : coordination grossly intact, no focal deficits [0] : left 0 [Pulsatile Thrill] : no pulsatile thrill [Aneurysm] : no aneurysm [Bleeding] : no bleeding [Ulcer] : no ulcer [de-identified] : Gangrenous ulcer left first toe [de-identified] : intact

## 2020-09-09 ENCOUNTER — FORM ENCOUNTER (OUTPATIENT)
Age: 60
End: 2020-09-09

## 2020-09-10 ENCOUNTER — LABORATORY RESULT (OUTPATIENT)
Age: 60
End: 2020-09-10

## 2020-09-10 ENCOUNTER — APPOINTMENT (OUTPATIENT)
Dept: ENDOVASCULAR SURGERY | Facility: CLINIC | Age: 60
End: 2020-09-10
Payer: MEDICARE

## 2020-09-10 VITALS
HEIGHT: 71 IN | OXYGEN SATURATION: 98 % | BODY MASS INDEX: 23.24 KG/M2 | DIASTOLIC BLOOD PRESSURE: 85 MMHG | HEART RATE: 81 BPM | TEMPERATURE: 97.4 F | SYSTOLIC BLOOD PRESSURE: 166 MMHG | WEIGHT: 166 LBS | RESPIRATION RATE: 16 BRPM

## 2020-09-10 PROCEDURE — 37229Z: CUSTOM | Mod: 59,LT

## 2020-09-10 PROCEDURE — 75625 CONTRAST EXAM ABDOMINL AORTA: CPT

## 2020-09-10 PROCEDURE — 37227Z: CUSTOM | Mod: LT

## 2020-09-10 NOTE — PHYSICAL EXAM
[Thrill] : thrill [Hand well perfused] : hand well perfused [2+] : right 2+ [0] : left 0 [Normal] : coordination grossly intact, no focal deficits [Pulsatile Thrill] : no pulsatile thrill [Aneurysm] : no aneurysm [Bleeding] : no bleeding [Ulcer] : no ulcer [de-identified] : Gangrenous ulcer left first toe [de-identified] : intact

## 2020-09-10 NOTE — HISTORY OF PRESENT ILLNESS
[] : left radiocephalic fistula [FreeTextEntry1] : 2016 Dr. Duarte [FreeTextEntry4] : yesterday [FreeTextEntry5] : yesterday at 8pm [FreeTextEntry6] : Dr Khan

## 2020-09-10 NOTE — PROCEDURE
[D/C IV on discharge] : D/C IV on discharge [Resume diet] : resume diet [FSBS] : FSBS [FreeTextEntry1] : aortogram, left leg angiogram/athrectomy/angioplasty/stent

## 2020-09-21 ENCOUNTER — EMERGENCY (EMERGENCY)
Facility: HOSPITAL | Age: 60
LOS: 1 days | Discharge: HOME | End: 2020-09-21
Attending: EMERGENCY MEDICINE
Payer: MEDICARE

## 2020-09-21 VITALS
RESPIRATION RATE: 20 BRPM | DIASTOLIC BLOOD PRESSURE: 90 MMHG | HEART RATE: 79 BPM | OXYGEN SATURATION: 100 % | SYSTOLIC BLOOD PRESSURE: 190 MMHG

## 2020-09-21 VITALS
DIASTOLIC BLOOD PRESSURE: 112 MMHG | OXYGEN SATURATION: 100 % | HEIGHT: 71 IN | RESPIRATION RATE: 18 BRPM | WEIGHT: 184.97 LBS | HEART RATE: 79 BPM | SYSTOLIC BLOOD PRESSURE: 218 MMHG

## 2020-09-21 DIAGNOSIS — Z98.890 OTHER SPECIFIED POSTPROCEDURAL STATES: Chronic | ICD-10-CM

## 2020-09-21 LAB
ANION GAP SERPL CALC-SCNC: 13 MMOL/L — SIGNIFICANT CHANGE UP (ref 5–17)
BASOPHILS # BLD AUTO: 0.04 K/UL — SIGNIFICANT CHANGE UP (ref 0–0.2)
BASOPHILS NFR BLD AUTO: 0.6 % — SIGNIFICANT CHANGE UP (ref 0–2)
BUN SERPL-MCNC: 73 MG/DL — HIGH (ref 7–23)
CALCIUM SERPL-MCNC: 9.4 MG/DL — SIGNIFICANT CHANGE UP (ref 8.4–10.5)
CHLORIDE SERPL-SCNC: 97 MMOL/L — SIGNIFICANT CHANGE UP (ref 96–108)
CO2 SERPL-SCNC: 28 MMOL/L — SIGNIFICANT CHANGE UP (ref 22–31)
CREAT SERPL-MCNC: 11.49 MG/DL — HIGH (ref 0.5–1.3)
EOSINOPHIL # BLD AUTO: 0.27 K/UL — SIGNIFICANT CHANGE UP (ref 0–0.5)
EOSINOPHIL NFR BLD AUTO: 4.2 % — SIGNIFICANT CHANGE UP (ref 0–6)
GLUCOSE SERPL-MCNC: 183 MG/DL — HIGH (ref 70–99)
HCT VFR BLD CALC: 49 % — SIGNIFICANT CHANGE UP (ref 39–50)
HGB BLD-MCNC: 14.8 G/DL — SIGNIFICANT CHANGE UP (ref 13–17)
IMM GRANULOCYTES NFR BLD AUTO: 0.3 % — SIGNIFICANT CHANGE UP (ref 0–1.5)
LYMPHOCYTES # BLD AUTO: 1.17 K/UL — SIGNIFICANT CHANGE UP (ref 1–3.3)
LYMPHOCYTES # BLD AUTO: 18.2 % — SIGNIFICANT CHANGE UP (ref 13–44)
MCHC RBC-ENTMCNC: 25.3 PG — LOW (ref 27–34)
MCHC RBC-ENTMCNC: 30.2 GM/DL — LOW (ref 32–36)
MCV RBC AUTO: 83.9 FL — SIGNIFICANT CHANGE UP (ref 80–100)
MONOCYTES # BLD AUTO: 0.48 K/UL — SIGNIFICANT CHANGE UP (ref 0–0.9)
MONOCYTES NFR BLD AUTO: 7.5 % — SIGNIFICANT CHANGE UP (ref 2–14)
NEUTROPHILS # BLD AUTO: 4.44 K/UL — SIGNIFICANT CHANGE UP (ref 1.8–7.4)
NEUTROPHILS NFR BLD AUTO: 69.2 % — SIGNIFICANT CHANGE UP (ref 43–77)
NRBC # BLD: 0 /100 WBCS — SIGNIFICANT CHANGE UP (ref 0–0)
PLATELET # BLD AUTO: 144 K/UL — LOW (ref 150–400)
POTASSIUM SERPL-MCNC: 5.6 MMOL/L — HIGH (ref 3.5–5.3)
POTASSIUM SERPL-SCNC: 5.6 MMOL/L — HIGH (ref 3.5–5.3)
RBC # BLD: 5.84 M/UL — HIGH (ref 4.2–5.8)
RBC # FLD: 19.2 % — HIGH (ref 10.3–14.5)
SODIUM SERPL-SCNC: 138 MMOL/L — SIGNIFICANT CHANGE UP (ref 135–145)
TROPONIN T, HIGH SENSITIVITY RESULT: 48 NG/L — SIGNIFICANT CHANGE UP (ref 0–51)
TROPONIN T, HIGH SENSITIVITY RESULT: 51 NG/L — SIGNIFICANT CHANGE UP (ref 0–51)
WBC # BLD: 6.42 K/UL — SIGNIFICANT CHANGE UP (ref 3.8–10.5)
WBC # FLD AUTO: 6.42 K/UL — SIGNIFICANT CHANGE UP (ref 3.8–10.5)

## 2020-09-21 PROCEDURE — 84484 ASSAY OF TROPONIN QUANT: CPT

## 2020-09-21 PROCEDURE — 70450 CT HEAD/BRAIN W/O DYE: CPT | Mod: 26

## 2020-09-21 PROCEDURE — 85025 COMPLETE CBC W/AUTO DIFF WBC: CPT

## 2020-09-21 PROCEDURE — 70450 CT HEAD/BRAIN W/O DYE: CPT

## 2020-09-21 PROCEDURE — 99285 EMERGENCY DEPT VISIT HI MDM: CPT | Mod: GC

## 2020-09-21 PROCEDURE — 96374 THER/PROPH/DIAG INJ IV PUSH: CPT

## 2020-09-21 PROCEDURE — 99284 EMERGENCY DEPT VISIT MOD MDM: CPT | Mod: 25

## 2020-09-21 PROCEDURE — 80048 BASIC METABOLIC PNL TOTAL CA: CPT

## 2020-09-21 PROCEDURE — 93005 ELECTROCARDIOGRAM TRACING: CPT

## 2020-09-21 PROCEDURE — 93010 ELECTROCARDIOGRAM REPORT: CPT | Mod: GC

## 2020-09-21 RX ORDER — HYDRALAZINE HCL 50 MG
10 TABLET ORAL ONCE
Refills: 0 | Status: COMPLETED | OUTPATIENT
Start: 2020-09-21 | End: 2020-09-21

## 2020-09-21 RX ORDER — ACETAMINOPHEN 500 MG
975 TABLET ORAL ONCE
Refills: 0 | Status: COMPLETED | OUTPATIENT
Start: 2020-09-21 | End: 2020-09-21

## 2020-09-21 RX ADMIN — Medication 10 MILLIGRAM(S): at 02:00

## 2020-09-21 RX ADMIN — Medication 975 MILLIGRAM(S): at 03:03

## 2020-09-21 RX ADMIN — Medication 975 MILLIGRAM(S): at 04:44

## 2020-09-21 RX ADMIN — Medication 0.1 MILLIGRAM(S): at 06:26

## 2020-09-21 NOTE — CHART NOTE - NSCHARTNOTEFT_GEN_A_CORE
ED SW: Notification received regarding patient in need of discharge transportation. Per Triage RN, patient discharged earlier and awaiting SW assist with scheduling discharge transportation to home. LCSW met with patient and confirmed home address. Patient reports he travels with [employer] to and from medical appointments. [facility] (ph. [phone]) contacted and discharge transportation scheduled with [employer] for 10am. Reference #[id number]. Trip confirmed with [employer] (ph. [phone]). Patient requesting LCSW to confirm routine transportation to HD later today. Call placed to [facility] and routine trip confirmed with Nadiya. Patient informed of all transportation details. No futher SW needs indicated at this time.

## 2020-09-21 NOTE — ED PROVIDER NOTE - NSFOLLOWUPINSTRUCTIONS_ED_ALL_ED_FT
You were seen for elevated blood pressure and headache.    Your blood work, EKG, and CT scan were all unremarkable.    You do not need to check your blood pressure four times a day.    Please follow up with your PCP in the next 3-5 days and bring a copy of your results.    If you have any concerning symptoms, seek immediate medical attention.

## 2020-09-21 NOTE — ED ADULT NURSE REASSESSMENT NOTE - NS ED NURSE REASSESS COMMENT FT1
Per MD Clements, patient is okay to be sent home after medication administration. Patient denies headache, numbness/tingling/weakness, vision changes at this time. Patient given discharge instructions and educated on importance of follow up with PCP for blood pressure medication and monitoring. Patient instructed to come back to ED if symptoms return (ie. headache, numbness/tingling/weakness, vision changes.) Patient verbalizes understanding and provides teach back to RN.  Patient seen ambulatory to waiting room, tolerating well. Per MD Clements, patient is okay to be sent home after medication administration without BP reassessment. Patient denies headache, numbness/tingling/weakness, vision changes at this time. Patient given discharge instructions and educated on importance of follow up with PCP for blood pressure medication and monitoring. Patient instructed to come back to ED if symptoms return (ie. headache, numbness/tingling/weakness, vision changes.) Patient verbalizes understanding and provides teach back to RN.  Patient seen ambulatory to waiting room, tolerating well.

## 2020-09-21 NOTE — ED PROVIDER NOTE - PROGRESS NOTE DETAILS
JORDAN: borderline/mild hyperkalemia in a dialysis patient scheduled for dialysis in <24h with no significant EKG changes; defer emergent treatment at this time. Tyree PGY2 - Discussed results w/ pt., who understands them.  BP down to 160s systolic.  Advised pt. to f/u w/ PCP and to not check BP four times daily.  All other questions and concerns have been addressed.  Pt. will be discharged.

## 2020-09-21 NOTE — ED ADULT NURSE REASSESSMENT NOTE - NS ED NURSE REASSESS COMMENT FT1
Upon patient receiving discharge papers, patients BP noted to be elevated. Per MD Isidro patient is to receive his home dose of 0/1 Catapress and to be discharged home.

## 2020-09-21 NOTE — ED PROVIDER NOTE - ATTENDING CONTRIBUTION TO CARE
58 yo male hx of HTN PVD ESRD p/w HTN >200s @ home, HA, intermittent CP, generalized weakness.  no focal deficit on exam but will CT head, check labs, IV hydral x 1 now, EKG and reassess.

## 2020-09-21 NOTE — ED ADULT NURSE NOTE - NSIMPLEMENTINTERV_GEN_ALL_ED
Implemented All Universal Safety Interventions:  [place] to call system. Call bell, personal items and telephone within reach. Instruct patient to call for assistance. Room bathroom lighting operational. Non-slip footwear when patient is off stretcher. Physically safe environment: no spills, clutter or unnecessary equipment. Stretcher in lowest position, wheels locked, appropriate side rails in place.

## 2020-09-21 NOTE — ED ADULT NURSE NOTE - CAS ELECT INFOMATION PROVIDED
follow up with PCP, discharge instructions and education provided by RN and MD Clements about BP monitoring and worsening s/s return to ED, pt verbalizes understanding and provides teach back to RN./DC instructions

## 2020-09-21 NOTE — ED PROVIDER NOTE - PATIENT PORTAL LINK FT
You can access the FollowMyHealth Patient Portal offered by [facility] by registering at the following website: [email or web address]. By joining [employer]’s FollowMyHealth portal, you will also be able to view your health information using other applications (apps) compatible with our system.

## 2020-09-21 NOTE — ED PROVIDER NOTE - PHYSICAL EXAMINATION
GENERAL: Patient awake alert NAD.  HEENT: NC/AT, Moist mucous membranes, PERRL, EOMI.  LUNGS: CTAB, no wheezes or crackles.  CARDIAC: RRR, no m/r/g.  ABDOMEN: Soft, NT, ND, No rebound, guarding.  EXT: No edema. No calf tenderness. CV 2+DP/PT bilaterally.  MSK: No spinal tenderness, no pain with movement, no deformities.  NEURO: A&Ox3. Moving all extremities. CN 2-12 intact, motor strength 5/5 in all four extremities, sensation intact.  SKIN: Warm and dry. No rash.  PSYCH: Normal affect.

## 2020-09-21 NOTE — ED PROVIDER NOTE - INTERPRETATION
EKG reviewed for rate, rhythm, axis, intervals and segments, including QRS morphology, P wave appearance T wave appearance, AR interval, and QT interval.  I find the EKG to be unremarkable in all of these regards except as follows: L axis

## 2020-09-21 NOTE — ED PROVIDER NOTE - CLINICAL SUMMARY MEDICAL DECISION MAKING FREE TEXT BOX
Pt. is a 59 y.o. M p/w headache in the setting of elevated BP (systolic 200s).  Complaining of intermittent CP, but no active CP.  No FNDs.  Will get basics, trops, ekg, CTh.  Will give anti-hypertensives in the ED.  Will reassess and dispo pending results.

## 2020-09-21 NOTE — ED ADULT NURSE NOTE - OBJECTIVE STATEMENT
59 year old male with a PMH of HTN on Catapres BID, ESRD on dialysis MWF(fistula present with +bruit in left forearm) comes to the ED c/o elevated BP and headache. Patient states blood pressure at home has been in the 200s systolically and began to have a headache two days ago. Patient has hx of glaucoma states experiencing vision changes from his glaucoma, which is his baseline, denies acute new changes to vision since headache begun. Patient denies numbness/tingling/weakness as well. Patient is a/ox4, hypertensive, other VSS, sensory/motor function intact, gross neuro function intact, speaking coherently, follows commands and c/o 3/10 pain on the pain scale, locating headache to be in the frontal region. Patient endorses taking HTN medication as prescribed with an extra dose due to his BP not coming down. Lung sounds are clear and equal b/l with no labored breathing noted. Abdomen is soft, round and nontender upon palpation. Peripheral pulses are strong and equal b/l with no edema noted. Skin is warm, dry and intact. Patient denies CP/SOB, f/c, n/v/d, dizziness/lightheadedness, numbness/tingling/weakness, abdominal pain/back pain, hematuria/dysuria/frequency at this time.

## 2020-09-21 NOTE — ED ADULT NURSE REASSESSMENT NOTE - NS ED NURSE REASSESS COMMENT FT1
As per MD Isidro, patient is okay to be discharged at 0400 but able to rest in room until 0600 to wait for social work for ride home assistance. Will provide discharge paperwork and instructions to patient at 0600 prior to leaving care of RN and MD on floor. Patient noted to be sleeping comfortably in stretcher at this time and in NAD.

## 2020-09-21 NOTE — ED PROVIDER NOTE - OBJECTIVE STATEMENT
59 y.o. M w/ PMHx of HTN, HLD, DM, recent LLE bypass in June, CKD on HD (M, W, F) p/w hypertension and headache for the last 2-3 days.  Pt. checks his BP four times a day and states it's been persistently elevated this week.  He usually takes a walk when his BP is high, but that hasn't resolved it.  Last PCP visit was a few months ago.  Headache was 8/10 yesterday but 5/10 today.  States he gets intermittent CP that self-resolve.  No current CP.  Last HD on Friday went well w/o complications.  Denies f/c.

## 2020-09-21 NOTE — ED PROVIDER NOTE - NS ED ROS FT
General: denies fever, chills, weight loss/weight gain.  HENT: denies nasal congestion, sore throat, rhinorrhea, ear pain  Eyes: denies visual changes, blurred vision, eye discharge, eye redness  Neck: denies neck pain, neck swelling  CV: denies chest pain, palpitations  Resp: denies difficulty breathing, cough  Abdominal: denies nausea, vomiting, diarrhea, abdominal pain, blood in stool, dark stool  MSK: denies muscle aches, bony pain, leg pain, leg swelling  Neuro: +headaches; denies numbness, tingling, dizziness, lightheadedness.  Skin: denies rashes, cuts, bruises  Hematologic: denies unexplained bruises

## 2020-10-07 NOTE — ED ADULT NURSE NOTE - NS ED NOTE ABUSE SUSPICION NEGLECT YN
Patient:   LOLI COUCH            MRN: [id number]            FIN: [id number]              Age:   77 years     Sex:  FEMALE     :  38   Associated Diagnoses:   None   Author:   SYMONE ADAMS     DATE OF PROCEDURE:  10/7/2020  DIAGNOSIS: Lumbar facet arthritis  PROCEDURE: left lumbar facet joint steroid injection under fluoroscopic guidance.  PROCEDURE IN DETAIL: After taking informed consent with the patient in prone position, back prepped and draped aseptically.  The patient was monitored with pulse oximeter, blood pressure, EKG.  The patient was awake and spontaneously breathing, communicating throughout the procedure.  Under AP view of fluoroscopy, left L4-5, L2-3 and L3-4 facet joints identified, locally infiltrated.  A #22-gauge spinal needles were then advanced under  continuous AP, then under continuous lateral fluoroscopy.  Once reaching the facet joint space on aspiration, no CSF or heme, no paresthesia at any time.  Total of 40 mg of Kenalog in 3 divided doses were  injected.   Patient received sedation and was stable through out. The patient tolerated this well and was discharged uneventfully.  
No

## 2020-10-16 ENCOUNTER — INPATIENT (INPATIENT)
Facility: HOSPITAL | Age: 60
LOS: 5 days | Discharge: HOME | DRG: 314 | End: 2020-10-22
Attending: SURGERY | Admitting: SURGERY
Payer: MEDICARE

## 2020-10-16 VITALS
HEIGHT: 71 IN | TEMPERATURE: 99 F | RESPIRATION RATE: 20 BRPM | HEART RATE: 95 BPM | DIASTOLIC BLOOD PRESSURE: 103 MMHG | SYSTOLIC BLOOD PRESSURE: 172 MMHG | OXYGEN SATURATION: 96 % | WEIGHT: 179.9 LBS

## 2020-10-16 DIAGNOSIS — Z98.890 OTHER SPECIFIED POSTPROCEDURAL STATES: Chronic | ICD-10-CM

## 2020-10-16 LAB
ALBUMIN SERPL ELPH-MCNC: 4.5 G/DL — SIGNIFICANT CHANGE UP (ref 3.3–5)
ALP SERPL-CCNC: 154 U/L — HIGH (ref 40–120)
ALT FLD-CCNC: 10 U/L — SIGNIFICANT CHANGE UP (ref 10–45)
ANION GAP SERPL CALC-SCNC: 15 MMOL/L — SIGNIFICANT CHANGE UP (ref 5–17)
APTT BLD: 31.4 SEC — SIGNIFICANT CHANGE UP (ref 27.5–35.5)
AST SERPL-CCNC: 15 U/L — SIGNIFICANT CHANGE UP (ref 10–40)
BILIRUB SERPL-MCNC: 0.5 MG/DL — SIGNIFICANT CHANGE UP (ref 0.2–1.2)
BUN SERPL-MCNC: 31 MG/DL — HIGH (ref 7–23)
CALCIUM SERPL-MCNC: 10.1 MG/DL — SIGNIFICANT CHANGE UP (ref 8.4–10.5)
CHLORIDE SERPL-SCNC: 92 MMOL/L — LOW (ref 96–108)
CO2 SERPL-SCNC: 30 MMOL/L — SIGNIFICANT CHANGE UP (ref 22–31)
CREAT SERPL-MCNC: 7.32 MG/DL — HIGH (ref 0.5–1.3)
GLUCOSE SERPL-MCNC: 168 MG/DL — HIGH (ref 70–99)
HCT VFR BLD CALC: 57.4 % — CRITICAL HIGH (ref 39–50)
HGB BLD-MCNC: 18.1 G/DL — HIGH (ref 13–17)
INR BLD: 0.9 RATIO — SIGNIFICANT CHANGE UP (ref 0.88–1.16)
MAGNESIUM SERPL-MCNC: 2.4 MG/DL — SIGNIFICANT CHANGE UP (ref 1.6–2.6)
MCHC RBC-ENTMCNC: 26.3 PG — LOW (ref 27–34)
MCHC RBC-ENTMCNC: 31.5 GM/DL — LOW (ref 32–36)
MCV RBC AUTO: 83.6 FL — SIGNIFICANT CHANGE UP (ref 80–100)
PLATELET # BLD AUTO: 118 K/UL — LOW (ref 150–400)
POTASSIUM SERPL-MCNC: 4.5 MMOL/L — SIGNIFICANT CHANGE UP (ref 3.5–5.3)
POTASSIUM SERPL-SCNC: 4.5 MMOL/L — SIGNIFICANT CHANGE UP (ref 3.5–5.3)
PROT SERPL-MCNC: 8.2 G/DL — SIGNIFICANT CHANGE UP (ref 6–8.3)
PROTHROM AB SERPL-ACNC: 10.9 SEC — SIGNIFICANT CHANGE UP (ref 10.6–13.6)
RBC # BLD: 6.87 M/UL — HIGH (ref 4.2–5.8)
RBC # FLD: 20.3 % — HIGH (ref 10.3–14.5)
SODIUM SERPL-SCNC: 137 MMOL/L — SIGNIFICANT CHANGE UP (ref 135–145)
WBC # BLD: 6.59 K/UL — SIGNIFICANT CHANGE UP (ref 3.8–10.5)
WBC # FLD AUTO: 6.59 K/UL — SIGNIFICANT CHANGE UP (ref 3.8–10.5)

## 2020-10-16 PROCEDURE — 99285 EMERGENCY DEPT VISIT HI MDM: CPT | Mod: CS,GC

## 2020-10-16 RX ORDER — HEPARIN SODIUM 5000 [USP'U]/ML
3000 INJECTION INTRAVENOUS; SUBCUTANEOUS EVERY 6 HOURS
Refills: 0 | Status: DISCONTINUED | OUTPATIENT
Start: 2020-10-16 | End: 2020-10-17

## 2020-10-16 RX ORDER — DIPHENHYDRAMINE HCL 50 MG
25 CAPSULE ORAL ONCE
Refills: 0 | Status: COMPLETED | OUTPATIENT
Start: 2020-10-16 | End: 2020-10-16

## 2020-10-16 RX ORDER — HEPARIN SODIUM 5000 [USP'U]/ML
6500 INJECTION INTRAVENOUS; SUBCUTANEOUS ONCE
Refills: 0 | Status: COMPLETED | OUTPATIENT
Start: 2020-10-16 | End: 2020-10-17

## 2020-10-16 RX ORDER — HEPARIN SODIUM 5000 [USP'U]/ML
6500 INJECTION INTRAVENOUS; SUBCUTANEOUS EVERY 6 HOURS
Refills: 0 | Status: DISCONTINUED | OUTPATIENT
Start: 2020-10-16 | End: 2020-10-17

## 2020-10-16 RX ORDER — HEPARIN SODIUM 5000 [USP'U]/ML
INJECTION INTRAVENOUS; SUBCUTANEOUS
Qty: 25000 | Refills: 0 | Status: DISCONTINUED | OUTPATIENT
Start: 2020-10-16 | End: 2020-10-20

## 2020-10-16 RX ORDER — ACETAMINOPHEN 500 MG
650 TABLET ORAL ONCE
Refills: 0 | Status: COMPLETED | OUTPATIENT
Start: 2020-10-16 | End: 2020-10-16

## 2020-10-16 RX ADMIN — Medication 650 MILLIGRAM(S): at 22:40

## 2020-10-16 RX ADMIN — Medication 25 MILLIGRAM(S): at 23:53

## 2020-10-16 NOTE — ED ADULT NURSE NOTE - OBJECTIVE STATEMENT
Pt is a 60 yr old male with pmh of DM, HTN, CKD on dialysis (MWF), and leg bypass coming in from dialysis for left leg pain. The pain travels down his whole leg and is a 2/10. Pt did see his primary care about the pain but states "nothing was done". Pt has a long scar running the inside of his left leg from bypass. Pt stated the pain has been constant since his stress test on Wednesday. Pt has a left arm fistula. Tape and gauze covering. No thrill felt. bruit heard. Pt denies chest pain, chills, fever. No respiratory distress noted. Pt is a/o x 3, calm, cooperative. MD to evaluate patient, RN awaiting orders.

## 2020-10-16 NOTE — ED PROVIDER NOTE - ATTENDING CONTRIBUTION TO CARE
59yo male ESRd (M/W/F) last dialyzed today, HTN, PAD s/p LLE bypass graft 6/2020, DM on insulin, presents with LLE "burning" pain x 3 days. Pain constant, worse with palpation. Also has noted numbness to L leg, diffuse, and ?weakness. Pt did have nuclear stress test 3 days ago, states pain began after this. Also notes some mild pain to his L forearm. Denies cp, sob, f/c, n/v/d, cough, congestion. Does have chronic lower back pain, unchanged. No recent falls or trauma.    PE: NAD, NCAT, MMM, Trachea midline, Normal conjunctiva, lungs CTAB, S1/S2 RRR, Normal perfusion, 2+ radial pulses bilat, Abdomen Soft, NTND, No rebound/guarding. No pallor, coolness to bilat LE. Able to obtain doppler flow to R PT, not DP, unable to obtain doppler flow L PT, DP. LLE diffusely ttp. No midline C, T, L ttp.    Consider acute thrombosis LLE, unable to obtain pulses, however no pallor or coolness. Consult vascular surgery for eval. Not suggestive of DVT. No midlin elumbar ttp, no dermatomal distribution, does not suggest acute spinal cord pahtology. Check labs. Give analgesia as needed. Will require admission. - Vijay Maria MD

## 2020-10-16 NOTE — ED PROVIDER NOTE - PROGRESS NOTE DETAILS
Rachel PGY2: vascular consulted for concern for graft failure, to see patient Pt seen by vascular surgery, who also could not obtain pulses. Recommend cta abd aorta with runoff, along with heparin bolus and drip. Checking weight now and will start. Contacted CT tech to expedite CTA. - Vijay Maria MD Rachel PGY2: contrast allergy is pruritus. Need for urgent scan. Discussed with radiology, premedication is benadryl and hydrocortisone 1 mg/kg. Patient consented to not waiting full 4 hours and understands risks and benefits. Consent placed in chart. CT tech aware. Rachel PGY2: patient had mild headache during scan that's now resolved. No pruritus. Vascular notified and tba Deny.

## 2020-10-16 NOTE — ED PROVIDER NOTE - OBJECTIVE STATEMENT
59yo male ESRd (M/W/F) last dialyzed today, HTN, PAD s/p LLE bypass graft 6/2020 here, DM on insulin p/w 3 days intermittent random whole leg burning pain anterior and posterior. Had nuclear stress test 3 days ago and has noticed symptoms since. Last had this pain prior to bypass graft. Noted numbness of the L leg yesterday and isn't sure if leg weakness is 2/2 pain. Also notes pain in R forearm where nuclear stress contrast was injected. Has chronic lower back pain that's unchanged. Denies dyspnea, change in vision, chest pain, fever, leg swelling, urinary or fecal incontinence, constipation or retention, trauma.

## 2020-10-16 NOTE — ED PROVIDER NOTE - CLINICAL SUMMARY MEDICAL DECISION MAKING FREE TEXT BOX
61yo male h/o recent LLE bypass graft for PAD here p/w whole L leg pain, numbness and mild weakness on exam. Unsure if weakness 2/2 to pain. No distal pulses in L foot, concern for graft failure. Low suspicion for cord compression given no h/o cancer or trauma and neuro deficits accompanied by pain and no distal pulses. Unlikely to be musculoskeletal pain or DVT. Labs, arterial doppler US, vascular consult and likely admission.

## 2020-10-16 NOTE — ED PROVIDER NOTE - PHYSICAL EXAMINATION
Physical Exam:  Gen: NAD, AOx3, non-toxic appearing, able to ambulate without assistance  Lung: CTAB, no respiratory distress, no wheezes/rhonchi/rales B/L, speaking in full sentences  CV: RRR, no murmurs, rubs or gallops, dopplerable pulses in R foot but none in L foot, no pallor or coldness of LLE  Abd: soft, NT, ND, no guarding, no rigidity, no rebound tenderness, no CVA tenderness   MSK: TTP of whole L leg and R forearm, no redness no visible deformities, ROM normal in UE/LE, no back TTP  Neuro: 4/5 strength L leg on hip flexion and plantar/dorsfexion, 5/5 strength on R leg and UE's b/l, sensation of R > L leg, no clear dermatomal distribution  Skin: Warm, well perfused, no rash, no leg swelling  Psych: normal affect, calm

## 2020-10-17 DIAGNOSIS — M79.605 PAIN IN LEFT LEG: ICD-10-CM

## 2020-10-17 DIAGNOSIS — E11.9 TYPE 2 DIABETES MELLITUS WITHOUT COMPLICATIONS: ICD-10-CM

## 2020-10-17 DIAGNOSIS — I10 ESSENTIAL (PRIMARY) HYPERTENSION: ICD-10-CM

## 2020-10-17 DIAGNOSIS — N18.6 END STAGE RENAL DISEASE: ICD-10-CM

## 2020-10-17 DIAGNOSIS — Z98.890 OTHER SPECIFIED POSTPROCEDURAL STATES: Chronic | ICD-10-CM

## 2020-10-17 LAB
A1C WITH ESTIMATED AVERAGE GLUCOSE RESULT: 9 % — HIGH (ref 4–5.6)
ANION GAP SERPL CALC-SCNC: 17 MMOL/L — SIGNIFICANT CHANGE UP (ref 5–17)
ANISOCYTOSIS BLD QL: SIGNIFICANT CHANGE UP
APTT BLD: 184.6 SEC — CRITICAL HIGH (ref 27.5–35.5)
APTT BLD: 82.7 SEC — HIGH (ref 27.5–35.5)
APTT BLD: >200 SEC — CRITICAL HIGH (ref 27.5–35.5)
BASOPHILS # BLD AUTO: 0.06 K/UL — SIGNIFICANT CHANGE UP (ref 0–0.2)
BASOPHILS NFR BLD AUTO: 0.9 % — SIGNIFICANT CHANGE UP (ref 0–2)
BUN SERPL-MCNC: 44 MG/DL — HIGH (ref 7–23)
BURR CELLS BLD QL SMEAR: PRESENT — SIGNIFICANT CHANGE UP
CALCIUM SERPL-MCNC: 9 MG/DL — SIGNIFICANT CHANGE UP (ref 8.4–10.5)
CHLORIDE SERPL-SCNC: 94 MMOL/L — LOW (ref 96–108)
CO2 SERPL-SCNC: 25 MMOL/L — SIGNIFICANT CHANGE UP (ref 22–31)
CREAT SERPL-MCNC: 9.58 MG/DL — HIGH (ref 0.5–1.3)
DACRYOCYTES BLD QL SMEAR: SLIGHT — SIGNIFICANT CHANGE UP
EOSINOPHIL # BLD AUTO: 0.28 K/UL — SIGNIFICANT CHANGE UP (ref 0–0.5)
EOSINOPHIL NFR BLD AUTO: 4.3 % — SIGNIFICANT CHANGE UP (ref 0–6)
ESTIMATED AVERAGE GLUCOSE: 212 MG/DL — HIGH (ref 68–114)
GLUCOSE BLDC GLUCOMTR-MCNC: 122 MG/DL — HIGH (ref 70–99)
GLUCOSE BLDC GLUCOMTR-MCNC: 144 MG/DL — HIGH (ref 70–99)
GLUCOSE BLDC GLUCOMTR-MCNC: 174 MG/DL — HIGH (ref 70–99)
GLUCOSE BLDC GLUCOMTR-MCNC: 209 MG/DL — HIGH (ref 70–99)
GLUCOSE BLDC GLUCOMTR-MCNC: 93 MG/DL — SIGNIFICANT CHANGE UP (ref 70–99)
GLUCOSE SERPL-MCNC: 97 MG/DL — SIGNIFICANT CHANGE UP (ref 70–99)
HCT VFR BLD CALC: 55.9 % — HIGH (ref 39–50)
HGB BLD-MCNC: 17.9 G/DL — HIGH (ref 13–17)
INR BLD: 1.09 RATIO — SIGNIFICANT CHANGE UP (ref 0.88–1.16)
LYMPHOCYTES # BLD AUTO: 0.63 K/UL — LOW (ref 1–3.3)
LYMPHOCYTES # BLD AUTO: 9.6 % — LOW (ref 13–44)
MACROCYTES BLD QL: SLIGHT — SIGNIFICANT CHANGE UP
MANUAL SMEAR VERIFICATION: SIGNIFICANT CHANGE UP
MCHC RBC-ENTMCNC: 26.6 PG — LOW (ref 27–34)
MCHC RBC-ENTMCNC: 32 GM/DL — SIGNIFICANT CHANGE UP (ref 32–36)
MCV RBC AUTO: 83.1 FL — SIGNIFICANT CHANGE UP (ref 80–100)
MONOCYTES # BLD AUTO: 0.63 K/UL — SIGNIFICANT CHANGE UP (ref 0–0.9)
MONOCYTES NFR BLD AUTO: 9.6 % — SIGNIFICANT CHANGE UP (ref 2–14)
NEUTROPHILS # BLD AUTO: 4.81 K/UL — SIGNIFICANT CHANGE UP (ref 1.8–7.4)
NEUTROPHILS NFR BLD AUTO: 73 % — SIGNIFICANT CHANGE UP (ref 43–77)
NRBC # BLD: 0 /100 WBCS — SIGNIFICANT CHANGE UP (ref 0–0)
OVALOCYTES BLD QL SMEAR: SLIGHT — SIGNIFICANT CHANGE UP
PLAT MORPH BLD: NORMAL — SIGNIFICANT CHANGE UP
PLATELET # BLD AUTO: 112 K/UL — LOW (ref 150–400)
POIKILOCYTOSIS BLD QL AUTO: SLIGHT — SIGNIFICANT CHANGE UP
POLYCHROMASIA BLD QL SMEAR: SLIGHT — SIGNIFICANT CHANGE UP
POTASSIUM SERPL-MCNC: 5.4 MMOL/L — HIGH (ref 3.5–5.3)
POTASSIUM SERPL-SCNC: 5.4 MMOL/L — HIGH (ref 3.5–5.3)
PROTHROM AB SERPL-ACNC: 13 SEC — SIGNIFICANT CHANGE UP (ref 10.6–13.6)
RBC # BLD: 6.73 M/UL — HIGH (ref 4.2–5.8)
RBC # FLD: 20.3 % — HIGH (ref 10.3–14.5)
RBC BLD AUTO: ABNORMAL
SARS-COV-2 IGG SERPL QL IA: NEGATIVE — SIGNIFICANT CHANGE UP
SARS-COV-2 IGM SERPL IA-ACNC: <3.8 AU/ML — SIGNIFICANT CHANGE UP
SARS-COV-2 RNA SPEC QL NAA+PROBE: SIGNIFICANT CHANGE UP
SCHISTOCYTES BLD QL AUTO: SLIGHT — SIGNIFICANT CHANGE UP
SODIUM SERPL-SCNC: 136 MMOL/L — SIGNIFICANT CHANGE UP (ref 135–145)
VARIANT LYMPHS # BLD: 2.6 % — SIGNIFICANT CHANGE UP (ref 0–6)
WBC # BLD: 5.85 K/UL — SIGNIFICANT CHANGE UP (ref 3.8–10.5)
WBC # FLD AUTO: 5.85 K/UL — SIGNIFICANT CHANGE UP (ref 3.8–10.5)

## 2020-10-17 PROCEDURE — 99222 1ST HOSP IP/OBS MODERATE 55: CPT | Mod: GC

## 2020-10-17 PROCEDURE — 75635 CT ANGIO ABDOMINAL ARTERIES: CPT | Mod: 26

## 2020-10-17 RX ORDER — INSULIN GLARGINE 100 [IU]/ML
15 INJECTION, SOLUTION SUBCUTANEOUS ONCE
Refills: 0 | Status: COMPLETED | OUTPATIENT
Start: 2020-10-17 | End: 2020-10-17

## 2020-10-17 RX ORDER — INSULIN LISPRO 100/ML
VIAL (ML) SUBCUTANEOUS
Refills: 0 | Status: DISCONTINUED | OUTPATIENT
Start: 2020-10-17 | End: 2020-10-20

## 2020-10-17 RX ORDER — CALCIUM ACETATE 667 MG
3 TABLET ORAL
Qty: 0 | Refills: 0 | DISCHARGE

## 2020-10-17 RX ORDER — DEXTROSE 50 % IN WATER 50 %
15 SYRINGE (ML) INTRAVENOUS ONCE
Refills: 0 | Status: DISCONTINUED | OUTPATIENT
Start: 2020-10-17 | End: 2020-10-22

## 2020-10-17 RX ORDER — HYDROCORTISONE 20 MG
80 TABLET ORAL ONCE
Refills: 0 | Status: COMPLETED | OUTPATIENT
Start: 2020-10-17 | End: 2020-10-17

## 2020-10-17 RX ORDER — INSULIN LISPRO 100/ML
VIAL (ML) SUBCUTANEOUS EVERY 6 HOURS
Refills: 0 | Status: DISCONTINUED | OUTPATIENT
Start: 2020-10-17 | End: 2020-10-17

## 2020-10-17 RX ORDER — INSULIN LISPRO 100/ML
VIAL (ML) SUBCUTANEOUS AT BEDTIME
Refills: 0 | Status: DISCONTINUED | OUTPATIENT
Start: 2020-10-17 | End: 2020-10-17

## 2020-10-17 RX ORDER — DEXTROSE 50 % IN WATER 50 %
12.5 SYRINGE (ML) INTRAVENOUS ONCE
Refills: 0 | Status: DISCONTINUED | OUTPATIENT
Start: 2020-10-17 | End: 2020-10-22

## 2020-10-17 RX ORDER — DIPHENHYDRAMINE HCL 50 MG
25 CAPSULE ORAL EVERY 6 HOURS
Refills: 0 | Status: DISCONTINUED | OUTPATIENT
Start: 2020-10-17 | End: 2020-10-17

## 2020-10-17 RX ORDER — LIDOCAINE AND PRILOCAINE CREAM 25; 25 MG/G; MG/G
1 CREAM TOPICAL DAILY
Refills: 0 | Status: DISCONTINUED | OUTPATIENT
Start: 2020-10-17 | End: 2020-10-22

## 2020-10-17 RX ORDER — INSULIN LISPRO 100/ML
VIAL (ML) SUBCUTANEOUS AT BEDTIME
Refills: 0 | Status: DISCONTINUED | OUTPATIENT
Start: 2020-10-17 | End: 2020-10-20

## 2020-10-17 RX ORDER — SODIUM CHLORIDE 9 MG/ML
1000 INJECTION INTRAMUSCULAR; INTRAVENOUS; SUBCUTANEOUS
Refills: 0 | Status: DISCONTINUED | OUTPATIENT
Start: 2020-10-17 | End: 2020-10-20

## 2020-10-17 RX ORDER — DEXTROSE 50 % IN WATER 50 %
25 SYRINGE (ML) INTRAVENOUS ONCE
Refills: 0 | Status: DISCONTINUED | OUTPATIENT
Start: 2020-10-17 | End: 2020-10-22

## 2020-10-17 RX ORDER — GLUCAGON INJECTION, SOLUTION 0.5 MG/.1ML
1 INJECTION, SOLUTION SUBCUTANEOUS ONCE
Refills: 0 | Status: DISCONTINUED | OUTPATIENT
Start: 2020-10-17 | End: 2020-10-22

## 2020-10-17 RX ORDER — SODIUM CHLORIDE 9 MG/ML
1000 INJECTION, SOLUTION INTRAVENOUS
Refills: 0 | Status: DISCONTINUED | OUTPATIENT
Start: 2020-10-17 | End: 2020-10-22

## 2020-10-17 RX ORDER — OXYCODONE HYDROCHLORIDE 5 MG/1
5 TABLET ORAL EVERY 4 HOURS
Refills: 0 | Status: DISCONTINUED | OUTPATIENT
Start: 2020-10-17 | End: 2020-10-22

## 2020-10-17 RX ORDER — INSULIN ASPART 100 [IU]/ML
7 INJECTION, SOLUTION SUBCUTANEOUS
Qty: 0 | Refills: 0 | DISCHARGE

## 2020-10-17 RX ORDER — CALCIUM ACETATE 667 MG
1334 TABLET ORAL
Refills: 0 | Status: DISCONTINUED | OUTPATIENT
Start: 2020-10-17 | End: 2020-10-22

## 2020-10-17 RX ORDER — DIPHENHYDRAMINE HCL 50 MG
50 CAPSULE ORAL EVERY 6 HOURS
Refills: 0 | Status: DISCONTINUED | OUTPATIENT
Start: 2020-10-17 | End: 2020-10-18

## 2020-10-17 RX ORDER — INSULIN GLARGINE 100 [IU]/ML
15 INJECTION, SOLUTION SUBCUTANEOUS AT BEDTIME
Refills: 0 | Status: DISCONTINUED | OUTPATIENT
Start: 2020-10-17 | End: 2020-10-18

## 2020-10-17 RX ORDER — ACETAMINOPHEN 500 MG
650 TABLET ORAL EVERY 6 HOURS
Refills: 0 | Status: DISCONTINUED | OUTPATIENT
Start: 2020-10-17 | End: 2020-10-22

## 2020-10-17 RX ORDER — OXYCODONE HYDROCHLORIDE 5 MG/1
5 TABLET ORAL EVERY 6 HOURS
Refills: 0 | Status: DISCONTINUED | OUTPATIENT
Start: 2020-10-17 | End: 2020-10-17

## 2020-10-17 RX ORDER — HYDROMORPHONE HYDROCHLORIDE 2 MG/ML
0.5 INJECTION INTRAMUSCULAR; INTRAVENOUS; SUBCUTANEOUS ONCE
Refills: 0 | Status: DISCONTINUED | OUTPATIENT
Start: 2020-10-17 | End: 2020-10-17

## 2020-10-17 RX ADMIN — Medication 650 MILLIGRAM(S): at 00:22

## 2020-10-17 RX ADMIN — Medication 1334 MILLIGRAM(S): at 18:41

## 2020-10-17 RX ADMIN — OXYCODONE HYDROCHLORIDE 5 MILLIGRAM(S): 5 TABLET ORAL at 15:15

## 2020-10-17 RX ADMIN — HEPARIN SODIUM 900 UNIT(S)/HR: 5000 INJECTION INTRAVENOUS; SUBCUTANEOUS at 17:41

## 2020-10-17 RX ADMIN — HEPARIN SODIUM 6500 UNIT(S): 5000 INJECTION INTRAVENOUS; SUBCUTANEOUS at 00:08

## 2020-10-17 RX ADMIN — OXYCODONE HYDROCHLORIDE 5 MILLIGRAM(S): 5 TABLET ORAL at 14:45

## 2020-10-17 RX ADMIN — Medication 80 MILLIGRAM(S): at 00:52

## 2020-10-17 RX ADMIN — HYDROMORPHONE HYDROCHLORIDE 0.5 MILLIGRAM(S): 2 INJECTION INTRAMUSCULAR; INTRAVENOUS; SUBCUTANEOUS at 11:22

## 2020-10-17 RX ADMIN — Medication 25 MILLIGRAM(S): at 18:39

## 2020-10-17 RX ADMIN — Medication 0.1 MILLIGRAM(S): at 18:38

## 2020-10-17 RX ADMIN — HEPARIN SODIUM 1200 UNIT(S)/HR: 5000 INJECTION INTRAVENOUS; SUBCUTANEOUS at 10:54

## 2020-10-17 RX ADMIN — INSULIN GLARGINE 15 UNIT(S): 100 INJECTION, SOLUTION SUBCUTANEOUS at 02:59

## 2020-10-17 RX ADMIN — OXYCODONE HYDROCHLORIDE 5 MILLIGRAM(S): 5 TABLET ORAL at 18:39

## 2020-10-17 RX ADMIN — HEPARIN SODIUM 0 UNIT(S)/HR: 5000 INJECTION INTRAVENOUS; SUBCUTANEOUS at 09:47

## 2020-10-17 RX ADMIN — HEPARIN SODIUM 1500 UNIT(S)/HR: 5000 INJECTION INTRAVENOUS; SUBCUTANEOUS at 00:09

## 2020-10-17 RX ADMIN — HEPARIN SODIUM 900 UNIT(S)/HR: 5000 INJECTION INTRAVENOUS; SUBCUTANEOUS at 22:24

## 2020-10-17 RX ADMIN — HYDROMORPHONE HYDROCHLORIDE 0.5 MILLIGRAM(S): 2 INJECTION INTRAMUSCULAR; INTRAVENOUS; SUBCUTANEOUS at 06:30

## 2020-10-17 RX ADMIN — HEPARIN SODIUM 0 UNIT(S)/HR: 5000 INJECTION INTRAVENOUS; SUBCUTANEOUS at 16:36

## 2020-10-17 RX ADMIN — HYDROMORPHONE HYDROCHLORIDE 0.5 MILLIGRAM(S): 2 INJECTION INTRAMUSCULAR; INTRAVENOUS; SUBCUTANEOUS at 06:03

## 2020-10-17 RX ADMIN — INSULIN GLARGINE 15 UNIT(S): 100 INJECTION, SOLUTION SUBCUTANEOUS at 22:29

## 2020-10-17 RX ADMIN — Medication 50 MILLIGRAM(S): at 22:41

## 2020-10-17 RX ADMIN — Medication 0.1 MILLIGRAM(S): at 06:10

## 2020-10-17 RX ADMIN — SODIUM CHLORIDE 50 MILLILITER(S): 9 INJECTION INTRAMUSCULAR; INTRAVENOUS; SUBCUTANEOUS at 09:48

## 2020-10-17 RX ADMIN — OXYCODONE HYDROCHLORIDE 5 MILLIGRAM(S): 5 TABLET ORAL at 19:40

## 2020-10-17 RX ADMIN — HYDROMORPHONE HYDROCHLORIDE 0.5 MILLIGRAM(S): 2 INJECTION INTRAMUSCULAR; INTRAVENOUS; SUBCUTANEOUS at 10:52

## 2020-10-17 RX ADMIN — Medication 2: at 06:16

## 2020-10-17 NOTE — CONSULT NOTE ADULT - PROBLEM/RECOMMENDATION-4
OFFICE VISIT      Patient: Ayala Johnson Date of Service: 2020   : 1986 MRN: [id number]     SUBJECTIVE:   HISTORY OF PRESENT ILLNESS:  Ayala Johnson is a 34 year old female who presents today for sore throat, bodyaches with loss of taste for 1 day         Denies any known exposure to covid   Denies recent travel     Illness  The current episode started in the past 7 days. The problem occurs daily. The problem is unchanged. The pain is mild. Associated symptoms include a sore throat and muscle aches. Pertinent negatives include no fatigue, fever, diarrhea or rash. (Loss of taste ) Past treatments include nothing. There were sick contacts at home (both sons have same symptoms ).       PAST MEDICAL HISTORY:  No past medical history on file.    MEDICATIONS:  Current Outpatient Medications   Medication Sig   • Cholecalciferol (VITAMIN D-400) 10 mcg (400 units) tablet Take 2 tablets by mouth daily   • PRENATAL 28-0.8 MG tablet Take 1 tablet by mouth daily.   • Unable to Find Medication Vitamin D Caps     No current facility-administered medications for this visit.        ALLERGIES:  ALLERGIES:  No Known Allergies    PAST SURGICAL HISTORY:  No past surgical history on file.    FAMILY HISTORY:  Family History   Problem Relation Age of Onset   • Diabetes Mother    • Diabetes Father    • Patient is unaware of any medical problems Sister    • Patient is unaware of any medical problems Brother        SOCIAL HISTORY:  Social History     Tobacco Use   • Smoking status: Never Smoker   • Smokeless tobacco: Never Used   Substance Use Topics   • Alcohol use: No     Frequency: Never   • Drug use: No       Review of Systems   Constitutional: Negative for fatigue and fever.   HENT: Positive for sore throat.    Gastrointestinal: Negative for diarrhea.   Skin: Negative for rash.         OBJECTIVE:     Physical Exam   Constitutional: She is oriented to person, place, and time. She appears well-developed and well-nourished. No  distress.   HENT:   Head: Normocephalic.   Right Ear: External ear normal.   Left Ear: External ear normal.   Nose: Nose normal.   Mouth/Throat: Oropharynx is clear and moist.   Eyes: EOM are normal.   Cardiovascular: Normal rate, regular rhythm and normal heart sounds.   Pulmonary/Chest: Effort normal and breath sounds normal.   Neurological: She is alert and oriented to person, place, and time.   Skin: Skin is warm. No rash noted.   Psychiatric: She has a normal mood and affect. Her behavior is normal. Judgment and thought content normal.   Vitals reviewed.      Visit Vitals  /79 (BP Location: RUE - Right upper extremity, Patient Position: Sitting, Cuff Size: Small Adult)   Pulse 90   Temp 99 °F (37.2 °C) (Tympanic)   Resp 17   SpO2 97%       DIAGNOSTIC STUDIES:   LAB RESULTS:  Results for orders placed or performed in visit on 09/19/20   POCT RAPID STREP A   Result Value    GRP A STREP Negative    Internal Procedural Controls Acceptable Yes       Assessment AND PLAN:     Problem List Items Addressed This Visit     None      Visit Diagnoses     Acute upper respiratory infection, unspecified    -  Primary    Sore throat        Relevant Orders    POCT RAPID STREP A (Completed)    SARS-COV-2 RNA, QUALITATIVE, REAL TIME PCR          Covid 19  testing done today   Rest, supportive care discussed   Quarantine until feeling better if positive for at minimum 14 days   Fu with pcp if no improvement in 1 wk sooner if sxs become worse or new sxs develop   Er precautions discussed     Return if symptoms worsen or fail to improve.    Instructions provided as documented in the AVS.          The patient indicated understanding of the diagnosis and agreed with the plan of care.         DISPLAY PLAN FREE TEXT

## 2020-10-17 NOTE — PROVIDER CONTACT NOTE (CRITICAL VALUE NOTIFICATION) - ACTION/TREATMENT ORDERED:
Held Heparin infusion for 1 hour and will decrese by 3 to 9ml/hr.
Held Heparin infusion for 1 hour and decrease by 3 to 12ml/hr

## 2020-10-17 NOTE — H&P ADULT - NSHPLABSRESULTS_GEN_ALL_CORE
18.1   6.59  )-----------( 118      ( 16 Oct 2020 22:39 )             57.4   10-16    137  |  92<L>  |  31<H>  ----------------------------<  168<H>  4.5   |  30  |  7.32<H>    Ca    10.1      16 Oct 2020 22:39  Mg     2.4     10-16    TPro  8.2  /  Alb  4.5  /  TBili  0.5  /  DBili  x   /  AST  15  /  ALT  10  /  AlkPhos  154<H>  10-16  PT/INR - ( 16 Oct 2020 22:39 )   PT: 10.9 sec;   INR: 0.90 ratio         PTT - ( 16 Oct 2020 22:39 )  PTT:31.4 sec

## 2020-10-17 NOTE — CONSULT NOTE ADULT - PROBLEM SELECTOR PROBLEM 4
No retinal tears or retinal detachment seen on clinical exam today. Reviewed the signs and symptoms of retinal tear/retinal detachment and the importance of calling for prompt evaluation should there be increasing floaters, new flashing lights, or decreasing peripheral vision in either eye at any time. Observation recommended. HTN (hypertension)

## 2020-10-17 NOTE — H&P ADULT - ASSESSMENT
59M PMH ESRD (on HD M/W/F), glaucoma, DMII, HCV, PAD, RLE DVT s/p thrombectomy, LLE ischemic rest pain s/p popliteal to posterior tibial artery bypass (6/3/2020, Dr. Samaniego) presenting with LLE foot pain without PT/PT signals and slightly cooler than RLE.    Plan  - Admit to Vascular Surgery under Dr. Samaniego  - F/u CTA  - Continue Hep gtt  - NPO/IVF  - Plan discussed with Fellow Dr. Reddy on behalf of Dr. Aden Artis MD  General Surgery, PGY 2

## 2020-10-17 NOTE — H&P ADULT - NSICDXPASTMEDICALHX_GEN_ALL_CORE_FT
PAST MEDICAL HISTORY:  Diabetes     Glaucoma     HTN (hypertension)     Renal failure on dialysis

## 2020-10-17 NOTE — H&P ADULT - HISTORY OF PRESENT ILLNESS
59M PMH ESRD (on HD M/W/F), glaucoma, DMII, HCV, PAD, RLE DVT s/p thrombectomy, LLE ischemic rest pain s/p popliteal to posterior tibial artery bypass (6/3/2020, Dr. Samaniego) presenting with LLE foot pain. Patient reports b/l lower extremity pain left greater than right started on Wednesday after undergoing nuclear stress test. Pain progressively worsened with intermittent episodes of extreme pain associated with weakness and numbness of the left leg. On the day of his presentation he undewent scheduled dialysis which substantially worsened his pain causing his presentation to the ED. Vascular surgery was consulted for management of ischemic pain.

## 2020-10-17 NOTE — H&P ADULT - NSICDXPASTSURGICALHX_GEN_ALL_CORE_FT
PAST SURGICAL HISTORY:  H/O right wrist surgery 1994    Status post popliteal-tibial bypass POP-PT graph 6/2020

## 2020-10-17 NOTE — H&P ADULT - NSHPPHYSICALEXAM_GEN_ALL_CORE
General: well developed, well nourished, NAD  Neuro: alert and oriented, no focal deficits, moves all extremities spontaneously  HEENT: NCAT, no lymphadenopathy  Respiratory: airway patent, respirations unlabored  CVS: regular rate and rhythm  Abdomen: soft, nontender, nondistended  Extremities: no edema, left lower extremity with diminished motor and sensation, cooler than right foot. RLE POP and DP/PT signals. LLE POP signals only. b/l femoral pulses  Skin: chronic skin changes in the lower extremity

## 2020-10-17 NOTE — CONSULT NOTE ADULT - SUBJECTIVE AND OBJECTIVE BOX
[facility] Division of Kidney Diseases & Hypertension  INITIAL CONSULT NOTE  [phone]--------------------------------------------------------------------------------  HPI: 59M PMH ESRD (on HD M/W/F), glaucoma, DMII, HCV, PAD, RLE DVT s/p thrombectomy, LLE ischemic rest pain s/p popliteal to posterior tibial artery bypass (6/3/2020, Dr. Samaniego) presenting with LLE foot pain. Nephrology team following for ESRD/HD management.    Receives HD via LUE AVF at [facility]. Patient gets dialysis every M/Wed/Fri with Last HD on 10/16/20 for 3 1/2 hours. Patient reports b/l lower extremity pain left greater than right started on Wednesday after undergoing nuclear stress test. Pain progressively worsened with intermittent episodes of extreme pain associated with weakness and numbness of the left leg.     PAST HISTORY  --------------------------------------------------------------------------------  PAST MEDICAL & SURGICAL HISTORY:  Glaucoma    Diabetes    HTN (hypertension)    Renal failure  on dialysis    Status post popliteal-tibial bypass  POP-PT graph 6/2020    H/O right wrist surgery  1994      FAMILY HISTORY:  No pertinent family history in first degree relatives      PAST SOCIAL HISTORY:    ALLERGIES & MEDICATIONS  --------------------------------------------------------------------------------  Allergies    aspirin (Rash; Urticaria; Hives)  iodine (Rash; Urticaria)    Intolerances      Standing Inpatient Medications  acetaminophen   Tablet .. 650 milliGRAM(s) Oral every 6 hours  cloNIDine 0.1 milliGRAM(s) Oral two times a day  dextrose 5%. 1000 milliLiter(s) IV Continuous <Continuous>  dextrose 50% Injectable 12.5 Gram(s) IV Push once  dextrose 50% Injectable 25 Gram(s) IV Push once  dextrose 50% Injectable 25 Gram(s) IV Push once  heparin  Infusion.  Unit(s)/Hr IV Continuous <Continuous>  insulin glargine SubCutaneous Injection (LANTUS) - Peds 15 Unit(s) SubCutaneous at bedtime  insulin lispro (HumaLOG) corrective regimen sliding scale   SubCutaneous every 6 hours  sodium chloride 0.9%. 1000 milliLiter(s) IV Continuous <Continuous>    PRN Inpatient Medications  dextrose 40% Gel 15 Gram(s) Oral once PRN  glucagon  Injectable 1 milliGRAM(s) IntraMuscular once PRN  heparin   Injectable 6500 Unit(s) IV Push every 6 hours PRN  heparin   Injectable 3000 Unit(s) IV Push every 6 hours PRN  oxyCODONE    IR 5 milliGRAM(s) Oral every 6 hours PRN      REVIEW OF SYSTEMS  --------------------------------------------------------------------------------  Gen: No lethargy  Respiratory: No dyspnea  CV: No chest pain  GI: No abdominal pain/ diarrhea   MSK: No edema, left LE pain  Neuro: No dizziness  Heme: No bleeding    All other systems were reviewed and are negative, except as noted.      VITALS/PHYSICAL EXAM  --------------------------------------------------------------------------------  T(C): 36.4 (10-17-20 @ 11:38), Max: 37 (10-16-20 @ 21:37)  HR: 70 (10-17-20 @ 11:38) (70 - 97)  BP: 145/70 (10-17-20 @ 11:38) (137/77 - 172/103)  RR: 18 (10-17-20 @ 11:38) (18 - 20)  SpO2: 97% (10-17-20 @ 11:38) (95% - 100%)  Wt(kg): --  Height (cm): 180.3 (10-16-20 @ 21:37)  Weight (kg): 80.5 (10-16-20 @ 23:30)  BMI (kg/m2): 24.8 (10-16-20 @ 23:30)  BSA (m2): 2 (10-16-20 @ 23:30)      10-16-20 @ 07:01  -  10-17-20 @ 07:00  --------------------------------------------------------  IN: 390 mL / OUT: 0 mL / NET: 390 mL    10-17-20 @ 07:01  -  10-17-20 @ 14:19  --------------------------------------------------------  IN: 520 mL / OUT: 0 mL / NET: 520 mL    Physical Exam:    	Gen: NAD, well-appearing  	HEENT: Anicteric   	Pulm: CTA B/L  	CV: RRR, S1S2; no rub  	Abd: +BS, soft, nontender/nondistended       	: No suprapubic tenderness  	MSK: Warm, no edema  	Neuro: No focal deficits, AOX3      	Vascular Access: CARMELA RAY with palpable thrill    LABS/STUDIES  --------------------------------------------------------------------------------              17.9   5.85  >-----------<  112      [10-17-20 @ 05:25]              55.9     137  |  92  |  31  ----------------------------<  168      [10-16-20 @ 22:39]  4.5   |  30  |  7.32        Ca     10.1     [10-16-20 @ 22:39]      Mg     2.4     [10-16-20 @ 22:39]    TPro  8.2  /  Alb  4.5  /  TBili  0.5  /  DBili  x   /  AST  15  /  ALT  10  /  AlkPhos  154  [10-16-20 @ 22:39]    PT/INR: PT 13.0 , INR 1.09       [10-17-20 @ 06:54]  PTT: >200.0      [10-17-20 @ 06:54]      Creatinine Trend:  SCr 7.32 [10-16 @ 22:39]  SCr 11.49 [09-21 @ 02:13]

## 2020-10-17 NOTE — CHART NOTE - NSCHARTNOTEFT_GEN_A_CORE
Vascular & Interventional Radiology Brief Consult Note    Evaluate for Procedure: Left lower extremity angiogram, possible intervention.     HPI: 60y Male with left lower extremity popliteal to posterior tibial artery bypass presenting with left lower extremity rest pain. Patient started on heparin drip.    Allergies: aspirin (Rash; Urticaria; Hives)  iodine (Rash; Urticaria)    Medications (Abx/Cardiac/Anticoagulation/Blood Products)  cloNIDine: 0.1 milliGRAM(s) Oral (10-17 @ 06:10)  heparin   Injectable: 6500 Unit(s) IV Push (10-17 @ 00:08)  heparin  Infusion.: 0 Unit(s)/Hr IV Continuous (10-17 @ 10:54)    Data:  180.3  80.5  T(C): 36.7  HR: 72  BP: 169/81  RR: 18  SpO2: 97%    -WBC 5.85 / HgB 17.9 / Hct 55.9 / Plt 112  -Na 137 / Cl 92 / BUN 31 / Glucose 168  -K 4.5 / CO2 30 / Cr 7.32  -ALT 10 / Alk Phos 154 / T.Bili 0.5  -INR1.09    Imaging:     ct< from: CT Angio Abd Aorta w/run-off w/ IV Cont (10.17.20 @ 01:18) >    IMPRESSION:  Right lower extremity: Focal high-grade stenoses of the distal superficial femoral and popliteal arteries. Anterior tibial artery occlusion with two-vessel runoff in the foot.    Left lower extremity: Deep femoral artery occlusion. Popliteal artery stent occlusion. Popliteal to anterior tibial artery graft occlusion. Posterior tibial artery occlusion. Single-vessel runoff in the calf via the reconstituted peroneal artery.            CHECO RICHTER M.D., ATTENDING RADIOLOGIST  This document has been electronically signed. Oct 17 2020 11:11AM    < end of copied text >        Assessment/Plan:   -60y Male status post left popliteal to posterior tibial artery bypass presents with left lower extremity rest pain. IR consulted for angiogram with possible intervention. Dr. Leija discussed with Dr. Samaniego, plan for case Monday. Patient with known contrast allergy, would need premedication.   - please make patient NPO after midnight going into Monday  - maintain heparin drip, will call in AM they day of procedure to hold   - AM labs Monday - CBC, BMP  - please maintain active type and screen.   - premedicate with 13 hour protocol starting at 11pm Sunday night for contrast allergy. Vascular & Interventional Radiology Brief Consult Note    Evaluate for Procedure: Left lower extremity angiogram, possible intervention.     HPI: 60y Male with left lower extremity popliteal to posterior tibial artery bypass presenting with left lower extremity rest pain. Patient started on heparin drip.    Allergies: aspirin (Rash; Urticaria; Hives)  iodine (Rash; Urticaria)    Medications (Abx/Cardiac/Anticoagulation/Blood Products)  cloNIDine: 0.1 milliGRAM(s) Oral (10-17 @ 06:10)  heparin   Injectable: 6500 Unit(s) IV Push (10-17 @ 00:08)  heparin  Infusion.: 0 Unit(s)/Hr IV Continuous (10-17 @ 10:54)    Data:  180.3  80.5  T(C): 36.7  HR: 72  BP: 169/81  RR: 18  SpO2: 97%    -WBC 5.85 / HgB 17.9 / Hct 55.9 / Plt 112  -Na 137 / Cl 92 / BUN 31 / Glucose 168  -K 4.5 / CO2 30 / Cr 7.32  -ALT 10 / Alk Phos 154 / T.Bili 0.5  -INR1.09    Imaging:     ct< from: CT Angio Abd Aorta w/run-off w/ IV Cont (10.17.20 @ 01:18) >    IMPRESSION:  Right lower extremity: Focal high-grade stenoses of the distal superficial femoral and popliteal arteries. Anterior tibial artery occlusion with two-vessel runoff in the foot.    Left lower extremity: Deep femoral artery occlusion. Popliteal artery stent occlusion. Popliteal to anterior tibial artery graft occlusion. Posterior tibial artery occlusion. Single-vessel runoff in the calf via the reconstituted peroneal artery.            CHECO RICHTER M.D., ATTENDING RADIOLOGIST  This document has been electronically signed. Oct 17 2020 11:11AM    < end of copied text >        Assessment/Plan:   60y Male status post left popliteal to posterior tibial artery bypass presents with left lower extremity rest pain. IR consulted for angiogram with possible intervention. Dr. Leija discussed with Dr. Samaniego, plan for case Monday. Patient with known contrast allergy, would need premedication.   - please make patient NPO after midnight going into Monday  - maintain heparin drip, will hold on call to IR  - AM labs Monday - CBC, BMP, INR, Aptt  - please maintain active type and screen.   - premedication 13 hour protocol recommended as follows:  --Prednisone PO 50mg 11:00PM Sunday  --Prednisone PO 50mg 5:00 AM Monday  --Prednisone PO 50 mg 11:00 AM Monday  --Diphenhydramine IV 50mg 11:00AM Monday

## 2020-10-17 NOTE — CONSULT NOTE ADULT - ASSESSMENT
60y M with ESRD on HD      #ESRD   Pt. with ESRD on HD three times a week (MWF). Last HD was on 10/16 via LUE AVF. Pt. clinically stable. Labs reviewed. Will plan d for maintenance HD on Monday. Monitor labs. Consent obtained from patient.      #HTN  BP at target range. Monitor BP on current BP medication. Low salt diet.      If you have any questions, please feel free to contact me  Fran Roldan  Nephrology Fellow  Pager: [phone] / [phone]  (After 5pm or on weekends please page the on-call fellow)

## 2020-10-17 NOTE — CONSULT NOTE ADULT - ATTENDING COMMENTS
Pt. seen on 10/17 ~11 am, clinically stable. Plan for maintenance HD on Monday, does not require any additional HD sessions if going for procedures with IV contrast.
Advanced care planning was discussed with patient and family.  Advanced care planning forms were reviewed and discussed as appropriate.  Differential diagnosis and plan of care discussed with patient after the evaluation.   Pain assessed and judicious use of narcotics when appropriate was discussed.  Importance of Fall prevention discussed.  Counseling on Smoking and Alcohol cessation was offered when appropriate.  Counseling on Diet, exercise, and medication compliance was done.

## 2020-10-17 NOTE — CONSULT NOTE ADULT - PROBLEM SELECTOR RECOMMENDATION 9
Deep femoral artery occlusion. Popliteal artery stent occlusion. Popliteal to anterior tibial artery graft occlusion. Posterior tibial artery occlusion. Single-vessel runoff in the calf via the reconstituted peroneal artery.  Heparin gtt   vascular follow up for possible LLE bypass

## 2020-10-17 NOTE — CONSULT NOTE ADULT - ASSESSMENT
58 yo M well known t o m e from office w/ PMH ESRD (on HD M/W/F), glaucoma, DMII, HCV, PAD, RLE DVT s/p thrombectomy, LLE ischemic rest pain s/p popliteal to posterior tibial artery bypass (6/3/2020, Dr. Samaniego) presenting with LLE foot pain. Patient reports b/l lower extremity pain left greater than right started on Wednesday after undergoing nuclear stress test. Pain progressively worsened with intermittent episodes of extreme pain associated with weakness and numbness of the left leg. On the day of his presentation he undewent scheduled dialysis which substantially worsened his pain causing his presentation to the ED

## 2020-10-17 NOTE — CONSULT NOTE ADULT - SUBJECTIVE AND OBJECTIVE BOX
CHIEF COMPLAINT:    HISTORY OF PRESENT ILLNESS:  58 yo M well known t o m e from office w/ PMH ESRD (on HD M/W/F), glaucoma, DMII, HCV, PAD, RLE DVT s/p thrombectomy, LLE ischemic rest pain s/p popliteal to posterior tibial artery bypass (6/3/2020, Dr. Samaniego) presenting with LLE foot pain. Patient reports b/l lower extremity pain left greater than right started on Wednesday after undergoing nuclear stress test. Pain progressively worsened with intermittent episodes of extreme pain associated with weakness and numbness of the left leg. On the day of his presentation he undewent scheduled dialysis which substantially worsened his pain causing his presentation to the ED    PAST MEDICAL & SURGICAL HISTORY:  Glaucoma    Diabetes    HTN (hypertension)    Renal failure  on dialysis    Status post popliteal-tibial bypass  POP-PT graph 6/2020    H/O right wrist surgery  1994            MEDICATIONS:  cloNIDine 0.1 milliGRAM(s) Oral two times a day  heparin  Infusion.  Unit(s)/Hr IV Continuous <Continuous>        acetaminophen   Tablet .. 650 milliGRAM(s) Oral every 6 hours  diphenhydrAMINE 25 milliGRAM(s) Oral every 6 hours PRN  oxyCODONE    IR 5 milliGRAM(s) Oral every 4 hours PRN      dextrose 40% Gel 15 Gram(s) Oral once PRN  dextrose 50% Injectable 12.5 Gram(s) IV Push once  dextrose 50% Injectable 25 Gram(s) IV Push once  dextrose 50% Injectable 25 Gram(s) IV Push once  glucagon  Injectable 1 milliGRAM(s) IntraMuscular once PRN  insulin glargine SubCutaneous Injection (LANTUS) - Peds 15 Unit(s) SubCutaneous at bedtime  insulin lispro (HumaLOG) corrective regimen sliding scale   SubCutaneous three times a day before meals  insulin lispro (HumaLOG) corrective regimen sliding scale   SubCutaneous at bedtime    calcium acetate 1334 milliGRAM(s) Oral three times a day with meals  dextrose 5%. 1000 milliLiter(s) IV Continuous <Continuous>  lidocaine/prilocaine Cream 1 Application(s) Topical daily  sodium chloride 0.9%. 1000 milliLiter(s) IV Continuous <Continuous>      FAMILY HISTORY:  No pertinent family history in first degree relatives        SOCIAL HISTORY:    [ ] Non-smoker  [ ] Smoker  [ ] Alcohol    Allergies    aspirin (Rash; Urticaria; Hives)  iodine (Rash; Urticaria)    Intolerances    	    REVIEW OF SYSTEMS:  CONSTITUTIONAL: No fever, weight loss, or fatigue  EYES: No eye pain, visual disturbances, or discharge  ENMT:  No difficulty hearing, tinnitus, vertigo; No sinus or throat pain  NECK: No pain or stiffness  RESPIRATORY: No cough, wheezing, chills or hemoptysis; No Shortness of Breath  CARDIOVASCULAR: No chest pain, palpitations, passing out, dizziness, or leg swelling  GASTROINTESTINAL: No abdominal or epigastric pain. No nausea, vomiting, or hematemesis; No diarrhea or constipation. No melena or hematochezia.  GENITOURINARY: No dysuria, frequency, hematuria, or incontinence  NEUROLOGICAL: No headaches, memory loss, loss of strength, numbness, or tremors  SKIN: No itching, burning, rashes, or lesions   LYMPH Nodes: No enlarged glands  ENDOCRINE: No heat or cold intolerance; No hair loss  MUSCULOSKELETAL: + joint pain or swelling; No muscle, back, or extremity pain  PSYCHIATRIC: No depression, anxiety, mood swings, or difficulty sleeping  HEME/LYMPH: No easy bruising, or bleeding gums  ALLERY AND IMMUNOLOGIC: No hives or eczema	    [ ] All others negative	  [ ] Unable to obtain    PHYSICAL EXAM:  T(C): 36.4 (10-17-20 @ 17:09), Max: 37 (10-16-20 @ 21:37)  HR: 80 (10-17-20 @ 17:09) (70 - 97)  BP: 166/83 (10-17-20 @ 17:09) (137/77 - 172/103)  RR: 18 (10-17-20 @ 17:09) (18 - 20)  SpO2: 97% (10-17-20 @ 17:09) (95% - 100%)  Wt(kg): --  I&O's Summary    16 Oct 2020 07:01  -  17 Oct 2020 07:00  --------------------------------------------------------  IN: 390 mL / OUT: 0 mL / NET: 390 mL    17 Oct 2020 07:01  -  17 Oct 2020 21:31  --------------------------------------------------------  IN: 1236 mL / OUT: 0 mL / NET: 1236 mL        Appearance: NAD  HEENT:   Dry  oral mucosa, PERRL, EOMI	  Lymphatic: No lymphadenopathy  Cardiovascular: Normal S1 S2, No JVD, No murmurs, No edema  Respiratory: Lungs clear to auscultation	  Psychiatry: A & O x 3, Mood & affect appropriate  Gastrointestinal:  Soft, Non-tender, + BS	  Skin: No rashes, No ecchymoses, No cyanosis	  Neurologic: Non-focal  Extremities and Vascular: no edema, left lower extremity with diminished motor and sensation, cooler than right foot. RLE POP and DP/PT signals. LLE POP signals only. b/l femoral pulses    TELEMETRY:  	    ECG:  	  RADIOLOGY:  < from: CT Angio Abd Aorta w/run-off w/ IV Cont (10.17.20 @ 01:18) >    EXAM:  CT ANGIO ABD AOR W RUN(W)AW IC                            PROCEDURE DATE:  10/17/2020            INTERPRETATION:  CLINICAL INFORMATION: Known peripheral arterial disease. Lower extremity claudication. Nonpalpable distal pulses left lower extremity.    CT ANGIOGRAM ABDOMEN, PELVIS, AND LOWER EXTREMITIES:    TECHNIQUE:  Initially, nonenhanced CT was obtained through the calves. Then, following the rapid administration of intravenous contrast, CT angiography was performed through the abdomen, pelvis, and lower extremities down to the toes.  Delayed images through the calves were also obtained. Sagittal and coronal reformats as well as 3D multiplanar reconstructions were performed.    125 ml of Omnipaque 350 was administered intravenously without complication and 25 ml were discarded.    COMPARISON: CT arteriogram of the aorta and runoff dated 6/1/2020.    FINDINGS:    ABDOMEN AND PELVIS ARTERIAL SYSTEM:    Mild atheromatous plaque in the infrarenal aorta without luminal stenosis or aneurysm. Mesenteric and renal arteries are patent. There is replaced right hepatic artery.    RIGHT LOWER EXTREMITY:    Moderate atheromatous plaque in the common iliac artery. There is segmental occlusion of the internal iliac artery. The external iliac and common femoral arteries are widely patent. There is mild atheromatous irregularity of the proximal superficial femoral artery. There is focal high-grade stenosis of the distal superficial femoral and popliteal artery. The anterior tibial artery is occluded. There is two-vessel runoff in the calf via the peroneal and posterior tibial arteries. The posterior tibial artery is patent in the foot.    LEFT LOWER EXTREMITY:    There is moderate atheromatous plaque in the common iliac artery. Theexternal iliac and common femoral arteries are widely patent. The deep femoral artery is occluded near its origin. There is multifocal narrowing of the superficial femoral artery. There is a metallic stent in the popliteal artery. The stent is occluded. Similarly, popliteal to anterior tibial artery bypass graft is occluded, as is the distal native anterior tibial artery. The posterior tibial artery is occluded. The peroneal artery is reconstituted via collaterals and provides single-vessel runoff to the foot.    ADDITIONAL FINDINGS:  Small gallstones.    IMPRESSION:  Right lower extremity: Focal high-grade stenoses of the distal superficial femoral and popliteal arteries. Anterior tibial artery occlusion with two-vessel runoff in the foot.    Left lower extremity: Deep femoral artery occlusion. Popliteal artery stent occlusion. Popliteal to anterior tibial artery graft occlusion. Posterior tibial artery occlusion. Single-vessel runoff in the calf via the reconstituted peroneal artery.                  CHECO RICHTER M.D., ATTENDING RADIOLOGIST  This document has been electronically signed. Oct 17 2020 11:11AM    < end of copied text >    OTHER: 	  	  LABS:	 	    CARDIAC MARKERS:                                  17.9   5.85  )-----------( 112      ( 17 Oct 2020 05:25 )             55.9     10-17    136  |  94<L>  |  44<H>  ----------------------------<  97  5.4<H>   |  25  |  9.58<H>    Ca    9.0      17 Oct 2020 17:39  Mg     2.4     10-16    TPro  8.2  /  Alb  4.5  /  TBili  0.5  /  DBili  x   /  AST  15  /  ALT  10  /  AlkPhos  154<H>  10-16    proBNP:   Lipid Profile:   HgA1c:   TSH:

## 2020-10-18 LAB
A1C WITH ESTIMATED AVERAGE GLUCOSE RESULT: 9.2 % — HIGH (ref 4–5.6)
ANION GAP SERPL CALC-SCNC: 17 MMOL/L — SIGNIFICANT CHANGE UP (ref 5–17)
APTT BLD: 54.4 SEC — HIGH (ref 27.5–35.5)
APTT BLD: 80.7 SEC — HIGH (ref 27.5–35.5)
APTT BLD: 90 SEC — HIGH (ref 27.5–35.5)
BLD GP AB SCN SERPL QL: NEGATIVE — SIGNIFICANT CHANGE UP
BUN SERPL-MCNC: 54 MG/DL — HIGH (ref 7–23)
CALCIUM SERPL-MCNC: 8.6 MG/DL — SIGNIFICANT CHANGE UP (ref 8.4–10.5)
CHLORIDE SERPL-SCNC: 94 MMOL/L — LOW (ref 96–108)
CO2 SERPL-SCNC: 22 MMOL/L — SIGNIFICANT CHANGE UP (ref 22–31)
CREAT SERPL-MCNC: 10.82 MG/DL — HIGH (ref 0.5–1.3)
ESTIMATED AVERAGE GLUCOSE: 217 MG/DL — HIGH (ref 68–114)
GLUCOSE BLDC GLUCOMTR-MCNC: 100 MG/DL — HIGH (ref 70–99)
GLUCOSE BLDC GLUCOMTR-MCNC: 148 MG/DL — HIGH (ref 70–99)
GLUCOSE BLDC GLUCOMTR-MCNC: 161 MG/DL — HIGH (ref 70–99)
GLUCOSE BLDC GLUCOMTR-MCNC: 163 MG/DL — HIGH (ref 70–99)
GLUCOSE SERPL-MCNC: 109 MG/DL — HIGH (ref 70–99)
HCT VFR BLD CALC: 50.2 % — HIGH (ref 39–50)
HGB BLD-MCNC: 16 G/DL — SIGNIFICANT CHANGE UP (ref 13–17)
MAGNESIUM SERPL-MCNC: 2.7 MG/DL — HIGH (ref 1.6–2.6)
MCHC RBC-ENTMCNC: 26.9 PG — LOW (ref 27–34)
MCHC RBC-ENTMCNC: 31.9 GM/DL — LOW (ref 32–36)
MCV RBC AUTO: 84.5 FL — SIGNIFICANT CHANGE UP (ref 80–100)
NRBC # BLD: 0 /100 WBCS — SIGNIFICANT CHANGE UP (ref 0–0)
PHOSPHATE SERPL-MCNC: 6.9 MG/DL — HIGH (ref 2.5–4.5)
PLATELET # BLD AUTO: 104 K/UL — LOW (ref 150–400)
POTASSIUM SERPL-MCNC: 4.8 MMOL/L — SIGNIFICANT CHANGE UP (ref 3.5–5.3)
POTASSIUM SERPL-SCNC: 4.8 MMOL/L — SIGNIFICANT CHANGE UP (ref 3.5–5.3)
RBC # BLD: 5.94 M/UL — HIGH (ref 4.2–5.8)
RBC # FLD: 19.9 % — HIGH (ref 10.3–14.5)
RH IG SCN BLD-IMP: POSITIVE — SIGNIFICANT CHANGE UP
SODIUM SERPL-SCNC: 133 MMOL/L — LOW (ref 135–145)
WBC # BLD: 3.98 K/UL — SIGNIFICANT CHANGE UP (ref 3.8–10.5)
WBC # FLD AUTO: 3.98 K/UL — SIGNIFICANT CHANGE UP (ref 3.8–10.5)

## 2020-10-18 RX ORDER — INSULIN GLARGINE 100 [IU]/ML
7 INJECTION, SOLUTION SUBCUTANEOUS AT BEDTIME
Refills: 0 | Status: DISCONTINUED | OUTPATIENT
Start: 2020-10-18 | End: 2020-10-18

## 2020-10-18 RX ORDER — HYDRALAZINE HCL 50 MG
10 TABLET ORAL ONCE
Refills: 0 | Status: COMPLETED | OUTPATIENT
Start: 2020-10-18 | End: 2020-10-18

## 2020-10-18 RX ORDER — DIPHENHYDRAMINE HCL 50 MG
50 CAPSULE ORAL EVERY 4 HOURS
Refills: 0 | Status: DISCONTINUED | OUTPATIENT
Start: 2020-10-18 | End: 2020-10-22

## 2020-10-18 RX ORDER — DIPHENHYDRAMINE HCL 50 MG
50 CAPSULE ORAL EVERY 4 HOURS
Refills: 0 | Status: DISCONTINUED | OUTPATIENT
Start: 2020-10-18 | End: 2020-10-18

## 2020-10-18 RX ORDER — INSULIN GLARGINE 100 [IU]/ML
15 INJECTION, SOLUTION SUBCUTANEOUS AT BEDTIME
Refills: 0 | Status: DISCONTINUED | OUTPATIENT
Start: 2020-10-18 | End: 2020-10-22

## 2020-10-18 RX ADMIN — Medication 0.1 MILLIGRAM(S): at 06:09

## 2020-10-18 RX ADMIN — Medication 0.2 MILLIGRAM(S): at 23:37

## 2020-10-18 RX ADMIN — Medication 0.1 MILLIGRAM(S): at 18:18

## 2020-10-18 RX ADMIN — OXYCODONE HYDROCHLORIDE 5 MILLIGRAM(S): 5 TABLET ORAL at 22:12

## 2020-10-18 RX ADMIN — Medication 1: at 18:43

## 2020-10-18 RX ADMIN — Medication 1334 MILLIGRAM(S): at 18:20

## 2020-10-18 RX ADMIN — HEPARIN SODIUM 1100 UNIT(S)/HR: 5000 INJECTION INTRAVENOUS; SUBCUTANEOUS at 22:12

## 2020-10-18 RX ADMIN — HEPARIN SODIUM 1100 UNIT(S)/HR: 5000 INJECTION INTRAVENOUS; SUBCUTANEOUS at 06:08

## 2020-10-18 RX ADMIN — Medication 10 MILLIGRAM(S): at 21:37

## 2020-10-18 RX ADMIN — LIDOCAINE AND PRILOCAINE CREAM 1 APPLICATION(S): 25; 25 CREAM TOPICAL at 12:37

## 2020-10-18 RX ADMIN — OXYCODONE HYDROCHLORIDE 5 MILLIGRAM(S): 5 TABLET ORAL at 22:42

## 2020-10-18 RX ADMIN — Medication 50 MILLIGRAM(S): at 01:46

## 2020-10-18 RX ADMIN — Medication 50 MILLIGRAM(S): at 20:05

## 2020-10-18 RX ADMIN — Medication 1334 MILLIGRAM(S): at 12:37

## 2020-10-18 RX ADMIN — Medication 1334 MILLIGRAM(S): at 09:53

## 2020-10-18 RX ADMIN — INSULIN GLARGINE 15 UNIT(S): 100 INJECTION, SOLUTION SUBCUTANEOUS at 21:38

## 2020-10-18 RX ADMIN — HEPARIN SODIUM 1100 UNIT(S)/HR: 5000 INJECTION INTRAVENOUS; SUBCUTANEOUS at 14:47

## 2020-10-18 NOTE — PROGRESS NOTE ADULT - SUBJECTIVE AND OBJECTIVE BOX
Subjective: Patient seen and examined. No new events except as noted.   feeling better     REVIEW OF SYSTEMS:    CONSTITUTIONAL: No weakness, fevers or chills  EYES/ENT: No visual changes;  No vertigo or throat pain   NECK: No pain or stiffness  RESPIRATORY: No cough, wheezing, hemoptysis; No shortness of breath  CARDIOVASCULAR: No chest pain or palpitations  GASTROINTESTINAL: No abdominal or epigastric pain. No nausea, vomiting, or hematemesis; No diarrhea or constipation. No melena or hematochezia.  GENITOURINARY: No dysuria, frequency or hematuria  NEUROLOGICAL: No numbness or weakness  SKIN: No itching, burning, rashes, or lesions   All other review of systems is negative unless indicated above.    MEDICATIONS:  MEDICATIONS  (STANDING):  acetaminophen   Tablet .. 650 milliGRAM(s) Oral every 6 hours  calcium acetate 1334 milliGRAM(s) Oral three times a day with meals  cloNIDine 0.1 milliGRAM(s) Oral two times a day  dextrose 5%. 1000 milliLiter(s) (50 mL/Hr) IV Continuous <Continuous>  dextrose 50% Injectable 12.5 Gram(s) IV Push once  dextrose 50% Injectable 25 Gram(s) IV Push once  dextrose 50% Injectable 25 Gram(s) IV Push once  heparin  Infusion.  Unit(s)/Hr (15 mL/Hr) IV Continuous <Continuous>  insulin glargine SubCutaneous Injection (LANTUS) - Peds 15 Unit(s) SubCutaneous at bedtime  insulin lispro (HumaLOG) corrective regimen sliding scale   SubCutaneous three times a day before meals  insulin lispro (HumaLOG) corrective regimen sliding scale   SubCutaneous at bedtime  lidocaine/prilocaine Cream 1 Application(s) Topical daily  sodium chloride 0.9%. 1000 milliLiter(s) (50 mL/Hr) IV Continuous <Continuous>      PHYSICAL EXAM:  T(C): 36.3 (10-18-20 @ 09:02), Max: 36.8 (10-17-20 @ 22:27)  HR: 69 (10-18-20 @ 09:02) (67 - 80)  BP: 156/90 (10-18-20 @ 09:02) (145/70 - 177/91)  RR: 18 (10-18-20 @ 09:02) (18 - 18)  SpO2: 98% (10-18-20 @ 09:02) (94% - 98%)  Wt(kg): --  I&O's Summary    17 Oct 2020 07:01  -  18 Oct 2020 07:00  --------------------------------------------------------  IN: 1767 mL / OUT: 50 mL / NET: 1717 mL          Appearance: NAD  HEENT:   Dry  oral mucosa, PERRL, EOMI	  Lymphatic: No lymphadenopathy  Cardiovascular: Normal S1 S2, No JVD, No murmurs, No edema  Respiratory: Lungs clear to auscultation	  Psychiatry: A & O x 3, Mood & affect appropriate  Gastrointestinal:  Soft, Non-tender, + BS	  Skin: No rashes, No ecchymoses, No cyanosis	  Neurologic: Non-focal  Extremities and Vascular: no edema, left lower extremity with diminished motor and sensation, cooler than right foot. RLE POP and DP/PT signals. LLE POP signals only. b/l femoral pulses    LABS:    CARDIAC MARKERS:                                16.0   3.98  )-----------( 104      ( 18 Oct 2020 05:29 )             50.2     10-18    133<L>  |  94<L>  |  54<H>  ----------------------------<  109<H>  4.8   |  22  |  10.82<H>    Ca    8.6      18 Oct 2020 05:29  Phos  6.9     10-18  Mg     2.7     10-18    TPro  8.2  /  Alb  4.5  /  TBili  0.5  /  DBili  x   /  AST  15  /  ALT  10  /  AlkPhos  154<H>  10-16    proBNP:   Lipid Profile:   HgA1c:   TSH:             TELEMETRY: 	    ECG:  	  RADIOLOGY:   DIAGNOSTIC TESTING:  [ ] Echocardiogram:  [ ]  Catheterization:  [ ] Stress Test:    OTHER:

## 2020-10-18 NOTE — PROGRESS NOTE ADULT - SUBJECTIVE AND OBJECTIVE BOX
24h Events:   - PTT subtherapeutic, titrated up  - planning for angiogram with IR Monday  - cleared by cardiology for procedure  - Overnight, no acute events    Subjective:   Patient examined at bedside this AM. Reports pain is better controlled after resuming home lidocaine cream. Denies fever, chills, n/v, chest pain, SOB.    Objective:  Vital Signs  T(C): 36.4 (10-18 @ 05:15), Max: 36.8 (10-17 @ 22:27)  HR: 80 (10-18 @ 05:15) (67 - 80)  BP: 177/91 (10-18 @ 05:15) (145/70 - 177/91)  RR: 18 (10-18 @ 05:15) (18 - 18)  SpO2: 94% (10-18 @ 05:15) (94% - 97%)  10-17-20 @ 07:01  -  10-18-20 @ 07:00  --------------------------------------------------------  IN: 1767 mL / OUT: 50 mL / NET: 1717 mL        Physical Exam:  General: alert and oriented, NAD  Resp: airway patent, respirations unlabored  CVS: regular rate and rhythm  Abdomen: soft, nontender, nondistended  Extremities: no edema, left lower extremity with diminished motor and sensation, cooler than right foot. b/l femoral pulses, no DP/PT signals on L  Skin: chronic skin changes in the lower extremity      Labs:                        16.0   3.98  )-----------( 104      ( 18 Oct 2020 05:29 )             50.2   10-18    133<L>  |  94<L>  |  54<H>  ----------------------------<  109<H>  4.8   |  22  |  10.82<H>    Ca    8.6      18 Oct 2020 05:29  Phos  6.9     10-18  Mg     2.7     10-18    TPro  8.2  /  Alb  4.5  /  TBili  0.5  /  DBili  x   /  AST  15  /  ALT  10  /  AlkPhos  154<H>  10-16    CAPILLARY BLOOD GLUCOSE      POCT Blood Glucose.: 100 mg/dL (18 Oct 2020 08:25)  POCT Blood Glucose.: 174 mg/dL (17 Oct 2020 21:54)  POCT Blood Glucose.: 93 mg/dL (17 Oct 2020 17:50)  POCT Blood Glucose.: 122 mg/dL (17 Oct 2020 12:36)      Medications:   MEDICATIONS  (STANDING):  acetaminophen   Tablet .. 650 milliGRAM(s) Oral every 6 hours  calcium acetate 1334 milliGRAM(s) Oral three times a day with meals  cloNIDine 0.1 milliGRAM(s) Oral two times a day  dextrose 5%. 1000 milliLiter(s) (50 mL/Hr) IV Continuous <Continuous>  dextrose 50% Injectable 12.5 Gram(s) IV Push once  dextrose 50% Injectable 25 Gram(s) IV Push once  dextrose 50% Injectable 25 Gram(s) IV Push once  heparin  Infusion.  Unit(s)/Hr (15 mL/Hr) IV Continuous <Continuous>  insulin glargine SubCutaneous Injection (LANTUS) - Peds 15 Unit(s) SubCutaneous at bedtime  insulin lispro (HumaLOG) corrective regimen sliding scale   SubCutaneous three times a day before meals  insulin lispro (HumaLOG) corrective regimen sliding scale   SubCutaneous at bedtime  lidocaine/prilocaine Cream 1 Application(s) Topical daily  sodium chloride 0.9%. 1000 milliLiter(s) (50 mL/Hr) IV Continuous <Continuous>    MEDICATIONS  (PRN):  dextrose 40% Gel 15 Gram(s) Oral once PRN Blood Glucose LESS THAN 70 milliGRAM(s)/deciliter  diphenhydrAMINE   Injectable 50 milliGRAM(s) IntraMuscular every 4 hours PRN Rash and/or Itching  glucagon  Injectable 1 milliGRAM(s) IntraMuscular once PRN Glucose LESS THAN 70 milligrams/deciliter  oxyCODONE    IR 5 milliGRAM(s) Oral every 4 hours PRN Moderate Pain (4 - 6)      Assessment:   59M PMH ESRD (on HD M/W/F), glaucoma, DMII, HCV, PAD, RLE DVT s/p thrombectomy, LLE ischemic rest pain s/p popliteal to posterior tibial artery bypass (6/3/2020, Dr. Samaniego) presenting with LLE foot pain without PT/PT signals and slightly cooler than RLE. CTA with occluded L deep femoral artery, popliteal stent, pop-AT graft, posterior tibial artery; single vessel runoff in calf via reconstituted peroneal artery    Plan  - IR angiogram Monday, premedicate with benadryl/decadron for contrast allergy, cleared by cardiology (Dr. Aguirre)  - HD last shift tomorrow after IR, last HD 10/16/20  - Continue Hep gtt, titrate for PTT 58-99  - NPO @ MN  - pain control PRN    Discussed with Dr. Reddy, vascular fellow on call.      Sujata Moreira, PGY-1  Vascular Surgery  [phone]

## 2020-10-19 LAB
ANION GAP SERPL CALC-SCNC: 17 MMOL/L — SIGNIFICANT CHANGE UP (ref 5–17)
APTT BLD: 74.6 SEC — HIGH (ref 27.5–35.5)
BUN SERPL-MCNC: 75 MG/DL — HIGH (ref 7–23)
CALCIUM SERPL-MCNC: 8.7 MG/DL — SIGNIFICANT CHANGE UP (ref 8.4–10.5)
CHLORIDE SERPL-SCNC: 97 MMOL/L — SIGNIFICANT CHANGE UP (ref 96–108)
CO2 SERPL-SCNC: 21 MMOL/L — LOW (ref 22–31)
CREAT SERPL-MCNC: 12.41 MG/DL — HIGH (ref 0.5–1.3)
GLUCOSE BLDC GLUCOMTR-MCNC: 121 MG/DL — HIGH (ref 70–99)
GLUCOSE BLDC GLUCOMTR-MCNC: 123 MG/DL — HIGH (ref 70–99)
GLUCOSE BLDC GLUCOMTR-MCNC: 193 MG/DL — HIGH (ref 70–99)
GLUCOSE BLDC GLUCOMTR-MCNC: 210 MG/DL — HIGH (ref 70–99)
GLUCOSE SERPL-MCNC: 162 MG/DL — HIGH (ref 70–99)
HCT VFR BLD CALC: 46.1 % — SIGNIFICANT CHANGE UP (ref 39–50)
HGB BLD-MCNC: 14.4 G/DL — SIGNIFICANT CHANGE UP (ref 13–17)
INR BLD: 0.93 RATIO — SIGNIFICANT CHANGE UP (ref 0.88–1.16)
MAGNESIUM SERPL-MCNC: 2.6 MG/DL — SIGNIFICANT CHANGE UP (ref 1.6–2.6)
MCHC RBC-ENTMCNC: 26 PG — LOW (ref 27–34)
MCHC RBC-ENTMCNC: 31.2 GM/DL — LOW (ref 32–36)
MCV RBC AUTO: 83.2 FL — SIGNIFICANT CHANGE UP (ref 80–100)
NRBC # BLD: 0 /100 WBCS — SIGNIFICANT CHANGE UP (ref 0–0)
PHOSPHATE SERPL-MCNC: 5.9 MG/DL — HIGH (ref 2.5–4.5)
PLATELET # BLD AUTO: 126 K/UL — LOW (ref 150–400)
POTASSIUM SERPL-MCNC: 5.9 MMOL/L — HIGH (ref 3.5–5.3)
POTASSIUM SERPL-SCNC: 5.9 MMOL/L — HIGH (ref 3.5–5.3)
PROTHROM AB SERPL-ACNC: 11.2 SEC — SIGNIFICANT CHANGE UP (ref 10.6–13.6)
RBC # BLD: 5.54 M/UL — SIGNIFICANT CHANGE UP (ref 4.2–5.8)
RBC # FLD: 19.2 % — HIGH (ref 10.3–14.5)
SODIUM SERPL-SCNC: 135 MMOL/L — SIGNIFICANT CHANGE UP (ref 135–145)
WBC # BLD: 7.83 K/UL — SIGNIFICANT CHANGE UP (ref 3.8–10.5)
WBC # FLD AUTO: 7.83 K/UL — SIGNIFICANT CHANGE UP (ref 3.8–10.5)

## 2020-10-19 PROCEDURE — 93010 ELECTROCARDIOGRAM REPORT: CPT

## 2020-10-19 PROCEDURE — 99232 SBSQ HOSP IP/OBS MODERATE 35: CPT | Mod: GC

## 2020-10-19 RX ORDER — DIPHENHYDRAMINE HCL 50 MG
50 CAPSULE ORAL
Refills: 0 | Status: DISCONTINUED | OUTPATIENT
Start: 2020-10-19 | End: 2020-10-20

## 2020-10-19 RX ORDER — METOPROLOL TARTRATE 50 MG
5 TABLET ORAL ONCE
Refills: 0 | Status: COMPLETED | OUTPATIENT
Start: 2020-10-19 | End: 2020-10-19

## 2020-10-19 RX ORDER — HYDROMORPHONE HYDROCHLORIDE 2 MG/ML
0.5 INJECTION INTRAMUSCULAR; INTRAVENOUS; SUBCUTANEOUS ONCE
Refills: 0 | Status: DISCONTINUED | OUTPATIENT
Start: 2020-10-19 | End: 2020-10-19

## 2020-10-19 RX ORDER — HYDRALAZINE HCL 50 MG
10 TABLET ORAL ONCE
Refills: 0 | Status: COMPLETED | OUTPATIENT
Start: 2020-10-19 | End: 2020-10-19

## 2020-10-19 RX ADMIN — Medication 650 MILLIGRAM(S): at 03:23

## 2020-10-19 RX ADMIN — Medication 5 MILLIGRAM(S): at 20:24

## 2020-10-19 RX ADMIN — Medication 0.2 MILLIGRAM(S): at 17:19

## 2020-10-19 RX ADMIN — Medication 10 MILLIGRAM(S): at 21:00

## 2020-10-19 RX ADMIN — Medication 650 MILLIGRAM(S): at 17:22

## 2020-10-19 RX ADMIN — HEPARIN SODIUM 1100 UNIT(S)/HR: 5000 INJECTION INTRAVENOUS; SUBCUTANEOUS at 07:56

## 2020-10-19 RX ADMIN — Medication 650 MILLIGRAM(S): at 23:10

## 2020-10-19 RX ADMIN — OXYCODONE HYDROCHLORIDE 5 MILLIGRAM(S): 5 TABLET ORAL at 18:23

## 2020-10-19 RX ADMIN — Medication 50 MILLIGRAM(S): at 17:30

## 2020-10-19 RX ADMIN — Medication 0.2 MILLIGRAM(S): at 05:15

## 2020-10-19 RX ADMIN — HYDROMORPHONE HYDROCHLORIDE 0.5 MILLIGRAM(S): 2 INJECTION INTRAMUSCULAR; INTRAVENOUS; SUBCUTANEOUS at 21:30

## 2020-10-19 RX ADMIN — Medication 1334 MILLIGRAM(S): at 09:05

## 2020-10-19 RX ADMIN — Medication 650 MILLIGRAM(S): at 09:05

## 2020-10-19 RX ADMIN — OXYCODONE HYDROCHLORIDE 5 MILLIGRAM(S): 5 TABLET ORAL at 15:00

## 2020-10-19 RX ADMIN — Medication 0.2 MILLIGRAM(S): at 23:09

## 2020-10-19 RX ADMIN — Medication 650 MILLIGRAM(S): at 17:19

## 2020-10-19 RX ADMIN — Medication 650 MILLIGRAM(S): at 02:53

## 2020-10-19 RX ADMIN — OXYCODONE HYDROCHLORIDE 5 MILLIGRAM(S): 5 TABLET ORAL at 02:53

## 2020-10-19 RX ADMIN — Medication 1334 MILLIGRAM(S): at 17:19

## 2020-10-19 RX ADMIN — OXYCODONE HYDROCHLORIDE 5 MILLIGRAM(S): 5 TABLET ORAL at 03:23

## 2020-10-19 RX ADMIN — OXYCODONE HYDROCHLORIDE 5 MILLIGRAM(S): 5 TABLET ORAL at 14:21

## 2020-10-19 RX ADMIN — INSULIN GLARGINE 15 UNIT(S): 100 INJECTION, SOLUTION SUBCUTANEOUS at 23:09

## 2020-10-19 RX ADMIN — HYDROMORPHONE HYDROCHLORIDE 0.5 MILLIGRAM(S): 2 INJECTION INTRAMUSCULAR; INTRAVENOUS; SUBCUTANEOUS at 21:00

## 2020-10-19 RX ADMIN — OXYCODONE HYDROCHLORIDE 5 MILLIGRAM(S): 5 TABLET ORAL at 06:54

## 2020-10-19 NOTE — PRE PROCEDURE NOTE - PRE PROCEDURE EVALUATION
------------------------------------------------------------  Interventional Radiology Pre-Procedure Note  ------------------------------------------------------------    Indication: 60y Male with severe PAD s/p left popliteal stent and pop-AT bypass now has occluded left PFA, popliteal stent, and bypass and is referred to interventional radiology for left lower extremity angiogram.    Past Medical History:  Glaucoma    Diabetes    HTN (hypertension)    Renal failure        Allergies: aspirin (Rash; Urticaria; Hives)  iodine (Rash; Urticaria)      Medications:  cloNIDine: 0.1 milliGRAM(s) Oral (10-18-20 @ 18:18)  cloNIDine: 0.2 milliGRAM(s) Oral (10-19-20 @ 17:19)  heparin  Infusion.: 1100 Unit(s)/Hr IV Continuous (10-18-20 @ 22:12)  hydrALAZINE Injectable: 10 milliGRAM(s) IV Push (10-18-20 @ 21:37)  hydrALAZINE Injectable: 10 milliGRAM(s) IV Push (10-19-20 @ 21:00)  metoprolol tartrate Injectable: 5 milliGRAM(s) IV Push (10-19-20 @ 20:24)      Vital Signs:   T(F): 99.3 (21:00), Max: 100.2 (22:56)  HR: 77 (21:00)  BP: 207/91 (21:00)  RR: 17 (21:00)  SpO2: 94% (21:00)    Labs:           14.4  7.83)-----(126     (10-19-20 @ 07:03)         46.1     135 | 97 | 75  --------------------< 162     (10-19-20 @ 07:03)  5.9 | 21 | 12.41       PT: 11.2 10-19-20 @ 07:03  aPTT: 74.6<H> 10-19-20 @ 07:03   INR: 0.93 10-19-20 @ 07:03    Imaging: CTA reviewed, it shows occlusion of the left PFA, the left popilteal stent, the left pop-AT bypass graft and the left PT, with single vessel runoff via the peroneal to the left foot.    Consent: The risks, benefits and alternatives of the procedure were discussed with the patient, who verbalized understanding. Signed consent is available in the paper chart.    Procedure Plan: left lower extremity angiogram 10/20, will perform revascularization if possible however it is unlikely  -premedication protocol discussed with vascular surgery resident. Patient was placed on oral prednisone which per standard protocol should be 50mg 13, 7, and 1 hour prior to procedure. With anticipated procedure start of 1PM we recommend 50mh PO prednisone at 12 AM, 6 AM, and 12 PM on 10/20  -NPO past midnight  -check AM BMP for K--was markedly elevated post-HD      Terry Foley MD, RPVI  Chief Resident, Interventional Radiology  [facility]: [phone]  [facility]: [phone]

## 2020-10-19 NOTE — PROGRESS NOTE ADULT - SUBJECTIVE AND OBJECTIVE BOX
24h Events:   - PTT subtherapeutic, continued titration  - planning for angiogram with IR Tuesday  - cleared by cardiology for procedure  - BP to 220s systolic, 0.2mg TID clonidine given    Subjective:   Patient examined at bedside this AM. Reports pain is better controlled with home lidocaine cream. Denies fever, chills, n/v, chest pain, SOB.    Objective:  Vital Signs  T(C): 37.7 (10-19 @ 05:11), Max: 37.9 (10-18 @ 22:56)  HR: 81 (10-19 @ 05:11) (69 - 95)  BP: 163/79 (10-19 @ 05:11) (155/77 - 210/92)  RR: 17 (10-19 @ 05:11) (17 - 18)  SpO2: 96% (10-19 @ 05:11) (93% - 98%)  10-18-20 @ 07:01  -  10-19-20 @ 07:00  --------------------------------------------------------  IN: 1692 mL / OUT: 50 mL / NET: 1642 mL        Physical Exam:  General: alert and oriented, NAD  Resp: airway patent, respirations unlabored  CVS: regular rate and rhythm  Abdomen: soft, nontender, nondistended  Extremities: no edema, left lower extremity with diminished motor and sensation, cooler than right foot. b/l femoral pulses, no DP/PT signals on L  Skin: chronic skin changes in the lower extremity    Labs:                        14.4   7.83  )-----------( 126      ( 19 Oct 2020 07:03 )             46.1   10-18    133<L>  |  94<L>  |  54<H>  ----------------------------<  109<H>  4.8   |  22  |  10.82<H>    Ca    8.6      18 Oct 2020 05:29  Phos  6.9     10-18  Mg     2.7     10-18      CAPILLARY BLOOD GLUCOSE      POCT Blood Glucose.: 163 mg/dL (18 Oct 2020 21:11)  POCT Blood Glucose.: 161 mg/dL (18 Oct 2020 18:41)  POCT Blood Glucose.: 148 mg/dL (18 Oct 2020 12:22)  POCT Blood Glucose.: 100 mg/dL (18 Oct 2020 08:25)      Medications:   MEDICATIONS  (STANDING):  acetaminophen   Tablet .. 650 milliGRAM(s) Oral every 6 hours  calcium acetate 1334 milliGRAM(s) Oral three times a day with meals  cloNIDine 0.2 milliGRAM(s) Oral three times a day  dextrose 5%. 1000 milliLiter(s) (50 mL/Hr) IV Continuous <Continuous>  dextrose 50% Injectable 12.5 Gram(s) IV Push once  dextrose 50% Injectable 25 Gram(s) IV Push once  dextrose 50% Injectable 25 Gram(s) IV Push once  heparin  Infusion.  Unit(s)/Hr (15 mL/Hr) IV Continuous <Continuous>  insulin glargine Injectable (LANTUS) 15 Unit(s) SubCutaneous at bedtime  insulin lispro (HumaLOG) corrective regimen sliding scale   SubCutaneous three times a day before meals  insulin lispro (HumaLOG) corrective regimen sliding scale   SubCutaneous at bedtime  lidocaine/prilocaine Cream 1 Application(s) Topical daily  sodium chloride 0.9%. 1000 milliLiter(s) (50 mL/Hr) IV Continuous <Continuous>    MEDICATIONS  (PRN):  dextrose 40% Gel 15 Gram(s) Oral once PRN Blood Glucose LESS THAN 70 milliGRAM(s)/deciliter  diphenhydrAMINE   Injectable 50 milliGRAM(s) IV Push every 4 hours PRN Rash and/or Itching  glucagon  Injectable 1 milliGRAM(s) IntraMuscular once PRN Glucose LESS THAN 70 milligrams/deciliter  oxyCODONE    IR 5 milliGRAM(s) Oral every 4 hours PRN Moderate Pain (4 - 6)      Assessment:   59M PMH ESRD (on HD M/W/F), glaucoma, DMII, HCV, PAD, RLE DVT s/p thrombectomy, LLE ischemic rest pain s/p popliteal to posterior tibial artery bypass (6/3/2020, Dr. Samaniego) presenting with LLE foot pain without PT/PT signals and slightly cooler than RLE. CTA with occluded L deep femoral artery, popliteal stent, pop-AT graft, posterior tibial artery; single vessel runoff in calf via reconstituted peroneal artery.    Plan  - IR angiogram Tuesday, premedicate with benadryl/decadron for contrast allergy, cleared by cardiology (Dr. Aguirre)  - HD today 10/19/20, last HD 10/16/20  - Continue Hep gtt, titrate for PTT 58-99  - NPO @ MN  - pain control PRN  Discussed with Dr. Reddy, vascular fellow on call.      Terry Adams, PGY-1  Vascular Surgery  [phone]

## 2020-10-19 NOTE — PROGRESS NOTE ADULT - ASSESSMENT
59 F PMHx ESRD (on HD M/W/F), glaucoma, DM2, HCV, PAD, RLE DVT s/p thrombectomy, LLE ischemic rest pain s/p popliteal to posterior tibial artery bypass (6/3/2020, Dr. Samaniego) presenting with LLE foot pain. Nephrology team following for ESRD - HD management.      #ESRD   Pt. with ESRD on HD three times a week (MWF). Last HD was on 10/16 via [facility].   Receives HD via [facility] at [facility] [place].   Pt. clinically stable. Labs reviewed. Will plan for maintenance HD today. Monitor labs.   Adjust medications to HD       #HTN  BP uncontrolled at this time, might be secondary to severe LE pain.   Monitor BP on current BP medication. Low salt diet.       59 F PMHx ESRD (on HD M/W/F), glaucoma, DM2, HCV, PAD, RLE DVT s/p thrombectomy, LLE ischemic rest pain s/p popliteal to posterior tibial artery bypass (6/3/2020, Dr. Samaniego) presenting with LLE foot pain. Nephrology team following for ESRD - HD management.      #ESRD   Pt. with ESRD on HD three times a week (MWF). Last HD was on 10/16 via [facility].   Receives HD via [facility] at [facility] [place].   Pt. clinically stable. Labs reviewed. Will plan for maintenance HD today. Monitor labs.   Adjust medications to HD       #HTN  BP uncontrolled at this time, might be secondary to severe LE pain.   Monitor BP on current BP medication. Low salt diet.    #hyperkalemia  K at 5.9 this AM  schedule for HD today, 2K bath on HD  hyperkalemic medical management as needed   low K diet

## 2020-10-19 NOTE — PROGRESS NOTE ADULT - SUBJECTIVE AND OBJECTIVE BOX
[facility] DIVISION OF KIDNEY DISEASES AND HYPERTENSION -- FOLLOW UP NOTE  --------------------------------------------------------------------------------  If any questions, please feel free to contact me  NS pager: [phone], [facility]: [phone]  Maximus Coto M.D.  Nephrology Fellow    (After 5 pm or on weekends please page the on-call fellow)  --------------------------------------------------------------------------------    24 hour events/subjective:  PAtient seen and examined at bedside, In mild distress due to pain LLE  was planned today for Angiogram but patient deferred to Tuesday   He is due for HD today. - noted hyperkalemic - Vitals/labs/imaging reviewed.       PAST HISTORY  --------------------------------------------------------------------------------  No significant changes to PMH, PSH, FHx, SHx, unless otherwise noted    ALLERGIES & MEDICATIONS  --------------------------------------------------------------------------------  Allergies    aspirin (Rash; Urticaria; Hives)  iodine (Rash; Urticaria)    Intolerances      Standing Inpatient Medications  acetaminophen   Tablet .. 650 milliGRAM(s) Oral every 6 hours  calcium acetate 1334 milliGRAM(s) Oral three times a day with meals  cloNIDine 0.2 milliGRAM(s) Oral three times a day  dextrose 5%. 1000 milliLiter(s) IV Continuous <Continuous>  dextrose 50% Injectable 12.5 Gram(s) IV Push once  dextrose 50% Injectable 25 Gram(s) IV Push once  dextrose 50% Injectable 25 Gram(s) IV Push once  heparin  Infusion.  Unit(s)/Hr IV Continuous <Continuous>  insulin glargine Injectable (LANTUS) 15 Unit(s) SubCutaneous at bedtime  insulin lispro (HumaLOG) corrective regimen sliding scale   SubCutaneous three times a day before meals  insulin lispro (HumaLOG) corrective regimen sliding scale   SubCutaneous at bedtime  lidocaine/prilocaine Cream 1 Application(s) Topical daily  sodium chloride 0.9%. 1000 milliLiter(s) IV Continuous <Continuous>    PRN Inpatient Medications  dextrose 40% Gel 15 Gram(s) Oral once PRN  diphenhydrAMINE   Injectable 50 milliGRAM(s) IV Push every 4 hours PRN  glucagon  Injectable 1 milliGRAM(s) IntraMuscular once PRN  oxyCODONE    IR 5 milliGRAM(s) Oral every 4 hours PRN      REVIEW OF SYSTEMS  --------------------------------------------------------------------------------  Gen: +ve fatigue-  No fevers/chills  Skin: No rashes  Head/Eyes/Ears: Normal hearing,   Respiratory: No dyspnea, cough  CV: No chest pain  GI: No abdominal pain, diarrhea  : No dysuria, hematuria  MSK: +ve pain of LLE  Heme: No easy bruising or bleeding  Psych: No significant depression      All other systems were reviewed and are negative, except as noted.    VITALS/PHYSICAL EXAM  --------------------------------------------------------------------------------  T(C): 37.7 (10-19-20 @ 05:11), Max: 37.9 (10-18-20 @ 22:56)  HR: 81 (10-19-20 @ 05:11) (72 - 95)  BP: 163/79 (10-19-20 @ 05:11) (155/77 - 210/92)  RR: 17 (10-19-20 @ 05:11) (17 - 18)  SpO2: 96% (10-19-20 @ 05:11) (93% - 96%)  Wt(kg): --        10-18-20 @ 07:01  -  10-19-20 @ 07:00  --------------------------------------------------------  IN: 1692 mL / OUT: 50 mL / NET: 1642 mL        Physical Exam:  	Gen: NAD, well-appearing  	HEENT: Anicteric   	Pulm: CTA B/L, no wheezing   	CV: RRR, S1S2; no rub  	Abd: +BS, soft, nontender/nondistended       	: No suprapubic tenderness  	MSK: Warm, no edema  	Neuro: No focal deficits, AOX3      	Vascular Access: LUE AVF with palpable thrill    LABS/STUDIES  --------------------------------------------------------------------------------              14.4   7.83  >-----------<  126      [10-19-20 @ 07:03]              46.1     135  |  97  |  75  ----------------------------<  162      [10-19-20 @ 07:03]  5.9   |  21  |  12.41        Ca     8.7     [10-19-20 @ 07:03]      Mg     2.6     [10-19-20 @ 07:03]      Phos  5.9     [10-19-20 @ 07:03]      PT/INR: PT 11.2 , INR 0.93       [10-19-20 @ 07:03]  PTT: 74.6       [10-19-20 @ 07:03]      Creatinine Trend:  SCr 12.41 [10-19 @ 07:03]  SCr 10.82 [10-18 @ 05:29]  SCr 9.58 [10-17 @ 17:39]  SCr 7.32 [10-16 @ 22:39]  SCr 11.49 [09-21 @ 02:13]        Iron 32, TIBC 276, %sat 11      [06-09-20 @ 09:19]  Ferritin 53      [06-09-20 @ 09:19]

## 2020-10-19 NOTE — CHART NOTE - NSCHARTNOTEFT_GEN_A_CORE
IR event note    Patient seen this yesterday evening in anticipation of left lower extremity angiogram scheduled for 10/19/2020 with IR. Patient with concerns of undergoing procedure and dialysis in the same day and wishes to defer angiogram until Tuesday. Patient's symptoms have improved and he reports improved pain and less sensory loss since starting heparin drip. Motor function diminished however patient reports this improved as well.     Can reschedule left lower extremity angiogram to Tuesday as patient seems to be improving with heparin drip alone.  - please make patient NPO after midnight in anticipation of procedure Tuesday.   - premedication protocol would need to be rescheduled and adjusted as patient wishes to avoid PO steroids if possible due to prior episodes of hyperglycemia.   - will follow up with new premedication for known contrast allergy (generalized hives)

## 2020-10-19 NOTE — PROGRESS NOTE ADULT - SUBJECTIVE AND OBJECTIVE BOX
Subjective: Patient seen and examined. No new events except as noted.   no cp   LLE pain     REVIEW OF SYSTEMS:    CONSTITUTIONAL: + weakness, fevers or chills  EYES/ENT: No visual changes;  No vertigo or throat pain   NECK: No pain or stiffness  RESPIRATORY: No cough, wheezing, hemoptysis; No shortness of breath  CARDIOVASCULAR: No chest pain or palpitations  GASTROINTESTINAL: No abdominal or epigastric pain. No nausea, vomiting, or hematemesis; No diarrhea or constipation. No melena or hematochezia.  GENITOURINARY: No dysuria, frequency or hematuria  NEUROLOGICAL: No numbness or weakness  SKIN: No itching, burning, rashes, or lesions   All other review of systems is negative unless indicated above.    MEDICATIONS:  MEDICATIONS  (STANDING):  acetaminophen   Tablet .. 650 milliGRAM(s) Oral every 6 hours  calcium acetate 1334 milliGRAM(s) Oral three times a day with meals  cloNIDine 0.2 milliGRAM(s) Oral three times a day  dextrose 5%. 1000 milliLiter(s) (50 mL/Hr) IV Continuous <Continuous>  dextrose 50% Injectable 12.5 Gram(s) IV Push once  dextrose 50% Injectable 25 Gram(s) IV Push once  dextrose 50% Injectable 25 Gram(s) IV Push once  heparin  Infusion.  Unit(s)/Hr (15 mL/Hr) IV Continuous <Continuous>  insulin glargine Injectable (LANTUS) 15 Unit(s) SubCutaneous at bedtime  insulin lispro (HumaLOG) corrective regimen sliding scale   SubCutaneous three times a day before meals  insulin lispro (HumaLOG) corrective regimen sliding scale   SubCutaneous at bedtime  lidocaine/prilocaine Cream 1 Application(s) Topical daily  sodium chloride 0.9%. 1000 milliLiter(s) (50 mL/Hr) IV Continuous <Continuous>      PHYSICAL EXAM:  T(C): 37.7 (10-19-20 @ 05:11), Max: 37.9 (10-18-20 @ 22:56)  HR: 81 (10-19-20 @ 05:11) (72 - 95)  BP: 163/79 (10-19-20 @ 05:11) (155/77 - 210/92)  RR: 17 (10-19-20 @ 05:11) (17 - 18)  SpO2: 96% (10-19-20 @ 05:11) (93% - 96%)  Wt(kg): --  I&O's Summary    18 Oct 2020 07:01  -  19 Oct 2020 07:00  --------------------------------------------------------  IN: 1692 mL / OUT: 50 mL / NET: 1642 mL          Appearance: NAD  HEENT:   Dry  oral mucosa, PERRL, EOMI	  Lymphatic: No lymphadenopathy  Cardiovascular: Normal S1 S2, No JVD, No murmurs, No edema  Respiratory: Lungs clear to auscultation	  Psychiatry: A & O x 3, Mood & affect appropriate  Gastrointestinal:  Soft, Non-tender, + BS	  Skin: No rashes, No ecchymoses, No cyanosis	  Neurologic: Non-focal  Extremities and Vascular: no edema, left lower extremity with diminished motor and sensation, cooler than right foot. RLE POP and DP/PT signals. LLE POP signals only. b/l femoral pulses      LABS:    CARDIAC MARKERS:                                14.4   7.83  )-----------( 126      ( 19 Oct 2020 07:03 )             46.1     10-19    135  |  97  |  75<H>  ----------------------------<  162<H>  5.9<H>   |  21<L>  |  12.41<H>    Ca    8.7      19 Oct 2020 07:03  Phos  5.9     10-19  Mg     2.6     10-19      proBNP:   Lipid Profile:   HgA1c:   TSH:             TELEMETRY: 	    ECG:  	  RADIOLOGY:   DIAGNOSTIC TESTING:  [ ] Echocardiogram:  [ ]  Catheterization:  [ ] Stress Test:    OTHER:

## 2020-10-19 NOTE — PROGRESS NOTE ADULT - ASSESSMENT
60 yo M well known t o m e from office w/ PMH ESRD (on HD M/W/F), glaucoma, DMII, HCV, PAD, RLE DVT s/p thrombectomy, LLE ischemic rest pain s/p popliteal to posterior tibial artery bypass (6/3/2020, Dr. Samaniego) presenting with LLE foot pain. Patient reports b/l lower extremity pain left greater than right started on Wednesday after undergoing nuclear stress test. Pain progressively worsened with intermittent episodes of extreme pain associated with weakness and numbness of the left leg. On the day of his presentation he undewent scheduled dialysis which substantially worsened his pain causing his presentation to the ED

## 2020-10-20 ENCOUNTER — APPOINTMENT (OUTPATIENT)
Dept: VASCULAR SURGERY | Facility: CLINIC | Age: 60
End: 2020-10-20

## 2020-10-20 LAB
ANION GAP SERPL CALC-SCNC: 17 MMOL/L — SIGNIFICANT CHANGE UP (ref 5–17)
ANION GAP SERPL CALC-SCNC: 18 MMOL/L — HIGH (ref 5–17)
APTT BLD: 84.1 SEC — HIGH (ref 27.5–35.5)
BUN SERPL-MCNC: 37 MG/DL — HIGH (ref 7–23)
BUN SERPL-MCNC: 47 MG/DL — HIGH (ref 7–23)
CALCIUM SERPL-MCNC: 8.9 MG/DL — SIGNIFICANT CHANGE UP (ref 8.4–10.5)
CALCIUM SERPL-MCNC: 9.1 MG/DL — SIGNIFICANT CHANGE UP (ref 8.4–10.5)
CHLORIDE SERPL-SCNC: 93 MMOL/L — LOW (ref 96–108)
CHLORIDE SERPL-SCNC: 94 MMOL/L — LOW (ref 96–108)
CO2 SERPL-SCNC: 21 MMOL/L — LOW (ref 22–31)
CO2 SERPL-SCNC: 22 MMOL/L — SIGNIFICANT CHANGE UP (ref 22–31)
CREAT SERPL-MCNC: 7.24 MG/DL — HIGH (ref 0.5–1.3)
CREAT SERPL-MCNC: 9.17 MG/DL — HIGH (ref 0.5–1.3)
GLUCOSE BLDC GLUCOMTR-MCNC: 181 MG/DL — HIGH (ref 70–99)
GLUCOSE BLDC GLUCOMTR-MCNC: 188 MG/DL — HIGH (ref 70–99)
GLUCOSE BLDC GLUCOMTR-MCNC: 205 MG/DL — HIGH (ref 70–99)
GLUCOSE BLDC GLUCOMTR-MCNC: 268 MG/DL — HIGH (ref 70–99)
GLUCOSE SERPL-MCNC: 201 MG/DL — HIGH (ref 70–99)
GLUCOSE SERPL-MCNC: 252 MG/DL — HIGH (ref 70–99)
HCT VFR BLD CALC: 49.3 % — SIGNIFICANT CHANGE UP (ref 39–50)
HGB BLD-MCNC: 15.8 G/DL — SIGNIFICANT CHANGE UP (ref 13–17)
MAGNESIUM SERPL-MCNC: 2.2 MG/DL — SIGNIFICANT CHANGE UP (ref 1.6–2.6)
MCHC RBC-ENTMCNC: 26.6 PG — LOW (ref 27–34)
MCHC RBC-ENTMCNC: 32 GM/DL — SIGNIFICANT CHANGE UP (ref 32–36)
MCV RBC AUTO: 83 FL — SIGNIFICANT CHANGE UP (ref 80–100)
NRBC # BLD: 0 /100 WBCS — SIGNIFICANT CHANGE UP (ref 0–0)
PHOSPHATE SERPL-MCNC: 5.6 MG/DL — HIGH (ref 2.5–4.5)
PLATELET # BLD AUTO: 109 K/UL — LOW (ref 150–400)
POTASSIUM SERPL-MCNC: 5.2 MMOL/L — SIGNIFICANT CHANGE UP (ref 3.5–5.3)
POTASSIUM SERPL-MCNC: 6 MMOL/L — HIGH (ref 3.5–5.3)
POTASSIUM SERPL-SCNC: 5.2 MMOL/L — SIGNIFICANT CHANGE UP (ref 3.5–5.3)
POTASSIUM SERPL-SCNC: 6 MMOL/L — HIGH (ref 3.5–5.3)
RBC # BLD: 5.94 M/UL — HIGH (ref 4.2–5.8)
RBC # FLD: 19.7 % — HIGH (ref 10.3–14.5)
SODIUM SERPL-SCNC: 132 MMOL/L — LOW (ref 135–145)
SODIUM SERPL-SCNC: 133 MMOL/L — LOW (ref 135–145)
WBC # BLD: 8.28 K/UL — SIGNIFICANT CHANGE UP (ref 3.8–10.5)
WBC # FLD AUTO: 8.28 K/UL — SIGNIFICANT CHANGE UP (ref 3.8–10.5)

## 2020-10-20 PROCEDURE — 99231 SBSQ HOSP IP/OBS SF/LOW 25: CPT

## 2020-10-20 PROCEDURE — 76937 US GUIDE VASCULAR ACCESS: CPT | Mod: 26

## 2020-10-20 PROCEDURE — 36200 PLACE CATHETER IN AORTA: CPT | Mod: 59

## 2020-10-20 PROCEDURE — 75710 ARTERY X-RAYS ARM/LEG: CPT | Mod: 26,LT

## 2020-10-20 PROCEDURE — 99232 SBSQ HOSP IP/OBS MODERATE 35: CPT | Mod: GC

## 2020-10-20 RX ORDER — HYDRALAZINE HCL 50 MG
10 TABLET ORAL ONCE
Refills: 0 | Status: DISCONTINUED | OUTPATIENT
Start: 2020-10-20 | End: 2020-10-20

## 2020-10-20 RX ORDER — DIPHENHYDRAMINE HCL 50 MG
50 CAPSULE ORAL ONCE
Refills: 0 | Status: COMPLETED | OUTPATIENT
Start: 2020-10-20 | End: 2020-10-21

## 2020-10-20 RX ORDER — SODIUM CHLORIDE 9 MG/ML
1000 INJECTION, SOLUTION INTRAVENOUS
Refills: 0 | Status: DISCONTINUED | OUTPATIENT
Start: 2020-10-20 | End: 2020-10-20

## 2020-10-20 RX ORDER — HYDRALAZINE HCL 50 MG
10 TABLET ORAL ONCE
Refills: 0 | Status: COMPLETED | OUTPATIENT
Start: 2020-10-20 | End: 2020-10-20

## 2020-10-20 RX ORDER — HYDRALAZINE HCL 50 MG
25 TABLET ORAL EVERY 8 HOURS
Refills: 0 | Status: DISCONTINUED | OUTPATIENT
Start: 2020-10-20 | End: 2020-10-20

## 2020-10-20 RX ORDER — HYDRALAZINE HCL 50 MG
25 TABLET ORAL EVERY 8 HOURS
Refills: 0 | Status: DISCONTINUED | OUTPATIENT
Start: 2020-10-20 | End: 2020-10-22

## 2020-10-20 RX ORDER — INSULIN LISPRO 100/ML
VIAL (ML) SUBCUTANEOUS EVERY 6 HOURS
Refills: 0 | Status: DISCONTINUED | OUTPATIENT
Start: 2020-10-20 | End: 2020-10-20

## 2020-10-20 RX ORDER — INSULIN LISPRO 100/ML
VIAL (ML) SUBCUTANEOUS
Refills: 0 | Status: DISCONTINUED | OUTPATIENT
Start: 2020-10-20 | End: 2020-10-22

## 2020-10-20 RX ADMIN — Medication 50 MILLIGRAM(S): at 15:16

## 2020-10-20 RX ADMIN — Medication 50 MILLIGRAM(S): at 11:25

## 2020-10-20 RX ADMIN — Medication 0.2 MILLIGRAM(S): at 21:24

## 2020-10-20 RX ADMIN — Medication 650 MILLIGRAM(S): at 05:16

## 2020-10-20 RX ADMIN — OXYCODONE HYDROCHLORIDE 5 MILLIGRAM(S): 5 TABLET ORAL at 09:54

## 2020-10-20 RX ADMIN — Medication 650 MILLIGRAM(S): at 21:24

## 2020-10-20 RX ADMIN — Medication 50 MILLIGRAM(S): at 05:16

## 2020-10-20 RX ADMIN — Medication 1: at 12:40

## 2020-10-20 RX ADMIN — Medication 25 MILLIGRAM(S): at 21:26

## 2020-10-20 RX ADMIN — Medication 10 MILLIGRAM(S): at 19:45

## 2020-10-20 RX ADMIN — Medication 25 MILLIGRAM(S): at 14:01

## 2020-10-20 RX ADMIN — OXYCODONE HYDROCHLORIDE 5 MILLIGRAM(S): 5 TABLET ORAL at 10:35

## 2020-10-20 RX ADMIN — INSULIN GLARGINE 15 UNIT(S): 100 INJECTION, SOLUTION SUBCUTANEOUS at 21:25

## 2020-10-20 RX ADMIN — Medication 50 MILLIGRAM(S): at 00:44

## 2020-10-20 RX ADMIN — Medication 10 MILLIGRAM(S): at 01:06

## 2020-10-20 RX ADMIN — OXYCODONE HYDROCHLORIDE 5 MILLIGRAM(S): 5 TABLET ORAL at 02:26

## 2020-10-20 RX ADMIN — Medication 2: at 01:26

## 2020-10-20 RX ADMIN — LIDOCAINE AND PRILOCAINE CREAM 1 APPLICATION(S): 25; 25 CREAM TOPICAL at 12:41

## 2020-10-20 RX ADMIN — Medication 0.2 MILLIGRAM(S): at 05:16

## 2020-10-20 RX ADMIN — Medication 0.2 MILLIGRAM(S): at 13:47

## 2020-10-20 RX ADMIN — HEPARIN SODIUM 1100 UNIT(S)/HR: 5000 INJECTION INTRAVENOUS; SUBCUTANEOUS at 07:24

## 2020-10-20 RX ADMIN — Medication 3: at 05:17

## 2020-10-20 RX ADMIN — Medication 650 MILLIGRAM(S): at 12:40

## 2020-10-20 RX ADMIN — OXYCODONE HYDROCHLORIDE 5 MILLIGRAM(S): 5 TABLET ORAL at 01:26

## 2020-10-20 NOTE — PROGRESS NOTE ADULT - ASSESSMENT
A/P:  59M PMH ESRD (on HD M/W/F), glaucoma, DMII, HCV, PAD, RLE DVT s/p thrombectomy, LLE ischemic rest pain s/p popliteal to posterior tibial artery bypass (6/3/2020, Dr. Samaniego) presenting with LLE foot pain without PT/PT signals and slightly cooler than RLE. CTA with occluded L deep femoral artery, popliteal stent, pop-AT graft, posterior tibial artery; single vessel runoff in calf via reconstituted peroneal artery.    - IR angiogram at 1PM today, premedicate with benadryl/decadron for contrast allergy  - NPO for angiogram  - BP control - will discuss w Cardiology  - Continue Hep gtt, titrate for PTT 58-99  - pain control PRN    Vascular surgery [id number]

## 2020-10-20 NOTE — PROGRESS NOTE ADULT - ASSESSMENT
59 F PMHx ESRD (on HD M/W/F), glaucoma, DM2, HCV, PAD, RLE DVT s/p thrombectomy, LLE ischemic rest pain s/p popliteal to posterior tibial artery bypass (6/3/2020, Dr. Samaniego) presenting with LLE foot pain. Nephrology team following for ESRD - HD management.      #ESRD   Pt. with ESRD on HD three times a week (MWF). Last HD was on 10/19 via LUE AVF with 2L removal  Receives HD via LUE AVF at [facility] [place].   Pt. clinically stable, schedule for angiogram this afternoon, noted hyperkalemic this AM will schedule for short extra HD today  Labs reviewed. Monitor labs.   Adjust medications to HD       #HTN  BP uncontrolled at this time, might be secondary to severe LE pain.   Monitor BP on current BP medication. Low salt diet.    #hyperkalemia  K at 6 this AM  schedule for HD today, 2K bath on HD  hyperkalemic medical management as needed   low K diet

## 2020-10-20 NOTE — PROGRESS NOTE ADULT - SUBJECTIVE AND OBJECTIVE BOX
VASCULAR SURGERY DAILY PROGRESS NOTE:       Subjective / Overnight events:  Hypertensive overnight, given IV hydralazine with minimal response.  Underwent HD yesterday.  Pain well controlled.      Objective:      MEDICATIONS  (STANDING):  acetaminophen   Tablet .. 650 milliGRAM(s) Oral every 6 hours  calcium acetate 1334 milliGRAM(s) Oral three times a day with meals  cloNIDine 0.2 milliGRAM(s) Oral three times a day  dextrose 5%. 1000 milliLiter(s) (50 mL/Hr) IV Continuous <Continuous>  dextrose 50% Injectable 12.5 Gram(s) IV Push once  dextrose 50% Injectable 25 Gram(s) IV Push once  dextrose 50% Injectable 25 Gram(s) IV Push once  diphenhydrAMINE 50 milliGRAM(s) Oral <User Schedule>  heparin  Infusion.  Unit(s)/Hr (15 mL/Hr) IV Continuous <Continuous>  insulin glargine Injectable (LANTUS) 15 Unit(s) SubCutaneous at bedtime  insulin lispro (HumaLOG) corrective regimen sliding scale   SubCutaneous every 6 hours  lidocaine/prilocaine Cream 1 Application(s) Topical daily  predniSONE   Tablet 50 milliGRAM(s) Oral <User Schedule>  sodium chloride 0.9%. 1000 milliLiter(s) (50 mL/Hr) IV Continuous <Continuous>    MEDICATIONS  (PRN):  dextrose 40% Gel 15 Gram(s) Oral once PRN Blood Glucose LESS THAN 70 milliGRAM(s)/deciliter  diphenhydrAMINE   Injectable 50 milliGRAM(s) IV Push every 4 hours PRN Rash and/or Itching  glucagon  Injectable 1 milliGRAM(s) IntraMuscular once PRN Glucose LESS THAN 70 milligrams/deciliter  oxyCODONE    IR 5 milliGRAM(s) Oral every 4 hours PRN Moderate Pain (4 - 6)      Vital Signs Last 24 Hrs  T(C): 36.7 (20 Oct 2020 05:13), Max: 37.4 (19 Oct 2020 21:00)  T(F): 98 (20 Oct 2020 05:13), Max: 99.3 (19 Oct 2020 21:00)  HR: 78 (20 Oct 2020 05:13) (71 - 88)  BP: 183/84 (20 Oct 2020 05:13) (159/72 - 207/91)  BP(mean): --  RR: 18 (20 Oct 2020 05:13) (17 - 20)  SpO2: 96% (20 Oct 2020 05:13) (94% - 100%)    I&O's Detail    19 Oct 2020 07:01  -  20 Oct 2020 07:00  --------------------------------------------------------  IN:    Heparin Infusion: 209 mL    Oral Fluid: 390 mL    sodium chloride 0.9%: 800 mL  Total IN: 1399 mL    OUT:    Other (mL): 2000 mL    Voided (mL): 1 mL  Total OUT: 2001 mL    Total NET: -602 mL          Daily     Daily Weight in k.6 (19 Oct 2020 16:28)    LABS:                        15.8   8.28  )-----------( 109      ( 20 Oct 2020 06:45 )             49.3     10-20    132<L>  |  94<L>  |  47<H>  ----------------------------<  252<H>  6.0<H>   |  21<L>  |  9.17<H>    Ca    8.9      20 Oct 2020 06:45  Phos  5.9     10-19  Mg     2.6     10-19      PT/INR - ( 19 Oct 2020 07:03 )   PT: 11.2 sec;   INR: 0.93 ratio         PTT - ( 20 Oct 2020 06:45 )  PTT:84.1 sec            Physical Exam:  General: alert and oriented, NAD  Resp: airway patent, respirations unlabored  CVS: regular rate and rhythm  Abdomen: soft, nontender, nondistended  Extremities: no edema, left lower extremity with diminished motor and sensation, cooler than right foot. b/l femoral pulses, no DP/PT signals on L  Skin: chronic skin changes in the lower extremity

## 2020-10-20 NOTE — PROGRESS NOTE ADULT - SUBJECTIVE AND OBJECTIVE BOX
[facility] DIVISION OF KIDNEY DISEASES AND HYPERTENSION -- FOLLOW UP NOTE  --------------------------------------------------------------------------------  If any questions, please feel free to contact me  [facility] pager: [phone], [facility]: [phone]  Maximus Coto M.D.  Nephrology Fellow    (After 5 pm or on weekends please page the on-call fellow)  --------------------------------------------------------------------------------      24 hour events/subjective:  Patient seen and examined at beside, noted with K 6 this AM  elevated BUN/Cr- s/p Hd yesterday with 2L removal.   will schedule for HD again today - 2hrs before his angiogram this pm  vitals/labs/imaging reviewed.       PAST HISTORY  --------------------------------------------------------------------------------  No significant changes to PMH, PSH, FHx, SHx, unless otherwise noted    ALLERGIES & MEDICATIONS  --------------------------------------------------------------------------------  Allergies    aspirin (Rash; Urticaria; Hives)  iodine (Rash; Urticaria)    Intolerances      Standing Inpatient Medications  acetaminophen   Tablet .. 650 milliGRAM(s) Oral every 6 hours  calcium acetate 1334 milliGRAM(s) Oral three times a day with meals  cloNIDine 0.2 milliGRAM(s) Oral three times a day  dextrose 5%. 1000 milliLiter(s) IV Continuous <Continuous>  dextrose 50% Injectable 12.5 Gram(s) IV Push once  dextrose 50% Injectable 25 Gram(s) IV Push once  dextrose 50% Injectable 25 Gram(s) IV Push once  heparin  Infusion.  Unit(s)/Hr IV Continuous <Continuous>  insulin glargine Injectable (LANTUS) 15 Unit(s) SubCutaneous at bedtime  insulin lispro (ADMELOG) corrective regimen sliding scale.   SubCutaneous three times a day before meals  lidocaine/prilocaine Cream 1 Application(s) Topical daily  predniSONE   Tablet 50 milliGRAM(s) Oral <User Schedule>  sodium chloride 0.9%. 1000 milliLiter(s) IV Continuous <Continuous>    PRN Inpatient Medications  dextrose 40% Gel 15 Gram(s) Oral once PRN  diphenhydrAMINE 50 milliGRAM(s) Oral once PRN  diphenhydrAMINE   Injectable 50 milliGRAM(s) IV Push every 4 hours PRN  glucagon  Injectable 1 milliGRAM(s) IntraMuscular once PRN  oxyCODONE    IR 5 milliGRAM(s) Oral every 4 hours PRN      REVIEW OF SYSTEMS  --------------------------------------------------------------------------------  Gen: No fevers/chills  Skin: No rashes  Head/Eyes/Ears: Normal hearing,   Respiratory: No dyspnea, cough  CV: No chest pain  GI: No abdominal pain, diarrhea  : No dysuria, hematuria  MSK: +ve LLE pain - No  edema  Heme: No easy bruising or bleeding  Psych: No significant depression      All other systems were reviewed and are negative, except as noted.    VITALS/PHYSICAL EXAM  --------------------------------------------------------------------------------  T(C): 36.6 (10-20-20 @ 08:47), Max: 37.4 (10-19-20 @ 21:00)  HR: 74 (10-20-20 @ 08:47) (71 - 88)  BP: 220/108 (10-20-20 @ 08:47) (159/72 - 220/108)  RR: 18 (10-20-20 @ 08:47) (17 - 20)  SpO2: 100% (10-20-20 @ 08:47) (94% - 100%)  Wt(kg): --        10-19-20 @ 07:01  -  10-20-20 @ 07:00  --------------------------------------------------------  IN: 1399 mL / OUT: 2001 mL / NET: -602 mL    10-20-20 @ 07:01  -  10-20-20 @ 10:15  --------------------------------------------------------  IN: 183 mL / OUT: 0 mL / NET: 183 mL        Physical Exam:  	Gen: NAD, well-appearing  	HEENT: Anicteric   	Pulm: CTA B/L, no wheezing   	CV: RRR, S1S2; no rub  	Abd: +BS, soft, nontender/nondistended       	: No suprapubic tenderness  	MSK: no edema, LLE with diminished sensation, no DP/PT  on Left  	Neuro: No focal deficits, AOX3      	Vascular Access: LUE AVF with palpable thrill      LABS/STUDIES  --------------------------------------------------------------------------------              15.8   8.28  >-----------<  109      [10-20-20 @ 06:45]              49.3     132  |  94  |  47  ----------------------------<  252      [10-20-20 @ 06:45]  6.0   |  21  |  9.17        Ca     8.9     [10-20-20 @ 06:45]      Mg     2.6     [10-19-20 @ 07:03]      Phos  5.9     [10-19-20 @ 07:03]      PT/INR: PT 11.2 , INR 0.93       [10-19-20 @ 07:03]  PTT: 84.1       [10-20-20 @ 06:45]      Creatinine Trend:  SCr 9.17 [10-20 @ 06:45]  SCr 12.41 [10-19 @ 07:03]  SCr 10.82 [10-18 @ 05:29]  SCr 9.58 [10-17 @ 17:39]  SCr 7.32 [10-16 @ 22:39]        Iron 32, TIBC 276, %sat 11      [06-09-20 @ 09:19]  Ferritin 53      [06-09-20 @ 09:19]         [facility] DIVISION OF KIDNEY DISEASES AND HYPERTENSION -- FOLLOW UP NOTE  --------------------------------------------------------------------------------  If any questions, please feel free to contact me  NS pager: [phone], [facility]: [phone]  Maximus Coto M.D.  Nephrology Fellow    (After 5 pm or on weekends please page the on-call fellow)  --------------------------------------------------------------------------------  24 hour events/subjective:  Patient seen and examined at beside, noted with K 6 this AM  elevated BUN/Cr- s/p Hd yesterday with 2L removal.   will schedule for HD again today - 2hrs before his angiogram this pm  vitals/labs/imaging reviewed.       PAST HISTORY  --------------------------------------------------------------------------------  No significant changes to PMH, PSH, FHx, SHx, unless otherwise noted    ALLERGIES & MEDICATIONS  --------------------------------------------------------------------------------  Allergies    aspirin (Rash; Urticaria; Hives)  iodine (Rash; Urticaria)    Intolerances      Standing Inpatient Medications  acetaminophen   Tablet .. 650 milliGRAM(s) Oral every 6 hours  calcium acetate 1334 milliGRAM(s) Oral three times a day with meals  cloNIDine 0.2 milliGRAM(s) Oral three times a day  dextrose 5%. 1000 milliLiter(s) IV Continuous <Continuous>  dextrose 50% Injectable 12.5 Gram(s) IV Push once  dextrose 50% Injectable 25 Gram(s) IV Push once  dextrose 50% Injectable 25 Gram(s) IV Push once  heparin  Infusion.  Unit(s)/Hr IV Continuous <Continuous>  insulin glargine Injectable (LANTUS) 15 Unit(s) SubCutaneous at bedtime  insulin lispro (ADMELOG) corrective regimen sliding scale.   SubCutaneous three times a day before meals  lidocaine/prilocaine Cream 1 Application(s) Topical daily  predniSONE   Tablet 50 milliGRAM(s) Oral <User Schedule>  sodium chloride 0.9%. 1000 milliLiter(s) IV Continuous <Continuous>    PRN Inpatient Medications  dextrose 40% Gel 15 Gram(s) Oral once PRN  diphenhydrAMINE 50 milliGRAM(s) Oral once PRN  diphenhydrAMINE   Injectable 50 milliGRAM(s) IV Push every 4 hours PRN  glucagon  Injectable 1 milliGRAM(s) IntraMuscular once PRN  oxyCODONE    IR 5 milliGRAM(s) Oral every 4 hours PRN      REVIEW OF SYSTEMS  --------------------------------------------------------------------------------  Gen: No fevers/chills  Skin: No rashes  Head/Eyes/Ears: Normal hearing,   Respiratory: No dyspnea, cough  CV: No chest pain  GI: No abdominal pain, diarrhea  : No dysuria, hematuria  MSK: +ve LLE pain - No  edema  Heme: No easy bruising or bleeding  Psych: No significant depression      All other systems were reviewed and are negative, except as noted.    VITALS/PHYSICAL EXAM  --------------------------------------------------------------------------------  T(C): 36.6 (10-20-20 @ 08:47), Max: 37.4 (10-19-20 @ 21:00)  HR: 74 (10-20-20 @ 08:47) (71 - 88)  BP: 220/108 (10-20-20 @ 08:47) (159/72 - 220/108)  RR: 18 (10-20-20 @ 08:47) (17 - 20)  SpO2: 100% (10-20-20 @ 08:47) (94% - 100%)  Wt(kg): --        10-19-20 @ 07:01  -  10-20-20 @ 07:00  --------------------------------------------------------  IN: 1399 mL / OUT: 2001 mL / NET: -602 mL    10-20-20 @ 07:01  -  10-20-20 @ 10:15  --------------------------------------------------------  IN: 183 mL / OUT: 0 mL / NET: 183 mL        Physical Exam:  	Gen: NAD, well-appearing  	HEENT: Anicteric   	Pulm: CTA B/L, no wheezing   	CV: RRR, S1S2; no rub  	Abd: +BS, soft, nontender/nondistended       	: No suprapubic tenderness  	MSK: no edema, LLE with diminished sensation, no DP/PT  on Left  	Neuro: No focal deficits, AOX3      	Vascular Access: LUE AVF with palpable thrill      LABS/STUDIES  --------------------------------------------------------------------------------              15.8   8.28  >-----------<  109      [10-20-20 @ 06:45]              49.3     132  |  94  |  47  ----------------------------<  252      [10-20-20 @ 06:45]  6.0   |  21  |  9.17        Ca     8.9     [10-20-20 @ 06:45]      Mg     2.6     [10-19-20 @ 07:03]      Phos  5.9     [10-19-20 @ 07:03]      PT/INR: PT 11.2 , INR 0.93       [10-19-20 @ 07:03]  PTT: 84.1       [10-20-20 @ 06:45]      Creatinine Trend:  SCr 9.17 [10-20 @ 06:45]  SCr 12.41 [10-19 @ 07:03]  SCr 10.82 [10-18 @ 05:29]  SCr 9.58 [10-17 @ 17:39]  SCr 7.32 [10-16 @ 22:39]        Iron 32, TIBC 276, %sat 11      [06-09-20 @ 09:19]  Ferritin 53      [06-09-20 @ 09:19]

## 2020-10-20 NOTE — PROGRESS NOTE ADULT - SUBJECTIVE AND OBJECTIVE BOX
Subjective: Patient seen and examined. No new events except as noted.   Hypertensive overnight, given IV hydralazine with minimal response.  Underwent HD yesterday.  Pain well controlled.    REVIEW OF SYSTEMS:    CONSTITUTIONAL: No weakness, fevers or chills  EYES/ENT: No visual changes;  No vertigo or throat pain   NECK: No pain or stiffness  RESPIRATORY: No cough, wheezing, hemoptysis; No shortness of breath  CARDIOVASCULAR: No chest pain or palpitations  GASTROINTESTINAL: No abdominal or epigastric pain. No nausea, vomiting, or hematemesis; No diarrhea or constipation. No melena or hematochezia.  GENITOURINARY: No dysuria, frequency or hematuria  NEUROLOGICAL: No numbness or weakness  SKIN: No itching, burning, rashes, or lesions   All other review of systems is negative unless indicated above.    MEDICATIONS:  MEDICATIONS  (STANDING):  acetaminophen   Tablet .. 650 milliGRAM(s) Oral every 6 hours  calcium acetate 1334 milliGRAM(s) Oral three times a day with meals  cloNIDine 0.2 milliGRAM(s) Oral three times a day  dextrose 5%. 1000 milliLiter(s) (50 mL/Hr) IV Continuous <Continuous>  dextrose 50% Injectable 12.5 Gram(s) IV Push once  dextrose 50% Injectable 25 Gram(s) IV Push once  dextrose 50% Injectable 25 Gram(s) IV Push once  heparin  Infusion.  Unit(s)/Hr (15 mL/Hr) IV Continuous <Continuous>  insulin glargine Injectable (LANTUS) 15 Unit(s) SubCutaneous at bedtime  insulin lispro (ADMELOG) corrective regimen sliding scale.   SubCutaneous three times a day before meals  lidocaine/prilocaine Cream 1 Application(s) Topical daily  predniSONE   Tablet 50 milliGRAM(s) Oral <User Schedule>  sodium chloride 0.9%. 1000 milliLiter(s) (50 mL/Hr) IV Continuous <Continuous>      PHYSICAL EXAM:  T(C): 36.1 (10-20-20 @ 10:55), Max: 37.4 (10-19-20 @ 21:00)  HR: 70 (10-20-20 @ 10:55) (70 - 88)  BP: 173/87 (10-20-20 @ 10:55) (159/72 - 220/108)  RR: 18 (10-20-20 @ 10:55) (17 - 20)  SpO2: 98% (10-20-20 @ 10:55) (94% - 100%)  Wt(kg): --  I&O's Summary    19 Oct 2020 07:01  -  20 Oct 2020 07:00  --------------------------------------------------------  IN: 1399 mL / OUT: 2001 mL / NET: -602 mL    20 Oct 2020 07:01  -  20 Oct 2020 12:37  --------------------------------------------------------  IN: 366 mL / OUT: 1500 mL / NET: -1134 mL      Appearance: NAD  HEENT:   Dry  oral mucosa, PERRL, EOMI	  Lymphatic: No lymphadenopathy  Cardiovascular: Normal S1 S2, No JVD, No murmurs, No edema  Respiratory: Lungs clear to auscultation	  Psychiatry: A & O x 3, Mood & affect appropriate  Gastrointestinal:  Soft, Non-tender, + BS	  Skin: No rashes, No ecchymoses, No cyanosis	  Neurologic: Non-focal  Extremities and Vascular: no edema, left lower extremity with diminished motor and sensation, cooler than right foot. RLE POP and DP/PT signals. LLE POP signals only. b/l femoral pulses      LABS:    CARDIAC MARKERS:                                15.8   8.28  )-----------( 109      ( 20 Oct 2020 06:45 )             49.3     10-20    132<L>  |  94<L>  |  47<H>  ----------------------------<  252<H>  6.0<H>   |  21<L>  |  9.17<H>    Ca    8.9      20 Oct 2020 06:45  Phos  5.9     10-19  Mg     2.6     10-19      proBNP:   Lipid Profile:   HgA1c:   TSH:             TELEMETRY: 	    ECG:  	  RADIOLOGY:   DIAGNOSTIC TESTING:  [ ] Echocardiogram:  [ ]  Catheterization:  [ ] Stress Test:    OTHER: 	    · Note Type	Event Note  IR event note      IR event note    Patient seen this yesterday evening in anticipation of left lower extremity angiogram scheduled for 10/19/2020 with IR. Patient with concerns of undergoing procedure and dialysis in the same day and wishes to defer angiogram until Tuesday. Patient's symptoms have improved and he reports improved pain and less sensory loss since starting heparin drip. Motor function diminished however patient reports this improved as well.     Can reschedule left lower extremity angiogram to Tuesday as patient seems to be improving with heparin drip alone.  - please make patient NPO after midnight in anticipation of procedure Tuesday.   - premedication protocol would need to be rescheduled and adjusted as patient wishes to avoid PO steroids if possible due to prior episodes of hyperglycemia.   - will follow up with new premedication for known contrast allergy (generalized hives).      Electronic Signatures:  Jose Hadley)  (Signed 19-Oct-2020 02:15)  	Authored: Note Type, Note

## 2020-10-20 NOTE — PROCEDURE NOTE - PROCEDURE FINDINGS AND DETAILS
left cfa access. patent left sfa. occluded above the knee pop stent and pop to posterior tibial bypass. reconstitution of the peroneal artery in prox calf, which supplies the foot via collaterals. patent dorsalis pedis artery.

## 2020-10-21 LAB
ANION GAP SERPL CALC-SCNC: 22 MMOL/L — HIGH (ref 5–17)
APTT BLD: 29.8 SEC — SIGNIFICANT CHANGE UP (ref 27.5–35.5)
BUN SERPL-MCNC: 61 MG/DL — HIGH (ref 7–23)
CALCIUM SERPL-MCNC: 8.4 MG/DL — SIGNIFICANT CHANGE UP (ref 8.4–10.5)
CHLORIDE SERPL-SCNC: 91 MMOL/L — LOW (ref 96–108)
CO2 SERPL-SCNC: 19 MMOL/L — LOW (ref 22–31)
CREAT SERPL-MCNC: 9.4 MG/DL — HIGH (ref 0.5–1.3)
GLUCOSE BLDC GLUCOMTR-MCNC: 130 MG/DL — HIGH (ref 70–99)
GLUCOSE BLDC GLUCOMTR-MCNC: 178 MG/DL — HIGH (ref 70–99)
GLUCOSE BLDC GLUCOMTR-MCNC: 245 MG/DL — HIGH (ref 70–99)
GLUCOSE BLDC GLUCOMTR-MCNC: 245 MG/DL — HIGH (ref 70–99)
GLUCOSE SERPL-MCNC: 248 MG/DL — HIGH (ref 70–99)
HCT VFR BLD CALC: 48.2 % — SIGNIFICANT CHANGE UP (ref 39–50)
HGB BLD-MCNC: 15.8 G/DL — SIGNIFICANT CHANGE UP (ref 13–17)
MAGNESIUM SERPL-MCNC: 2.2 MG/DL — SIGNIFICANT CHANGE UP (ref 1.6–2.6)
MCHC RBC-ENTMCNC: 26.6 PG — LOW (ref 27–34)
MCHC RBC-ENTMCNC: 32.8 GM/DL — SIGNIFICANT CHANGE UP (ref 32–36)
MCV RBC AUTO: 81.3 FL — SIGNIFICANT CHANGE UP (ref 80–100)
NRBC # BLD: 0 /100 WBCS — SIGNIFICANT CHANGE UP (ref 0–0)
PHOSPHATE SERPL-MCNC: 8.7 MG/DL — HIGH (ref 2.5–4.5)
PLATELET # BLD AUTO: 103 K/UL — LOW (ref 150–400)
POTASSIUM SERPL-MCNC: 5.2 MMOL/L — SIGNIFICANT CHANGE UP (ref 3.5–5.3)
POTASSIUM SERPL-SCNC: 5.2 MMOL/L — SIGNIFICANT CHANGE UP (ref 3.5–5.3)
RBC # BLD: 5.93 M/UL — HIGH (ref 4.2–5.8)
RBC # FLD: 19.7 % — HIGH (ref 10.3–14.5)
SARS-COV-2 RNA SPEC QL NAA+PROBE: SIGNIFICANT CHANGE UP
SODIUM SERPL-SCNC: 132 MMOL/L — LOW (ref 135–145)
WBC # BLD: 11.51 K/UL — HIGH (ref 3.8–10.5)
WBC # FLD AUTO: 11.51 K/UL — HIGH (ref 3.8–10.5)

## 2020-10-21 PROCEDURE — 99232 SBSQ HOSP IP/OBS MODERATE 35: CPT | Mod: GC

## 2020-10-21 RX ORDER — APIXABAN 2.5 MG/1
2.5 TABLET, FILM COATED ORAL
Refills: 0 | Status: DISCONTINUED | OUTPATIENT
Start: 2020-10-21 | End: 2020-10-22

## 2020-10-21 RX ORDER — HEPARIN SODIUM 5000 [USP'U]/ML
5000 INJECTION INTRAVENOUS; SUBCUTANEOUS EVERY 8 HOURS
Refills: 0 | Status: DISCONTINUED | OUTPATIENT
Start: 2020-10-21 | End: 2020-10-21

## 2020-10-21 RX ORDER — CHLORHEXIDINE GLUCONATE 213 G/1000ML
1 SOLUTION TOPICAL DAILY
Refills: 0 | Status: DISCONTINUED | OUTPATIENT
Start: 2020-10-21 | End: 2020-10-22

## 2020-10-21 RX ORDER — CLOPIDOGREL BISULFATE 75 MG/1
75 TABLET, FILM COATED ORAL DAILY
Refills: 0 | Status: DISCONTINUED | OUTPATIENT
Start: 2020-10-21 | End: 2020-10-22

## 2020-10-21 RX ADMIN — Medication 1334 MILLIGRAM(S): at 21:57

## 2020-10-21 RX ADMIN — Medication 50 MILLIGRAM(S): at 17:38

## 2020-10-21 RX ADMIN — Medication 0.2 MILLIGRAM(S): at 21:57

## 2020-10-21 RX ADMIN — Medication 50 MILLIGRAM(S): at 06:01

## 2020-10-21 RX ADMIN — Medication 2: at 13:49

## 2020-10-21 RX ADMIN — OXYCODONE HYDROCHLORIDE 5 MILLIGRAM(S): 5 TABLET ORAL at 01:07

## 2020-10-21 RX ADMIN — Medication 650 MILLIGRAM(S): at 05:59

## 2020-10-21 RX ADMIN — INSULIN GLARGINE 15 UNIT(S): 100 INJECTION, SOLUTION SUBCUTANEOUS at 21:57

## 2020-10-21 RX ADMIN — Medication 25 MILLIGRAM(S): at 13:49

## 2020-10-21 RX ADMIN — CLOPIDOGREL BISULFATE 75 MILLIGRAM(S): 75 TABLET, FILM COATED ORAL at 12:06

## 2020-10-21 RX ADMIN — Medication 1334 MILLIGRAM(S): at 08:44

## 2020-10-21 RX ADMIN — OXYCODONE HYDROCHLORIDE 5 MILLIGRAM(S): 5 TABLET ORAL at 18:07

## 2020-10-21 RX ADMIN — Medication 25 MILLIGRAM(S): at 21:57

## 2020-10-21 RX ADMIN — CHLORHEXIDINE GLUCONATE 1 APPLICATION(S): 213 SOLUTION TOPICAL at 12:06

## 2020-10-21 RX ADMIN — OXYCODONE HYDROCHLORIDE 5 MILLIGRAM(S): 5 TABLET ORAL at 17:29

## 2020-10-21 RX ADMIN — Medication 25 MILLIGRAM(S): at 05:59

## 2020-10-21 RX ADMIN — APIXABAN 2.5 MILLIGRAM(S): 2.5 TABLET, FILM COATED ORAL at 21:57

## 2020-10-21 RX ADMIN — OXYCODONE HYDROCHLORIDE 5 MILLIGRAM(S): 5 TABLET ORAL at 02:07

## 2020-10-21 RX ADMIN — Medication 650 MILLIGRAM(S): at 12:06

## 2020-10-21 RX ADMIN — Medication 0.2 MILLIGRAM(S): at 05:59

## 2020-10-21 RX ADMIN — Medication 1: at 08:44

## 2020-10-21 RX ADMIN — Medication 1334 MILLIGRAM(S): at 12:06

## 2020-10-21 RX ADMIN — Medication 0.2 MILLIGRAM(S): at 13:49

## 2020-10-21 NOTE — PHYSICAL THERAPY INITIAL EVALUATION ADULT - ADDITIONAL COMMENTS
Patient lives in pvt house with mother(who has parkinsons disease) 4 steps to enter and 12 steps inside. Patient ambulated without AD independent. Uses rollator outdoor amb.

## 2020-10-21 NOTE — PROGRESS NOTE ADULT - SUBJECTIVE AND OBJECTIVE BOX
Interventional Radiology Follow- Up Note (seen & examined at 12:40)    This is a 60y Male s/p L leg angiogram on 10/20 in Interventional Radiology with Dr. Chambers.     Patient seen and examined @ bedside. Patient continues to complain of pain at 8 or 9 out of 10 on pain scale in anterior left foot at rest, worsens to 10/10 with activity.    PAST MEDICAL & SURGICAL HISTORY:  Glaucoma    Diabetes    HTN (hypertension)    Renal failure on dialysis    Status post popliteal-tibial bypass  POP-PT graph 6/2020    H/O right wrist surgery 1994    Allergies: aspirin (Rash; Urticaria; Hives)  iodine (Rash; Urticaria)    LABS:                        15.8   11.51 )-----------( 103      ( 21 Oct 2020 06:34 )             48.2     10-21    132<L>  |  91<L>  |  61<H>  ----------------------------<  248<H>  5.2   |  19<L>  |  9.40<H>    Ca    8.4      21 Oct 2020 10:33  Phos  8.7     10-21  Mg     2.2     10-21    PTT - ( 21 Oct 2020 06:35 )  PTT:29.8 sec  I&O's Detail    20 Oct 2020 07:01  -  21 Oct 2020 07:00  --------------------------------------------------------  IN:    Heparin Infusion: 77 mL    Oral Fluid: 280 mL    sodium chloride 0.9%: 450 mL  Total IN: 807 mL    OUT:    Other (mL): 1500 mL    Voided (mL): 0 mL  Total OUT: 1500 mL    Total NET: -693 mL    21 Oct 2020 07:01  -  21 Oct 2020 13:43  --------------------------------------------------------  IN:    Oral Fluid: 200 mL  Total IN: 200 mL    OUT:  Total OUT: 0 mL    Total NET: 200 mL     Vitals: T(F): 97.7 (10-21-20 @ 13:33), Max: 98.6 (10-21-20 @ 08:30)  HR: 69 (10-21-20 @ 13:33) (59 - 90)  BP: 145/80 (10-21-20 @ 10:28) (142/59 - 202/83)  RR: 18 (10-21-20 @ 13:33) (14 - 18)  SpO2: 96% (10-21-20 @ 13:33) (95% - 100%)  Wt(kg): --    PHYSICAL EXAM:  General: Nontoxic, in NAD, A&O x3  Extremities: L groin puncture site c/d/i. No edema, left lower extremity with diminished motor and sensation, cooler than right foot. b/l femoral pulses, no DP/PT signals on L    Impression: 60y Male admitted with Pain of left lower extremity, absent left foot pulses and left foot coolness now day 1 s/p L leg angiogram, which revealed a patent L SFA, an occlusion of above the knee popliteal stent and pop-post fib bypass, reconstitution of peroneal artery in prox calf which supplies foot via collaterals, patent DP artery.    Plan:  -care, pain control per primary team  -PT, rehab planning  -trend vitals, labs  -will discuss with IR attending     Please call IR at extension [phone] with any questions, concerns, or issues regarding above.      Lio REYNA  Spectra # [phone]

## 2020-10-21 NOTE — PROGRESS NOTE ADULT - SUBJECTIVE AND OBJECTIVE BOX
[facility] DIVISION OF KIDNEY DISEASES AND HYPERTENSION -- FOLLOW UP NOTE  --------------------------------------------------------------------------------  If any questions, please feel free to contact me  NS pager: [phone], [facility]: [phone]  Maximus Coto M.D.  Nephrology Fellow    (After 5 pm or on weekends please page the on-call fellow)  --------------------------------------------------------------------------------    24 hour events/subjective:        PAST HISTORY  --------------------------------------------------------------------------------  No significant changes to PMH, PSH, FHx, SHx, unless otherwise noted    ALLERGIES & MEDICATIONS  --------------------------------------------------------------------------------  Allergies    aspirin (Rash; Urticaria; Hives)  iodine (Rash; Urticaria)    Intolerances      Standing Inpatient Medications  acetaminophen   Tablet .. 650 milliGRAM(s) Oral every 6 hours  calcium acetate 1334 milliGRAM(s) Oral three times a day with meals  cloNIDine 0.2 milliGRAM(s) Oral three times a day  dextrose 5%. 1000 milliLiter(s) IV Continuous <Continuous>  dextrose 50% Injectable 12.5 Gram(s) IV Push once  dextrose 50% Injectable 25 Gram(s) IV Push once  dextrose 50% Injectable 25 Gram(s) IV Push once  heparin   Injectable 5000 Unit(s) SubCutaneous every 8 hours  hydrALAZINE 25 milliGRAM(s) Oral every 8 hours  insulin glargine Injectable (LANTUS) 15 Unit(s) SubCutaneous at bedtime  insulin lispro (ADMELOG) corrective regimen sliding scale.   SubCutaneous three times a day before meals  lidocaine/prilocaine Cream 1 Application(s) Topical daily    PRN Inpatient Medications  dextrose 40% Gel 15 Gram(s) Oral once PRN  diphenhydrAMINE   Injectable 50 milliGRAM(s) IV Push every 4 hours PRN  glucagon  Injectable 1 milliGRAM(s) IntraMuscular once PRN  oxyCODONE    IR 5 milliGRAM(s) Oral every 4 hours PRN      REVIEW OF SYSTEMS  --------------------------------------------------------------------------------  Gen: No fevers/chills  Skin: No rashes  Head/Eyes/Ears: Normal hearing,   Respiratory: No dyspnea, cough  CV: No chest pain  GI: No abdominal pain, diarrhea  : No dysuria, hematuria  MSK: No  edema  Heme: No easy bruising or bleeding  Psych: No significant depression      All other systems were reviewed and are negative, except as noted.    VITALS/PHYSICAL EXAM  --------------------------------------------------------------------------------  T(C): 36.7 (10-21-20 @ 05:43), Max: 36.9 (10-20-20 @ 18:10)  HR: 73 (10-21-20 @ 05:43) (59 - 79)  BP: 160/78 (10-21-20 @ 05:43) (142/59 - 206/96)  RR: 18 (10-21-20 @ 05:43) (14 - 18)  SpO2: 95% (10-21-20 @ 05:43) (95% - 100%)  Wt(kg): --  Height (cm): 180.3 (10-20-20 @ 17:03)  Weight (kg): 80 (10-20-20 @ 17:03)  BMI (kg/m2): 24.6 (10-20-20 @ 17:03)  BSA (m2): 2 (10-20-20 @ 17:03)      10-20-20 @ 07:01  -  10-21-20 @ 07:00  --------------------------------------------------------  IN: 807 mL / OUT: 1500 mL / NET: -693 mL        Physical Exam:  	Gen: NAD  	HEENT: MMM  	Pulm: CTA B/L  	CV: S1S2  	Abd: Soft, +BS   	Ext: No LE edema B/L  	Neuro: Awake  	Skin: Warm and dry  	Vascular access:      LABS/STUDIES  --------------------------------------------------------------------------------              15.8   11.51 >-----------<  103      [10-21-20 @ 06:34]              48.2     133  |  93  |  37  ----------------------------<  201      [10-20-20 @ 13:01]  5.2   |  22  |  7.24        Ca     9.1     [10-20-20 @ 13:01]      Mg     2.2     [10-20-20 @ 13:01]      Phos  5.6     [10-20-20 @ 13:01]        PTT: 29.8       [10-21-20 @ 06:35]      Creatinine Trend:  SCr 7.24 [10-20 @ 13:01]  SCr 9.17 [10-20 @ 06:45]  SCr 12.41 [10-19 @ 07:03]  SCr 10.82 [10-18 @ 05:29]  SCr 9.58 [10-17 @ 17:39]        Iron 32, TIBC 276, %sat 11      [06-09-20 @ 09:19]  Ferritin 53      [06-09-20 @ 09:19]       [facility] DIVISION OF KIDNEY DISEASES AND HYPERTENSION -- FOLLOW UP NOTE  --------------------------------------------------------------------------------  If any questions, please feel free to contact me  [facility] pager: [phone], [facility]: [phone]  Maximus Coto M.D.  Nephrology Fellow    (After 5 pm or on weekends please page the on-call fellow)  --------------------------------------------------------------------------------    24 hour events/subjective:  Patient seen and examined at bedside, in NAD  tolerated extra treatment yesterday for hyperkalemia  s/p Angiogram - now reports pain has resolved, feels much better.   Vitals/labs/imaging reviewed.       PAST HISTORY  --------------------------------------------------------------------------------  No significant changes to PMH, PSH, FHx, SHx, unless otherwise noted    ALLERGIES & MEDICATIONS  --------------------------------------------------------------------------------  Allergies    aspirin (Rash; Urticaria; Hives)  iodine (Rash; Urticaria)    Intolerances      Standing Inpatient Medications  acetaminophen   Tablet .. 650 milliGRAM(s) Oral every 6 hours  calcium acetate 1334 milliGRAM(s) Oral three times a day with meals  cloNIDine 0.2 milliGRAM(s) Oral three times a day  dextrose 5%. 1000 milliLiter(s) IV Continuous <Continuous>  dextrose 50% Injectable 12.5 Gram(s) IV Push once  dextrose 50% Injectable 25 Gram(s) IV Push once  dextrose 50% Injectable 25 Gram(s) IV Push once  heparin   Injectable 5000 Unit(s) SubCutaneous every 8 hours  hydrALAZINE 25 milliGRAM(s) Oral every 8 hours  insulin glargine Injectable (LANTUS) 15 Unit(s) SubCutaneous at bedtime  insulin lispro (ADMELOG) corrective regimen sliding scale.   SubCutaneous three times a day before meals  lidocaine/prilocaine Cream 1 Application(s) Topical daily    PRN Inpatient Medications  dextrose 40% Gel 15 Gram(s) Oral once PRN  diphenhydrAMINE   Injectable 50 milliGRAM(s) IV Push every 4 hours PRN  glucagon  Injectable 1 milliGRAM(s) IntraMuscular once PRN  oxyCODONE    IR 5 milliGRAM(s) Oral every 4 hours PRN      REVIEW OF SYSTEMS  --------------------------------------------------------------------------------  Gen: No fevers/chills  Skin: No rashes  Head/Eyes/Ears: Normal hearing,   Respiratory: No dyspnea, cough  CV: No chest pain  GI: No abdominal pain, diarrhea  : No dysuria, hematuria  MSK: No  edema  Heme: No easy bruising or bleeding  Psych: No significant depression      All other systems were reviewed and are negative, except as noted.    VITALS/PHYSICAL EXAM  --------------------------------------------------------------------------------  T(C): 36.7 (10-21-20 @ 05:43), Max: 36.9 (10-20-20 @ 18:10)  HR: 73 (10-21-20 @ 05:43) (59 - 79)  BP: 160/78 (10-21-20 @ 05:43) (142/59 - 206/96)  RR: 18 (10-21-20 @ 05:43) (14 - 18)  SpO2: 95% (10-21-20 @ 05:43) (95% - 100%)  Wt(kg): --  Height (cm): 180.3 (10-20-20 @ 17:03)  Weight (kg): 80 (10-20-20 @ 17:03)  BMI (kg/m2): 24.6 (10-20-20 @ 17:03)  BSA (m2): 2 (10-20-20 @ 17:03)      10-20-20 @ 07:01  -  10-21-20 @ 07:00  --------------------------------------------------------  IN: 807 mL / OUT: 1500 mL / NET: -693 mL        Physical Exam:  	Gen: NAD, well-appearing  	HEENT: Anicteric   	Pulm: CTA B/L, no wheezing   	CV: RRR, S1S2; no rub  	Abd: +BS, soft, nontender/nondistended       	: No suprapubic tenderness  	MSK: no edema  	Neuro: No focal deficits, AOX3      	Vascular Access: LUE AVF with palpable thrill    LABS/STUDIES  --------------------------------------------------------------------------------              15.8   11.51 >-----------<  103      [10-21-20 @ 06:34]              48.2     133  |  93  |  37  ----------------------------<  201      [10-20-20 @ 13:01]  5.2   |  22  |  7.24        Ca     9.1     [10-20-20 @ 13:01]      Mg     2.2     [10-20-20 @ 13:01]      Phos  5.6     [10-20-20 @ 13:01]        PTT: 29.8       [10-21-20 @ 06:35]      Creatinine Trend:  SCr 7.24 [10-20 @ 13:01]  SCr 9.17 [10-20 @ 06:45]  SCr 12.41 [10-19 @ 07:03]  SCr 10.82 [10-18 @ 05:29]  SCr 9.58 [10-17 @ 17:39]        Iron 32, TIBC 276, %sat 11      [06-09-20 @ 09:19]  Ferritin 53      [06-09-20 @ 09:19]

## 2020-10-21 NOTE — PROGRESS NOTE ADULT - ASSESSMENT
A/P:  59M PMH ESRD (on HD M/W/F), glaucoma, DMII, HCV, PAD, RLE DVT s/p thrombectomy, LLE ischemic rest pain s/p popliteal to posterior tibial artery bypass (6/3/2020, Dr. Samaniego) presenting with LLE foot pain without PT/PT signals and slightly cooler than RLE. CTA with occluded L deep femoral artery, popliteal stent, pop-AT graft, posterior tibial artery; single vessel runoff in calf via reconstituted peroneal artery. Left angiogram showed  patent left sfa. occluded above the knee pop stent and pop to posterior tibial bypass. Patient discharge planning.     - Diet Consistent carb renal  - DVT ppx with sub q heparin   - BP control - will discuss w Cardiology   - ASA 81 for antiplatelet therapy continue outpatient  - pain control PRN  - Dispo: Rehab   - Discharge planning    Vascular surgery [id number]   PGY 1 A/P:  59M PMH ESRD (on HD M/W/F), glaucoma, DMII, HCV, PAD, RLE DVT s/p thrombectomy, LLE ischemic rest pain s/p popliteal to posterior tibial artery bypass (6/3/2020, Dr. Samaniego) presenting with LLE foot pain without PT/PT signals and slightly cooler than RLE. CTA with occluded L deep femoral artery, popliteal stent, pop-AT graft, posterior tibial artery; single vessel runoff in calf via reconstituted peroneal artery. Left angiogram showed  patent left sfa. occluded above the knee pop stent and pop to posterior tibial bypass. Patient discharge planning.     - Diet Consistent carb renal  - DVT ppx with sub q heparin   - BP control - will discuss w Cardiology   - ASA 81 for antiplatelet therapy continue outpatient  - pain control PRN  - PT: Graded physical therapy for claudication  - Dispo: Rehab   - Discharge planning    Vascular surgery [id number]   PGY 1 A/P:  59M PMH ESRD (on HD M/W/F), glaucoma, DMII, HCV, PAD, RLE DVT s/p thrombectomy, LLE ischemic rest pain s/p popliteal to posterior tibial artery bypass (6/3/2020, Dr. Samaniego) presenting with LLE foot pain without PT/PT signals and slightly cooler than RLE. CTA with occluded L deep femoral artery, popliteal stent, pop-AT graft, posterior tibial artery; single vessel runoff in calf via reconstituted peroneal artery. Left angiogram showed  patent left sfa. occluded above the knee pop stent and pop to posterior tibial bypass. Patient discharge planning.     - Diet Consistent carb renal  - DVT ppx with sub q heparin   - BP control - will discuss w Cardiology   - pain control PRN  - PT: Graded physical therapy for claudication  - Dispo: Rehab   - Discharge planning    Vascular surgery [id number]   PGY 1

## 2020-10-21 NOTE — PROGRESS NOTE ADULT - ASSESSMENT
59 F PMHx ESRD (on HD M/W/F), glaucoma, DM2, HCV, PAD, RLE DVT s/p thrombectomy, LLE ischemic rest pain s/p popliteal to posterior tibial artery bypass (6/3/2020, Dr. Samaniego) presenting with LLE foot pain. Nephrology team following for ESRD - HD management.      #ESRD   Pt. with ESRD on HD three times a week (MWF). Last HD was on 10/20 due to hyperkalemia- via LUE AVF with 1.5L removal  Receives HD via LUE AVF at [facility].   Pt. clinically stable, s/p angiogram, feels much better- pain resolved. Will schedule for maintenance HD today  Labs reviewed. Monitor labs.   Adjust medications to HD       #HTN  BP better controlled at this time-started on hydralazine   Monitor BP on current BP medication. Low salt diet.    #hyperkalemia  resolved  low K diet

## 2020-10-21 NOTE — PROGRESS NOTE ADULT - SUBJECTIVE AND OBJECTIVE BOX
GENERAL SURGERY PROGRESS NOTE   ___________________________________________________________________    RAMSAMMY DINDIAL | [id number] | 60y Male | [facility] 9MON 915 D1 | LOS 4d    Attending: Ok PEREA Team  ___________________________________________________________________      SUBJECTIVE:   Patient seen today during morning rounds at bedside and without acute distress. Denies chest pain, fever, severe pain, or SOB.     Overnight: Doing well following yesterday dialysis and angiogram    Diet, Consistent Carbohydrate Renal w/Evening Snack (10-17-20 @ 16:20) [Active]      PMH:   Glaucoma    Diabetes    HTN (hypertension)    Renal failure    Status post popliteal-tibial bypass    H/O right wrist surgery          OBJECTIVE:    Allegies: aspirin (Rash; Urticaria; Hives)  iodine (Rash; Urticaria)   NKDA    MEDICATIONS  (STANDING):  acetaminophen   Tablet .. 650 milliGRAM(s) Oral every 6 hours  calcium acetate 1334 milliGRAM(s) Oral three times a day with meals  cloNIDine 0.2 milliGRAM(s) Oral three times a day  dextrose 5%. 1000 milliLiter(s) (50 mL/Hr) IV Continuous <Continuous>  dextrose 50% Injectable 12.5 Gram(s) IV Push once  dextrose 50% Injectable 25 Gram(s) IV Push once  dextrose 50% Injectable 25 Gram(s) IV Push once  heparin   Injectable 5000 Unit(s) SubCutaneous every 8 hours  hydrALAZINE 25 milliGRAM(s) Oral every 8 hours  insulin glargine Injectable (LANTUS) 15 Unit(s) SubCutaneous at bedtime  insulin lispro (ADMELOG) corrective regimen sliding scale.   SubCutaneous three times a day before meals  lidocaine/prilocaine Cream 1 Application(s) Topical daily    MEDICATIONS  (PRN):  dextrose 40% Gel 15 Gram(s) Oral once PRN Blood Glucose LESS THAN 70 milliGRAM(s)/deciliter  diphenhydrAMINE   Injectable 50 milliGRAM(s) IV Push every 4 hours PRN Rash and/or Itching  glucagon  Injectable 1 milliGRAM(s) IntraMuscular once PRN Glucose LESS THAN 70 milligrams/deciliter  oxyCODONE    IR 5 milliGRAM(s) Oral every 4 hours PRN Moderate Pain (4 - 6)      Vitals:  Height (cm): 180.3  Weight (kg): 80  BMI (kg/m2): 24.6  T(C): 36.7 (10-21-20 @ 05:43), Max: 36.9 (10-20-20 @ 18:10)  HR: 73 (10-21-20 @ 05:43) (59 - 79)  BP: 160/78 (10-21-20 @ 05:43) (142/59 - 220/108)  RR: 18 (10-21-20 @ 05:43) (14 - 18)  SpO2: 95% (10-21-20 @ 05:43) (95% - 100%)    Physical Exam:  General: alert and oriented, NAD  Resp: airway patent, respirations unlabored  CVS: regular rate and rhythm  Abdomen: soft, nontender, nondistended  Extremities: no edema, left lower extremity with diminished motor and sensation, cooler than right foot. b/l femoral pulses, no DP/PT signals on L  Skin: chronic skin changes in the lower extremity    Intake&Output:  Totals:    10-20-20 @ 07:01  -  10-21-20 @ 07:00  --------------------------------------------------------  IN: 807 mL / OUT: 1500 mL / NET: -693 mL      Outputs:    10-20-20 @ 07:01  -  10-21-20 @ 07:00  --------------------------------------------------------  OUT:    Voided (mL): 0 mL  Total OUT: 0 mL        Urine Output:    10-20-20 @ 07:01  -  10-21-20 @ 07:00  --------------------------------------------------------  OUT: 0 mL/kg/d        Laboratory:                        15.8   11.51 )-----------( 103      ( 21 Oct 2020 06:34 )             48.2               10-20    133<L>  |  93<L>  |  37<H>  ----------------------------<  201<H>  5.2   |  22  |  7.24<H>    Ca    9.1      20 Oct 2020 13:01  Phos  5.6     10-20  Mg     2.2     10-20        PTT - ( 21 Oct 2020 06:35 )  PTT:29.8 sec    COVID-19 PCR: NotDetec (16 Oct 2020 22:40)  ,   ,   CAPILLARY BLOOD GLUCOSE      POCT Blood Glucose.: 188 mg/dL (20 Oct 2020 21:22)  POCT Blood Glucose.: 181 mg/dL (20 Oct 2020 12:30)        Recent Imaging:    left cfa access. patent left sfa. occluded above the knee pop stent and pop to posterior tibial bypass. reconstitution of the peroneal artery in prox calf, which supplies the foot via collaterals. patent dorsalis pedis artery.    Reviewed laboratory and imaging

## 2020-10-21 NOTE — PROGRESS NOTE ADULT - PROBLEM SELECTOR PLAN 1
s/p Left angiogram showed patent left sfa. occluded above the knee pop stent and pop to posterior tibial bypass.  Plavix and Eliquis 2.5 bid   Vascular surgery follow up

## 2020-10-21 NOTE — PHYSICAL THERAPY INITIAL EVALUATION ADULT - PLANNED THERAPY INTERVENTIONS, PT EVAL
gait training/bed mobility training/transfer training/balance training/GOAL: Pt will negotiate 5 steps  up and down using HR support, independent  within 2-3 weeks./strengthening

## 2020-10-21 NOTE — PROGRESS NOTE ADULT - SUBJECTIVE AND OBJECTIVE BOX
Subjective: Patient seen and examined. No new events except as noted.   feels much better   pain resolved   s/p LE angiogram yesterday     REVIEW OF SYSTEMS:    CONSTITUTIONAL: No weakness, fevers or chills  EYES/ENT: No visual changes;  No vertigo or throat pain   NECK: No pain or stiffness  RESPIRATORY: No cough, wheezing, hemoptysis; No shortness of breath  CARDIOVASCULAR: No chest pain or palpitations  GASTROINTESTINAL: No abdominal or epigastric pain. No nausea, vomiting, or hematemesis; No diarrhea or constipation. No melena or hematochezia.  GENITOURINARY: No dysuria, frequency or hematuria  NEUROLOGICAL: No numbness or weakness  SKIN: No itching, burning, rashes, or lesions   All other review of systems is negative unless indicated above.    MEDICATIONS:  MEDICATIONS  (STANDING):  acetaminophen   Tablet .. 650 milliGRAM(s) Oral every 6 hours  apixaban 2.5 milliGRAM(s) Oral two times a day  calcium acetate 1334 milliGRAM(s) Oral three times a day with meals  chlorhexidine 2% Cloths 1 Application(s) Topical daily  cloNIDine 0.2 milliGRAM(s) Oral three times a day  clopidogrel Tablet 75 milliGRAM(s) Oral daily  dextrose 5%. 1000 milliLiter(s) (50 mL/Hr) IV Continuous <Continuous>  dextrose 50% Injectable 12.5 Gram(s) IV Push once  dextrose 50% Injectable 25 Gram(s) IV Push once  dextrose 50% Injectable 25 Gram(s) IV Push once  hydrALAZINE 25 milliGRAM(s) Oral every 8 hours  insulin glargine Injectable (LANTUS) 15 Unit(s) SubCutaneous at bedtime  insulin lispro (ADMELOG) corrective regimen sliding scale.   SubCutaneous three times a day before meals  lidocaine/prilocaine Cream 1 Application(s) Topical daily      PHYSICAL EXAM:  T(C): 37 (10-21-20 @ 08:30), Max: 37 (10-21-20 @ 08:30)  HR: 73 (10-21-20 @ 05:43) (59 - 79)  BP: 160/78 (10-21-20 @ 05:43) (142/59 - 206/96)  RR: 18 (10-21-20 @ 08:30) (14 - 18)  SpO2: 97% (10-21-20 @ 08:30) (95% - 100%)  Wt(kg): --  I&O's Summary    20 Oct 2020 07:01  -  21 Oct 2020 07:00  --------------------------------------------------------  IN: 807 mL / OUT: 1500 mL / NET: -693 mL    21 Oct 2020 07:01  -  21 Oct 2020 09:46  --------------------------------------------------------  IN: 200 mL / OUT: 0 mL / NET: 200 mL      Height (cm): 180.3 (10-20 @ 17:03)  Weight (kg): 80 (10-20 @ 17:03)  BMI (kg/m2): 24.6 (10-20 @ 17:03)  BSA (m2): 2 (10-20 @ 17:03)    Appearance: Normal	  HEENT:   Normal oral mucosa, PERRL, EOMI	  Lymphatic: No lymphadenopathy , no edema  Cardiovascular: Normal S1 S2, No JVD, No murmurs , Peripheral pulses palpable 2+ bilaterally  Respiratory: Lungs clear to auscultation, normal effort 	  Gastrointestinal:  Soft, Non-tender, + BS	  Skin: No rashes, No ecchymoses, No cyanosis, warm to touch  Musculoskeletal: Normal range of motion, normal strength  Psychiatry:  Mood & affect appropriate  Ext: no edema, left lower extremity with diminished motor and sensation, cooler than right foot. b/l femoral pulses, no DP/PT signals on L      LABS:    CARDIAC MARKERS:                                15.8   11.51 )-----------( 103      ( 21 Oct 2020 06:34 )             48.2     10-20    133<L>  |  93<L>  |  37<H>  ----------------------------<  201<H>  5.2   |  22  |  7.24<H>    Ca    9.1      20 Oct 2020 13:01  Phos  5.6     10-20  Mg     2.2     10-20      proBNP:   Lipid Profile:   HgA1c:   TSH:             TELEMETRY: 	    ECG:  	  RADIOLOGY:   DIAGNOSTIC TESTING:  [ ] Echocardiogram:  [ ]  Catheterization:    [ ] Stress Test:    OTHER:

## 2020-10-21 NOTE — PHYSICAL THERAPY INITIAL EVALUATION ADULT - PERTINENT HX OF CURRENT PROBLEM, REHAB EVAL
59M PMH ESRD (on HD M/W/F), glaucoma, DMII, HCV, PAD, RLE DVT s/p thrombectomy, LLE ischemic rest pain s/p popliteal to posterior tibial artery bypass (6/3/2020, Dr. Samaniego) presenting with LLE foot pain without PT/PT signals and slightly cooler than RLE.

## 2020-10-22 ENCOUNTER — TRANSCRIPTION ENCOUNTER (OUTPATIENT)
Age: 60
End: 2020-10-22

## 2020-10-22 VITALS
SYSTOLIC BLOOD PRESSURE: 133 MMHG | DIASTOLIC BLOOD PRESSURE: 68 MMHG | OXYGEN SATURATION: 96 % | HEART RATE: 68 BPM | RESPIRATION RATE: 18 BRPM

## 2020-10-22 LAB
ANION GAP SERPL CALC-SCNC: 16 MMOL/L — SIGNIFICANT CHANGE UP (ref 5–17)
BUN SERPL-MCNC: 51 MG/DL — HIGH (ref 7–23)
CALCIUM SERPL-MCNC: 8.1 MG/DL — LOW (ref 8.4–10.5)
CHLORIDE SERPL-SCNC: 93 MMOL/L — LOW (ref 96–108)
CO2 SERPL-SCNC: 25 MMOL/L — SIGNIFICANT CHANGE UP (ref 22–31)
CREAT SERPL-MCNC: 7.61 MG/DL — HIGH (ref 0.5–1.3)
GLUCOSE BLDC GLUCOMTR-MCNC: 196 MG/DL — HIGH (ref 70–99)
GLUCOSE BLDC GLUCOMTR-MCNC: 202 MG/DL — HIGH (ref 70–99)
GLUCOSE SERPL-MCNC: 253 MG/DL — HIGH (ref 70–99)
HAV IGM SER-ACNC: SIGNIFICANT CHANGE UP
HBV CORE IGM SER-ACNC: SIGNIFICANT CHANGE UP
HBV SURFACE AG SER-ACNC: SIGNIFICANT CHANGE UP
HCT VFR BLD CALC: 47.8 % — SIGNIFICANT CHANGE UP (ref 39–50)
HCV AB S/CO SERPL IA: 14.27 S/CO — HIGH (ref 0–0.99)
HCV AB SERPL-IMP: REACTIVE
HGB BLD-MCNC: 15.3 G/DL — SIGNIFICANT CHANGE UP (ref 13–17)
MAGNESIUM SERPL-MCNC: 2.4 MG/DL — SIGNIFICANT CHANGE UP (ref 1.6–2.6)
MCHC RBC-ENTMCNC: 26.6 PG — LOW (ref 27–34)
MCHC RBC-ENTMCNC: 32 GM/DL — SIGNIFICANT CHANGE UP (ref 32–36)
MCV RBC AUTO: 83.1 FL — SIGNIFICANT CHANGE UP (ref 80–100)
NRBC # BLD: 0 /100 WBCS — SIGNIFICANT CHANGE UP (ref 0–0)
PHOSPHATE SERPL-MCNC: 6.8 MG/DL — HIGH (ref 2.5–4.5)
PLATELET # BLD AUTO: 112 K/UL — LOW (ref 150–400)
POTASSIUM SERPL-MCNC: 5 MMOL/L — SIGNIFICANT CHANGE UP (ref 3.5–5.3)
POTASSIUM SERPL-SCNC: 5 MMOL/L — SIGNIFICANT CHANGE UP (ref 3.5–5.3)
RBC # BLD: 5.75 M/UL — SIGNIFICANT CHANGE UP (ref 4.2–5.8)
RBC # FLD: 19.4 % — HIGH (ref 10.3–14.5)
SODIUM SERPL-SCNC: 134 MMOL/L — LOW (ref 135–145)
WBC # BLD: 11.18 K/UL — HIGH (ref 3.8–10.5)
WBC # FLD AUTO: 11.18 K/UL — HIGH (ref 3.8–10.5)

## 2020-10-22 PROCEDURE — C1769: CPT

## 2020-10-22 PROCEDURE — 80074 ACUTE HEPATITIS PANEL: CPT

## 2020-10-22 PROCEDURE — U0003: CPT

## 2020-10-22 PROCEDURE — 80053 COMPREHEN METABOLIC PANEL: CPT

## 2020-10-22 PROCEDURE — 97161 PT EVAL LOW COMPLEX 20 MIN: CPT

## 2020-10-22 PROCEDURE — 36200 PLACE CATHETER IN AORTA: CPT

## 2020-10-22 PROCEDURE — 83735 ASSAY OF MAGNESIUM: CPT

## 2020-10-22 PROCEDURE — 96375 TX/PRO/DX INJ NEW DRUG ADDON: CPT

## 2020-10-22 PROCEDURE — 75710 ARTERY X-RAYS ARM/LEG: CPT

## 2020-10-22 PROCEDURE — 86900 BLOOD TYPING SEROLOGIC ABO: CPT

## 2020-10-22 PROCEDURE — 86850 RBC ANTIBODY SCREEN: CPT

## 2020-10-22 PROCEDURE — 85027 COMPLETE CBC AUTOMATED: CPT

## 2020-10-22 PROCEDURE — 97116 GAIT TRAINING THERAPY: CPT

## 2020-10-22 PROCEDURE — 82962 GLUCOSE BLOOD TEST: CPT

## 2020-10-22 PROCEDURE — 96374 THER/PROPH/DIAG INJ IV PUSH: CPT | Mod: XU

## 2020-10-22 PROCEDURE — 93005 ELECTROCARDIOGRAM TRACING: CPT

## 2020-10-22 PROCEDURE — 80048 BASIC METABOLIC PNL TOTAL CA: CPT

## 2020-10-22 PROCEDURE — 83036 HEMOGLOBIN GLYCOSYLATED A1C: CPT

## 2020-10-22 PROCEDURE — 85025 COMPLETE CBC W/AUTO DIFF WBC: CPT

## 2020-10-22 PROCEDURE — 86901 BLOOD TYPING SEROLOGIC RH(D): CPT

## 2020-10-22 PROCEDURE — 75635 CT ANGIO ABDOMINAL ARTERIES: CPT

## 2020-10-22 PROCEDURE — 84100 ASSAY OF PHOSPHORUS: CPT

## 2020-10-22 PROCEDURE — 86769 SARS-COV-2 COVID-19 ANTIBODY: CPT

## 2020-10-22 PROCEDURE — 85730 THROMBOPLASTIN TIME PARTIAL: CPT

## 2020-10-22 PROCEDURE — C1887: CPT

## 2020-10-22 PROCEDURE — C1894: CPT

## 2020-10-22 PROCEDURE — 97110 THERAPEUTIC EXERCISES: CPT

## 2020-10-22 PROCEDURE — 85610 PROTHROMBIN TIME: CPT

## 2020-10-22 PROCEDURE — 87521 HEPATITIS C PROBE&RVRS TRNSC: CPT

## 2020-10-22 PROCEDURE — 99261: CPT

## 2020-10-22 PROCEDURE — 99285 EMERGENCY DEPT VISIT HI MDM: CPT | Mod: 25

## 2020-10-22 PROCEDURE — 76937 US GUIDE VASCULAR ACCESS: CPT

## 2020-10-22 RX ORDER — ACETAMINOPHEN 500 MG
2 TABLET ORAL
Qty: 0 | Refills: 0 | DISCHARGE
Start: 2020-10-22

## 2020-10-22 RX ORDER — HYDRALAZINE HCL 50 MG
1 TABLET ORAL
Qty: 0 | Refills: 0 | DISCHARGE
Start: 2020-10-22

## 2020-10-22 RX ORDER — CLOPIDOGREL BISULFATE 75 MG/1
1 TABLET, FILM COATED ORAL
Qty: 0 | Refills: 0 | DISCHARGE
Start: 2020-10-22

## 2020-10-22 RX ORDER — OXYCODONE HYDROCHLORIDE 5 MG/1
1 TABLET ORAL
Qty: 0 | Refills: 0 | DISCHARGE
Start: 2020-10-22

## 2020-10-22 RX ORDER — INSULIN GLARGINE 100 [IU]/ML
1 INJECTION, SOLUTION SUBCUTANEOUS
Qty: 0 | Refills: 0 | DISCHARGE

## 2020-10-22 RX ORDER — INSULIN ASPART 100 [IU]/ML
15 INJECTION, SOLUTION SUBCUTANEOUS
Qty: 0 | Refills: 0 | DISCHARGE

## 2020-10-22 RX ADMIN — Medication 25 MILLIGRAM(S): at 06:02

## 2020-10-22 RX ADMIN — Medication 0.2 MILLIGRAM(S): at 06:02

## 2020-10-22 RX ADMIN — CLOPIDOGREL BISULFATE 75 MILLIGRAM(S): 75 TABLET, FILM COATED ORAL at 11:16

## 2020-10-22 RX ADMIN — Medication 650 MILLIGRAM(S): at 11:16

## 2020-10-22 RX ADMIN — CHLORHEXIDINE GLUCONATE 1 APPLICATION(S): 213 SOLUTION TOPICAL at 11:17

## 2020-10-22 RX ADMIN — Medication 2: at 08:41

## 2020-10-22 RX ADMIN — Medication 0.2 MILLIGRAM(S): at 13:10

## 2020-10-22 RX ADMIN — OXYCODONE HYDROCHLORIDE 5 MILLIGRAM(S): 5 TABLET ORAL at 07:05

## 2020-10-22 RX ADMIN — Medication 1: at 13:13

## 2020-10-22 RX ADMIN — Medication 25 MILLIGRAM(S): at 13:10

## 2020-10-22 RX ADMIN — Medication 650 MILLIGRAM(S): at 06:02

## 2020-10-22 RX ADMIN — Medication 1334 MILLIGRAM(S): at 08:08

## 2020-10-22 RX ADMIN — OXYCODONE HYDROCHLORIDE 5 MILLIGRAM(S): 5 TABLET ORAL at 06:05

## 2020-10-22 RX ADMIN — Medication 1334 MILLIGRAM(S): at 11:16

## 2020-10-22 RX ADMIN — LIDOCAINE AND PRILOCAINE CREAM 1 APPLICATION(S): 25; 25 CREAM TOPICAL at 11:16

## 2020-10-22 RX ADMIN — APIXABAN 2.5 MILLIGRAM(S): 2.5 TABLET, FILM COATED ORAL at 06:02

## 2020-10-22 NOTE — DISCHARGE NOTE PROVIDER - CARE PROVIDER_API CALL
Ok Samaniego  VASCULAR SURGERY  [address], [address]  [place] [address]  Phone: [phone]  Fax: [phone]  Follow Up Time:    Ok Samaniego  VASCULAR SURGERY  [address], [address]  [place] [address]  Phone: [phone]  Fax: [phone]  Follow Up Time:     Brittany Purcell  INTERNAL MEDICINE  [address], [address] [place] [address]  Phone: [phone]  Fax: [phone]  Follow Up Time:

## 2020-10-22 NOTE — PROGRESS NOTE ADULT - ATTENDING COMMENTS
ESRD on HD MWF outpt at [place] last HD friday  Admitted for ischemic left leg  Had HD yesterday  Hyperkalemia today  Plan for 2 hour HD for hyperkalemia    Then angio per vascular today  Heparin gtt  2K bath    Brittany Purcell MD  Off: [phone]  Cell: [phone]
ESRD on HD MWF outpt at [place] last HD friday  Admitted for ischemic left leg  Plan for HD today  Angio per vascular  Heparin gtt  2K bath  Pain control    Brittany Purcell MD  Off: [phone]  Cell: [phone]
ESRD on HD MWF outpt at [place]  Admitted for ischemic left leg  s/p angio  Plan for HD today  Dc planning    Brittany Purcell MD  Off: [phone]  Cell: [phone]
Advanced care planning was discussed with patient and family.  Advanced care planning forms were reviewed and discussed as appropriate.  Differential diagnosis and plan of care discussed with patient after the evaluation.   Pain assessed and judicious use of narcotics when appropriate was discussed.  Importance of Fall prevention discussed.  Counseling on Smoking and Alcohol cessation was offered when appropriate.  Counseling on Diet, exercise, and medication compliance was done.

## 2020-10-22 NOTE — DISCHARGE NOTE PROVIDER - NSDCMRMEDTOKEN_GEN_ALL_CORE_FT
calcium acetate 667 mg oral tablet: 2 tab(s) orally 3 times a day  cloNIDine 0.1 mg oral tablet: 1 tab(s) orally 2 times a day  cloNIDine 0.2 mg oral tablet: 1 tab(s) orally 3 times a day  insulin glargine 100 units/mL subcutaneous solution: 1 milligram(s) subcutaneous once a day (at bedtime)  Lantus 100 units/mL subcutaneous solution: 15 unit(s) subcutaneous once a day (at bedtime)  MiraLax oral powder for reconstitution: 1 scoop(s) orally once a day  NovoLOG 100 units/mL subcutaneous solution: 15 unit(s) subcutaneous 3 times a day  polyethylene glycol 3350 oral powder for reconstitution: 17 gram(s) orally 2 times a day   acetaminophen 325 mg oral tablet: 2 tab(s) orally every 6 hours  apixaban 2.5 mg oral tablet: 1 tab(s) orally 2 times a day  calcium acetate 667 mg oral tablet: 2 tab(s) orally 3 times a day  cloNIDine 0.1 mg oral tablet: 1 tab(s) orally 2 times a day  cloNIDine 0.2 mg oral tablet: 1 tab(s) orally 3 times a day  clopidogrel 75 mg oral tablet: 1 tab(s) orally once a day  hydrALAZINE 25 mg oral tablet: 1 tab(s) orally every 8 hours  insulin glargine 100 units/mL subcutaneous solution: 1 milligram(s) subcutaneous once a day (at bedtime)  Lantus 100 units/mL subcutaneous solution: 15 unit(s) subcutaneous once a day (at bedtime)  MiraLax oral powder for reconstitution: 1 scoop(s) orally once a day  NovoLOG 100 units/mL subcutaneous solution: 15 unit(s) subcutaneous 3 times a day  oxyCODONE 5 mg oral tablet: 1 tab(s) orally every 4 hours, As needed, Moderate Pain (4 - 6)  polyethylene glycol 3350 oral powder for reconstitution: 17 gram(s) orally 2 times a day   acetaminophen 325 mg oral tablet: 2 tab(s) orally every 6 hours  apixaban 2.5 mg oral tablet: 1 tab(s) orally 2 times a day  calcium acetate 667 mg oral tablet: 2 tab(s) orally 3 times a day  cloNIDine 0.2 mg oral tablet: 1 tab(s) orally 3 times a day  clopidogrel 75 mg oral tablet: 1 tab(s) orally once a day  hydrALAZINE 25 mg oral tablet: 1 tab(s) orally every 8 hours  Lantus 100 units/mL subcutaneous solution: 15 unit(s) subcutaneous once a day (at bedtime)  MiraLax oral powder for reconstitution: 1 scoop(s) orally once a day  NovoLOG 100 units/mL subcutaneous solution: 15 unit(s) subcutaneous 3 times a day  oxyCODONE 5 mg oral tablet: 1 tab(s) orally every 4 hours, As needed, Moderate Pain (4 - 6)   acetaminophen 325 mg oral tablet: 2 tab(s) orally every 6 hours  apixaban 2.5 mg oral tablet: 1 tab(s) orally 2 times a day  calcium acetate 667 mg oral tablet: 2 tab(s) orally 3 times a day  cloNIDine 0.2 mg oral tablet: 1 tab(s) orally 3 times a day  clopidogrel 75 mg oral tablet: 1 tab(s) orally once a day  hydrALAZINE 25 mg oral tablet: 1 tab(s) orally every 8 hours  insulin lispro 100 units/mL injectable solution: 0 Unit(s) if Glucose 61 - 150  1 Unit(s) if Glucose 151 - 200  2 Unit(s) if Glucose 201 - 250  3 Unit(s) if Glucose 251 - 300  4 Unit(s) if Glucose 301 - 350  5 Unit(s) if Glucose 351 - 400  6 Unit(s) if Glucose Greater Than 400  Lantus 100 units/mL subcutaneous solution: 15 unit(s) subcutaneous once a day (at bedtime)  MiraLax oral powder for reconstitution: 1 scoop(s) orally once a day  oxyCODONE 5 mg oral tablet: 1 tab(s) orally every 4 hours, As needed, Moderate Pain (4 - 6)

## 2020-10-22 NOTE — PROGRESS NOTE ADULT - PROBLEM SELECTOR PROBLEM 1
ESRD (end stage renal disease) on dialysis
ESRD (end stage renal disease) on dialysis
Leg pain, diffuse, left
ESRD (end stage renal disease) on dialysis

## 2020-10-22 NOTE — DISCHARGE NOTE PROVIDER - CARE PROVIDERS DIRECT ADDRESSES
[email or web address]

## 2020-10-22 NOTE — DISCHARGE NOTE NURSING/CASE MANAGEMENT/SOCIAL WORK - PATIENT PORTAL LINK FT
You can access the FollowMyHealth Patient Portal offered by [facility] by registering at the following website: [email or web address]. By joining [facility]’s FollowMyHealth portal, you will also be able to view your health information using other applications (apps) compatible with our system.

## 2020-10-22 NOTE — DISCHARGE NOTE PROVIDER - NSDCCPCAREPLAN_GEN_ALL_CORE_FT
PRINCIPAL DISCHARGE DIAGNOSIS  Diagnosis: Leg pain, diffuse, left  Assessment and Plan of Treatment: WOUND CARE: Please monitor groin for any sign of erythema or swelling.    ACTIVITY:  You may return to your usual level of physical activity.   NOTIFY YOUR SURGEON IF: You have any pus draining from your wound, any fever (over 100.4 F) or chills, worsening pain in the lower legs, numbness or if your pain is not controlled on your discharge pain medications.  FOLLOW-UP:  1. Please call to make a follow-up appointment in 1-2 weeks upon discharge from the hospital with Dr. Samaniego  2. Please follow up with your primary care physician in one week regarding your hospitalization.

## 2020-10-22 NOTE — PROVIDER CONTACT NOTE (OTHER) - ASSESSMENT
No S/S of bleeding
pt found sitting on the edge of the bed asking "where am I?". pt knows name, , month, year & president. Did not know where he was or why he came to the hospital, although he did recognize me.

## 2020-10-22 NOTE — DISCHARGE NOTE PROVIDER - HOSPITAL COURSE
59M PMH ESRD (on HD M/W/F), glaucoma, DMII, HCV, PAD, RLE DVT s/p thrombectomy, LLE ischemic rest pain s/p popliteal to posterior tibial artery bypass (6/3/2020, Dr. Samaniego) presenting with LLE foot pain. No DP/PT signals and slightly cooler than RLE. CTA with occluded L deep femoral artery, popliteal stent, pop-AT graft, posterior tibial artery; single vessel runoff in calf via reconstituted peroneal artery  He was admitted to vascular on a Heparin gtt with plans for angiogram.  He was medically optimized to undergo angio and given steroids pre procedure given contrast allergy.  Angiogram on 10/20 with IR revealed eft cfa access. patent left sfa. occluded above the knee pop stent and pop to posterior tibial bypass. reconstitution of the peroneal artery in prox calf, which supplies the foot via collaterals. patent dorsalis pedis artery. Cardiology assisted with blood pressure management  .  Renal was on board for HD and patient maintained regular MWF schedule.  Physical therapy recommended SANJUANA.  On day of discharge, the patient was tolerating diet, ambulating with assistance and pain controlled. 59M PMH ESRD (on HD M/W/F), glaucoma, DMII, HCV, PAD, RLE DVT s/p thrombectomy, LLE ischemic rest pain s/p popliteal to posterior tibial artery bypass (6/3/2020, Dr. Samaniego) presenting with LLE foot pain. No DP/PT signals and slightly cooler than RLE. CTA with occluded L deep femoral artery, popliteal stent, pop-AT graft, posterior tibial artery; single vessel runoff in calf via reconstituted peroneal artery  He was admitted to vascular on a Heparin gtt with plans for angiogram.  He was medically optimized to undergo angio and given steroids pre procedure given contrast allergy.  Angiogram on 10/20 with IR revealed eft cfa access. patent left sfa. occluded above the knee pop stent and pop to posterior tibial bypass. reconstitution of the peroneal artery in prox calf, which supplies the foot via collaterals. patent dorsalis pedis artery. Cardiology assisted with blood pressure management  Renal was on board for HD and patient maintained regular MWF schedule.  Physical therapy recommended [facility]NA.  On day of discharge, the patient was tolerating diet, ambulating with assistance and pain controlled.

## 2020-10-22 NOTE — DISCHARGE NOTE PROVIDER - PROVIDER TOKENS
PROVIDER:[TOKEN:[[id number]]] PROVIDER:[TOKEN:[[id number]]],PROVIDER:[TOKEN:[[id number]]]

## 2020-10-22 NOTE — PROGRESS NOTE ADULT - SUBJECTIVE AND OBJECTIVE BOX
VASCULAR SURGERY DAILY PROGRESS NOTE:       SUBJECTIVE/ROS: Patient feels well- no events overnight- tolerated HD- PT recommended SANJUANA- Denies nausea, vomiting, chest pain, shortness of breath         MEDICATIONS  (STANDING):  acetaminophen   Tablet .. 650 milliGRAM(s) Oral every 6 hours  apixaban 2.5 milliGRAM(s) Oral two times a day  calcium acetate 1334 milliGRAM(s) Oral three times a day with meals  chlorhexidine 2% Cloths 1 Application(s) Topical daily  cloNIDine 0.2 milliGRAM(s) Oral three times a day  clopidogrel Tablet 75 milliGRAM(s) Oral daily  dextrose 5%. 1000 milliLiter(s) (50 mL/Hr) IV Continuous <Continuous>  dextrose 50% Injectable 12.5 Gram(s) IV Push once  dextrose 50% Injectable 25 Gram(s) IV Push once  dextrose 50% Injectable 25 Gram(s) IV Push once  hydrALAZINE 25 milliGRAM(s) Oral every 8 hours  insulin glargine Injectable (LANTUS) 15 Unit(s) SubCutaneous at bedtime  insulin lispro (ADMELOG) corrective regimen sliding scale.   SubCutaneous three times a day before meals  lidocaine/prilocaine Cream 1 Application(s) Topical daily    MEDICATIONS  (PRN):  dextrose 40% Gel 15 Gram(s) Oral once PRN Blood Glucose LESS THAN 70 milliGRAM(s)/deciliter  diphenhydrAMINE   Injectable 50 milliGRAM(s) IV Push every 4 hours PRN Rash and/or Itching  glucagon  Injectable 1 milliGRAM(s) IntraMuscular once PRN Glucose LESS THAN 70 milligrams/deciliter  oxyCODONE    IR 5 milliGRAM(s) Oral every 4 hours PRN Moderate Pain (4 - 6)      OBJECTIVE:    Vital Signs Last 24 Hrs  T(C): 36.8 (22 Oct 2020 05:50), Max: 37 (21 Oct 2020 08:30)  T(F): 98.3 (22 Oct 2020 05:50), Max: 98.6 (21 Oct 2020 08:30)  HR: 70 (22 Oct 2020 05:50) (67 - 90)  BP: 154/75 (22 Oct 2020 05:50) (145/80 - 167/77)  BP(mean): --  RR: 16 (22 Oct 2020 05:50) (16 - 20)  SpO2: 96% (22 Oct 2020 05:50) (94% - 99%)        I&O's Detail    21 Oct 2020 07:01  -  22 Oct 2020 07:00  --------------------------------------------------------  IN:    Oral Fluid: 680 mL  Total IN: 680 mL    OUT:    Other (mL): 1000 mL    Voided (mL): 0 mL  Total OUT: 1000 mL    Total NET: -320 mL          Daily     Daily Weight in k.5 (21 Oct 2020 08:30)    LABS:                        15.8   11.51 )-----------( 103      ( 21 Oct 2020 06:34 )             48.2     10-21    132<L>  |  91<L>  |  61<H>  ----------------------------<  248<H>  5.2   |  19<L>  |  9.40<H>    Ca    8.4      21 Oct 2020 10:33  Phos  8.7     10-21  Mg     2.2     10-21      PTT - ( 21 Oct 2020 06:35 )  PTT:29.8 sec              Physical Exam:  General: alert and oriented, NAD  Resp: airway patent, respirations unlabored  CVS: regular rate and rhythm  Abdomen: soft, nontender, nondistended  Extremities: no edema, left lower extremity with diminished motor and sensation, cooler than right foot. b/l femoral pulses, no DP/PT signals on L  Skin: chronic skin changes in the lower extremity

## 2020-10-22 NOTE — PROGRESS NOTE ADULT - ASSESSMENT
59M PMH ESRD (on HD M/W/F), glaucoma, DMII, HCV, PAD, RLE DVT s/p thrombectomy, LLE ischemic rest pain s/p popliteal to posterior tibial artery bypass (6/3/2020, Dr. Samaniego) presenting with LLE foot pain without PT/PT signals and slightly cooler than RLE. CTA with occluded L deep femoral artery, popliteal stent, pop-AT graft, posterior tibial artery; single vessel runoff in calf via reconstituted peroneal artery. Left angiogram showed  patent left sfa. occluded above the knee pop stent and pop to posterior tibial bypass. Patient discharge planning.     - Diet Consistent carb renal  - DVT ppx with sub q heparin   - pain control PRN  - PT: Graded physical therapy for claudication  - Dispo: Rehab   - Discharge planning    Ashleigh Ceja PA-C [id number]

## 2020-10-22 NOTE — PROGRESS NOTE ADULT - PROBLEM SELECTOR PROBLEM 2
HTN (hypertension)
ESRD (end stage renal disease) on dialysis

## 2020-10-22 NOTE — PROGRESS NOTE ADULT - SUBJECTIVE AND OBJECTIVE BOX
Subjective: Patient seen and examined. No new events except as noted.     REVIEW OF SYSTEMS:    CONSTITUTIONAL: No weakness, fevers or chills  EYES/ENT: No visual changes;  No vertigo or throat pain   NECK: No pain or stiffness  RESPIRATORY: No cough, wheezing, hemoptysis; No shortness of breath  CARDIOVASCULAR: No chest pain or palpitations  GASTROINTESTINAL: No abdominal or epigastric pain. No nausea, vomiting, or hematemesis; No diarrhea or constipation. No melena or hematochezia.  GENITOURINARY: No dysuria, frequency or hematuria  NEUROLOGICAL: No numbness or weakness  SKIN: No itching, burning, rashes, or lesions   All other review of systems is negative unless indicated above.    MEDICATIONS:  MEDICATIONS  (STANDING):  acetaminophen   Tablet .. 650 milliGRAM(s) Oral every 6 hours  apixaban 2.5 milliGRAM(s) Oral two times a day  calcium acetate 1334 milliGRAM(s) Oral three times a day with meals  chlorhexidine 2% Cloths 1 Application(s) Topical daily  cloNIDine 0.2 milliGRAM(s) Oral three times a day  clopidogrel Tablet 75 milliGRAM(s) Oral daily  dextrose 5%. 1000 milliLiter(s) (50 mL/Hr) IV Continuous <Continuous>  dextrose 50% Injectable 12.5 Gram(s) IV Push once  dextrose 50% Injectable 25 Gram(s) IV Push once  dextrose 50% Injectable 25 Gram(s) IV Push once  hydrALAZINE 25 milliGRAM(s) Oral every 8 hours  insulin glargine Injectable (LANTUS) 15 Unit(s) SubCutaneous at bedtime  insulin lispro (ADMELOG) corrective regimen sliding scale.   SubCutaneous three times a day before meals  lidocaine/prilocaine Cream 1 Application(s) Topical daily      PHYSICAL EXAM:  T(C): 36.7 (10-22-20 @ 09:00), Max: 36.9 (10-21-20 @ 20:33)  HR: 69 (10-22-20 @ 09:00) (67 - 70)  BP: 158/73 (10-22-20 @ 09:00) (146/64 - 167/77)  RR: 16 (10-22-20 @ 09:00) (16 - 20)  SpO2: 97% (10-22-20 @ 09:00) (94% - 99%)  Wt(kg): --  I&O's Summary    21 Oct 2020 07:01  -  22 Oct 2020 07:00  --------------------------------------------------------  IN: 840 mL / OUT: 1000 mL / NET: -160 mL    22 Oct 2020 07:01  -  22 Oct 2020 11:11  --------------------------------------------------------  IN: 360 mL / OUT: 0 mL / NET: 360 mL          Appearance: Normal	  HEENT:   Normal oral mucosa, PERRL, EOMI	  Lymphatic: No lymphadenopathy , no edema  Cardiovascular: Normal S1 S2, No JVD, No murmurs , Peripheral pulses palpable 2+ bilaterally  Respiratory: Lungs clear to auscultation, normal effort 	  Gastrointestinal:  Soft, Non-tender, + BS	  Skin: No rashes, No ecchymoses, No cyanosis, warm to touch  Musculoskeletal: Normal range of motion, normal strength  Psychiatry:  Mood & affect appropriate  Ext: No edema      LABS:    CARDIAC MARKERS:                                15.3   11.18 )-----------( 112      ( 22 Oct 2020 07:12 )             47.8     10-22    134<L>  |  93<L>  |  51<H>  ----------------------------<  253<H>  5.0   |  25  |  7.61<H>    Ca    8.1<L>      22 Oct 2020 07:11  Phos  6.8     10-22  Mg     2.4     10-22      proBNP:   Lipid Profile:   HgA1c:   TSH:             TELEMETRY: 	    ECG:  	  RADIOLOGY:   DIAGNOSTIC TESTING:  [ ] Echocardiogram:  [ ]  Catheterization:  [ ] Stress Test:    OTHER:

## 2020-10-22 NOTE — PROVIDER CONTACT NOTE (OTHER) - ACTION/TREATMENT ORDERED:
Stop Heparin infusion for 1 hour and decrease rate by 3 to 12ml/hr
MD Cleary notified. No further interventions. Will continue to monitor.

## 2020-10-25 LAB — HCV RNA FLD QL NAA+PROBE: SIGNIFICANT CHANGE UP

## 2020-11-11 NOTE — ASU PREOP CHECKLIST - BSA (M2)
Detail Level: Detailed Depth Of Biopsy: dermis Was A Bandage Applied: Yes Size Of Lesion In Cm: 0 Biopsy Type: H and E Biopsy Method: Dermablade Anesthesia Type: 1% lidocaine with 1:100,000 epinephrine Anesthesia Volume In Cc (Will Not Render If 0): 0.5 Hemostasis: Drysol Wound Care: Petrolatum Dressing: bandage Destruction After The Procedure: No Type Of Destruction Used: Curettage Curettage Text: The wound bed was treated with curettage after the biopsy was performed. Cryotherapy Text: The wound bed was treated with cryotherapy after the biopsy was performed. Electrodesiccation Text: The wound bed was treated with electrodesiccation after the biopsy was performed. Electrodesiccation And Curettage Text: The wound bed was treated with electrodesiccation and curettage after the biopsy was performed. Silver Nitrate Text: The wound bed was treated with silver nitrate after the biopsy was performed. Lab: [id number] Lab Facility: [id number] Consent: Written consent was obtained and risks were reviewed including but not limited to scarring, infection and bleeding Post-Care Instructions: Patient was given post-surgical/biopsy wound care instructions. Notification Instructions: Patient will be notified of biopsy results. However, patient instructed to call the office if not contacted within 2 weeks. Billing Type: Third-Party Bill Information: Selecting Yes will display possible errors in your note based on the variables you have selected. This validation is only offered as a suggestion for you. PLEASE NOTE THAT THE VALIDATION TEXT WILL BE REMOVED WHEN YOU FINALIZE YOUR NOTE. IF YOU WANT TO FAX A PRELIMINARY NOTE YOU WILL NEED TO TOGGLE THIS TO 'NO' IF YOU DO NOT WANT IT IN YOUR FAXED NOTE. 2

## 2020-11-17 ENCOUNTER — APPOINTMENT (OUTPATIENT)
Dept: ENDOVASCULAR SURGERY | Facility: CLINIC | Age: 60
End: 2020-11-17
Payer: MEDICARE

## 2020-11-17 ENCOUNTER — RESULT REVIEW (OUTPATIENT)
Age: 60
End: 2020-11-17

## 2020-11-17 ENCOUNTER — LABORATORY RESULT (OUTPATIENT)
Age: 60
End: 2020-11-17

## 2020-11-17 VITALS
DIASTOLIC BLOOD PRESSURE: 86 MMHG | HEART RATE: 93 BPM | WEIGHT: 166 LBS | SYSTOLIC BLOOD PRESSURE: 173 MMHG | BODY MASS INDEX: 23.24 KG/M2 | HEIGHT: 71 IN | TEMPERATURE: 98.3 F | RESPIRATION RATE: 16 BRPM | OXYGEN SATURATION: 96 %

## 2020-11-17 PROCEDURE — 37227Z: CUSTOM | Mod: LT

## 2020-11-17 PROCEDURE — 37231Z: CUSTOM | Mod: 80,59,LT

## 2020-11-17 PROCEDURE — 37197Z: CUSTOM | Mod: 59,LT

## 2020-11-17 PROCEDURE — 37227Z: CUSTOM | Mod: 80,LT

## 2020-11-17 PROCEDURE — 37233Z: CUSTOM | Mod: 59,LT

## 2020-11-17 PROCEDURE — 37233Z: CUSTOM | Mod: 80,59,LT

## 2020-11-17 PROCEDURE — 75625 CONTRAST EXAM ABDOMINL AORTA: CPT | Mod: 59

## 2020-11-17 PROCEDURE — 37231Z: CUSTOM

## 2020-11-17 RX ORDER — CALCIUM ACETATE 667 MG/1
667 CAPSULE ORAL
Refills: 0 | Status: DISCONTINUED | COMMUNITY
End: 2020-11-17

## 2020-11-17 RX ORDER — PNV NO.95/FERROUS FUM/FOLIC AC 28MG-0.8MG
TABLET ORAL
Refills: 0 | Status: DISCONTINUED | COMMUNITY
End: 2020-11-17

## 2020-11-17 RX ORDER — FOLIC ACID 0.8 MG
500 TABLET ORAL
Refills: 0 | Status: DISCONTINUED | COMMUNITY
End: 2020-11-17

## 2020-11-19 NOTE — PAST MEDICAL HISTORY
[Increasing age ( >40 years old)] : Increasing age ( >40 years old) [Altered mobility] : Altered mobility [No therapy indicated for cases scheduled for less than one hour] : No therapy indicated for cases scheduled for less than one hour. [FreeTextEntry1] : Malignant Hyperthermia Screening Tool and Risk of Bleeding Assessment \par \par Mr. MARIETTA MAGALLANES denies family of unexpected death following Anesthesia or Exercise.\par Denies Family history of Malignant Hyperthermia, Muscle or Neuromuscular disorder and High Temperature  following exercise.\par \par Mr. MARIETTA MAGALLANES denies history of Muscle Spasm, Dark or Chocolate- Colored and Unanticipated fever immediately following anaesthesia or serious exercise.\par Mr. MARIETTA MAGALLANES also denies bleeding tendencies/Risks of Bleeding.\par

## 2020-11-19 NOTE — PROCEDURE
[FSBS] : FSBS [D/C IV on discharge] : D/C IV on discharge [Resume diet] : resume diet [FreeTextEntry1] : aortogram, left leg angiogram/athrectomy/angioplasty/stent

## 2020-11-19 NOTE — HISTORY OF PRESENT ILLNESS
[] : left radiocephalic fistula [FreeTextEntry1] : 2016 Dr. Duarte [FreeTextEntry4] : yesterday [FreeTextEntry5] : yesterday at 6pm [FreeTextEntry6] : Dr Khan

## 2020-11-19 NOTE — PHYSICAL EXAM
[Thrill] : thrill [Hand well perfused] : hand well perfused [2+] : left 2+ [0] : left 0 [Normal] : coordination grossly intact, no focal deficits [Pulsatile Thrill] : no pulsatile thrill [Aneurysm] : no aneurysm [Bleeding] : no bleeding [Ulcer] : no ulcer [de-identified] : Gangrenous ulcer left first toe [de-identified] : intact

## 2020-11-24 ENCOUNTER — APPOINTMENT (OUTPATIENT)
Dept: VASCULAR SURGERY | Facility: CLINIC | Age: 60
End: 2020-11-24
Payer: MEDICARE

## 2020-11-24 VITALS — TEMPERATURE: 98.2 F

## 2020-11-24 PROCEDURE — 93926 LOWER EXTREMITY STUDY: CPT

## 2020-11-24 PROCEDURE — 99213 OFFICE O/P EST LOW 20 MIN: CPT

## 2020-11-24 NOTE — HISTORY OF PRESENT ILLNESS
[FreeTextEntry1] : 60-year-old gentleman with multiple medical problems including diabetes and end-stage renal disease on hemodialysis is status post left leg tibial bypass which is currently occluded.  He recently presented with worsening left foot pain and ulceration.  Recently underwent left leg angiogram with atherectomy angioplasty and stenting of the popliteal artery and tibioperoneal trunk.  He is here for follow-up.

## 2020-11-24 NOTE — PHYSICAL EXAM
[JVD] : no jugular venous distention  [Normal Breath Sounds] : Normal breath sounds [Normal Rate and Rhythm] : normal rate and rhythm [2+] : left 2+ [Ankle Swelling (On Exam)] : not present [Varicose Veins Of Lower Extremities] : not present [] : not present [Abdomen Tenderness] : ~T ~M No abdominal tenderness [No Rash or Lesion] : No rash or lesion [Skin Ulcer] : ulcer [Skin Induration] : no induration [Alert] : alert [Calm] : calm [de-identified] : Appears well

## 2020-11-24 NOTE — ASSESSMENT
[FreeTextEntry1] : Patient underwent a duplex study which shows patent stents.  At this time no intervention is necessary.  Will refer patient to wound care center and he will follow-up in 1 month.

## 2020-12-08 ENCOUNTER — APPOINTMENT (OUTPATIENT)
Dept: VASCULAR SURGERY | Facility: CLINIC | Age: 60
End: 2020-12-08
Payer: MEDICARE

## 2020-12-08 VITALS
BODY MASS INDEX: 23.24 KG/M2 | SYSTOLIC BLOOD PRESSURE: 141 MMHG | HEIGHT: 71 IN | WEIGHT: 166 LBS | HEART RATE: 90 BPM | DIASTOLIC BLOOD PRESSURE: 77 MMHG

## 2020-12-08 VITALS — TEMPERATURE: 96.4 F

## 2020-12-08 PROCEDURE — 93923 UPR/LXTR ART STDY 3+ LVLS: CPT

## 2020-12-08 PROCEDURE — 99213 OFFICE O/P EST LOW 20 MIN: CPT

## 2020-12-08 NOTE — HISTORY OF PRESENT ILLNESS
[FreeTextEntry1] : 59 yo male with history of esrd on hd, pad s/p left lower extremity pop to pt artery bypass with gsv and recent angiogram with sfa and pop stents presents today with blackened discoloration of the left toes.  \par \par pt states that sensation and mobility is slowly improving of the left foot

## 2020-12-08 NOTE — PHYSICAL EXAM
[Alert] : alert [de-identified] : appears well  [de-identified] : foot is warm with dry gangrenous changes of the distal 1st - 4th digit

## 2020-12-08 NOTE — ASSESSMENT
[FreeTextEntry1] : 61 yo male with history of esrd on hd, pad s/p left lower extremity pop to pt artery bypass with gsv and recent angiogram with sfa and pop stents presents today with blackened discoloration of the left toes.  \par \par pvr shows WNL \par recommend podiatry consult for possible TMA

## 2020-12-15 ENCOUNTER — NON-APPOINTMENT (OUTPATIENT)
Age: 60
End: 2020-12-15

## 2020-12-15 ENCOUNTER — APPOINTMENT (OUTPATIENT)
Dept: WOUND CARE | Facility: CLINIC | Age: 60
End: 2020-12-15
Payer: MEDICARE

## 2020-12-15 VITALS
RESPIRATION RATE: 18 BRPM | DIASTOLIC BLOOD PRESSURE: 79 MMHG | TEMPERATURE: 97.7 F | SYSTOLIC BLOOD PRESSURE: 178 MMHG | HEART RATE: 96 BPM

## 2020-12-15 DIAGNOSIS — I96 GANGRENE, NOT ELSEWHERE CLASSIFIED: ICD-10-CM

## 2020-12-15 PROCEDURE — 99203 OFFICE O/P NEW LOW 30 MIN: CPT | Mod: PD

## 2020-12-15 NOTE — ASSESSMENT
[FreeTextEntry1] : --spent 30 min in initial evaluaiton with patient\par --reviewed vascular surgery note\par --continue with betadine and dsd to left foot \par --discussed with patient need for TMA left foot. Patient understands risks and complications due to PVD of possible non healing and need for proximal amp if occurs. Patient to be admitted to [facility] for gangrene. Will f/u as inpatient and procedure\par -

## 2020-12-15 NOTE — HISTORY OF PRESENT ILLNESS
[FreeTextEntry1] : New patient presents w/ left forefoot gangrene. He reports he had a bypass of the LLE in June, which was a left pop to PT artery bypass. He recently had his toes become black, which began 6 weeks ago. Pt states he then had an angiogram and stenting performed on 11/17 by Dr. Samaniego. Pt hasn't been dressing his foot gangrene with anything at this point. Denies N/V/F/SoB

## 2020-12-16 ENCOUNTER — INPATIENT (INPATIENT)
Facility: HOSPITAL | Age: 60
LOS: 7 days | Discharge: NURSING, REHAB OR LONG-TERM CARE | DRG: 239 | End: 2020-12-24
Attending: INTERNAL MEDICINE | Admitting: INTERNAL MEDICINE
Payer: MEDICARE

## 2020-12-16 VITALS
DIASTOLIC BLOOD PRESSURE: 66 MMHG | HEIGHT: 71 IN | HEART RATE: 102 BPM | OXYGEN SATURATION: 95 % | WEIGHT: 159.39 LBS | RESPIRATION RATE: 18 BRPM | SYSTOLIC BLOOD PRESSURE: 117 MMHG | TEMPERATURE: 98 F

## 2020-12-16 DIAGNOSIS — Z98.890 OTHER SPECIFIED POSTPROCEDURAL STATES: Chronic | ICD-10-CM

## 2020-12-16 DIAGNOSIS — I96 GANGRENE, NOT ELSEWHERE CLASSIFIED: ICD-10-CM

## 2020-12-16 LAB
ALBUMIN SERPL ELPH-MCNC: 3.9 G/DL — SIGNIFICANT CHANGE UP (ref 3.3–5)
ALP SERPL-CCNC: 88 U/L — SIGNIFICANT CHANGE UP (ref 40–120)
ALT FLD-CCNC: 16 U/L — SIGNIFICANT CHANGE UP (ref 10–45)
ANION GAP SERPL CALC-SCNC: 13 MMOL/L — SIGNIFICANT CHANGE UP (ref 5–17)
AST SERPL-CCNC: 14 U/L — SIGNIFICANT CHANGE UP (ref 10–40)
BASOPHILS # BLD AUTO: 0.03 K/UL — SIGNIFICANT CHANGE UP (ref 0–0.2)
BASOPHILS NFR BLD AUTO: 0.3 % — SIGNIFICANT CHANGE UP (ref 0–2)
BILIRUB SERPL-MCNC: 0.3 MG/DL — SIGNIFICANT CHANGE UP (ref 0.2–1.2)
BUN SERPL-MCNC: 23 MG/DL — SIGNIFICANT CHANGE UP (ref 7–23)
CALCIUM SERPL-MCNC: 9.6 MG/DL — SIGNIFICANT CHANGE UP (ref 8.4–10.5)
CHLORIDE SERPL-SCNC: 92 MMOL/L — LOW (ref 96–108)
CO2 SERPL-SCNC: 27 MMOL/L — SIGNIFICANT CHANGE UP (ref 22–31)
CREAT SERPL-MCNC: 5.4 MG/DL — HIGH (ref 0.5–1.3)
EOSINOPHIL # BLD AUTO: 0.24 K/UL — SIGNIFICANT CHANGE UP (ref 0–0.5)
EOSINOPHIL NFR BLD AUTO: 2.7 % — SIGNIFICANT CHANGE UP (ref 0–6)
GLUCOSE BLDC GLUCOMTR-MCNC: 146 MG/DL — HIGH (ref 70–99)
GLUCOSE SERPL-MCNC: 183 MG/DL — HIGH (ref 70–99)
HCT VFR BLD CALC: 32.8 % — LOW (ref 39–50)
HGB BLD-MCNC: 10.7 G/DL — LOW (ref 13–17)
HIV 1+2 AB+HIV1 P24 AG SERPL QL IA: SIGNIFICANT CHANGE UP
IMM GRANULOCYTES NFR BLD AUTO: 0.3 % — SIGNIFICANT CHANGE UP (ref 0–1.5)
LYMPHOCYTES # BLD AUTO: 1.04 K/UL — SIGNIFICANT CHANGE UP (ref 1–3.3)
LYMPHOCYTES # BLD AUTO: 11.9 % — LOW (ref 13–44)
MCHC RBC-ENTMCNC: 26.8 PG — LOW (ref 27–34)
MCHC RBC-ENTMCNC: 32.6 GM/DL — SIGNIFICANT CHANGE UP (ref 32–36)
MCV RBC AUTO: 82.2 FL — SIGNIFICANT CHANGE UP (ref 80–100)
MONOCYTES # BLD AUTO: 0.6 K/UL — SIGNIFICANT CHANGE UP (ref 0–0.9)
MONOCYTES NFR BLD AUTO: 6.9 % — SIGNIFICANT CHANGE UP (ref 2–14)
NEUTROPHILS # BLD AUTO: 6.79 K/UL — SIGNIFICANT CHANGE UP (ref 1.8–7.4)
NEUTROPHILS NFR BLD AUTO: 77.9 % — HIGH (ref 43–77)
NRBC # BLD: 0 /100 WBCS — SIGNIFICANT CHANGE UP (ref 0–0)
PLATELET # BLD AUTO: 230 K/UL — SIGNIFICANT CHANGE UP (ref 150–400)
POTASSIUM SERPL-MCNC: 4 MMOL/L — SIGNIFICANT CHANGE UP (ref 3.5–5.3)
POTASSIUM SERPL-SCNC: 4 MMOL/L — SIGNIFICANT CHANGE UP (ref 3.5–5.3)
PROT SERPL-MCNC: 7.7 G/DL — SIGNIFICANT CHANGE UP (ref 6–8.3)
RBC # BLD: 3.99 M/UL — LOW (ref 4.2–5.8)
RBC # FLD: 18.7 % — HIGH (ref 10.3–14.5)
SARS-COV-2 RNA SPEC QL NAA+PROBE: SIGNIFICANT CHANGE UP
SODIUM SERPL-SCNC: 132 MMOL/L — LOW (ref 135–145)
WBC # BLD: 8.73 K/UL — SIGNIFICANT CHANGE UP (ref 3.8–10.5)
WBC # FLD AUTO: 8.73 K/UL — SIGNIFICANT CHANGE UP (ref 3.8–10.5)

## 2020-12-16 PROCEDURE — 73620 X-RAY EXAM OF FOOT: CPT | Mod: 26,LT

## 2020-12-16 PROCEDURE — 99285 EMERGENCY DEPT VISIT HI MDM: CPT | Mod: CS,GC

## 2020-12-16 PROCEDURE — 71045 X-RAY EXAM CHEST 1 VIEW: CPT | Mod: 26

## 2020-12-16 RX ORDER — GLUCAGON INJECTION, SOLUTION 0.5 MG/.1ML
1 INJECTION, SOLUTION SUBCUTANEOUS ONCE
Refills: 0 | Status: DISCONTINUED | OUTPATIENT
Start: 2020-12-16 | End: 2020-12-24

## 2020-12-16 RX ORDER — CALCIUM ACETATE 667 MG
2 TABLET ORAL
Qty: 0 | Refills: 0 | DISCHARGE

## 2020-12-16 RX ORDER — HYDRALAZINE HCL 50 MG
25 TABLET ORAL EVERY 8 HOURS
Refills: 0 | Status: DISCONTINUED | OUTPATIENT
Start: 2020-12-16 | End: 2020-12-24

## 2020-12-16 RX ORDER — HEPARIN SODIUM 5000 [USP'U]/ML
5000 INJECTION INTRAVENOUS; SUBCUTANEOUS EVERY 12 HOURS
Refills: 0 | Status: DISCONTINUED | OUTPATIENT
Start: 2020-12-16 | End: 2020-12-16

## 2020-12-16 RX ORDER — OXYCODONE AND ACETAMINOPHEN 5; 325 MG/1; MG/1
1 TABLET ORAL EVERY 4 HOURS
Refills: 0 | Status: DISCONTINUED | OUTPATIENT
Start: 2020-12-16 | End: 2020-12-21

## 2020-12-16 RX ORDER — SENNA PLUS 8.6 MG/1
1 TABLET ORAL DAILY
Refills: 0 | Status: DISCONTINUED | OUTPATIENT
Start: 2020-12-16 | End: 2020-12-24

## 2020-12-16 RX ORDER — DEXTROSE 50 % IN WATER 50 %
25 SYRINGE (ML) INTRAVENOUS ONCE
Refills: 0 | Status: DISCONTINUED | OUTPATIENT
Start: 2020-12-16 | End: 2020-12-24

## 2020-12-16 RX ORDER — VANCOMYCIN HCL 1 G
1000 VIAL (EA) INTRAVENOUS ONCE
Refills: 0 | Status: COMPLETED | OUTPATIENT
Start: 2020-12-16 | End: 2020-12-16

## 2020-12-16 RX ORDER — POLYETHYLENE GLYCOL 3350 17 G/17G
17 POWDER, FOR SOLUTION ORAL DAILY
Refills: 0 | Status: DISCONTINUED | OUTPATIENT
Start: 2020-12-16 | End: 2020-12-24

## 2020-12-16 RX ORDER — INSULIN GLARGINE 100 [IU]/ML
15 INJECTION, SOLUTION SUBCUTANEOUS AT BEDTIME
Refills: 0 | Status: DISCONTINUED | OUTPATIENT
Start: 2020-12-16 | End: 2020-12-24

## 2020-12-16 RX ORDER — DEXTROSE 50 % IN WATER 50 %
12.5 SYRINGE (ML) INTRAVENOUS ONCE
Refills: 0 | Status: DISCONTINUED | OUTPATIENT
Start: 2020-12-16 | End: 2020-12-24

## 2020-12-16 RX ORDER — INSULIN LISPRO 100/ML
VIAL (ML) SUBCUTANEOUS
Refills: 0 | Status: DISCONTINUED | OUTPATIENT
Start: 2020-12-16 | End: 2020-12-24

## 2020-12-16 RX ORDER — CALCIUM ACETATE 667 MG
1334 TABLET ORAL
Refills: 0 | Status: DISCONTINUED | OUTPATIENT
Start: 2020-12-16 | End: 2020-12-24

## 2020-12-16 RX ORDER — CEFEPIME 1 G/1
2000 INJECTION, POWDER, FOR SOLUTION INTRAMUSCULAR; INTRAVENOUS ONCE
Refills: 0 | Status: COMPLETED | OUTPATIENT
Start: 2020-12-16 | End: 2020-12-16

## 2020-12-16 RX ORDER — LANOLIN ALCOHOL/MO/W.PET/CERES
3 CREAM (GRAM) TOPICAL ONCE
Refills: 0 | Status: COMPLETED | OUTPATIENT
Start: 2020-12-16 | End: 2020-12-16

## 2020-12-16 RX ORDER — INSULIN LISPRO 100/ML
VIAL (ML) SUBCUTANEOUS AT BEDTIME
Refills: 0 | Status: DISCONTINUED | OUTPATIENT
Start: 2020-12-16 | End: 2020-12-24

## 2020-12-16 RX ORDER — CEFEPIME 1 G/1
500 INJECTION, POWDER, FOR SOLUTION INTRAMUSCULAR; INTRAVENOUS EVERY 12 HOURS
Refills: 0 | Status: DISCONTINUED | OUTPATIENT
Start: 2020-12-16 | End: 2020-12-17

## 2020-12-16 RX ORDER — APIXABAN 2.5 MG/1
2.5 TABLET, FILM COATED ORAL
Refills: 0 | Status: DISCONTINUED | OUTPATIENT
Start: 2020-12-16 | End: 2020-12-18

## 2020-12-16 RX ORDER — SODIUM CHLORIDE 9 MG/ML
1000 INJECTION, SOLUTION INTRAVENOUS
Refills: 0 | Status: DISCONTINUED | OUTPATIENT
Start: 2020-12-16 | End: 2020-12-24

## 2020-12-16 RX ORDER — DEXTROSE 50 % IN WATER 50 %
15 SYRINGE (ML) INTRAVENOUS ONCE
Refills: 0 | Status: DISCONTINUED | OUTPATIENT
Start: 2020-12-16 | End: 2020-12-24

## 2020-12-16 RX ORDER — ACETAMINOPHEN 500 MG
650 TABLET ORAL EVERY 6 HOURS
Refills: 0 | Status: DISCONTINUED | OUTPATIENT
Start: 2020-12-16 | End: 2020-12-24

## 2020-12-16 RX ORDER — HYDROXYZINE HCL 10 MG
25 TABLET ORAL
Refills: 0 | Status: DISCONTINUED | OUTPATIENT
Start: 2020-12-16 | End: 2020-12-24

## 2020-12-16 RX ADMIN — Medication 250 MILLIGRAM(S): at 17:32

## 2020-12-16 RX ADMIN — SENNA PLUS 1 TABLET(S): 8.6 TABLET ORAL at 22:01

## 2020-12-16 RX ADMIN — Medication 0.2 MILLIGRAM(S): at 22:33

## 2020-12-16 RX ADMIN — Medication 25 MILLIGRAM(S): at 22:01

## 2020-12-16 RX ADMIN — CEFEPIME 100 MILLIGRAM(S): 1 INJECTION, POWDER, FOR SOLUTION INTRAMUSCULAR; INTRAVENOUS at 16:34

## 2020-12-16 RX ADMIN — INSULIN GLARGINE 15 UNIT(S): 100 INJECTION, SOLUTION SUBCUTANEOUS at 22:33

## 2020-12-16 NOTE — H&P ADULT - ASSESSMENT
60 m with    L foot gangrene  - wound care  - Podiatry evaluation  - Vascular evaluation  - possible TM amputation  - antibiotics  - ID evaluation    Cardiology evaluation for preop stratification  - Dr. Aguirre    Diabetes Mellitus  - BS control  - ADA diet     Glaucoma  - stable    HTN control    ESRD  - HD  - Nephrology evaluation    DVT prophylaxis  - on Eliquis 3X week  - Heparin sq    Further action as per clinical course      Vj Rios MD pager [phone]

## 2020-12-16 NOTE — ED PROVIDER NOTE - CLINICAL SUMMARY MEDICAL DECISION MAKING FREE TEXT BOX
Presenting with left foot gangrene. Sent from podiatrist office.   Consulted podiatry, who recommends cefepime and vanc and will plan for surgery. Presenting with left foot gangrene. Sent from podiatrist office.   Consulted podiatry, who recommends cefepime and vanc and will plan for surgery.  Consulted vascular surgery.   Admitted to . Presenting with left foot gangrene. Sent from podiatrist office.   Consulted podiatry, who recommends cefepime and vanc and will plan for surgery.  Vascular surgery c/s pending.      Attending Statement: Agree with the above.  L toe/1st MTP gangrene in pt c dm2, Hypertension, hld, esrd (s/p dialysis today, no complications).  Nontoxic appearing, not septic, already seen by podiatry.  Plan as above.  S/O to Dr. Painting pending lab results and admission.  --BMM

## 2020-12-16 NOTE — H&P ADULT - NSHPLABSRESULTS_GEN_ALL_CORE
10.7   8.73  )-----------( 230      ( 16 Dec 2020 15:58 )             32.8       12-16    132<L>  |  92<L>  |  23  ----------------------------<  183<H>  4.0   |  27  |  5.40<H>    Ca    9.6      16 Dec 2020 15:58    TPro  7.7  /  Alb  3.9  /  TBili  0.3  /  DBili  x   /  AST  14  /  ALT  16  /  AlkPhos  88  12-16          rad< from: Xray Chest 1 View AP/PA (12.16.20 @ 16:23) >      INTERPRETATION:  clear lungs    < end of copied text >  < from: Xray Foot AP + Lateral, Left (12.16.20 @ 16:16) >      IMPRESSION:    No acute fracture or dislocation. Preserved joint spaces. Vascular calcifications.    < end of copied text >

## 2020-12-16 NOTE — PATIENT PROFILE ADULT - NSPROPASSIVESMOKEEXPOSURE_GEN_A_NUR
October 25, 2018     Patient: Rowan Higgins   YOB: 1980   Date of Visit: 10/25/2018       To Whom it May Concern:    Wendy Armendariz is under my professional care  He was seen in my office on 10/25/2018  He may return to work with recommended restrictions:  Standing/walking as tolerated, please allow sitting breaks as needed as frequently and as long as the patient needs determined by his pain level  Max lifting 20 lbs  Minimal lifting/carrying as tolerated  If you have any questions or concerns, please don't hesitate to call           Sincerely,          Marcy Cevallos MD        CC: No Recipients No

## 2020-12-16 NOTE — ED ADULT NURSE REASSESSMENT NOTE - NS ED NURSE REASSESS COMMENT FT1
Pt reports that he is allergic to "IV antibiotics" but is unable to identify which antibiotic he is allergic to, pt is AOx4, no indication of allergy to any antibiotics in transfer form/ chart from [facility]. MD Bridges notified at this time. Pt reports that he is allergic to "IV antibiotics" but is unable to identify which antibiotic he is allergic to, pt is AOx4, no indication of allergy to any antibiotics in transfer form/ chart from [facility] [place]. Pt reports that he had hives/ rash "many years ago" after ABX administration during a hospital admission.  MD Bridges notified at this time.

## 2020-12-16 NOTE — H&P ADULT - NSHPPHYSICALEXAM_GEN_ALL_CORE
PHYSICAL EXAMINATION:  Vital Signs Last 24 Hrs  T(C): 36.8 (16 Dec 2020 15:29), Max: 36.8 (16 Dec 2020 15:29)  T(F): 98.3 (16 Dec 2020 15:29), Max: 98.3 (16 Dec 2020 15:29)  HR: 99 (16 Dec 2020 16:02) (99 - 102)  BP: 146/72 (16 Dec 2020 16:02) (117/66 - 146/72)  BP(mean): --  RR: 18 (16 Dec 2020 16:02) (18 - 18)  SpO2: 98% (16 Dec 2020 16:02) (95% - 98%)  CAPILLARY BLOOD GLUCOSE          GENERAL: NAD, well-groomed, well-developed  HEAD:  atraumatic, normocephalic  EYES: sclera anicteric  ENMT: mucous membranes moist  NECK: supple, No JVD  CHEST/LUNG: clear to auscultation bilaterally; no rales, rhonchi, or wheezing b/l  HEART: normal S1, S2  ABDOMEN: BS+, soft, ND, NT   EXTREMITIES:  L leg with gangrenous changes   NEURO: awake, alert, interactive; moves all extremities  SKIN: no rashes or lesions

## 2020-12-16 NOTE — ED ADULT NURSE REASSESSMENT NOTE - NS ED NURSE REASSESS COMMENT FT1
Pt denies rash/itchiness/ other signs of allergic reaction. Pt NAD at this time. No redness/ swelling noted at IV site, flushes with ease.

## 2020-12-16 NOTE — ED PROVIDER NOTE - OBJECTIVE STATEMENT
59 y/o male with a h/o HTN, diabetes, and ESRD on dialysis, presenting left foot gangrene, which started 6 weeks ago. Gradual onset, moderate-severe severity, pain at left foot, associated symptoms include dark discoloration to left foot. Patient states he went to his podiatrist Dr. Mendez today and was sent to the ER for further evaluation. Denies any fever, chills, shortness of breath, chest pain, cough, nausea, vomiting, or weakness. Patient states he had a full dialysis session today and has dialysis MWF.

## 2020-12-16 NOTE — H&P ADULT - NSHPREVIEWOFSYSTEMS_GEN_ALL_CORE
REVIEW OF SYSTEMS:    CONSTITUTIONAL: No weakness, fevers or chills  EYES/ENT: No visual changes;  No vertigo or throat pain   NECK: No pain or stiffness  RESPIRATORY: No cough, wheezing, hemoptysis; No shortness of breath  CARDIOVASCULAR: No chest pain or palpitations  GASTROINTESTINAL: No abdominal or epigastric pain. No nausea, vomiting, or hematemesis; No diarrhea or constipation. No melena or hematochezia.  GENITOURINARY: No dysuria, frequency or hematuria  NEUROLOGICAL: No numbness or weakness  SKIN: No itching, burning, rashes, or lesions  L leg gangrenous changes  All other review of systems is negative unless indicated above.

## 2020-12-16 NOTE — ED ADULT NURSE REASSESSMENT NOTE - NS ED NURSE REASSESS COMMENT FT1
Per MD Bridges, podiatry has no hx of pt with IV antibiotic allergic reactions. Pt educated on signs and symptoms of allergic reactions prior to abx administration. Pt verbalizes understanding. Given call bell, pt verbalized he will use call bell if he experiences any symptoms of an allergic reaction.

## 2020-12-16 NOTE — ED ADULT NURSE NOTE - OBJECTIVE STATEMENT
59 y/o male with PMH HTN ESRD on dialysis T/TH/S, had dialysis today, PVD, DM2 on insulin, BIBEMS presenting to ED for admission to vascular. Pt reports that he was seen at the foot clinic yesterday and was recommended to come in for admission. Upon exam pt A&Ox3 gross neuro intact, lungs cta bilaterally, no difficulty speaking in complete sentences, s1s2 heart sounds heard, no pulse palpated in left foot, left toes black, pt able to move toes on LLW, hinkle x4, abdomen soft nontender nondistended. Pt denies chest pain, sob, ha, n/v/d, abdominal pain, f/c, urinary symptoms, hematuria. 18 gauge placed to right a/c, labs drawn & sent. Pt has fistula to left arm, accessed today. 61 y/o male with PMH HTN ESRD on dialysis T/TH/S, had dialysis today, PVD, DM2 on insulin, BIBEMS presenting to ED for admission to vascular. Pt reports that he was seen at the foot clinic yesterday and was recommended to come in for admission. Upon exam pt A&Ox3 gross neuro intact, lungs cta bilaterally, no difficulty speaking in complete sentences, s1s2 heart sounds heard, no pulse palpated in left foot, left toes black, pt able to move toes on LLW, hinkle x4, abdomen soft nontender nondistended. Pt denies chest pain, sob, ha, n/v/d, abdominal pain, f/c, urinary symptoms, hematuria. 18 gauge placed to right a/c, labs drawn & sent. Pt has fistula to left arm, accessed today. Podiatry at bedside examining LLE, foot wrapped by podiatry.

## 2020-12-16 NOTE — CONSULT NOTE ADULT - SUBJECTIVE AND OBJECTIVE BOX
Podiatry pager #: [phone]/ [phone]    Patient is a 60y old  Male who presents with a chief complaint of left forefoot gangrene    HPI:   61 y/o male with a h/o HTN, diabetes, and ESRD on dialysis, presenting left foot gangrene, which started 6 weeks ago. Gradual onset, moderate-severe severity, pain at left foot, associated symptoms include dark discoloration to left foot. Patient states he went to his podiatrist Dr. Mendez today and was sent to the ER for further evaluation. Denies any fever, chills, shortness of breath, chest pain, cough, nausea, vomiting, or weakness. Patient states he had a full dialysis session today and has dialysis MWF.    PAST MEDICAL & SURGICAL HISTORY:  Glaucoma    Diabetes    HTN (hypertension)    Renal failure  on dialysis    Status post popliteal-tibial bypass  POP-PT graph 6/2020    H/O right wrist surgery  1994        MEDICATIONS  (STANDING):  cefepime   IVPB 2000 milliGRAM(s) IV Intermittent once  vancomycin  IVPB. 1000 milliGRAM(s) IV Intermittent once    MEDICATIONS  (PRN):      Allergies    aspirin (Rash; Urticaria; Hives)  iodine (Rash; Urticaria)    Intolerances        VITALS:    Vital Signs Last 24 Hrs  T(C): 36.8 (16 Dec 2020 15:29), Max: 36.8 (16 Dec 2020 15:29)  T(F): 98.3 (16 Dec 2020 15:29), Max: 98.3 (16 Dec 2020 15:29)  HR: 99 (16 Dec 2020 16:02) (99 - 102)  BP: 146/72 (16 Dec 2020 16:02) (117/66 - 146/72)  BP(mean): --  RR: 18 (16 Dec 2020 16:02) (18 - 18)  SpO2: 98% (16 Dec 2020 16:02) (95% - 98%)    LABS:                          10.7   8.73  )-----------( 230      ( 16 Dec 2020 15:58 )             32.8               CAPILLARY BLOOD GLUCOSE              LOWER EXTREMITY PHYSICAL EXAM:    Vasular: DP/PT non palpable nor dopplerable, B/L, CFT absent L, Temperature gradient WNL B/L  Neuro: Epicritic sensation diminished to the level of midfoot B/L.  Musculoskeletal/Ortho: unremarkable  Skin:  Left forefoot dry gangrene of hallux to 5th digits demarcating past MPJ level and midfoot; no fluctuance, no crepitus, no signs of wet conversion nor acute infection    RADIOLOGY & ADDITIONAL STUDIES:

## 2020-12-16 NOTE — H&P ADULT - NSHPSOCIALHISTORY_GEN_ALL_CORE
Social History:    Marital Status:  (   )    ( x ) Single    (   )    (  )   Occupation:   Lives with: (  ) alone  (  ) children   (  ) spouse   ( x ) parents  (  ) other    Substance Use (street drugs): ( x ) never used  (  ) other:  Tobacco Usage:  ( x  ) never smoked   (   ) former smoker   (   ) current smoker  (     ) pack years  (        ) last cigarette date  Alcohol Usage: denies    (     ) Advanced Directives: (     ) None    (      ) DNR    (     ) DNI    (     ) Health Care Proxy:

## 2020-12-16 NOTE — CONSULT NOTE ADULT - ASSESSMENT
61 yo male patient w/ left forefoot dry gangrene of hallux-5th digits  - Pt seen and evaluated in ED  - Afebrile, WBC 8.73  - Left forefoot dry gangrene of hallux to 5th digits demarcating past MPJ level and midfoot; no fluctuance, no crepitus, no signs of wet conversion nor acute infection  - Pt s/p LLE bypass in June 2020 & s/p LLE angio w/ SFA & Pop stent on 11/17 w/ PT signals & single vessel peroneal run-off with Dr. Samaniego ([facility] surgery)  - Applied betadine/DSD  - Rec IV Vanco, cefepime  - Rec admit under medicine for mgmt w/ dialysis & rec vascular surgery team consult   - Consented & booked for Left foot transmetatarsal amputation w/ achilles tendon lengthening on Monday 12/21 at 2PM w/ Dr. Mendez  - Please evaluate and document medical/cardiac clearance for procedure under sedation with local  - Please coordinate dialysis Monday AM in time for surgery time  - Discussed with attending 61 yo male patient w/ left forefoot dry gangrene of hallux-5th digits  - Pt seen and evaluated in ED  - Afebrile, WBC 8.73  - Left forefoot dry gangrene of hallux to 5th digits demarcating past MPJ level and midfoot; no fluctuance, no crepitus, no signs of wet conversion nor acute infection  - Pt s/p LLE bypass in June 2020 & s/p LLE angio w/ SFA & Pop stent on 11/17 w/ PT signals & single vessel peroneal run-off with Dr. Samaniego ([facility] surgery)  - Applied betadine/DSD  - Rec IV Vanco, cefepime  - Rec admit under medicine for mgmt w/ dialysis & rec vascular surgery team consult   - Consented & booked for Left foot transmetatarsal amputation w/ achilles tendon lengthening on Monday 12/21 at 2PM w/ Dr. Mendez  - Patient showing understanding that podiatric surgical intervention with high risk of failure requiring LLE BKA due to poor vascular status.  - Please evaluate and document medical/cardiac clearance for procedure under sedation with local  - Please coordinate dialysis Monday AM in time for surgery time  - Discussed with attending

## 2020-12-17 ENCOUNTER — TRANSCRIPTION ENCOUNTER (OUTPATIENT)
Age: 60
End: 2020-12-17

## 2020-12-17 DIAGNOSIS — N18.6 END STAGE RENAL DISEASE: ICD-10-CM

## 2020-12-17 DIAGNOSIS — N25.0 RENAL OSTEODYSTROPHY: ICD-10-CM

## 2020-12-17 DIAGNOSIS — I10 ESSENTIAL (PRIMARY) HYPERTENSION: ICD-10-CM

## 2020-12-17 DIAGNOSIS — E11.9 TYPE 2 DIABETES MELLITUS WITHOUT COMPLICATIONS: ICD-10-CM

## 2020-12-17 DIAGNOSIS — I96 GANGRENE, NOT ELSEWHERE CLASSIFIED: ICD-10-CM

## 2020-12-17 LAB
ANION GAP SERPL CALC-SCNC: 15 MMOL/L — SIGNIFICANT CHANGE UP (ref 5–17)
BUN SERPL-MCNC: 35 MG/DL — HIGH (ref 7–23)
CALCIUM SERPL-MCNC: 9.1 MG/DL — SIGNIFICANT CHANGE UP (ref 8.4–10.5)
CHLORIDE SERPL-SCNC: 95 MMOL/L — LOW (ref 96–108)
CO2 SERPL-SCNC: 24 MMOL/L — SIGNIFICANT CHANGE UP (ref 22–31)
CREAT SERPL-MCNC: 6.99 MG/DL — HIGH (ref 0.5–1.3)
GLUCOSE BLDC GLUCOMTR-MCNC: 112 MG/DL — HIGH (ref 70–99)
GLUCOSE BLDC GLUCOMTR-MCNC: 171 MG/DL — HIGH (ref 70–99)
GLUCOSE BLDC GLUCOMTR-MCNC: 172 MG/DL — HIGH (ref 70–99)
GLUCOSE BLDC GLUCOMTR-MCNC: 231 MG/DL — HIGH (ref 70–99)
GLUCOSE SERPL-MCNC: 134 MG/DL — HIGH (ref 70–99)
HCT VFR BLD CALC: 32.9 % — LOW (ref 39–50)
HGB BLD-MCNC: 10.6 G/DL — LOW (ref 13–17)
MCHC RBC-ENTMCNC: 26.6 PG — LOW (ref 27–34)
MCHC RBC-ENTMCNC: 32.2 GM/DL — SIGNIFICANT CHANGE UP (ref 32–36)
MCV RBC AUTO: 82.7 FL — SIGNIFICANT CHANGE UP (ref 80–100)
NRBC # BLD: 0 /100 WBCS — SIGNIFICANT CHANGE UP (ref 0–0)
PLATELET # BLD AUTO: 226 K/UL — SIGNIFICANT CHANGE UP (ref 150–400)
POTASSIUM SERPL-MCNC: 4.5 MMOL/L — SIGNIFICANT CHANGE UP (ref 3.5–5.3)
POTASSIUM SERPL-SCNC: 4.5 MMOL/L — SIGNIFICANT CHANGE UP (ref 3.5–5.3)
RBC # BLD: 3.98 M/UL — LOW (ref 4.2–5.8)
RBC # FLD: 18.6 % — HIGH (ref 10.3–14.5)
SARS-COV-2 IGG SERPL QL IA: NEGATIVE — SIGNIFICANT CHANGE UP
SARS-COV-2 IGM SERPL IA-ACNC: 0.06 INDEX — SIGNIFICANT CHANGE UP
SODIUM SERPL-SCNC: 134 MMOL/L — LOW (ref 135–145)
WBC # BLD: 7.3 K/UL — SIGNIFICANT CHANGE UP (ref 3.8–10.5)
WBC # FLD AUTO: 7.3 K/UL — SIGNIFICANT CHANGE UP (ref 3.8–10.5)

## 2020-12-17 PROCEDURE — 93923 UPR/LXTR ART STDY 3+ LVLS: CPT | Mod: 26

## 2020-12-17 PROCEDURE — 99223 1ST HOSP IP/OBS HIGH 75: CPT | Mod: GC

## 2020-12-17 PROCEDURE — 99232 SBSQ HOSP IP/OBS MODERATE 35: CPT | Mod: GC

## 2020-12-17 RX ORDER — CHLORHEXIDINE GLUCONATE 213 G/1000ML
1 SOLUTION TOPICAL DAILY
Refills: 0 | Status: DISCONTINUED | OUTPATIENT
Start: 2020-12-17 | End: 2020-12-24

## 2020-12-17 RX ADMIN — Medication 4: at 17:19

## 2020-12-17 RX ADMIN — INSULIN GLARGINE 15 UNIT(S): 100 INJECTION, SOLUTION SUBCUTANEOUS at 21:46

## 2020-12-17 RX ADMIN — Medication 25 MILLIGRAM(S): at 09:09

## 2020-12-17 RX ADMIN — Medication 0.2 MILLIGRAM(S): at 06:26

## 2020-12-17 RX ADMIN — Medication 1334 MILLIGRAM(S): at 13:10

## 2020-12-17 RX ADMIN — Medication 25 MILLIGRAM(S): at 21:46

## 2020-12-17 RX ADMIN — Medication 0.2 MILLIGRAM(S): at 13:10

## 2020-12-17 RX ADMIN — Medication 1334 MILLIGRAM(S): at 09:10

## 2020-12-17 RX ADMIN — Medication 25 MILLIGRAM(S): at 13:10

## 2020-12-17 RX ADMIN — Medication 0.2 MILLIGRAM(S): at 21:46

## 2020-12-17 RX ADMIN — Medication 25 MILLIGRAM(S): at 06:25

## 2020-12-17 RX ADMIN — CEFEPIME 100 MILLIGRAM(S): 1 INJECTION, POWDER, FOR SOLUTION INTRAMUSCULAR; INTRAVENOUS at 06:24

## 2020-12-17 RX ADMIN — Medication 25 MILLIGRAM(S): at 19:59

## 2020-12-17 RX ADMIN — Medication 2: at 13:09

## 2020-12-17 RX ADMIN — POLYETHYLENE GLYCOL 3350 17 GRAM(S): 17 POWDER, FOR SOLUTION ORAL at 13:10

## 2020-12-17 RX ADMIN — SENNA PLUS 1 TABLET(S): 8.6 TABLET ORAL at 17:22

## 2020-12-17 RX ADMIN — Medication 1334 MILLIGRAM(S): at 17:19

## 2020-12-17 NOTE — PHYSICAL THERAPY INITIAL EVALUATION ADULT - GENERAL OBSERVATIONS, REHAB EVAL
Rec'd supine in bed, in NAD, +IV lock, +bandage around L foot. Agreeable to bedside eval, however refusing to get OOB at this time. MARIBELL Pemberton reports planning for LF TMA on 12/21 and PT can perform FE post procedure. Rec'd supine in bed, in NAD, +IV lock, +bandage around L foot s/p L TMA on 12/21. Per podiatry (Dougie Russell), pt's WB status is L NWB without shoe and L WBAT through heel when wearing surgical shoe.

## 2020-12-17 NOTE — PHYSICAL THERAPY INITIAL EVALUATION ADULT - ADDITIONAL COMMENTS
Per pt and per CM note(spoke to pt's sister-in-law), pt came from [facility] with onsite HD. Pt reports he uses a RW at "rehab center" and has no steps to navigate (+elevator). Per pt and per CM note(spoke to pt's sister-in-law), pt came from [facility] with onsite HD. Pt reports he uses a RW at rehab center and has no steps to navigate (+elevator).

## 2020-12-17 NOTE — PHYSICAL THERAPY INITIAL EVALUATION ADULT - ASR EQUIP NEEDS DISCH PT EVAL
Pt given written instructions on how to care for Rome catheter and also given extra leg bag (with supplies), drainage bag, and colostomy kit and supples.  Writer attempted to go over Rome and colostomy instructions with patient, but pt refused teaching at this time.  Pt is very anxious to leave.  Pt left unit on foot with supplies and all personal belongings in hand en route to outpatient pharmacy.   TBD at rehab

## 2020-12-17 NOTE — PHYSICAL THERAPY INITIAL EVALUATION ADULT - GAIT DEVIATIONS NOTED, PT EVAL
decreased maranda/increased time in double stance/decreased step length/decreased stride length/decreased weight-shifting ability

## 2020-12-17 NOTE — PHYSICAL THERAPY INITIAL EVALUATION ADULT - PRECAUTIONS/LIMITATIONS, REHAB EVAL
CXR: clear lungs. LE Ultrasound:  Doppler evidence of mild arterial flow limitation in the left lower extremity likely localizing to the infrapopliteal circulation. XRay Foot AP+Lateral (L): No acute fracture or dislocation. Preserved joint spaces. Vascular calcifications. CXR: clear lungs. LE Ultrasound:  Doppler evidence of mild arterial flow limitation in the left lower extremity likely localizing to the infrapopliteal circulation. XRay Foot AP+Lateral (L): No acute fracture or dislocation. Preserved joint spaces. Vascular calcifications./fall precautions/surgical precautions

## 2020-12-17 NOTE — PHYSICAL THERAPY INITIAL EVALUATION ADULT - PERTINENT HX OF CURRENT PROBLEM, REHAB EVAL
59 y/o male with a h/o HTN, diabetes, and ESRD on dialysis, LLE Bypass 5/2020 and LLE angio with SFA and pop stent 11/2020 now presenting left foot gangrene, which started 6 weeks ago. Seen by podiatry, planning for LF TMA on 12/21. 61 y/o male with a h/o HTN, diabetes, and ESRD on dialysis, LLE Bypass 5/2020 and LLE angio with SFA and pop stent 11/2020 now presenting left foot gangrene, which started 6 weeks ago. Seen by podiatry, planning for LF TMA on 12/21. Update: Now S/p LF TMA 12/21.

## 2020-12-17 NOTE — PHYSICAL THERAPY INITIAL EVALUATION ADULT - NS ASR WT BEARING DETAIL LLE
weight-bearing as tolerated WBAT to left heel in surgical shoe; NWB without shoe/weight-bearing as tolerated/nonweight-bearing

## 2020-12-17 NOTE — PROGRESS NOTE ADULT - ASSESSMENT
60 m with    L foot gangrene  - wound care  - Podiatry evaluation  - Vascular evaluation  - possible TM amputation  - off antibiotics   - ID evaluation    Cardiology evaluation for preop stratification  - Dr. Aguirre    Diabetes Mellitus  - BS control  - ADA diet     Glaucoma  - stable    HTN control    ESRD  - HD  - Nephrology evaluation    DVT prophylaxis  - on Eliquis 3X week  - Heparin sq    Vj Rios MD pager [phone]

## 2020-12-17 NOTE — CONSULT NOTE ADULT - ATTENDING COMMENTS
Advanced care planning was discussed with patient and family.  Advanced care planning forms were reviewed and discussed as appropriate.  Differential diagnosis and plan of care discussed with patient after the evaluation.   Pain assessed and judicious use of narcotics when appropriate was discussed.  Importance of Fall prevention discussed.  Counseling on Smoking and Alcohol cessation was offered when appropriate.  Counseling on Diet, exercise, and medication compliance was done.
59 yo M with a h/o HTN, diabetes, and ESRD on dialysis, LLE Bypass 5/2020 and LLE angio with SFA and pop stent 11/2020 now presenting left foot gangrene, which started 6 weeks ago  No fevers, no leukocytosis  LLE appears as dry gangrene, no signs active infection presently  No signs active infection  Overall,  1) LE Gangrene  - Does not appear actively infected presently  - Planned for OR per podiatry  - Would DC Cefepime, monitor off abx  - Monitor LE for any signs developing infection, monitor for any signs sepsis  2) ESRD on HD  - HD per primary team    Jason Valdez MD  Pager [phone]  After 5pm and on weekends call [phone]    I was physically present for the key portions of the evaluation and management service provided. I saw and examined the patient. I agree with the above history, physical, and plan except for any discrepancies which I have documented in “Attending Attestation.” Please refer to “Attending Attestation” for final plan.
I reviewed the records, saw and examined the patient  I agree with the assessment and plan above with my added edits.  Dialyzes in [facility]  Has been at rehab  L toe arterial disease  two recent procedures have not been successful  L clear  HD MWF

## 2020-12-17 NOTE — CONSULT NOTE ADULT - SUBJECTIVE AND OBJECTIVE BOX
[facility] DIVISION OF KIDNEY DISEASES AND HYPERTENSION   -- INITIAL CONSULT NOTE --  Jinny Stinson, Nephrology Fellow     NS Pager: [phone] / JERMAIN Pager: [phone]  After 5pm or on weekend, please page the on-call fellow)  --------------------------------------------------------------------------------    HPI: 60M PMHx ESRD on HD MWF ([facility] center), HTN, diabetes present with L foot gangrene x 6 weeks.  Pt report pain initially mild however progressively worsened to mod-severe, gradual onset, now with discoloration L foot.  Patient saw his podiatrist Dr. Mendez day of admission who referred to ED.  Pt denies any fever, chills, chest pain, shortness of breath, cough, nausea, vomiting, diarrhea.      Nephrology consulted for ESRD on HD management.  Pt goes to [facility] HD center, on MWF, last HD 12/16/20 via [facility] AVF.      PAST HISTORY  --------------------------------------------------------------------------------  PAST MEDICAL & SURGICAL HISTORY:  Glaucoma  Diabetes  HTN (hypertension)  Renal failure on dialysis  Status post popliteal-tibial bypass POP-PT graph 6/2020  H/O right wrist surgery 1994    FAMILY HISTORY:  PAST SOCIAL HISTORY:  ALLERGIES & MEDICATIONS  --------------------------------------------------------------------------------  Allergies  aspirin (Rash; Urticaria; Hives)  iodine (Rash; Urticaria)    Intolerances    Standing Inpatient Medications  apixaban 2.5 milliGRAM(s) Oral <User Schedule>  calcium acetate 1334 milliGRAM(s) Oral three times a day with meals  cefepime   IVPB 500 milliGRAM(s) IV Intermittent every 12 hours  cloNIDine 0.2 milliGRAM(s) Oral three times a day  dextrose 40% Gel 15 Gram(s) Oral once  dextrose 5%. 1000 milliLiter(s) IV Continuous <Continuous>  dextrose 5%. 1000 milliLiter(s) IV Continuous <Continuous>  dextrose 50% Injectable 25 Gram(s) IV Push once  dextrose 50% Injectable 12.5 Gram(s) IV Push once  dextrose 50% Injectable 25 Gram(s) IV Push once  glucagon  Injectable 1 milliGRAM(s) IntraMuscular once  hydrALAZINE 25 milliGRAM(s) Oral every 8 hours  insulin glargine Injectable (LANTUS) 15 Unit(s) SubCutaneous at bedtime  insulin lispro (ADMELOG) corrective regimen sliding scale   SubCutaneous three times a day before meals  insulin lispro (ADMELOG) corrective regimen sliding scale   SubCutaneous at bedtime  polyethylene glycol 3350 17 Gram(s) Oral daily  senna 1 Tablet(s) Oral daily    PRN Inpatient Medications  acetaminophen   Tablet .. 650 milliGRAM(s) Oral every 6 hours PRN  hydrOXYzine hydrochloride 25 milliGRAM(s) Oral four times a day PRN  oxycodone    5 mG/acetaminophen 325 mG 1 Tablet(s) Oral every 4 hours PRN    REVIEW OF SYSTEMS    VITALS/PHYSICAL EXAM  --------------------------------------------------------------------------------  T(C): 36.8 (12-17-20 @ 04:56), Max: 37.1 (12-16-20 @ 19:40)  HR: 77 (12-17-20 @ 04:56) (77 - 102)  BP: 149/79 (12-17-20 @ 04:56) (117/66 - 168/86)  RR: 18 (12-17-20 @ 04:56) (18 - 18)  SpO2: 98% (12-17-20 @ 04:56) (95% - 99%)  Wt(kg): --  Height (cm): 180.3 (12-16-20 @ 21:48)  Weight (kg): 80 (12-16-20 @ 21:48)  BMI (kg/m2): 24.6 (12-16-20 @ 21:48)  BSA (m2): 2 (12-16-20 @ 21:48)    12-16-20 @ 07:01  -  12-17-20 @ 07:00  --------------------------------------------------------  IN: 50 mL / OUT: 0 mL / NET: 50 mL    Physical Exam:      LABS/STUDIES  --------------------------------------------------------------------------------              10.6   7.30  >-----------<  226      [12-17-20 @ 06:54]              32.9     134  |  95  |  35  ----------------------------<  134      [12-17-20 @ 06:54]  4.5   |  24  |  6.99        Ca     9.1     [12-17-20 @ 06:54]    TPro  7.7  /  Alb  3.9  /  TBili  0.3  /  DBili  x   /  AST  14  /  ALT  16  /  AlkPhos  88  [12-16-20 @ 15:58]    Creatinine Trend:  SCr 6.99 [12-17 @ 06:54]  SCr 5.40 [12-16 @ 15:58]      Iron 32, TIBC 276, %sat 11      [06-09-20 @ 09:19]  Ferritin 53      [06-09-20 @ 09:19]    HBsAb 464.5      [06-05-20 @ 13:20]  HBsAg Nonreact      [10-22-20 @ 04:33]  HCV 14.27, Reactive      [10-22-20 @ 04:33]  HIV Nonreact      [12-16-20 @ 19:11] [facility] DIVISION OF KIDNEY DISEASES AND HYPERTENSION   -- INITIAL CONSULT NOTE --  Jinny Stinson, Nephrology Fellow     NS Pager: [phone] / JERMAIN Pager: [phone]  After 5pm or on weekend, please page the on-call fellow)  --------------------------------------------------------------------------------    HPI: 60M PMHx ESRD on HD MWF ([facility]), HTN, diabetes present with L foot gangrene x 6 weeks.  Pt report pain initially mild however progressively worsened to mod-severe, gradual onset, now with discoloration L foot.  Patient saw his podiatrist Dr. Mendez day of admission who referred to ED.  Pt denies any fever, chills, chest pain, shortness of breath, cough, nausea, vomiting, diarrhea.      Nephrology consulted for ESRD on HD management.  Pt report in 2016 was started on Candesartan which caused him go in ESRD requiring HD on April 2016.  Pt usual goes to [facility] HD center, on a  MWF schedule however currently in [facility] with dialysis center, last HD 12/16/20 via [facility] AVF, usual UF 2-3L with no hypotension issues.  Pt denies any history kidney disease prior 2016 (normal kidney function per pt), no history nephrolithiasis, recurrent UTI, NSAIDS use, family history kidney disease, hematuria, foamy urine.  Hypertension was diagnosed in April 2016.      PAST HISTORY  --------------------------------------------------------------------------------  PAST MEDICAL & SURGICAL HISTORY:  Glaucoma  Diabetes  HTN (hypertension)  Renal failure on dialysis  Status post popliteal-tibial bypass POP-PT graph 6/2020  H/O right wrist surgery 1994    FAMILY HISTORY:  PAST SOCIAL HISTORY: Retired bank, reality agent, [employer].  Denies alcohol, smoking.    ALLERGIES & MEDICATIONS  --------------------------------------------------------------------------------  Allergies  aspirin (Rash; Urticaria; Hives)  iodine (Rash; Urticaria)    Intolerances    Standing Inpatient Medications  apixaban 2.5 milliGRAM(s) Oral <User Schedule>  calcium acetate 1334 milliGRAM(s) Oral three times a day with meals  cefepime   IVPB 500 milliGRAM(s) IV Intermittent every 12 hours  cloNIDine 0.2 milliGRAM(s) Oral three times a day  dextrose 40% Gel 15 Gram(s) Oral once  dextrose 5%. 1000 milliLiter(s) IV Continuous <Continuous>  dextrose 5%. 1000 milliLiter(s) IV Continuous <Continuous>  dextrose 50% Injectable 25 Gram(s) IV Push once  dextrose 50% Injectable 12.5 Gram(s) IV Push once  dextrose 50% Injectable 25 Gram(s) IV Push once  glucagon  Injectable 1 milliGRAM(s) IntraMuscular once  hydrALAZINE 25 milliGRAM(s) Oral every 8 hours  insulin glargine Injectable (LANTUS) 15 Unit(s) SubCutaneous at bedtime  insulin lispro (ADMELOG) corrective regimen sliding scale   SubCutaneous three times a day before meals  insulin lispro (ADMELOG) corrective regimen sliding scale   SubCutaneous at bedtime  polyethylene glycol 3350 17 Gram(s) Oral daily  senna 1 Tablet(s) Oral daily    PRN Inpatient Medications  acetaminophen   Tablet .. 650 milliGRAM(s) Oral every 6 hours PRN  hydrOXYzine hydrochloride 25 milliGRAM(s) Oral four times a day PRN  oxycodone    5 mG/acetaminophen 325 mG 1 Tablet(s) Oral every 4 hours PRN    REVIEW OF SYSTEMS  Gen: no fever, chills, weakness  Respiratory: No dyspnea, cough  CV: No chest pain, orthopnea  GI: No abdominal pain, nausea, vomiting, diarrhea  MSK: no edema  Neuro: No dizziness, lightheadedness  All other systems were reviewed and are negative, except as noted.    VITALS/PHYSICAL EXAM  --------------------------------------------------------------------------------  T(C): 36.8 (12-17-20 @ 04:56), Max: 37.1 (12-16-20 @ 19:40)  HR: 77 (12-17-20 @ 04:56) (77 - 102)  BP: 149/79 (12-17-20 @ 04:56) (117/66 - 168/86)  RR: 18 (12-17-20 @ 04:56) (18 - 18)  SpO2: 98% (12-17-20 @ 04:56) (95% - 99%)  Wt(kg): --  Height (cm): 180.3 (12-16-20 @ 21:48)  Weight (kg): 80 (12-16-20 @ 21:48)  BMI (kg/m2): 24.6 (12-16-20 @ 21:48)  BSA (m2): 2 (12-16-20 @ 21:48)    12-16-20 @ 07:01  -  12-17-20 @ 07:00  --------------------------------------------------------  IN: 50 mL / OUT: 0 mL / NET: 50 mL    Physical Exam:  	Gen: NAD, well-appearing on room air  	HEENT: moist mucous membrane  	Pulm: crackles R base, clear Left lung  	CV: RRR, S1S2; no rub/murmur  	GI: +BS, soft, nontender/nondistended  	MSK: Warm, no edema              Neuro: AAOx3  	Psych: Normal affect and mood  	Skin: Warm  	Vascular access: LUE AVF +thrills/bruits      LABS/STUDIES  --------------------------------------------------------------------------------              10.6   7.30  >-----------<  226      [12-17-20 @ 06:54]              32.9     134  |  95  |  35  ----------------------------<  134      [12-17-20 @ 06:54]  4.5   |  24  |  6.99        Ca     9.1     [12-17-20 @ 06:54]    TPro  7.7  /  Alb  3.9  /  TBili  0.3  /  DBili  x   /  AST  14  /  ALT  16  /  AlkPhos  88  [12-16-20 @ 15:58]    Creatinine Trend:  SCr 6.99 [12-17 @ 06:54]  SCr 5.40 [12-16 @ 15:58]      Iron 32, TIBC 276, %sat 11      [06-09-20 @ 09:19]  Ferritin 53      [06-09-20 @ 09:19]    HBsAb 464.5      [06-05-20 @ 13:20]  HBsAg Nonreact      [10-22-20 @ 04:33]  HCV 14.27, Reactive      [10-22-20 @ 04:33]  HIV Nonreact      [12-16-20 @ 19:11]

## 2020-12-17 NOTE — CONSULT NOTE ADULT - ASSESSMENT
59 y/o male with a h/o HTN, diabetes, and ESRD on dialysis, LLE Bypass 5/2020 and LLE angio with SFA and pop stent 11/2020 now presenting left foot gangrene, which started 6 weeks ago

## 2020-12-17 NOTE — DISCHARGE NOTE NURSING/CASE MANAGEMENT/SOCIAL WORK - PATIENT PORTAL LINK FT
You can access the FollowMyHealth Patient Portal offered by [facility] by registering at the following website: [email or web address]. By joining [facility]’s FollowMyHealth portal, you will also be able to view your health information using other applications (apps) compatible with our system.

## 2020-12-17 NOTE — CONSULT NOTE ADULT - SUBJECTIVE AND OBJECTIVE BOX
CHIEF COMPLAINT:      HISTORY OF PRESENT ILLNESS:  59 y/o male well known to me from office with a h/o HCV, R DVT s/p thrombectomy, PAD  s/p LLE popliteal to posterior tibial artery bypass,  s/p LLE angio with SFA and pop stent 11/2020, HTN, diabetes, and ESRD on dialysis, presenting left foot gangrene, which started 6 weeks ago. Gradual onset, moderate-severe severity, pain at left foot, associated symptoms include dark discoloration to left foot. Patient states he went to his podiatrist Dr. Mendez today and was sent to the ER for further evaluation. Denies any fever, chills, shortness of breath, chest pain, cough, nausea, vomiting, or weakness. Patient states he had a full dialysis session today and has dialysis MWF.      PAST MEDICAL & SURGICAL HISTORY:  Glaucoma    Diabetes    HTN (hypertension)    Renal failure  on dialysis    Status post popliteal-tibial bypass  POP-PT graph 6/2020    H/O right wrist surgery  1994            MEDICATIONS:  apixaban 2.5 milliGRAM(s) Oral <User Schedule>  cloNIDine 0.2 milliGRAM(s) Oral three times a day  hydrALAZINE 25 milliGRAM(s) Oral every 8 hours    cefepime   IVPB 500 milliGRAM(s) IV Intermittent every 12 hours      acetaminophen   Tablet .. 650 milliGRAM(s) Oral every 6 hours PRN  hydrOXYzine hydrochloride 25 milliGRAM(s) Oral four times a day PRN  oxycodone    5 mG/acetaminophen 325 mG 1 Tablet(s) Oral every 4 hours PRN    polyethylene glycol 3350 17 Gram(s) Oral daily  senna 1 Tablet(s) Oral daily    dextrose 40% Gel 15 Gram(s) Oral once  dextrose 50% Injectable 25 Gram(s) IV Push once  dextrose 50% Injectable 12.5 Gram(s) IV Push once  dextrose 50% Injectable 25 Gram(s) IV Push once  glucagon  Injectable 1 milliGRAM(s) IntraMuscular once  insulin glargine Injectable (LANTUS) 15 Unit(s) SubCutaneous at bedtime  insulin lispro (ADMELOG) corrective regimen sliding scale   SubCutaneous three times a day before meals  insulin lispro (ADMELOG) corrective regimen sliding scale   SubCutaneous at bedtime    calcium acetate 1334 milliGRAM(s) Oral three times a day with meals  dextrose 5%. 1000 milliLiter(s) IV Continuous <Continuous>  dextrose 5%. 1000 milliLiter(s) IV Continuous <Continuous>      FAMILY HISTORY:      SOCIAL HISTORY:    [ ] Non-smoker  [ ] Smoker  [ ] Alcohol    Allergies    aspirin (Rash; Urticaria; Hives)  iodine (Rash; Urticaria)    Intolerances    	    REVIEW OF SYSTEMS:  CONSTITUTIONAL: No fever, weight loss, + fatigue  EYES: No eye pain, visual disturbances, or discharge  ENMT:  No difficulty hearing, tinnitus, vertigo; No sinus or throat pain  NECK: No pain or stiffness  RESPIRATORY: No cough, wheezing, chills or hemoptysis; No Shortness of Breath  CARDIOVASCULAR: No chest pain, palpitations, passing out, dizziness, or leg swelling  GASTROINTESTINAL: No abdominal or epigastric pain. No nausea, vomiting, or hematemesis; No diarrhea or constipation. No melena or hematochezia.  GENITOURINARY: No dysuria, frequency, hematuria, or incontinence  NEUROLOGICAL: No headaches, memory loss, loss of strength, numbness, or tremors  SKIN: No itching, burning, rashes, or lesions   LYMPH Nodes: No enlarged glands  ENDOCRINE: No heat or cold intolerance; No hair loss  MUSCULOSKELETAL:+ joint pain or swelling; No muscle, back, or extremity pain  PSYCHIATRIC: No depression, anxiety, mood swings, or difficulty sleeping  HEME/LYMPH: No easy bruising, or bleeding gums  ALLERY AND IMMUNOLOGIC: No hives or eczema	    [ ] All others negative	  [ ] Unable to obtain    PHYSICAL EXAM:  T(C): 36.8 (12-17-20 @ 04:56), Max: 37.1 (12-16-20 @ 19:40)  HR: 77 (12-17-20 @ 04:56) (77 - 102)  BP: 149/79 (12-17-20 @ 04:56) (117/66 - 168/86)  RR: 18 (12-17-20 @ 04:56) (18 - 18)  SpO2: 98% (12-17-20 @ 04:56) (95% - 99%)  Wt(kg): --  I&O's Summary    16 Dec 2020 07:01  -  17 Dec 2020 07:00  --------------------------------------------------------  IN: 50 mL / OUT: 0 mL / NET: 50 mL    17 Dec 2020 07:01  -  17 Dec 2020 11:31  --------------------------------------------------------  IN: 240 mL / OUT: 0 mL / NET: 240 mL        Appearance: NAD	  HEENT:   Normal oral mucosa, PERRL, EOMI	  Lymphatic: No lymphadenopathy  Cardiovascular: Normal S1 S2, No JVD, No murmurs, No edema  Respiratory: Lungs clear to auscultation	  Psychiatry: A & O x 3, Mood & affect appropriate  Gastrointestinal:  Soft, Non-tender, + BS	  Skin: No rashes, No ecchymoses, No cyanosis	  Neurologic: Non-focal  Extremities: L foot gangrene   Vascular: Decreased distal pulses   + AVF   TELEMETRY: 	    ECG:  	NSR, no acute ischemic stt changes   RADIOLOGY:  < from: Xray Chest 1 View AP/PA (12.16.20 @ 16:23) >    EXAM:  XR CHEST AP OR PA 1V                            PROCEDURE DATE:  12/16/2020            INTERPRETATION:  CLINICAL INDICATION: Preop.    TECHNIQUE: Frontal view of the chest.    COMPARISON: 5/31/2020.    FINDINGS:    Cardiac size is slightly enlarged.  The lungs are clear.  There are no pleural effusions. No pneumothorax.  There is no acute osseous abnormality.    IMPRESSION:  Clear lungs.      < end of copied text >  < from: Xray Foot AP + Lateral, Left (12.16.20 @ 16:16) >    EXAM:  FOOT 2VIEWS LT                            PROCEDURE DATE:  12/16/2020            INTERPRETATION:  EXAMINATION: 2 views of the left foot    CLINICAL INFORMATION: Left foot pain    IMPRESSION:    No acute fracture or dislocation. Preserved joint spaces. Vascular calcifications.      < end of copied text >  < from: CT Angio Abd Aorta w/run-off w/ IV Cont (10.17.20 @ 01:18) >    EXAM:  CT ANGIO ABD AOR W RUN(W)AW IC                            PROCEDURE DATE:  10/17/2020            INTERPRETATION:  CLINICAL INFORMATION: Known peripheral arterial disease. Lower extremity claudication. Nonpalpable distal pulses left lower extremity.    CT ANGIOGRAM ABDOMEN, PELVIS, AND LOWER EXTREMITIES:    TECHNIQUE:  Initially, nonenhanced CT was obtained through the calves. Then, following the rapid administration of intravenous contrast, CT angiography was performed through the abdomen, pelvis, and lower extremities down to the toes.  Delayed images through the calves were also obtained. Sagittal and coronal reformats as well as 3D multiplanar reconstructions were performed.    125 ml of Omnipaque 350 was administered intravenously without complication and 25 ml were discarded.    COMPARISON: CT arteriogram of the aorta and runoff dated 6/1/2020.    FINDINGS:    ABDOMEN AND PELVIS ARTERIAL SYSTEM:    Mild atheromatous plaque in the infrarenal aorta without luminal stenosis or aneurysm. Mesenteric and renal arteries are patent. There is replaced right hepatic artery.    RIGHT LOWER EXTREMITY:    Moderate atheromatous plaque in the common iliac artery. There is segmental occlusion of the internal iliac artery. The external iliac and common femoral arteries are widely patent. There is mild atheromatous irregularity of the proximal superficial femoral artery. There is focal high-grade stenosis of the distal superficial femoral and popliteal artery. The anterior tibial artery is occluded. There is two-vessel runoff in the calf via the peroneal and posterior tibial arteries. The posterior tibial artery is patent in the foot.    LEFT LOWER EXTREMITY:    There is moderate atheromatous plaque in the common iliac artery. Theexternal iliac and common femoral arteries are widely patent. The deep femoral artery is occluded near its origin. There is multifocal narrowing of the superficial femoral artery. There is a metallic stent in the popliteal artery. The stent is occluded. Similarly, popliteal to anterior tibial artery bypass graft is occluded, as is the distal native anterior tibial artery. The posterior tibial artery is occluded. The peroneal artery is reconstituted via collaterals and provides single-vessel runoff to the foot.    ADDITIONAL FINDINGS:  Small gallstones.    IMPRESSION:  Right lower extremity: Focal high-grade stenoses of the distal superficial femoral and popliteal arteries. Anterior tibial artery occlusion with two-vessel runoff in the foot.    Left lower extremity: Deep femoral artery occlusion. Popliteal artery stent occlusion. Popliteal to anterior tibial artery graft occlusion. Posterior tibial artery occlusion. Single-vessel runoff in the calf via the reconstituted peroneal artery.          < end of copied text >    OTHER: 	  	  LABS:	 	    CARDIAC MARKERS:                                  10.6   7.30  )-----------( 226      ( 17 Dec 2020 06:54 )             32.9     12-17    134<L>  |  95<L>  |  35<H>  ----------------------------<  134<H>  4.5   |  24  |  6.99<H>    Ca    9.1      17 Dec 2020 06:54    TPro  7.7  /  Alb  3.9  /  TBili  0.3  /  DBili  x   /  AST  14  /  ALT  16  /  AlkPhos  88  12-16    proBNP:   Lipid Profile:   HgA1c:   TSH:

## 2020-12-17 NOTE — CONSULT NOTE ADULT - PROBLEM SELECTOR RECOMMENDATION 9
IV abx  ID and podiatry consulted   for amputations. Acceptable cardiac risk to proceed. recent SPECT in my office with no evidence of ischemia. Has normal biventricular size and systolic function

## 2020-12-17 NOTE — CONSULT NOTE ADULT - ASSESSMENT
61 y/o male with a h/o HTN, diabetes, and ESRD on dialysis, LLE Bypass 5/2020 and LLE angio with SFA and pop stent 11/2020 now presenting left foot gangrene, which started 6 weeks ago 61 y/o male with a h/o HTN, diabetes, and ESRD on dialysis, LLE Bypass 5/2020 and LLE angio with SFA and pop stent 11/2020 now presenting left foot gangrene.    #Left foot dry gangrene - Without systemic symptoms of infection. No prior trauma or cellulitis. No Leukocytosis or fever here.  Recieving vanc and cefepime.  Pod planning for L ft TMA on 12/21.    Recommend:  - stop abx  - monitor for fevers or increased drainage of foot  - f/u bl clx from ED    Nilson Hook MD  Fellow, Infectious Diseases, PGY-4  Pager: [phone]  Before 9am or after 5pm/Weekends: Call [phone]

## 2020-12-17 NOTE — CONSULT NOTE ADULT - SUBJECTIVE AND OBJECTIVE BOX
Patient is a 60y old  Male who presents with a chief complaint of L foot gangrene (17 Dec 2020 08:49)    HPI:  61 y/o male with a h/o HTN, diabetes, and ESRD on dialysis, LLE Bypass 5/2020 and LLE angio with SFA and pop stent 11/2020 now presenting left foot gangrene, which started 6 weeks ago. Gradual onset, moderate-severe severity, pain at left foot, associated symptoms include dark discoloration to left foot. Patient states he went to his podiatrist Dr. Mendez today and was sent to the ER for further evaluation. Denies any fever, chills, shortness of breath, chest pain, cough, nausea, vomiting, or weakness. Patient states he had a full dialysis session today and has dialysis MWF.     ID consulted for antibiotic management.        prior hospital charts reviewed [  ]  primary team notes reviewed [  ]  other consultant notes reviewed [  ]    PAST MEDICAL & SURGICAL HISTORY:  Glaucoma  Diabetes  HTN (hypertension)  Renal failure on dialysis  Status post popliteal-tibial bypass - POP-PT graph 6/2020  H/O right wrist surgery 1994    Allergies  aspirin (Rash; Urticaria; Hives)  iodine (Rash; Urticaria)    ANTIMICROBIALS (past 90 days)  MEDICATIONS  (STANDING):  cefepime   IVPB   100 mL/Hr IV Intermittent (12-17-20 @ 06:24)    cefepime   IVPB   100 mL/Hr IV Intermittent (12-16-20 @ 16:34)    vancomycin  IVPB.   250 mL/Hr IV Intermittent (12-16-20 @ 17:32)      ANTIMICROBIALS:    cefepime   IVPB 500 every 12 hours    OTHER MEDS: MEDICATIONS  (STANDING):  acetaminophen   Tablet .. 650 every 6 hours PRN  apixaban 2.5 <User Schedule>  cloNIDine 0.2 three times a day  dextrose 40% Gel 15 once  dextrose 50% Injectable 25 once  dextrose 50% Injectable 12.5 once  dextrose 50% Injectable 25 once  glucagon  Injectable 1 once  hydrALAZINE 25 every 8 hours  hydrOXYzine hydrochloride 25 four times a day PRN  insulin glargine Injectable (LANTUS) 15 at bedtime  insulin lispro (ADMELOG) corrective regimen sliding scale  three times a day before meals  insulin lispro (ADMELOG) corrective regimen sliding scale  at bedtime  oxycodone    5 mG/acetaminophen 325 mG 1 every 4 hours PRN  polyethylene glycol 3350 17 daily  senna 1 daily    SOCIAL HISTORY:    Marital Status:  Single      Occupation:   Lives with: parents      Substance Use (street drugs): never used   Tobacco Usage: never smoked   Alcohol Usage: denies      FAMILY HISTORY:    REVIEW OF SYSTEMS  [  ] ROS unobtainable because:    [  ] All other systems negative except as noted below:	    Constitutional:  [ ] fever [ ] chills  [ ] weight loss  [ ] weakness  Skin:  [ ] rash [ ] phlebitis	  Eyes: [ ] icterus [ ] pain  [ ] discharge	  ENMT: [ ] sore throat  [ ] thrush [ ] ulcers [ ] exudates  Respiratory: [ ] dyspnea [ ] hemoptysis [ ] cough [ ] sputum	  Cardiovascular:  [ ] chest pain [ ] palpitations [ ] edema	  Gastrointestinal:  [ ] nausea [ ] vomiting [ ] diarrhea [ ] constipation [ ] pain	  Genitourinary:  [ ] dysuria [ ] frequency [ ] hematuria [ ] discharge [ ] flank pain  [ ] incontinence  Musculoskeletal:  [ ] myalgias [ ] arthralgias [ ] arthritis  [ ] back pain  Neurological:  [ ] headache [ ] seizures  [ ] confusion/altered mental status  Psychiatric:  [ ] anxiety [ ] depression	  Hematology/Lymphatics:  [ ] lymphadenopathy  Endocrine:  [ ] adrenal [ ] thyroid  Allergic/Immunologic:	 [ ] transplant [ ] seasonal    Vital Signs Last 24 Hrs  T(F): 98.2 (12-17-20 @ 04:56), Max: 98.7 (12-16-20 @ 19:40)  Vital Signs Last 24 Hrs  HR: 77 (12-17-20 @ 04:56) (77 - 102)  BP: 149/79 (12-17-20 @ 04:56) (117/66 - 168/86)  RR: 18 (12-17-20 @ 04:56)  SpO2: 98% (12-17-20 @ 04:56) (95% - 99%)  Wt(kg): --    EXAM:  Constitutional: Not in acute distress  Eyes: pupils bilaterally reactive to light. No icterus.  Oral cavity: Clear, no lesions  Neck: No neck vein distension noted  RS: Chest clear to auscultation bilaterally. No wheeze/rhonchi/crepitations.  CVS: S1, S2 heard. Regular rate and rhythm. No murmurs/rubs/gallops.  Abdomen: Soft. No guarding/rigidity/tenderness.  : No acute abnormalities  Extremities: Warm. No pedal edema  Skin: No lesions noted  Vascular: No evidence of phlebitis  Neuro: Alert, oriented to time/place/person                          10.6   7.30  )-----------( 226      ( 17 Dec 2020 06:54 )             32.9     12-17    134<L>  |  95<L>  |  35<H>  ----------------------------<  134<H>  4.5   |  24  |  6.99<H>    Ca    9.1      17 Dec 2020 06:54    TPro  7.7  /  Alb  3.9  /  TBili  0.3  /  DBili  x   /  AST  14  /  ALT  16  /  AlkPhos  88  12-16    MICROBIOLOGY:  COVID-19 PCR: NotDetec (12-16-20 @ 15:58)    HIV-1/2 Antigen/Antibody Screen by CMIA (12.16.20 @ 19:11)    HIV-1/2 Combo Result: Nonreact      RADIOLOGY:  imaging below personally reviewed  < from: Xray Chest 1 View AP/PA (12.16.20 @ 16:23) >  IMPRESSION:  Clear lungs    < from: Xray Foot AP + Lateral, Left (12.16.20 @ 16:16) >  IMPRESSION:  No acute fracture or dislocation. Preserved joint spaces. Vascular calcifications.     Patient is a 60y old  Male who presents with a chief complaint of L foot gangrene (17 Dec 2020 08:49)    HPI:  61 y/o male with a h/o HTN, diabetes, and ESRD on dialysis, LLE Bypass 5/2020 and LLE angio with SFA and pop stent 11/2020 now presenting left foot gangrene, which started 6 weeks ago.   Gradual onset, moderate-severe severity, pain at left foot, associated symptoms include dark discoloration to left foot. Patient states he went to his podiatrist Dr. Mendez and was sent to the ER for further evaluation. Denies any fever, chills, shortness of breath, chest pain, cough, nausea, vomiting, or weakness. Patient states he had a full dialysis session yesterday and has dialysis MWF.     ID consulted for antibiotic management.   Pt initially with LLE pain resulted in bypass in 5/2020 and then with angio and stent placement. Reports since then he developed darkening of toes that progressed  to all toes with associated pain. Reports no open wound or trauma. Never had any drainage or foul odor.  Denies any other symptoms as above.      Endorses rash reaction to unknown antibiotic when he had gallbladder procedure.   In the ED, afebrile, , /62 RR18/95% on RA.   Received vanc and cefepime. Seen by podiatry, planning for LF TMA on 12/21.    prior hospital charts reviewed [  ]  primary team notes reviewed [ x ]  other consultant notes reviewed [ x ]    PAST MEDICAL & SURGICAL HISTORY:  Glaucoma  Diabetes  HTN (hypertension)  Renal failure on dialysis  Status post popliteal-tibial bypass - POP-PT graph 6/2020  H/O right wrist surgery 1994    Allergies  aspirin (Rash; Urticaria; Hives)  iodine (Rash; Urticaria)    ANTIMICROBIALS (past 90 days)  MEDICATIONS  (STANDING):  cefepime   IVPB   100 mL/Hr IV Intermittent (12-17-20 @ 06:24)   100 mL/Hr IV Intermittent (12-16-20 @ 16:34)    vancomycin  IVPB.   250 mL/Hr IV Intermittent (12-16-20 @ 17:32)      ANTIMICROBIALS:    cefepime   IVPB 500 every 12 hours    OTHER MEDS: MEDICATIONS  (STANDING):  acetaminophen   Tablet .. 650 every 6 hours PRN  apixaban 2.5 <User Schedule>  cloNIDine 0.2 three times a day  dextrose 40% Gel 15 once  dextrose 50% Injectable 25 once  dextrose 50% Injectable 12.5 once  dextrose 50% Injectable 25 once  glucagon  Injectable 1 once  hydrALAZINE 25 every 8 hours  hydrOXYzine hydrochloride 25 four times a day PRN  insulin glargine Injectable (LANTUS) 15 at bedtime  insulin lispro (ADMELOG) corrective regimen sliding scale  three times a day before meals  insulin lispro (ADMELOG) corrective regimen sliding scale  at bedtime  oxycodone    5 mG/acetaminophen 325 mG 1 every 4 hours PRN  polyethylene glycol 3350 17 daily  senna 1 daily    SOCIAL HISTORY:    Marital Status:  Single      Occupation: Used to work at [employer]  Lives with: parents      Substance Use (street drugs): never used   Tobacco Usage: quit years ago  Alcohol Usage: denies    FAMILY HISTORY:    REVIEW OF SYSTEMS  [  ] ROS unobtainable because:    [ x ] All other systems negative except as noted below:	    Constitutional:  [ ] fever [ ] chills  [ ] weight loss  [ ] weakness  Skin:  [x ] rash [ ] phlebitis	  Eyes: [ ] icterus [ ] pain  [ ] discharge	  ENMT: [ ] sore throat  [ ] thrush [ ] ulcers [ ] exudates  Respiratory: [ ] dyspnea [ ] hemoptysis [ ] cough [ ] sputum	  Cardiovascular:  [ ] chest pain [ ] palpitations [ ] edema	  Gastrointestinal:  [ ] nausea [ ] vomiting [ ] diarrhea [ ] constipation [ ] pain	  Genitourinary:  [ ] dysuria [ ] frequency [ ] hematuria [ ] discharge [ ] flank pain  [ ] incontinence  Musculoskeletal:  [ ] myalgias [ ] arthralgias [ ] arthritis  [ ] back pain  Neurological:  [ ] headache [ ] seizures  [ ] confusion/altered mental status  Psychiatric:  [ ] anxiety [ ] depression	  Hematology/Lymphatics:  [ ] lymphadenopathy  Endocrine:  [ ] adrenal [ ] thyroid  Allergic/Immunologic:	 [ ] transplant [ ] seasonal    Vital Signs Last 24 Hrs  T(F): 98.2 (12-17-20 @ 04:56), Max: 98.7 (12-16-20 @ 19:40)  Vital Signs Last 24 Hrs  HR: 77 (12-17-20 @ 04:56) (77 - 102)  BP: 149/79 (12-17-20 @ 04:56) (117/66 - 168/86)  RR: 18 (12-17-20 @ 04:56)  SpO2: 98% (12-17-20 @ 04:56) (95% - 99%)  Wt(kg): --    EXAM:  Constitutional: Not in acute distress  Eyes: pupils bilaterally reactive to light. No icterus.  Oral cavity: Clear, no lesions  Neck: No neck vein distension noted  RS: Chest clear to auscultation bilaterally. No wheeze/rhonchi/crepitations.  CVS: S1, S2 heard. Regular rate and rhythm. No murmurs/rubs/gallops.  Abdomen: Soft. No guarding/rigidity/tenderness.  : No acute abnormalities  Extremities: Warm. No pedal edema  Skin: Left foot dry gangrene of forefoot   Vascular: No evidence of phlebitis; LE fistula  Neuro: Alert, oriented to time/place/person                          10.6   7.30  )-----------( 226      ( 17 Dec 2020 06:54 )             32.9     12-17    134<L>  |  95<L>  |  35<H>  ----------------------------<  134<H>  4.5   |  24  |  6.99<H>    Ca    9.1      17 Dec 2020 06:54    TPro  7.7  /  Alb  3.9  /  TBili  0.3  /  DBili  x   /  AST  14  /  ALT  16  /  AlkPhos  88  12-16    MICROBIOLOGY:  COVID-19 PCR: NotDetec (12-16-20 @ 15:58)    HIV-1/2 Antigen/Antibody Screen by CMIA (12.16.20 @ 19:11)    HIV-1/2 Combo Result: Nonreact      RADIOLOGY:  imaging below personally reviewed  < from: Xray Chest 1 View AP/PA (12.16.20 @ 16:23) >  IMPRESSION:  Clear lungs    < from: Xray Foot AP + Lateral, Left (12.16.20 @ 16:16) >  IMPRESSION:  No acute fracture or dislocation. Preserved joint spaces. Vascular calcifications.

## 2020-12-17 NOTE — PROGRESS NOTE ADULT - ASSESSMENT
· Assessment	  61 yo male patient w/ left forefoot dry gangrene of hallux-5th digits  - Pt seen and evaluated in ED  - Afebrile, WBC 8.73  - Left forefoot dry gangrene of hallux to 5th digits demarcating past MPJ level and midfoot; no fluctuance, no crepitus, no signs of wet conversion nor acute infection  - Pt s/p LLE bypass in June 2020 & s/p LLE angio w/ SFA & Pop stent on 11/17 w/ PT signals & single vessel peroneal run-off with Dr. Samaniego ([facility] surgery)  - Applied betadine/DSD  - Rec IV Vanco, cefepime  - Rec admit under medicine for mgmt w/ dialysis & rec vascular surgery team consult   - Consented & booked for Left foot transmetatarsal amputation w/ achilles tendon lengthening on Monday 12/21 at 2PM w/ Dr. Mendez  - Patient showing understanding that podiatric surgical intervention with high risk of failure requiring possible LLE BKA due to poor vascular status.  - Please evaluate and document medical/cardiac clearance for procedure under sedation with local  - Please coordinate dialysis Monday AM in time for surgery time

## 2020-12-17 NOTE — CHART NOTE - NSCHARTNOTEFT_GEN_A_CORE
Dialysis Consent   Thoroughly reviewed risks and benefits of RRT/HD with patient at bedside who agrees to dialytic therapy (maintenance dialysis). All questions answered.  Witnessed by Dr. Hook  Paper form consent obtained/signed and placed in patient's chart

## 2020-12-18 LAB
ALBUMIN SERPL ELPH-MCNC: 3.2 G/DL — LOW (ref 3.3–5)
ALP SERPL-CCNC: 87 U/L — SIGNIFICANT CHANGE UP (ref 40–120)
ALT FLD-CCNC: 12 U/L — SIGNIFICANT CHANGE UP (ref 10–45)
ANION GAP SERPL CALC-SCNC: 16 MMOL/L — SIGNIFICANT CHANGE UP (ref 5–17)
AST SERPL-CCNC: 12 U/L — SIGNIFICANT CHANGE UP (ref 10–40)
BILIRUB SERPL-MCNC: 0.3 MG/DL — SIGNIFICANT CHANGE UP (ref 0.2–1.2)
BUN SERPL-MCNC: 65 MG/DL — HIGH (ref 7–23)
CALCIUM SERPL-MCNC: 9.2 MG/DL — SIGNIFICANT CHANGE UP (ref 8.4–10.5)
CHLORIDE SERPL-SCNC: 95 MMOL/L — LOW (ref 96–108)
CO2 SERPL-SCNC: 23 MMOL/L — SIGNIFICANT CHANGE UP (ref 22–31)
CREAT SERPL-MCNC: 9.42 MG/DL — HIGH (ref 0.5–1.3)
GLUCOSE BLDC GLUCOMTR-MCNC: 138 MG/DL — HIGH (ref 70–99)
GLUCOSE BLDC GLUCOMTR-MCNC: 141 MG/DL — HIGH (ref 70–99)
GLUCOSE BLDC GLUCOMTR-MCNC: 195 MG/DL — HIGH (ref 70–99)
GLUCOSE BLDC GLUCOMTR-MCNC: 225 MG/DL — HIGH (ref 70–99)
GLUCOSE SERPL-MCNC: 179 MG/DL — HIGH (ref 70–99)
HCT VFR BLD CALC: 30.1 % — LOW (ref 39–50)
HGB BLD-MCNC: 9.8 G/DL — LOW (ref 13–17)
MCHC RBC-ENTMCNC: 27.1 PG — SIGNIFICANT CHANGE UP (ref 27–34)
MCHC RBC-ENTMCNC: 32.6 GM/DL — SIGNIFICANT CHANGE UP (ref 32–36)
MCV RBC AUTO: 83.1 FL — SIGNIFICANT CHANGE UP (ref 80–100)
NRBC # BLD: 0 /100 WBCS — SIGNIFICANT CHANGE UP (ref 0–0)
PLATELET # BLD AUTO: 221 K/UL — SIGNIFICANT CHANGE UP (ref 150–400)
POTASSIUM SERPL-MCNC: 4.7 MMOL/L — SIGNIFICANT CHANGE UP (ref 3.5–5.3)
POTASSIUM SERPL-SCNC: 4.7 MMOL/L — SIGNIFICANT CHANGE UP (ref 3.5–5.3)
PROT SERPL-MCNC: 6.6 G/DL — SIGNIFICANT CHANGE UP (ref 6–8.3)
RBC # BLD: 3.62 M/UL — LOW (ref 4.2–5.8)
RBC # FLD: 18.9 % — HIGH (ref 10.3–14.5)
SODIUM SERPL-SCNC: 134 MMOL/L — LOW (ref 135–145)
WBC # BLD: 7.14 K/UL — SIGNIFICANT CHANGE UP (ref 3.8–10.5)
WBC # FLD AUTO: 7.14 K/UL — SIGNIFICANT CHANGE UP (ref 3.8–10.5)

## 2020-12-18 PROCEDURE — 99232 SBSQ HOSP IP/OBS MODERATE 35: CPT

## 2020-12-18 PROCEDURE — 99232 SBSQ HOSP IP/OBS MODERATE 35: CPT | Mod: GC

## 2020-12-18 RX ORDER — CALCIUM ACETATE 667 MG
2 TABLET ORAL
Qty: 0 | Refills: 0 | DISCHARGE

## 2020-12-18 RX ORDER — LANOLIN ALCOHOL/MO/W.PET/CERES
3 CREAM (GRAM) TOPICAL ONCE
Refills: 0 | Status: COMPLETED | OUTPATIENT
Start: 2020-12-18 | End: 2020-12-18

## 2020-12-18 RX ORDER — INSULIN GLARGINE 100 [IU]/ML
15 INJECTION, SOLUTION SUBCUTANEOUS
Qty: 0 | Refills: 0 | DISCHARGE

## 2020-12-18 RX ORDER — DIPHENHYDRAMINE HCL 50 MG
25 CAPSULE ORAL ONCE
Refills: 0 | Status: COMPLETED | OUTPATIENT
Start: 2020-12-18 | End: 2020-12-18

## 2020-12-18 RX ORDER — HEPARIN SODIUM 5000 [USP'U]/ML
5000 INJECTION INTRAVENOUS; SUBCUTANEOUS EVERY 12 HOURS
Refills: 0 | Status: DISCONTINUED | OUTPATIENT
Start: 2020-12-18 | End: 2020-12-24

## 2020-12-18 RX ORDER — APIXABAN 2.5 MG/1
1 TABLET, FILM COATED ORAL
Qty: 0 | Refills: 0 | DISCHARGE

## 2020-12-18 RX ADMIN — CHLORHEXIDINE GLUCONATE 1 APPLICATION(S): 213 SOLUTION TOPICAL at 13:05

## 2020-12-18 RX ADMIN — Medication 650 MILLIGRAM(S): at 22:03

## 2020-12-18 RX ADMIN — Medication 1334 MILLIGRAM(S): at 18:16

## 2020-12-18 RX ADMIN — Medication 2: at 13:01

## 2020-12-18 RX ADMIN — Medication 0.2 MILLIGRAM(S): at 05:30

## 2020-12-18 RX ADMIN — Medication 25 MILLIGRAM(S): at 09:24

## 2020-12-18 RX ADMIN — APIXABAN 2.5 MILLIGRAM(S): 2.5 TABLET, FILM COATED ORAL at 09:24

## 2020-12-18 RX ADMIN — SENNA PLUS 1 TABLET(S): 8.6 TABLET ORAL at 18:16

## 2020-12-18 RX ADMIN — Medication 3 MILLIGRAM(S): at 00:30

## 2020-12-18 RX ADMIN — Medication 25 MILLIGRAM(S): at 05:30

## 2020-12-18 RX ADMIN — Medication 650 MILLIGRAM(S): at 22:49

## 2020-12-18 RX ADMIN — Medication 0.2 MILLIGRAM(S): at 22:03

## 2020-12-18 RX ADMIN — Medication 1334 MILLIGRAM(S): at 09:24

## 2020-12-18 RX ADMIN — Medication 25 MILLIGRAM(S): at 22:03

## 2020-12-18 RX ADMIN — POLYETHYLENE GLYCOL 3350 17 GRAM(S): 17 POWDER, FOR SOLUTION ORAL at 18:17

## 2020-12-18 RX ADMIN — Medication 1334 MILLIGRAM(S): at 13:02

## 2020-12-18 RX ADMIN — Medication 3 MILLIGRAM(S): at 22:31

## 2020-12-18 RX ADMIN — Medication 25 MILLIGRAM(S): at 15:23

## 2020-12-18 RX ADMIN — Medication 25 MILLIGRAM(S): at 18:17

## 2020-12-18 RX ADMIN — INSULIN GLARGINE 15 UNIT(S): 100 INJECTION, SOLUTION SUBCUTANEOUS at 22:03

## 2020-12-18 NOTE — PROGRESS NOTE ADULT - ASSESSMENT
59 yo male patient w/ left forefoot dry gangrene of hallux-5th digits  - Pt seen and evaluated in ED  - Afebrile  - Left forefoot dry gangrene of hallux to 5th digits demarcating past MPJ level and midfoot; no fluctuance, no crepitus, no signs of wet conversion nor acute infection  - Pt s/p LLE bypass in June 2020 & s/p LLE angio w/ SFA & Pop stent on 11/17 w/ PT signals & single vessel peroneal run-off with Dr. Samaniego ([facility] surgery)  - Applied betadine/DSD  - Consented & booked for Left foot transmetatarsal amputation w/ achilles tendon lengthening on Monday 12/21 at 2PM w/ Dr. Mendez  - Patient showing understanding that podiatric surgical intervention with high risk of failure requiring possible LLE BKA due to poor vascular status.  - Please evaluate and document medical/cardiac clearance for procedure under sedation with local  - Please coordinate dialysis Monday AM in time for surgery time  - discussed w/ attending

## 2020-12-18 NOTE — PROGRESS NOTE ADULT - ASSESSMENT
60 m with    L foot gangrene  - wound care  - Podiatry evaluation  - Vascular evaluation  - for TM amputation  - off antibiotics   - ID follow noted    Cardiology evaluation for preop stratification  - Dr. Aguirre noted    Diabetes Mellitus  - BS control  - ADA diet     Glaucoma  - stable    HTN control    ESRD  - HD  - Nephrology evaluation    DVT prophylaxis  - on Eliquis 3X week  - Heparin sq    No acute medical condition identified to postpone planned surgical intervention.     Vj Rios MD pager [phone]  60 m with    L foot gangrene  - wound care  - Podiatry evaluation  - Vascular evaluation  - for TM amputation  - off antibiotics   - ID follow noted    Cardiology evaluation for preop stratification  - Dr. Aguirre noted    Diabetes Mellitus  - BS control  - ADA diet     Glaucoma  - stable    HTN control    ESRD  - HD  - Nephrology evaluation    DVT prophylaxis  - on Eliquis 3X week. DC for OR.  - Heparin sq    No acute medical condition identified to postpone planned surgical intervention.     Vj Rios MD pager [phone]

## 2020-12-18 NOTE — PROGRESS NOTE ADULT - ASSESSMENT
61 y/o male with a h/o HTN, diabetes, and ESRD on dialysis, LLE Bypass 5/2020 and LLE angio with SFA and pop stent 11/2020 now presenting left foot gangrene, which started 6 weeks ago

## 2020-12-18 NOTE — PROGRESS NOTE ADULT - ASSESSMENT
59 y/o male with a h/o HTN, diabetes, and ESRD on dialysis, LLE Bypass 5/2020 and LLE angio with SFA and pop stent 11/2020 now presenting left foot gangrene.    #Left foot dry gangrene - s/p vanc and cefepime. Bl Clx NGTD. remains nontoxic-appeatring  - monitor for fevers or increased erythema or drainage of foot  - monitor for leukocytosis  - Surg plan per Podiatry, planning for L ft TMA on  Monday 12/21    Nilson Hook MD  Fellow, Infectious Diseases, PGY-4  Pager: [phone]  Before 9am or after 5pm/Weekends: Call [phone]

## 2020-12-18 NOTE — PROGRESS NOTE ADULT - ASSESSMENT
60M PMHx ESRD on HD MWF ( from [facility]), HTN, diabetes - admitted L foot gangrene.  Nephrology consulted for ESRD on HD.        # ESRD  Pt. with ESRD on HD (since 04/2016) three times a week (MWF @ currently in [facility] rehab HD). Last HD was on 12/16/20 via LUE AVF. Pt clinically stable. Labs reviewed.   - Pt will be for HD today with 2L UF. Monitor labs, weight, renal diet, fluid restriction, renally dose medication per HD    # Hypertension  BP in acceptable range. Monitor BP on current BP medications. Low salt diet advised    # Renal bone disease  Pt. with hyperphosphatemia in the setting of ESRD.   - Continue home phoslo TID with meals. Check intact PTH level. Low phosphorus diet advised. Monitor serum phosphorus    If any questions, please feel free to contact me     Angelica Puente  Nephrology Fellow  [facility] Pager: [phone]  [facility] Pager: [phone]

## 2020-12-18 NOTE — PROGRESS NOTE ADULT - ASSESSMENT
59 yo M with a h/o HTN, diabetes, and ESRD on dialysis, LLE Bypass 5/2020 and LLE angio with SFA and pop stent 11/2020 now presenting left foot gangrene, which started 6 weeks ago  No fevers, no leukocytosis  LLE appears as dry gangrene, no signs active infection presently  No signs active infection  Overall,  1) LE Gangrene  - Does not appear actively infected presently  - Planned for OR per podiatry  - Monitor off abx  - Monitor LE for any signs developing infection, monitor for any signs sepsis  2) ESRD on HD  - HD per primary team    Signing off. Please call with further questions or change in status.    Jason Valdez MD  Pager [phone]  After 5pm and on weekends call [phone]

## 2020-12-19 LAB
GLUCOSE BLDC GLUCOMTR-MCNC: 112 MG/DL — HIGH (ref 70–99)
GLUCOSE BLDC GLUCOMTR-MCNC: 158 MG/DL — HIGH (ref 70–99)
GLUCOSE BLDC GLUCOMTR-MCNC: 185 MG/DL — HIGH (ref 70–99)
GLUCOSE BLDC GLUCOMTR-MCNC: 201 MG/DL — HIGH (ref 70–99)

## 2020-12-19 RX ORDER — LANOLIN ALCOHOL/MO/W.PET/CERES
3 CREAM (GRAM) TOPICAL ONCE
Refills: 0 | Status: COMPLETED | OUTPATIENT
Start: 2020-12-19 | End: 2020-12-19

## 2020-12-19 RX ADMIN — Medication 0.2 MILLIGRAM(S): at 05:50

## 2020-12-19 RX ADMIN — Medication 2: at 12:55

## 2020-12-19 RX ADMIN — Medication 0.2 MILLIGRAM(S): at 13:02

## 2020-12-19 RX ADMIN — CHLORHEXIDINE GLUCONATE 1 APPLICATION(S): 213 SOLUTION TOPICAL at 12:59

## 2020-12-19 RX ADMIN — Medication 3 MILLIGRAM(S): at 22:12

## 2020-12-19 RX ADMIN — INSULIN GLARGINE 15 UNIT(S): 100 INJECTION, SOLUTION SUBCUTANEOUS at 22:13

## 2020-12-19 RX ADMIN — POLYETHYLENE GLYCOL 3350 17 GRAM(S): 17 POWDER, FOR SOLUTION ORAL at 12:57

## 2020-12-19 RX ADMIN — Medication 25 MILLIGRAM(S): at 17:57

## 2020-12-19 RX ADMIN — Medication 1334 MILLIGRAM(S): at 17:57

## 2020-12-19 RX ADMIN — Medication 1334 MILLIGRAM(S): at 08:59

## 2020-12-19 RX ADMIN — Medication 0.2 MILLIGRAM(S): at 22:13

## 2020-12-19 RX ADMIN — Medication 25 MILLIGRAM(S): at 13:02

## 2020-12-19 RX ADMIN — Medication 2: at 08:58

## 2020-12-19 RX ADMIN — Medication 25 MILLIGRAM(S): at 22:13

## 2020-12-19 RX ADMIN — Medication 25 MILLIGRAM(S): at 05:51

## 2020-12-19 RX ADMIN — Medication 1334 MILLIGRAM(S): at 12:57

## 2020-12-19 NOTE — DIETITIAN INITIAL EVALUATION ADULT. - ETIOLOGY
related to limited exposure to previous diet education related to increased physiological demand for nutrients in setting of renal dysfunction requiring HD

## 2020-12-19 NOTE — DIETITIAN INITIAL EVALUATION ADULT. - SIGNS/SYMPTOMS
pt with incomplete knowledge of dietary recommendations, with diet related questions pt with ESRD on HD

## 2020-12-19 NOTE — DIETITIAN INITIAL EVALUATION ADULT. - ADD RECOMMEND
1)Continue current diet as tolerated. 2) Enocurage PO intake of protein-rich nutrient dense foods. Pt declining nutrition supplements. 3) Consider addition of Nephrovite 4) Diet education provided, reinforce as needed.

## 2020-12-19 NOTE — DIETITIAN INITIAL EVALUATION ADULT. - PERTINENT LABORATORY DATA
POCT Blood Glucose.: 185 mg/dL (12-19-20 @ 12:42)  POCT Blood Glucose.: 158 mg/dL (12-19-20 @ 08:35)  POCT Blood Glucose.: 225 mg/dL (12-18-20 @ 21:34)  POCT Blood Glucose.: 141 mg/dL (12-18-20 @ 17:55)  Na 134  BUN 65   creatinine 9.42  eGFR5

## 2020-12-19 NOTE — DIETITIAN INITIAL EVALUATION ADULT. - ORAL INTAKE PTA/DIET HISTORY
Pt reports fair to good PO intake and appetite PTA, consumes 3 meals per day consisting of a variety of foods. Limits high potassium and phosphorus foods, as well as limits intake of sodium. Sees RD at outpatient dialysis. Pt also avoids concentrated sweets. Pt reports coming from rehab, reports intake is dependence on preference to meals, states meal were repetitive. Confirms allergy to crab (swelling). Takes calcium, renal multivitamin.

## 2020-12-19 NOTE — PROGRESS NOTE ADULT - ASSESSMENT
60 m with    L foot gangrene  - wound care  - Podiatry evaluation  - Vascular evaluation  - for TM amputation  - off antibiotics   - ID follow noted    Cardiology evaluation for preop stratification  - Dr. Aguirre noted    Diabetes Mellitus  - BS control  - ADA diet     Glaucoma  - stable    HTN control    ESRD  - HD  - Nephrology evaluation    DVT prophylaxis  - on Eliquis 3X week. DC for OR.  - Heparin sq    No acute medical condition identified to postpone planned surgical intervention.     Vj Rios MD pager [phone]

## 2020-12-19 NOTE — DIETITIAN INITIAL EVALUATION ADULT. - OTHER INFO
Pt noted with history of T2DM, checks BG daily, reports values ~ 140mg/dL though sometimes dips to 60-70mg/dL. HbA1c: 9.2% (10/18)--previous HbA1c: was 7.2% (6/2020), Takes Lantus and Lispro. Pt reports being on prednisone in the past which could be contributing to elevated BG recently.     Weight: Pt believes he has lost some weight however unable to quantify. Pt reports usual dry weight as 166lbs. Weight per previous RD note was 178.5lbs (6/4/20) Dosing weight noted as 176lbs. Post-HD weight 177.6lbs    Since admission pt reports appetite and PO intake, has been consuming 75-80% of meals per report. Declining all nutritional supplements at this time, doesn't enjoy the taste of them. Encourage PO intake of protein-rich foods due to increase demand while on HD as well as for recovery after upcoming surgery. Denies any acute GI distress, reports constipation previously which has since improved with addition of Miralax.     Pt familiar with renal and carbohydrate consistent diet however amendable to review. Provided education on renal diet, education included importance of monitoring intake of foods high in potassium/phosphorous/sodium, review of foods high in these micronutrients as well as alternatives, monitoring fluid intake, and importance of adequate protein intake due to increased demand on HD. Also discussed carbohydrate Consistent diet including sources of carbohydrates, portion sizes, pairing protein with carbohydrates, limiting sugar sweetened beverages in diet and the importance of consistent eating pattern to help optimize glycemic control. Patient with no nutrition-related questions at this time. Made aware RD remains available as needed.

## 2020-12-20 ENCOUNTER — TRANSCRIPTION ENCOUNTER (OUTPATIENT)
Age: 60
End: 2020-12-20

## 2020-12-20 LAB
ALBUMIN SERPL ELPH-MCNC: 3.6 G/DL — SIGNIFICANT CHANGE UP (ref 3.3–5)
ALP SERPL-CCNC: 95 U/L — SIGNIFICANT CHANGE UP (ref 40–120)
ALT FLD-CCNC: 19 U/L — SIGNIFICANT CHANGE UP (ref 10–45)
ANION GAP SERPL CALC-SCNC: 18 MMOL/L — HIGH (ref 5–17)
AST SERPL-CCNC: 13 U/L — SIGNIFICANT CHANGE UP (ref 10–40)
BILIRUB SERPL-MCNC: 0.3 MG/DL — SIGNIFICANT CHANGE UP (ref 0.2–1.2)
BUN SERPL-MCNC: 70 MG/DL — HIGH (ref 7–23)
CALCIUM SERPL-MCNC: 9.7 MG/DL — SIGNIFICANT CHANGE UP (ref 8.4–10.5)
CHLORIDE SERPL-SCNC: 92 MMOL/L — LOW (ref 96–108)
CO2 SERPL-SCNC: 24 MMOL/L — SIGNIFICANT CHANGE UP (ref 22–31)
CREAT SERPL-MCNC: 9.89 MG/DL — HIGH (ref 0.5–1.3)
GLUCOSE BLDC GLUCOMTR-MCNC: 139 MG/DL — HIGH (ref 70–99)
GLUCOSE BLDC GLUCOMTR-MCNC: 188 MG/DL — HIGH (ref 70–99)
GLUCOSE BLDC GLUCOMTR-MCNC: 198 MG/DL — HIGH (ref 70–99)
GLUCOSE BLDC GLUCOMTR-MCNC: 236 MG/DL — HIGH (ref 70–99)
GLUCOSE SERPL-MCNC: 146 MG/DL — HIGH (ref 70–99)
HCT VFR BLD CALC: 33.9 % — LOW (ref 39–50)
HGB BLD-MCNC: 11.1 G/DL — LOW (ref 13–17)
MCHC RBC-ENTMCNC: 27.5 PG — SIGNIFICANT CHANGE UP (ref 27–34)
MCHC RBC-ENTMCNC: 32.7 GM/DL — SIGNIFICANT CHANGE UP (ref 32–36)
MCV RBC AUTO: 83.9 FL — SIGNIFICANT CHANGE UP (ref 80–100)
NRBC # BLD: 0 /100 WBCS — SIGNIFICANT CHANGE UP (ref 0–0)
PLATELET # BLD AUTO: 229 K/UL — SIGNIFICANT CHANGE UP (ref 150–400)
POTASSIUM SERPL-MCNC: 4.9 MMOL/L — SIGNIFICANT CHANGE UP (ref 3.5–5.3)
POTASSIUM SERPL-SCNC: 4.9 MMOL/L — SIGNIFICANT CHANGE UP (ref 3.5–5.3)
PROT SERPL-MCNC: 6.7 G/DL — SIGNIFICANT CHANGE UP (ref 6–8.3)
RBC # BLD: 4.04 M/UL — LOW (ref 4.2–5.8)
RBC # FLD: 19.1 % — HIGH (ref 10.3–14.5)
SODIUM SERPL-SCNC: 134 MMOL/L — LOW (ref 135–145)
WBC # BLD: 7.27 K/UL — SIGNIFICANT CHANGE UP (ref 3.8–10.5)
WBC # FLD AUTO: 7.27 K/UL — SIGNIFICANT CHANGE UP (ref 3.8–10.5)

## 2020-12-20 PROCEDURE — 93010 ELECTROCARDIOGRAM REPORT: CPT

## 2020-12-20 RX ORDER — LANOLIN ALCOHOL/MO/W.PET/CERES
5 CREAM (GRAM) TOPICAL ONCE
Refills: 0 | Status: COMPLETED | OUTPATIENT
Start: 2020-12-20 | End: 2020-12-20

## 2020-12-20 RX ADMIN — Medication 25 MILLIGRAM(S): at 13:02

## 2020-12-20 RX ADMIN — Medication 2: at 17:51

## 2020-12-20 RX ADMIN — Medication 25 MILLIGRAM(S): at 22:30

## 2020-12-20 RX ADMIN — SENNA PLUS 1 TABLET(S): 8.6 TABLET ORAL at 12:56

## 2020-12-20 RX ADMIN — Medication 0.2 MILLIGRAM(S): at 05:45

## 2020-12-20 RX ADMIN — Medication 650 MILLIGRAM(S): at 09:11

## 2020-12-20 RX ADMIN — Medication 1334 MILLIGRAM(S): at 08:52

## 2020-12-20 RX ADMIN — Medication 650 MILLIGRAM(S): at 08:41

## 2020-12-20 RX ADMIN — POLYETHYLENE GLYCOL 3350 17 GRAM(S): 17 POWDER, FOR SOLUTION ORAL at 12:56

## 2020-12-20 RX ADMIN — Medication 25 MILLIGRAM(S): at 08:41

## 2020-12-20 RX ADMIN — Medication 25 MILLIGRAM(S): at 05:45

## 2020-12-20 RX ADMIN — Medication 0.2 MILLIGRAM(S): at 22:30

## 2020-12-20 RX ADMIN — Medication 2: at 12:57

## 2020-12-20 RX ADMIN — Medication 1334 MILLIGRAM(S): at 12:56

## 2020-12-20 RX ADMIN — CHLORHEXIDINE GLUCONATE 1 APPLICATION(S): 213 SOLUTION TOPICAL at 14:07

## 2020-12-20 RX ADMIN — Medication 1334 MILLIGRAM(S): at 17:51

## 2020-12-20 RX ADMIN — INSULIN GLARGINE 15 UNIT(S): 100 INJECTION, SOLUTION SUBCUTANEOUS at 22:19

## 2020-12-20 RX ADMIN — Medication 5 MILLIGRAM(S): at 22:30

## 2020-12-20 RX ADMIN — Medication 0.2 MILLIGRAM(S): at 13:02

## 2020-12-20 NOTE — PROGRESS NOTE ADULT - ASSESSMENT
60 m with    L foot gangrene  - wound care  - Podiatry evaluation  - Vascular evaluation  - for TM amputation  - off antibiotics   - ID follow noted    Cardiology evaluation for preop stratification  - Dr. Aguirre follow    Diabetes Mellitus  - BS control  - ADA diet     Glaucoma  - stable    HTN control    ESRD  - HD  - Nephrology evaluation    DVT prophylaxis  - on Eliquis 3X week. DC for OR.  - Heparin sq    No acute medical condition identified to postpone planned surgical intervention.     Vj Rios MD pager [phone]

## 2020-12-20 NOTE — PROGRESS NOTE ADULT - ASSESSMENT
59 yo male patient w/ left forefoot dry gangrene of hallux-5th digits  - Pt seen and evaluated in ED  - Afebrile  - Left forefoot dry gangrene of hallux to 5th digits demarcating past MPJ level and midfoot; no fluctuance, no crepitus, no signs of wet conversion nor acute infection  - Pt s/p LLE bypass in June 2020 & s/p LLE angio w/ SFA & Pop stent on 11/17 w/ PT signals & single vessel peroneal run-off with Dr. Samaniego ([facility] surgery)  - Applied betadine/DSD  - Booked for Left foot transmetatarsal amputation w/ achilles tendon lengthening on Monday 12/21 at 2PM w/ Dr. Mendez  - Medical & cardiac clearance appreciated, documented on 12/17  - Dialysis scheduled for first shift Monday AM, pre-op labs to be drawn 2 hours post-dialysis  - NPO after midnight  - discussed w/ attending

## 2020-12-21 ENCOUNTER — TRANSCRIPTION ENCOUNTER (OUTPATIENT)
Age: 60
End: 2020-12-21

## 2020-12-21 ENCOUNTER — RESULT REVIEW (OUTPATIENT)
Age: 60
End: 2020-12-21

## 2020-12-21 LAB
ANION GAP SERPL CALC-SCNC: 14 MMOL/L — SIGNIFICANT CHANGE UP (ref 5–17)
APTT BLD: 28.7 SEC — SIGNIFICANT CHANGE UP (ref 27.5–35.5)
APTT BLD: 31.4 SEC — SIGNIFICANT CHANGE UP (ref 27.5–35.5)
BLD GP AB SCN SERPL QL: NEGATIVE — SIGNIFICANT CHANGE UP
BUN SERPL-MCNC: 47 MG/DL — HIGH (ref 7–23)
CALCIUM SERPL-MCNC: 9.8 MG/DL — SIGNIFICANT CHANGE UP (ref 8.4–10.5)
CHLORIDE SERPL-SCNC: 93 MMOL/L — LOW (ref 96–108)
CO2 SERPL-SCNC: 30 MMOL/L — SIGNIFICANT CHANGE UP (ref 22–31)
CREAT SERPL-MCNC: 6.39 MG/DL — HIGH (ref 0.5–1.3)
CULTURE RESULTS: SIGNIFICANT CHANGE UP
CULTURE RESULTS: SIGNIFICANT CHANGE UP
GLUCOSE BLDC GLUCOMTR-MCNC: 111 MG/DL — HIGH (ref 70–99)
GLUCOSE BLDC GLUCOMTR-MCNC: 131 MG/DL — HIGH (ref 70–99)
GLUCOSE BLDC GLUCOMTR-MCNC: 195 MG/DL — HIGH (ref 70–99)
GLUCOSE BLDC GLUCOMTR-MCNC: 82 MG/DL — SIGNIFICANT CHANGE UP (ref 70–99)
GLUCOSE BLDC GLUCOMTR-MCNC: 91 MG/DL — SIGNIFICANT CHANGE UP (ref 70–99)
GLUCOSE SERPL-MCNC: 83 MG/DL — SIGNIFICANT CHANGE UP (ref 70–99)
HCT VFR BLD CALC: 39.4 % — SIGNIFICANT CHANGE UP (ref 39–50)
HGB BLD-MCNC: 12.5 G/DL — LOW (ref 13–17)
INR BLD: 0.98 RATIO — SIGNIFICANT CHANGE UP (ref 0.88–1.16)
INR BLD: 0.99 RATIO — SIGNIFICANT CHANGE UP (ref 0.88–1.16)
MCHC RBC-ENTMCNC: 26.7 PG — LOW (ref 27–34)
MCHC RBC-ENTMCNC: 31.7 GM/DL — LOW (ref 32–36)
MCV RBC AUTO: 84.2 FL — SIGNIFICANT CHANGE UP (ref 80–100)
NRBC # BLD: 0 /100 WBCS — SIGNIFICANT CHANGE UP (ref 0–0)
PLATELET # BLD AUTO: 240 K/UL — SIGNIFICANT CHANGE UP (ref 150–400)
POTASSIUM SERPL-MCNC: 4.2 MMOL/L — SIGNIFICANT CHANGE UP (ref 3.5–5.3)
POTASSIUM SERPL-SCNC: 4.2 MMOL/L — SIGNIFICANT CHANGE UP (ref 3.5–5.3)
PROTHROM AB SERPL-ACNC: 11.8 SEC — SIGNIFICANT CHANGE UP (ref 10.6–13.6)
PROTHROM AB SERPL-ACNC: 11.9 SEC — SIGNIFICANT CHANGE UP (ref 10.6–13.6)
RBC # BLD: 4.68 M/UL — SIGNIFICANT CHANGE UP (ref 4.2–5.8)
RBC # FLD: 19.3 % — HIGH (ref 10.3–14.5)
RH IG SCN BLD-IMP: POSITIVE — SIGNIFICANT CHANGE UP
SODIUM SERPL-SCNC: 137 MMOL/L — SIGNIFICANT CHANGE UP (ref 135–145)
SPECIMEN SOURCE: SIGNIFICANT CHANGE UP
SPECIMEN SOURCE: SIGNIFICANT CHANGE UP
WBC # BLD: 7.86 K/UL — SIGNIFICANT CHANGE UP (ref 3.8–10.5)
WBC # FLD AUTO: 7.86 K/UL — SIGNIFICANT CHANGE UP (ref 3.8–10.5)

## 2020-12-21 PROCEDURE — 73630 X-RAY EXAM OF FOOT: CPT | Mod: 26,LT

## 2020-12-21 PROCEDURE — 88305 TISSUE EXAM BY PATHOLOGIST: CPT | Mod: 26

## 2020-12-21 PROCEDURE — 99232 SBSQ HOSP IP/OBS MODERATE 35: CPT | Mod: GC

## 2020-12-21 RX ORDER — DEXTROSE 50 % IN WATER 50 %
25 SYRINGE (ML) INTRAVENOUS ONCE
Refills: 0 | Status: COMPLETED | OUTPATIENT
Start: 2020-12-21 | End: 2020-12-21

## 2020-12-21 RX ORDER — LANOLIN ALCOHOL/MO/W.PET/CERES
5 CREAM (GRAM) TOPICAL ONCE
Refills: 0 | Status: COMPLETED | OUTPATIENT
Start: 2020-12-21 | End: 2020-12-21

## 2020-12-21 RX ORDER — HYDROMORPHONE HYDROCHLORIDE 2 MG/ML
0.25 INJECTION INTRAMUSCULAR; INTRAVENOUS; SUBCUTANEOUS
Refills: 0 | Status: DISCONTINUED | OUTPATIENT
Start: 2020-12-21 | End: 2020-12-21

## 2020-12-21 RX ADMIN — OXYCODONE AND ACETAMINOPHEN 1 TABLET(S): 5; 325 TABLET ORAL at 12:46

## 2020-12-21 RX ADMIN — Medication 25 MILLIGRAM(S): at 21:19

## 2020-12-21 RX ADMIN — Medication 5 MILLIGRAM(S): at 21:34

## 2020-12-21 RX ADMIN — Medication 650 MILLIGRAM(S): at 19:46

## 2020-12-21 RX ADMIN — INSULIN GLARGINE 15 UNIT(S): 100 INJECTION, SOLUTION SUBCUTANEOUS at 21:47

## 2020-12-21 RX ADMIN — Medication 0.2 MILLIGRAM(S): at 13:24

## 2020-12-21 RX ADMIN — OXYCODONE AND ACETAMINOPHEN 1 TABLET(S): 5; 325 TABLET ORAL at 21:19

## 2020-12-21 RX ADMIN — CHLORHEXIDINE GLUCONATE 1 APPLICATION(S): 213 SOLUTION TOPICAL at 12:46

## 2020-12-21 RX ADMIN — Medication 0.2 MILLIGRAM(S): at 21:19

## 2020-12-21 RX ADMIN — OXYCODONE AND ACETAMINOPHEN 1 TABLET(S): 5; 325 TABLET ORAL at 22:30

## 2020-12-21 RX ADMIN — Medication 25 MILLILITER(S): at 13:58

## 2020-12-21 RX ADMIN — Medication 650 MILLIGRAM(S): at 20:30

## 2020-12-21 RX ADMIN — Medication 25 MILLIGRAM(S): at 13:41

## 2020-12-21 RX ADMIN — OXYCODONE AND ACETAMINOPHEN 1 TABLET(S): 5; 325 TABLET ORAL at 14:02

## 2020-12-21 NOTE — DISCHARGE NOTE PROVIDER - CARE PROVIDERS DIRECT ADDRESSES
,[email or web address],DirectAddress_Unknown

## 2020-12-21 NOTE — DISCHARGE NOTE PROVIDER - NSDCFUSCHEDAPPT_GEN_ALL_CORE_FT
MARIETTA MAGALLANES ; 12/29/2020 ; [facility] [address] ; 12/29/2020 ; [facility] [address] VIVIAN MAGALLANES ; 12/29/2020 ; [facility] [address] [address] ; 12/29/2020 ; [facility] [address]

## 2020-12-21 NOTE — DISCHARGE NOTE PROVIDER - NSDCCPCAREPLAN_GEN_ALL_CORE_FT
PRINCIPAL DISCHARGE DIAGNOSIS  Diagnosis: Gangrene of foot  Assessment and Plan of Treatment: Wound Care: Please apply 4x4 gauze, ABD pad, and annette to left foot daily.   Weight bearing: Please weight bear as tolerated in a surgical shoe.  Antibiotics: Please continue as instructed.      SECONDARY DISCHARGE DIAGNOSES  Diagnosis: ESRD on hemodialysis  Assessment and Plan of Treatment: ESRD on HD (since 04/2016) three times a week (MWF @ currently in [facility] HD).   clinically stable.        PRINCIPAL DISCHARGE DIAGNOSIS  Diagnosis: Gangrene of foot  Assessment and Plan of Treatment: Wound Care: Please apply 4x4 gauze, ABD pad, and annette to left foot daily.   Weight bearing: Please weight bear as tolerated in a surgical shoe.  Antibiotics: Please continue as instructed.      SECONDARY DISCHARGE DIAGNOSES  Diagnosis: ESRD on hemodialysis  Assessment and Plan of Treatment: ESRD on HD (since 04/2016) three times a week (MWF @ currently in [facility]).   clinically stable.       Diagnosis: Diabetes  Assessment and Plan of Treatment: Make sure you get your HgA1c checked every three months.  If you take oral diabetes medications, check your blood glucose two times a day.  If you take insulin, check your blood glucose before meals and at bedtime.  It's important not to skip any meals.  Keep a log of your blood glucose results and always take it with you to your doctor appointments.  Keep a list of your current medications including injectables and over the counter medications and bring this medication list with you to all your doctor appointments.  If you have not seen your ophthalmologist this year call for appointment.  Check your feet daily for redness, sores, or openings. Do not self treat. If no improvement in two days call your primary care physician for an appointment.  Low blood sugar (hypoglycemia) is a blood sugar below 70mg/dl. Check your blood sugar if you feel signs/symptoms of hypoglycemia. If your blood sugar is below 70 take 15 grams of carbohydrates (ex 4 oz of apple juice, 3-4 glucose tablets, or 4-6 oz of regular soda) wait 15 minutes and repeat blood sugar to make sure it comes up above 70.  If your blood sugar is above 70 and you are due for a meal, have a meal.  If you are not due for a meal have a snack.  This snack helps keeps your blood sugar at a safe range.      Diagnosis: Hypertension  Assessment and Plan of Treatment: Low salt diet  Activity as tolerated.  Take all medication as prescribed.  Follow up with your medical doctor for routine blood pressure monitoring at your next visit.  Notify your doctor if you have any of the following symptoms:   Dizziness, Lightheadedness, Blurry vision, Headache, Chest pain, Shortness of breath

## 2020-12-21 NOTE — BRIEF OPERATIVE NOTE - COMMENTS
s/p left foot TMA   150cc EBL   low concern for residual infection   high concern for viability   d/c pending viability

## 2020-12-21 NOTE — PROGRESS NOTE ADULT - ASSESSMENT
Plan:   To OR today at 2pm with Dr. Mendez for left foot TMA w/ JOSE GUADALUPE.   CXR on sunrise.  EKG on sunrise.  Medical/Cardiac clearance since 12/18 and documented in chart.  Consent signed and in chart.  Procedure was explained to patient in detail. All alternatives, risks and complications were discussed. All questions answered.

## 2020-12-21 NOTE — PROGRESS NOTE ADULT - ASSESSMENT
60M PMHx ESRD on HD MWF ( from [facility]), HTN, diabetes - admitted L foot gangrene.  Nephrology consulted for ESRD on HD.        # ESRD  Pt. with ESRD on HD (since 04/2016) three times a week (MWF @ currently in [facility] rehab HD). Last HD was on 12/16/20 via [facility]. Pt clinically stable. Labs reviewed.   - Pt will be for HD today with 2L UF. Monitor labs, weight, renal diet, fluid restriction, renally dose medication per HD    # Hypertension  BP in acceptable range. Monitor BP on current BP medications. Low salt diet advised    #anemia   Patient with anemia in the setting of ESRD. Hemoglobin at target range. Monitor hemoglobin.      # Renal bone disease  Pt. with hyperphosphatemia in the setting of ESRD.   - Continue home phoslo TID with meals. Check intact PTH level. Low phosphorus diet advised. Monitor serum phosphorus    If any questions, please feel free to contact me     Angelica Puente  Nephrology Fellow  [facility] Pager: [phone]  JERMAIN Pager: [phone]

## 2020-12-21 NOTE — DISCHARGE NOTE PROVIDER - NSDCMRMEDTOKEN_GEN_ALL_CORE_FT
acetaminophen 325 mg oral tablet: 2 tab(s) orally every 6 hours  apixaban 2.5 mg oral tablet: 1 tab(s) orally before dialysis days (Monday, Wednesday, Friday)    NOTE: Pharmacy dispensed 1 tab 2 times daily.  Atarax 25 mg oral tablet: 1 tab(s) orally 2 times a day, As Needed    NOTE: Pharmacy dispensed 1 tab 2 times daily.  calcium acetate 667 mg oral tablet: 2 tab(s) orally 3 times a day (before meals)  cloNIDine 0.2 mg oral tablet: 1 tab(s) orally 3 times a day    NOTE: Pharmacy dispensed 0.1 mg tablets; take 1 tablet 2 times daily.  hydrALAZINE 25 mg oral tablet: 1 tab(s) orally every 8 hours  insulin lispro 100 units/mL injectable solution: 0 Unit(s) if Glucose 61 - 150  1 Unit(s) if Glucose 151 - 200  2 Unit(s) if Glucose 201 - 250  3 Unit(s) if Glucose 251 - 300  4 Unit(s) if Glucose 301 - 350  5 Unit(s) if Glucose 351 - 400  6 Unit(s) if Glucose Greater Than 400    NOTE: Pharmacy dispensed 20-30 units 3 times daily before meals.  Lantus 100 units/mL subcutaneous solution: 15 unit(s) subcutaneous once a day (at bedtime)    NOTE: Pharmacy dispensed 30 units daily.  Melatonin 3 mg oral tablet: 1 tab(s) orally once a day (at bedtime), As Needed  MiraLax oral powder for reconstitution: 1 scoop(s) orally once a day  Komal-Merline oral tablet: 1 tab(s) orally once a day  Senna 8.6 mg oral tablet: 1 tab(s) orally , As Needed   acetaminophen 325 mg oral tablet: 2 tab(s) orally every 6 hours  apixaban 2.5 mg oral tablet: 1 tab(s) orally before dialysis days (Monday, Wednesday, Friday)    NOTE: Pharmacy dispensed 1 tab 2 times daily.  Atarax 25 mg oral tablet: 1 tab(s) orally 2 times a day, As Needed    NOTE: Pharmacy dispensed 1 tab 2 times daily.  calcium acetate 667 mg oral tablet: 2 tab(s) orally 3 times a day (before meals)  CAM boot: Disp: Please dispense one CAM boot for L lower extremity   Dx: s/p L foot transmetatarsal amputation  Ind: Please wear CAM boot at all times to L lower extremity  cloNIDine 0.2 mg oral tablet: 1 tab(s) orally 3 times a day    NOTE: Pharmacy dispensed 0.1 mg tablets; take 1 tablet 2 times daily.  hydrALAZINE 25 mg oral tablet: 1 tab(s) orally every 8 hours  insulin lispro 100 units/mL injectable solution: 0 Unit(s) if Glucose 61 - 150  1 Unit(s) if Glucose 151 - 200  2 Unit(s) if Glucose 201 - 250  3 Unit(s) if Glucose 251 - 300  4 Unit(s) if Glucose 301 - 350  5 Unit(s) if Glucose 351 - 400  6 Unit(s) if Glucose Greater Than 400    NOTE: Pharmacy dispensed 20-30 units 3 times daily before meals.  Lantus 100 units/mL subcutaneous solution: 15 unit(s) subcutaneous once a day (at bedtime)    NOTE: Pharmacy dispensed 30 units daily.  Melatonin 3 mg oral tablet: 1 tab(s) orally once a day (at bedtime), As Needed  MiraLax oral powder for reconstitution: 1 scoop(s) orally once a day  Komal-Merline oral tablet: 1 tab(s) orally once a day  Senna 8.6 mg oral tablet: 1 tab(s) orally , As Needed   acetaminophen 325 mg oral tablet: 2 tab(s) orally every 6 hours  apixaban 2.5 mg oral tablet: 1 tab(s) orally before dialysis days (Monday, Wednesday, Friday)    NOTE: Pharmacy dispensed 1 tab 2 times daily.  calcium acetate 667 mg oral tablet: 2 tab(s) orally 3 times a day (before meals)  cloNIDine 0.2 mg oral tablet: 1 tab(s) orally 3 times a day  hydrALAZINE 25 mg oral tablet: 1 tab(s) orally every 8 hours  Lantus 100 units/mL subcutaneous solution: 15 unit(s) subcutaneous once a day (at bedtime)    NOTE: Pharmacy dispensed 30 units daily.  polyethylene glycol 3350 oral powder for reconstitution: 17 gram(s) orally once a day  senna oral tablet: 1 tab(s) orally once a day

## 2020-12-21 NOTE — DISCHARGE NOTE PROVIDER - HOSPITAL COURSE
61 y/o male with a h/o HTN, diabetes, and ESRD on dialysis, presenting left foot gangrene, which started 6 weeks ago. Gradual onset, moderate-severe severity, pain at left foot, associated symptoms include dark discoloration to left foot. Patient states he went to his podiatrist Dr. Mendez today and was sent to the ER for further evaluation. Denies any fever, chills, shortness of breath, chest pain, cough, nausea, vomiting, or weakness. Patient states he  has dialysis MWF.  - s/p LF TMA DOS 12/21: sutures well co-opted, no amrik-incisional erythema, minimal sanguinous drainage, no signs of hematoma. Incision is stable, flaps are warm and viable.   -CAM boot to be delivered today,  OOB for BRP PRN & transfers in CAM boot at all times  -Pain meds PRN  - Stable for discharge from podiatric perspective off ABx, leave dressing clean dry and intact, f/u within 1 week with Dr[facility] - [phone]    Medically cleared for discharge by  with follow up as advised.

## 2020-12-21 NOTE — DISCHARGE NOTE PROVIDER - NSDCFUADDINST_GEN_ALL_CORE_FT
Podiatry Discharge Instructions:  Follow up: Please follow up with Dr. Mendez within 1 week of discharge from the hospital, please call [phone] for appointment and discuss that you recently were seen in the hospital.  Wound Care: Please apply 4x4 gauze, ABD pad, and annette to left foot daily.   Weight bearing: Please weight bear as tolerated in a surgical shoe.  Antibiotics: Please continue as instructed. Podiatry Discharge Instructions:  Follow up: Please follow up with Dr. Mendez within 1 week of discharge from the hospital, please call [phone] for appointment and discuss that you recently were seen in the hospital.  Wound Care: Please apply 4x4 gauze, ABD pad, and annette to left foot daily.   Weight bearing: Please weight bear as tolerated in a surgical shoe.  Antibiotics: Please continue as instructed.

## 2020-12-21 NOTE — DISCHARGE NOTE PROVIDER - NSDCFUADDAPPT_GEN_ALL_CORE_FT
Follow up with podiatry within one week. Follow up with podiatry within one week.    You will need to follow up with your primary medical doctor within one week of discharge - please call to make an appointment.

## 2020-12-21 NOTE — DISCHARGE NOTE PROVIDER - PROVIDER TOKENS
PROVIDER:[TOKEN:[[id number]]] PROVIDER:[TOKEN:[[id number]]],PROVIDER:[TOKEN:[[id number]]]

## 2020-12-21 NOTE — DISCHARGE NOTE PROVIDER - CARE PROVIDER_API CALL
Jonny Mendez (DPM)  Podiatric Medicine and Surgery  [address] [place] [id number]  Phone: [phone]  Fax: [phone]  Follow Up Time:    Jonny Mendez (DPM)  Podiatric Medicine and Surgery  [address] [place] [address]  Phone: [phone]  Fax: [phone]  Follow Up Time:     Billy Aguirre (DO)  Cardiology; Internal Medicine  [address] [facility], [address]  [place] [address]  Phone: [phone]  Fax: [phone]  Follow Up Time:

## 2020-12-22 LAB
ANION GAP SERPL CALC-SCNC: 18 MMOL/L — HIGH (ref 5–17)
BUN SERPL-MCNC: 63 MG/DL — HIGH (ref 7–23)
CALCIUM SERPL-MCNC: 9.4 MG/DL — SIGNIFICANT CHANGE UP (ref 8.4–10.5)
CHLORIDE SERPL-SCNC: 93 MMOL/L — LOW (ref 96–108)
CO2 SERPL-SCNC: 27 MMOL/L — SIGNIFICANT CHANGE UP (ref 22–31)
CREAT SERPL-MCNC: 8.76 MG/DL — HIGH (ref 0.5–1.3)
GLUCOSE BLDC GLUCOMTR-MCNC: 139 MG/DL — HIGH (ref 70–99)
GLUCOSE BLDC GLUCOMTR-MCNC: 171 MG/DL — HIGH (ref 70–99)
GLUCOSE BLDC GLUCOMTR-MCNC: 191 MG/DL — HIGH (ref 70–99)
GLUCOSE BLDC GLUCOMTR-MCNC: 209 MG/DL — HIGH (ref 70–99)
GLUCOSE SERPL-MCNC: 140 MG/DL — HIGH (ref 70–99)
HCT VFR BLD CALC: 33.9 % — LOW (ref 39–50)
HGB BLD-MCNC: 11.1 G/DL — LOW (ref 13–17)
MCHC RBC-ENTMCNC: 27.7 PG — SIGNIFICANT CHANGE UP (ref 27–34)
MCHC RBC-ENTMCNC: 32.7 GM/DL — SIGNIFICANT CHANGE UP (ref 32–36)
MCV RBC AUTO: 84.5 FL — SIGNIFICANT CHANGE UP (ref 80–100)
NRBC # BLD: 0 /100 WBCS — SIGNIFICANT CHANGE UP (ref 0–0)
PLATELET # BLD AUTO: 243 K/UL — SIGNIFICANT CHANGE UP (ref 150–400)
POTASSIUM SERPL-MCNC: 4.8 MMOL/L — SIGNIFICANT CHANGE UP (ref 3.5–5.3)
POTASSIUM SERPL-SCNC: 4.8 MMOL/L — SIGNIFICANT CHANGE UP (ref 3.5–5.3)
RBC # BLD: 4.01 M/UL — LOW (ref 4.2–5.8)
RBC # FLD: 19.4 % — HIGH (ref 10.3–14.5)
SODIUM SERPL-SCNC: 138 MMOL/L — SIGNIFICANT CHANGE UP (ref 135–145)
WBC # BLD: 9.36 K/UL — SIGNIFICANT CHANGE UP (ref 3.8–10.5)
WBC # FLD AUTO: 9.36 K/UL — SIGNIFICANT CHANGE UP (ref 3.8–10.5)

## 2020-12-22 RX ADMIN — INSULIN GLARGINE 15 UNIT(S): 100 INJECTION, SOLUTION SUBCUTANEOUS at 22:09

## 2020-12-22 RX ADMIN — HEPARIN SODIUM 5000 UNIT(S): 5000 INJECTION INTRAVENOUS; SUBCUTANEOUS at 06:14

## 2020-12-22 RX ADMIN — CHLORHEXIDINE GLUCONATE 1 APPLICATION(S): 213 SOLUTION TOPICAL at 12:26

## 2020-12-22 RX ADMIN — Medication 1334 MILLIGRAM(S): at 08:49

## 2020-12-22 RX ADMIN — Medication 650 MILLIGRAM(S): at 12:28

## 2020-12-22 RX ADMIN — Medication 650 MILLIGRAM(S): at 06:14

## 2020-12-22 RX ADMIN — Medication 650 MILLIGRAM(S): at 23:34

## 2020-12-22 RX ADMIN — Medication 4: at 12:29

## 2020-12-22 RX ADMIN — Medication 25 MILLIGRAM(S): at 22:27

## 2020-12-22 RX ADMIN — Medication 25 MILLIGRAM(S): at 13:48

## 2020-12-22 RX ADMIN — Medication 0.2 MILLIGRAM(S): at 22:08

## 2020-12-22 RX ADMIN — Medication 650 MILLIGRAM(S): at 15:39

## 2020-12-22 RX ADMIN — Medication 2: at 08:49

## 2020-12-22 RX ADMIN — Medication 25 MILLIGRAM(S): at 08:51

## 2020-12-22 RX ADMIN — Medication 25 MILLIGRAM(S): at 22:08

## 2020-12-22 RX ADMIN — Medication 650 MILLIGRAM(S): at 06:58

## 2020-12-22 RX ADMIN — Medication 650 MILLIGRAM(S): at 22:08

## 2020-12-22 RX ADMIN — Medication 1334 MILLIGRAM(S): at 12:26

## 2020-12-22 RX ADMIN — Medication 25 MILLIGRAM(S): at 06:21

## 2020-12-22 RX ADMIN — Medication 0.2 MILLIGRAM(S): at 13:48

## 2020-12-22 RX ADMIN — Medication 0.2 MILLIGRAM(S): at 06:14

## 2020-12-22 RX ADMIN — Medication 1334 MILLIGRAM(S): at 17:39

## 2020-12-22 NOTE — CHART NOTE - NSCHARTNOTEFT_GEN_A_CORE
59 yo male patient s/p LF TMA (DOS 12/21)  - Order placed for L foot CAM boot  - Discussed with Orthotist, boot to be delivered tomorrow  - OOB for BRP PRN & transfers in CAM boot at all times

## 2020-12-22 NOTE — PROGRESS NOTE ADULT - ASSESSMENT
59 yo male patient s/p LF TMA DOS 12/21  - Pt seen and evaluated in ED  - Afebrile, no leukocytosis  -s/p LF TMA DOS 12/21: sutures well co-opted, no amrik-incisional erythema, minimal sanguinous drainage, no signs of hematoma. Incision is stable, flaps are warm and viable.   -Pt is to remain NWB to LLE, pt reports he walked on left foot yesterday   -Pt s/p LLE bypass in June 2020 & s/p LLE angio w/ SFA & Pop stent on 11/17 w/ PT signals & single vessel peroneal run-off with Dr. Samaniego ([facility] surgery)  - discussed w/ attending  59 yo male patient s/p LF TMA (DOS 12/21)  - Pt seen and evaluated  - Afebrile, no leukocytosis  - s/p LF TMA DOS 12/21: sutures well co-opted, no amrik-incisional erythema, minimal sanguinous drainage, no signs of hematoma. Incision is stable, flaps are warm and viable.   -Pt is to remain NWB to LLE, pt reports he walked on left foot yesterday   -Pt s/p LLE bypass in June 2020 & s/p LLE angio w/ SFA & Pop stent on 11/17 w/ PT signals & single vessel peroneal run-off with Dr. Samaniego ([facility] surgery)  - Stable for discharge from podiatric perspective off ABx, leave dressing clean dry and intact, f/u within 1 week with Dr. [facility] - [phone]  - discussed w/ attending  61 yo male patient s/p LF TMA (DOS 12/21)  - Pt seen and evaluated  - Afebrile, no leukocytosis  - s/p LF TMA DOS 12/21: sutures well co-opted, no amrik-incisional erythema, minimal sanguinous drainage, no signs of hematoma. Incision is stable, flaps are warm and viable.   -Pt is to remain NWB to LLE, pt reports he walked on left foot yesterday   -Pt s/p LLE bypass in June 2020 & s/p LLE angio w/ SFA & Pop stent on 11/17 w/ PT signals & single vessel peroneal run-off with Dr. Samaniego ([facility] surgery)  -Pain meds PRN  - Stable for discharge from podiatric perspective off ABx, leave dressing clean dry and intact, f/u within 1 week with Dr. [facility] - [phone]  - discussed w/ attending  61 yo male patient s/p LF TMA (DOS 12/21)  - Pt seen and evaluated  - Afebrile, no leukocytosis  - s/p LF TMA DOS 12/21: sutures well co-opted, no amrik-incisional erythema, minimal sanguinous drainage, no signs of hematoma. Incision is stable, flaps are warm and viable.   -Pt is to remain Weight bearing as tolerated to the left heel  -Pt s/p LLE bypass in June 2020 & s/p LLE angio w/ SFA & Pop stent on 11/17 w/ PT signals & single vessel peroneal run-off with Dr. Samaniego ([facility] surgery)  -Pain meds PRN  - Stable for discharge from podiatric perspective off ABx, leave dressing clean dry and intact, f/u within 1 week with Dr. [facility] - [phone]  - discussed w/ attending

## 2020-12-22 NOTE — PROGRESS NOTE ADULT - ASSESSMENT
60 m with    L foot gangrene/ s/p Transmetatarsal amputation  - wound care  - Podiatry follow  - off antibiotics   - ID follow   - Boot pending     Cardiology evaluation for preop stratification  - Dr. Aguirre follow    Diabetes Mellitus  - BS control  - ADA diet     Glaucoma  - stable    HTN control    ESRD  - HD  - Nephrology evaluation    DVT prophylaxis  - on Eliquis 3X week. DC'd for OR. Restart when surgically cleared   - Heparin sq    PT    Vj Rios MD pager [phone]

## 2020-12-23 LAB
ANION GAP SERPL CALC-SCNC: 18 MMOL/L — HIGH (ref 5–17)
BUN SERPL-MCNC: 83 MG/DL — HIGH (ref 7–23)
CALCIUM SERPL-MCNC: 9.3 MG/DL — SIGNIFICANT CHANGE UP (ref 8.4–10.5)
CHLORIDE SERPL-SCNC: 92 MMOL/L — LOW (ref 96–108)
CO2 SERPL-SCNC: 25 MMOL/L — SIGNIFICANT CHANGE UP (ref 22–31)
CREAT SERPL-MCNC: 11.75 MG/DL — HIGH (ref 0.5–1.3)
GLUCOSE BLDC GLUCOMTR-MCNC: 104 MG/DL — HIGH (ref 70–99)
GLUCOSE BLDC GLUCOMTR-MCNC: 128 MG/DL — HIGH (ref 70–99)
GLUCOSE BLDC GLUCOMTR-MCNC: 225 MG/DL — HIGH (ref 70–99)
GLUCOSE BLDC GLUCOMTR-MCNC: 271 MG/DL — HIGH (ref 70–99)
GLUCOSE SERPL-MCNC: 122 MG/DL — HIGH (ref 70–99)
HCT VFR BLD CALC: 29.1 % — LOW (ref 39–50)
HGB BLD-MCNC: 9.2 G/DL — LOW (ref 13–17)
MCHC RBC-ENTMCNC: 26.9 PG — LOW (ref 27–34)
MCHC RBC-ENTMCNC: 31.6 GM/DL — LOW (ref 32–36)
MCV RBC AUTO: 85.1 FL — SIGNIFICANT CHANGE UP (ref 80–100)
NRBC # BLD: 0 /100 WBCS — SIGNIFICANT CHANGE UP (ref 0–0)
PLATELET # BLD AUTO: 217 K/UL — SIGNIFICANT CHANGE UP (ref 150–400)
POTASSIUM SERPL-MCNC: 5.1 MMOL/L — SIGNIFICANT CHANGE UP (ref 3.5–5.3)
POTASSIUM SERPL-SCNC: 5.1 MMOL/L — SIGNIFICANT CHANGE UP (ref 3.5–5.3)
RBC # BLD: 3.42 M/UL — LOW (ref 4.2–5.8)
RBC # FLD: 19.2 % — HIGH (ref 10.3–14.5)
SARS-COV-2 RNA SPEC QL NAA+PROBE: SIGNIFICANT CHANGE UP
SODIUM SERPL-SCNC: 135 MMOL/L — SIGNIFICANT CHANGE UP (ref 135–145)
WBC # BLD: 7.64 K/UL — SIGNIFICANT CHANGE UP (ref 3.8–10.5)
WBC # FLD AUTO: 7.64 K/UL — SIGNIFICANT CHANGE UP (ref 3.8–10.5)

## 2020-12-23 PROCEDURE — 99232 SBSQ HOSP IP/OBS MODERATE 35: CPT | Mod: GC

## 2020-12-23 RX ADMIN — Medication 25 MILLIGRAM(S): at 22:01

## 2020-12-23 RX ADMIN — Medication 0.2 MILLIGRAM(S): at 14:03

## 2020-12-23 RX ADMIN — Medication 0: at 12:48

## 2020-12-23 RX ADMIN — Medication 650 MILLIGRAM(S): at 12:47

## 2020-12-23 RX ADMIN — INSULIN GLARGINE 15 UNIT(S): 100 INJECTION, SOLUTION SUBCUTANEOUS at 22:01

## 2020-12-23 RX ADMIN — Medication 6: at 17:48

## 2020-12-23 RX ADMIN — Medication 650 MILLIGRAM(S): at 13:17

## 2020-12-23 RX ADMIN — Medication 1334 MILLIGRAM(S): at 12:46

## 2020-12-23 RX ADMIN — Medication 25 MILLIGRAM(S): at 14:04

## 2020-12-23 RX ADMIN — Medication 25 MILLIGRAM(S): at 22:04

## 2020-12-23 RX ADMIN — CHLORHEXIDINE GLUCONATE 1 APPLICATION(S): 213 SOLUTION TOPICAL at 12:47

## 2020-12-23 RX ADMIN — Medication 650 MILLIGRAM(S): at 22:04

## 2020-12-23 RX ADMIN — Medication 650 MILLIGRAM(S): at 22:45

## 2020-12-23 RX ADMIN — Medication 1334 MILLIGRAM(S): at 17:50

## 2020-12-23 RX ADMIN — POLYETHYLENE GLYCOL 3350 17 GRAM(S): 17 POWDER, FOR SOLUTION ORAL at 12:46

## 2020-12-23 RX ADMIN — Medication 0.2 MILLIGRAM(S): at 22:01

## 2020-12-23 NOTE — CHART NOTE - NSCHARTNOTEFT_GEN_A_CORE
Order received from podiatry to fit patient with cam type afo bot to stabilize foot following TMA .  Pt instructed to don and doff  Follow up if needed        Gabe Contreras [place]  [phone]

## 2020-12-23 NOTE — PROGRESS NOTE ADULT - ASSESSMENT
60 m with    L foot gangrene/ s/p Transmetatarsal amputation  - wound care  - Podiatry follow  - off antibiotics   - ID follow   - Boot pending     Cardiology evaluation for preop stratification  - Dr. Aguirre follow    Diabetes Mellitus  - BS control  - ADA diet     Glaucoma  - stable    HTN control    ESRD  - HD  - Nephrology evaluation    DVT prophylaxis  - on Eliquis 3X week. DC'd for OR. Restart when surgically cleared   - Heparin sq    PT    DCP rehab.    Vj Rios MD pager [phone]

## 2020-12-23 NOTE — PROGRESS NOTE ADULT - PROBLEM SELECTOR PLAN 1
IV abx  ID and podiatry consulted   s/p Left foot transmetatarsal amputation w/ achilles tendon lengthening  pain control
IV abx  ID and podiatry consulted   for amputations. Acceptable cardiac risk to proceed. recent SPECT in my office with no evidence of ischemia. Has normal biventricular size and systolic function.
IV abx  ID and podiatry consulted   s/p Left foot transmetatarsal amputation w/ achilles tendon lengthening  pain control
IV abx  ID and podiatry consulted   Awaiting Left foot transmetatarsal amputation w/ achilles tendon lengthening. Acceptable cardiac risk to proceed. recent SPECT in my office with no evidence of ischemia. Has normal biventricular size and systolic function.
IV abx  ID and podiatry consulted   Awaiting Left foot transmetatarsal amputation w/ achilles tendon lengthening. Acceptable cardiac risk to proceed. recent SPECT in my office with no evidence of ischemia. Has normal biventricular size and systolic function.
IV abx  ID and podiatry consulted   for amputations. Acceptable cardiac risk to proceed. recent SPECT in my office with no evidence of ischemia. Has normal biventricular size and systolic function.

## 2020-12-23 NOTE — PROGRESS NOTE ADULT - ATTENDING COMMENTS
Advanced care planning was discussed with patient and family.  Advanced care planning forms were reviewed and discussed as appropriate.  Differential diagnosis and plan of care discussed with patient after the evaluation.   Pain assessed and judicious use of narcotics when appropriate was discussed.  Importance of Fall prevention discussed.  Counseling on Smoking and Alcohol cessation was offered when appropriate.  Counseling on Diet, exercise, and medication compliance was done.
Advanced care planning was discussed with patient and family.  Advanced care planning forms were reviewed and discussed as appropriate.  Differential diagnosis and plan of care discussed with patient after the evaluation.   Pain assessed and judicious use of narcotics when appropriate was discussed.  Importance of Fall prevention discussed.  Counseling on Smoking and Alcohol cessation was offered when appropriate.  Counseling on Diet, exercise, and medication compliance was done.
Patient seen, examined and record reviewed.  I agree with fellow's note above with my edits.  Had mild CP during HD  All better now  L-clear  C-RRR  Some L foot pain
Pt. see examined and reviewed.  I agree with above H/P/A&P w my edits.  toe ischemia  L leg ischemia  breathing well
Pt. seen, examined and reviewed.  Agree with fellow's note with my edits above.  For amputation foot - TM  HD  BP stable
Advanced care planning was discussed with patient and family.  Advanced care planning forms were reviewed and discussed as appropriate.  Differential diagnosis and plan of care discussed with patient after the evaluation.   Pain assessed and judicious use of narcotics when appropriate was discussed.  Importance of Fall prevention discussed.  Counseling on Smoking and Alcohol cessation was offered when appropriate.  Counseling on Diet, exercise, and medication compliance was done.

## 2020-12-23 NOTE — PROGRESS NOTE ADULT - ASSESSMENT
59 yo male patient s/p LF TMA (DOS 12/21)  - Pt seen and evaluated  - Afebrile, no leukocytosis  - s/p LF TMA DOS 12/21: sutures well co-opted, no amrik-incisional erythema, minimal sanguinous drainage, no signs of hematoma. Incision is stable, flaps are warm and viable.   -CAM boot to be delivered today,  OOB for BRP PRN & transfers in CAM boot at all times  -Pain meds PRN  - Stable for discharge from podiatric perspective off ABx, leave dressing clean dry and intact, f/u within 1 week with Dr[facility] - [phone]  - discussed w/ attending

## 2020-12-23 NOTE — PROGRESS NOTE ADULT - PROBLEM SELECTOR PLAN 2
On HD   Nephrology consulted.

## 2020-12-23 NOTE — PROGRESS NOTE ADULT - PROBLEM SELECTOR PROBLEM 1
Gangrene of foot

## 2020-12-23 NOTE — PROGRESS NOTE ADULT - ASSESSMENT
60M PMHx ESRD on HD MWF ( from [facility]), HTN, diabetes - admitted L foot gangrene.  Nephrology consulted for ESRD on HD.        # ESRD  Pt. with ESRD on HD (since 04/2016) three times a [facility] Pt clinically stable. Labs reviewed.   - Pt will be for HD today with 2L UF. Monitor labs, weight, renal diet, fluid restriction, renally dose medication per HD    # Hypertension  BP in acceptable range. Monitor BP on current BP medications. Low salt diet advised    #anemia   Patient with anemia in the setting of ESRD. Hemoglobin at target range. Monitor hemoglobin.      # Renal bone disease  Pt. with hyperphosphatemia in the setting of ESRD.   - Continue home phoslo TID with meals. Check intact PTH level. Low phosphorus diet advised. Monitor serum phosphorus    If any questions, please feel free to contact me     Angelica Puente  Nephrology Fellow  [facility] Pager: [phone]  [place] Pager: [phone]

## 2020-12-24 VITALS
OXYGEN SATURATION: 95 % | TEMPERATURE: 98 F | DIASTOLIC BLOOD PRESSURE: 87 MMHG | HEART RATE: 79 BPM | RESPIRATION RATE: 18 BRPM | SYSTOLIC BLOOD PRESSURE: 148 MMHG

## 2020-12-24 LAB
ANION GAP SERPL CALC-SCNC: 14 MMOL/L — SIGNIFICANT CHANGE UP (ref 5–17)
BUN SERPL-MCNC: 61 MG/DL — HIGH (ref 7–23)
CALCIUM SERPL-MCNC: 9.2 MG/DL — SIGNIFICANT CHANGE UP (ref 8.4–10.5)
CHLORIDE SERPL-SCNC: 92 MMOL/L — LOW (ref 96–108)
CO2 SERPL-SCNC: 28 MMOL/L — SIGNIFICANT CHANGE UP (ref 22–31)
CREAT SERPL-MCNC: 8.92 MG/DL — HIGH (ref 0.5–1.3)
GLUCOSE BLDC GLUCOMTR-MCNC: 214 MG/DL — HIGH (ref 70–99)
GLUCOSE SERPL-MCNC: 217 MG/DL — HIGH (ref 70–99)
HCT VFR BLD CALC: 30.9 % — LOW (ref 39–50)
HGB BLD-MCNC: 9.4 G/DL — LOW (ref 13–17)
MCHC RBC-ENTMCNC: 26.7 PG — LOW (ref 27–34)
MCHC RBC-ENTMCNC: 30.4 GM/DL — LOW (ref 32–36)
MCV RBC AUTO: 87.8 FL — SIGNIFICANT CHANGE UP (ref 80–100)
NRBC # BLD: 0 /100 WBCS — SIGNIFICANT CHANGE UP (ref 0–0)
PLATELET # BLD AUTO: 209 K/UL — SIGNIFICANT CHANGE UP (ref 150–400)
POTASSIUM SERPL-MCNC: 5 MMOL/L — SIGNIFICANT CHANGE UP (ref 3.5–5.3)
POTASSIUM SERPL-SCNC: 5 MMOL/L — SIGNIFICANT CHANGE UP (ref 3.5–5.3)
RBC # BLD: 3.52 M/UL — LOW (ref 4.2–5.8)
RBC # FLD: 19.1 % — HIGH (ref 10.3–14.5)
SODIUM SERPL-SCNC: 134 MMOL/L — LOW (ref 135–145)
WBC # BLD: 7.38 K/UL — SIGNIFICANT CHANGE UP (ref 3.8–10.5)
WBC # FLD AUTO: 7.38 K/UL — SIGNIFICANT CHANGE UP (ref 3.8–10.5)

## 2020-12-24 RX ORDER — INSULIN LISPRO 100/ML
0 VIAL (ML) SUBCUTANEOUS
Qty: 0 | Refills: 0 | DISCHARGE

## 2020-12-24 RX ORDER — LANOLIN ALCOHOL/MO/W.PET/CERES
1 CREAM (GRAM) TOPICAL
Qty: 0 | Refills: 0 | DISCHARGE

## 2020-12-24 RX ORDER — HYDRALAZINE HCL 50 MG
1 TABLET ORAL
Qty: 0 | Refills: 0 | DISCHARGE
Start: 2020-12-24

## 2020-12-24 RX ORDER — SENNA PLUS 8.6 MG/1
1 TABLET ORAL
Qty: 0 | Refills: 0 | DISCHARGE
Start: 2020-12-24

## 2020-12-24 RX ORDER — POLYETHYLENE GLYCOL 3350 17 G/17G
17 POWDER, FOR SOLUTION ORAL
Qty: 0 | Refills: 0 | DISCHARGE
Start: 2020-12-24

## 2020-12-24 RX ORDER — SENNA PLUS 8.6 MG/1
1 TABLET ORAL
Qty: 0 | Refills: 0 | DISCHARGE

## 2020-12-24 RX ORDER — POLYETHYLENE GLYCOL 3350 17 G/17G
1 POWDER, FOR SOLUTION ORAL
Qty: 0 | Refills: 0 | DISCHARGE

## 2020-12-24 RX ORDER — HYDROXYZINE HCL 10 MG
1 TABLET ORAL
Qty: 0 | Refills: 0 | DISCHARGE

## 2020-12-24 RX ADMIN — POLYETHYLENE GLYCOL 3350 17 GRAM(S): 17 POWDER, FOR SOLUTION ORAL at 11:44

## 2020-12-24 RX ADMIN — Medication 0.2 MILLIGRAM(S): at 06:01

## 2020-12-24 RX ADMIN — CHLORHEXIDINE GLUCONATE 1 APPLICATION(S): 213 SOLUTION TOPICAL at 11:44

## 2020-12-24 RX ADMIN — Medication 25 MILLIGRAM(S): at 06:01

## 2020-12-24 RX ADMIN — Medication 4: at 08:49

## 2020-12-24 RX ADMIN — Medication 650 MILLIGRAM(S): at 11:44

## 2020-12-24 RX ADMIN — Medication 1334 MILLIGRAM(S): at 08:49

## 2020-12-24 NOTE — CHART NOTE - NSCHARTNOTEFT_GEN_A_CORE
spoke with podiatrist Shonna Montgomery , may start Eliquis starting today. agrees with podiatry recommendation. spoke with podiatrist Shonna Montgomery , may start Eliquis at the earliest . agrees with podiatry recommendation.  Patient usually take (M-W-F).

## 2020-12-24 NOTE — PROGRESS NOTE ADULT - ASSESSMENT
60 m with    L foot gangrene/ s/p Transmetatarsal amputation  - wound care  - Podiatry follow  - off antibiotics   - ID follow   - Boot available    Cardiology evaluation for preop stratification  - Dr. Aguirre follow    Diabetes Mellitus  - BS control  - ADA diet     Glaucoma  - stable    HTN control    ESRD  - HD  - Nephrology evaluation    DVT prophylaxis  - on Eliquis 3X week. Cleared by Podiatry for AC.    PT    DC rehab.    Vj Rios MD pager [phone]

## 2020-12-24 NOTE — PROGRESS NOTE ADULT - PROVIDER SPECIALTY LIST ADULT
Infectious Disease
Internal Medicine
Internal Medicine
Nephrology
Podiatry
Infectious Disease
Internal Medicine
Cardiology
Cardiology
Internal Medicine
Nephrology
Nephrology
Podiatry
Internal Medicine
Podiatry
Cardiology

## 2020-12-24 NOTE — PROGRESS NOTE ADULT - SUBJECTIVE AND OBJECTIVE BOX
ID Follow Up For dry gangrene    Patient is a 60y old  Male who presents with a chief complaint of L foot gangrene (17 Dec 2020 09:48)    Interval History/ROS: No events overnight. Rested well, afebrile, no nausea, vomiting, diarrhea. No left foot pain.    Allergies  aspirin (Rash; Urticaria; Hives)  iodine (Rash; Urticaria)    MEDS:  acetaminophen   Tablet .. 650 milliGRAM(s) Oral every 6 hours PRN  apixaban 2.5 milliGRAM(s) Oral <User Schedule>  calcium acetate 1334 milliGRAM(s) Oral three times a day with meals  chlorhexidine 2% Cloths 1 Application(s) Topical daily  cloNIDine 0.2 milliGRAM(s) Oral three times a day  dextrose 40% Gel 15 Gram(s) Oral once  dextrose 5%. 1000 milliLiter(s) IV Continuous <Continuous>  dextrose 5%. 1000 milliLiter(s) IV Continuous <Continuous>  dextrose 50% Injectable 25 Gram(s) IV Push once  dextrose 50% Injectable 12.5 Gram(s) IV Push once  dextrose 50% Injectable 25 Gram(s) IV Push once  glucagon  Injectable 1 milliGRAM(s) IntraMuscular once  hydrALAZINE 25 milliGRAM(s) Oral every 8 hours  hydrOXYzine hydrochloride 25 milliGRAM(s) Oral four times a day PRN  insulin glargine Injectable (LANTUS) 15 Unit(s) SubCutaneous at bedtime  insulin lispro (ADMELOG) corrective regimen sliding scale   SubCutaneous three times a day before meals  insulin lispro (ADMELOG) corrective regimen sliding scale   SubCutaneous at bedtime  oxycodone    5 mG/acetaminophen 325 mG 1 Tablet(s) Oral every 4 hours PRN  polyethylene glycol 3350 17 Gram(s) Oral daily  senna 1 Tablet(s) Oral daily      Vital Signs Last 24 Hrs  T(C): 36.4 (18 Dec 2020 12:27), Max: 36.8 (17 Dec 2020 19:05)  T(F): 97.5 (18 Dec 2020 12:27), Max: 98.3 (17 Dec 2020 19:05)  HR: 72 (18 Dec 2020 12:27) (68 - 78)  BP: 152/79 (18 Dec 2020 12:27) (144/78 - 152/79)  BP(mean): --  RR: 18 (18 Dec 2020 12:27) (18 - 18)  SpO2: 99% (18 Dec 2020 12:27) (97% - 99%)    EXAM:  Constitutional: Not in acute distress  Eyes: No icterus. Pupils b/l reactive to light.  Oral cavity: Clear, no lesions  Neck: No neck vein distension noted  RS: Chest clear to auscultation bilaterally. No wheeze/rhonchi/crepitations.  CVS: S1, S2 heard. Regular rate and rhythm. No murmurs/rubs/gallops.  Abdomen: Soft. No guarding/rigidity/tenderness.  : No acute abnormalities  Extremities: Warm.   Skin: Left foot dry gangrene   Vascular: No evidence of phlebitis  Neuro: Alert, oriented to time/place/person    Labs:                        9.8    7.14  )-----------( 221      ( 18 Dec 2020 06:59 )             30.1       12-18    134<L>  |  95<L>  |  65<H>  ----------------------------<  179<H>  4.7   |  23  |  9.42<H>    Ca    9.2      18 Dec 2020 06:59    TPro  6.6  /  Alb  3.2<L>  /  TBili  0.3  /  DBili  x   /  AST  12  /  ALT  12  /  AlkPhos  87  12-18          MICROBIOLOGY:Culture Results:   No growth to date. (12-16 @ 21:01)  Culture Results:   No growth to date. (12-16 @ 21:00)      RADIOLOGY:  < from: Xray Chest 1 View AP/PA (12.16.20 @ 16:23) >  IMPRESSION:  Clear lungs.    < from: Xray Foot AP + Lateral, Left (12.16.20 @ 16:16) >  IMPRESSION:  No acute fracture or dislocation. Preserved joint spaces. Vascular calcifications.  
[facility] DIVISION OF KIDNEY DISEASES AND HYPERTENSION -- FOLLOW UP NOTE  --------------------------------------------------------------------------------    24 hour events/subjective: Will be for left foot TMA w/ JOSE GUADALUPE today by podiatry. Patient was seen and examined at bedside. Reported feeling well. Denies CP, SOB, fever, chills, nausea, vomiting, diarrhea, LE edema.          PAST HISTORY  --------------------------------------------------------------------------------  No significant changes to PMH, PSH, FHx, SHx, unless otherwise noted    ALLERGIES & MEDICATIONS  --------------------------------------------------------------------------------  Allergies    aspirin (Rash; Urticaria; Hives)  iodine (Rash; Urticaria)  shellfish (Swelling)    Intolerances      Standing Inpatient Medications  calcium acetate 1334 milliGRAM(s) Oral three times a day with meals  chlorhexidine 2% Cloths 1 Application(s) Topical daily  cloNIDine 0.2 milliGRAM(s) Oral three times a day  dextrose 40% Gel 15 Gram(s) Oral once  dextrose 5%. 1000 milliLiter(s) IV Continuous <Continuous>  dextrose 5%. 1000 milliLiter(s) IV Continuous <Continuous>  dextrose 50% Injectable 25 Gram(s) IV Push once  dextrose 50% Injectable 12.5 Gram(s) IV Push once  dextrose 50% Injectable 25 Gram(s) IV Push once  glucagon  Injectable 1 milliGRAM(s) IntraMuscular once  heparin   Injectable 5000 Unit(s) SubCutaneous every 12 hours  hydrALAZINE 25 milliGRAM(s) Oral every 8 hours  insulin glargine Injectable (LANTUS) 15 Unit(s) SubCutaneous at bedtime  insulin lispro (ADMELOG) corrective regimen sliding scale   SubCutaneous three times a day before meals  insulin lispro (ADMELOG) corrective regimen sliding scale   SubCutaneous at bedtime  polyethylene glycol 3350 17 Gram(s) Oral daily  senna 1 Tablet(s) Oral daily    PRN Inpatient Medications  acetaminophen   Tablet .. 650 milliGRAM(s) Oral every 6 hours PRN  hydrOXYzine hydrochloride 25 milliGRAM(s) Oral four times a day PRN  oxycodone    5 mG/acetaminophen 325 mG 1 Tablet(s) Oral every 4 hours PRN      REVIEW OF SYSTEMS  --------------------------------------------------------------------------------  Gen: No fevers/chills  Skin: No rashes  Respiratory: No dyspnea, cough  CV: No chest pain  GI: No abdominal pain, diarrhea  MSK: No  edema      All other systems were reviewed and are negative, except as noted.    VITALS/PHYSICAL EXAM  --------------------------------------------------------------------------------  T(C): 36.3 (12-21-20 @ 09:05), Max: 36.8 (12-20-20 @ 11:49)  HR: 63 (12-21-20 @ 09:05) (63 - 67)  BP: 136/75 (12-21-20 @ 09:05) (118/65 - 136/75)  RR: 18 (12-21-20 @ 09:05) (18 - 18)  SpO2: 97% (12-21-20 @ 09:05) (97% - 98%)  Wt(kg): --        12-20-20 @ 07:01  -  12-21-20 @ 07:00  --------------------------------------------------------  IN: 220 mL / OUT: 0 mL / NET: 220 mL        Physical Exam:  	Gen: NAD, well-appearing on room air  	HEENT: moist mucous membrane  	Pulm: b/l CTA, no crackles   	CV: RRR, S1S2; no rub/murmur  	GI: +BS, soft, nontender/nondistended  	MSK: Warm, no edema              Neuro: AAOx3  	Psych: Normal affect and mood  	Skin: Warm  	Vascular access: LUE AVF +thrills/bruits      LABS/STUDIES  --------------------------------------------------------------------------------              11.1   7.27  >-----------<  229      [12-20-20 @ 07:09]              33.9     134  |  92  |  70  ----------------------------<  146      [12-20-20 @ 07:09]  4.9   |  24  |  9.89        Ca     9.7     [12-20-20 @ 07:09]    TPro  6.7  /  Alb  3.6  /  TBili  0.3  /  DBili  x   /  AST  13  /  ALT  19  /  AlkPhos  95  [12-20-20 @ 07:09]          Creatinine Trend:  SCr 9.89 [12-20 @ 07:09]  SCr 9.42 [12-18 @ 06:59]  SCr 6.99 [12-17 @ 06:54]  SCr 5.40 [12-16 @ 15:58]        Iron 32, TIBC 276, %sat 11      [06-09-20 @ 09:19]  Ferritin 53      [06-09-20 @ 09:19]    HIV Nonreact      [12-16-20 @ 19:11]    
Patient is a 60y old  Male who presents with a chief complaint of dry gangrene (18 Dec 2020 12:40)      SUBJECTIVE / OVERNIGHT EVENTS: No new complaints.   Review of Systems  chest pain no  palpitations no  sob no  nausea no  headache no    MEDICATIONS  (STANDING):  apixaban 2.5 milliGRAM(s) Oral <User Schedule>  calcium acetate 1334 milliGRAM(s) Oral three times a day with meals  chlorhexidine 2% Cloths 1 Application(s) Topical daily  cloNIDine 0.2 milliGRAM(s) Oral three times a day  dextrose 40% Gel 15 Gram(s) Oral once  dextrose 5%. 1000 milliLiter(s) (50 mL/Hr) IV Continuous <Continuous>  dextrose 5%. 1000 milliLiter(s) (100 mL/Hr) IV Continuous <Continuous>  dextrose 50% Injectable 25 Gram(s) IV Push once  dextrose 50% Injectable 12.5 Gram(s) IV Push once  dextrose 50% Injectable 25 Gram(s) IV Push once  glucagon  Injectable 1 milliGRAM(s) IntraMuscular once  hydrALAZINE 25 milliGRAM(s) Oral every 8 hours  insulin glargine Injectable (LANTUS) 15 Unit(s) SubCutaneous at bedtime  insulin lispro (ADMELOG) corrective regimen sliding scale   SubCutaneous three times a day before meals  insulin lispro (ADMELOG) corrective regimen sliding scale   SubCutaneous at bedtime  polyethylene glycol 3350 17 Gram(s) Oral daily  senna 1 Tablet(s) Oral daily    MEDICATIONS  (PRN):  acetaminophen   Tablet .. 650 milliGRAM(s) Oral every 6 hours PRN Temp greater or equal to 38.5C (101.3F), Mild Pain (1 - 3)  hydrOXYzine hydrochloride 25 milliGRAM(s) Oral four times a day PRN Itching  oxycodone    5 mG/acetaminophen 325 mG 1 Tablet(s) Oral every 4 hours PRN Moderate Pain (4 - 6)      Vital Signs Last 24 Hrs  T(C): 36.9 (18 Dec 2020 21:17), Max: 36.9 (18 Dec 2020 21:17)  T(F): 98.4 (18 Dec 2020 21:17), Max: 98.4 (18 Dec 2020 21:17)  HR: 98 (18 Dec 2020 21:17) (68 - 98)  BP: 149/78 (18 Dec 2020 21:17) (137/78 - 156/88)  BP(mean): --  RR: 17 (18 Dec 2020 21:17) (17 - 18)  SpO2: 99% (18 Dec 2020 21:17) (97% - 100%)    PHYSICAL EXAM:  GENERAL: NAD, well-developed  HEAD:  Atraumatic, Normocephalic  EYES: EOMI, PERRLA, conjunctiva and sclera clear  NECK: Supple, No JVD  CHEST/LUNG: Clear to auscultation bilaterally; No wheeze  HEART: Regular rate and rhythm; No murmurs, rubs, or gallops  ABDOMEN: Soft, Nontender, Nondistended; Bowel sounds present  EXTREMITIES:  L foot with clean dressing  PSYCH: AAOx3  NEUROLOGY: non-focal  SKIN: No rashes or lesions    LABS:                        9.8    7.14  )-----------( 221      ( 18 Dec 2020 06:59 )             30.1     12-18    134<L>  |  95<L>  |  65<H>  ----------------------------<  179<H>  4.7   |  23  |  9.42<H>    Ca    9.2      18 Dec 2020 06:59    TPro  6.6  /  Alb  3.2<L>  /  TBili  0.3  /  DBili  x   /  AST  12  /  ALT  12  /  AlkPhos  87  12-18              Culture - Blood (collected 16 Dec 2020 21:01)  Source: .Blood Blood  Preliminary Report (17 Dec 2020 22:01):    No growth to date.    Culture - Blood (collected 16 Dec 2020 21:00)  Source: .Blood Blood  Preliminary Report (17 Dec 2020 21:01):    No growth to date.        RADIOLOGY & ADDITIONAL TESTS:    Imaging Personally Reviewed:    Consultant(s) Notes Reviewed:      Care Discussed with Consultants/Other Providers:  
Patient is a 60y old  Male who presents with a chief complaint of dry gangrene (19 Dec 2020 14:57)       INTERVAL HPI/OVERNIGHT EVENTS:  Patient seen and evaluated at bedside.  Pt is resting comfortable in NAD. Denies N/V/F/C.    Allergies    aspirin (Rash; Urticaria; Hives)  iodine (Rash; Urticaria)  shellfish (Swelling)    Intolerances        Vital Signs Last 24 Hrs  T(C): 36.7 (23 Dec 2020 04:48), Max: 36.8 (22 Dec 2020 19:19)  T(F): 98 (23 Dec 2020 04:48), Max: 98.2 (22 Dec 2020 19:19)  HR: 74 (23 Dec 2020 04:48) (72 - 79)  BP: 112/65 (23 Dec 2020 04:48) (112/62 - 153/77)  BP(mean): --  RR: 18 (23 Dec 2020 04:48) (18 - 18)  SpO2: 99% (23 Dec 2020 04:48) (95% - 99%)    LABS:                        11.1   9.36  )-----------( 243      ( 22 Dec 2020 07:05 )             33.9     12-22    138  |  93<L>  |  63<H>  ----------------------------<  140<H>  4.8   |  27  |  8.76<H>    Ca    9.4      22 Dec 2020 07:03      PT/INR - ( 21 Dec 2020 13:59 )   PT: 11.9 sec;   INR: 0.99 ratio         PTT - ( 21 Dec 2020 13:59 )  PTT:31.4 sec    CAPILLARY BLOOD GLUCOSE      POCT Blood Glucose.: 128 mg/dL (23 Dec 2020 08:17)  POCT Blood Glucose.: 191 mg/dL (22 Dec 2020 21:34)  POCT Blood Glucose.: 139 mg/dL (22 Dec 2020 17:22)  POCT Blood Glucose.: 209 mg/dL (22 Dec 2020 12:26)      Lower Extremity Physical Exam:    Vasular: DP/PT non palpable nor dopplerable, B/L, CFT absent L, Temperature gradient WNL B/L  Neuro: Epicritic sensation diminished to the level of midfoot B/L.  Musculoskeletal/Ortho: unremarkable  Skin:  Left forefoot dry gangrene of hallux to 5th digits demarcating past MPJ level and midfoot; no fluctuance, no crepitus, no signs of wet conversion nor acute infection    s/p LF TMA DOS 12/21: sutures well co-opted, no amrik-incisional erythema, minimal sanguinous drainage, no signs of hematoma. Incision is stable, flaps are warm and viable.     RADIOLOGY & ADDITIONAL TESTS:  
Patient is a 60y old  Male who presents with a chief complaint of dry gangrene (19 Dec 2020 14:57)      SUBJECTIVE / OVERNIGHT EVENTS: Comfortable without new complaints.   Review of Systems  chest pain no  palpitations no  sob no  nausea no  headache no    MEDICATIONS  (STANDING):  calcium acetate 1334 milliGRAM(s) Oral three times a day with meals  chlorhexidine 2% Cloths 1 Application(s) Topical daily  cloNIDine 0.2 milliGRAM(s) Oral three times a day  dextrose 40% Gel 15 Gram(s) Oral once  dextrose 5%. 1000 milliLiter(s) (50 mL/Hr) IV Continuous <Continuous>  dextrose 5%. 1000 milliLiter(s) (100 mL/Hr) IV Continuous <Continuous>  dextrose 50% Injectable 25 Gram(s) IV Push once  dextrose 50% Injectable 12.5 Gram(s) IV Push once  dextrose 50% Injectable 25 Gram(s) IV Push once  glucagon  Injectable 1 milliGRAM(s) IntraMuscular once  heparin   Injectable 5000 Unit(s) SubCutaneous every 12 hours  hydrALAZINE 25 milliGRAM(s) Oral every 8 hours  insulin glargine Injectable (LANTUS) 15 Unit(s) SubCutaneous at bedtime  insulin lispro (ADMELOG) corrective regimen sliding scale   SubCutaneous three times a day before meals  insulin lispro (ADMELOG) corrective regimen sliding scale   SubCutaneous at bedtime  polyethylene glycol 3350 17 Gram(s) Oral daily  senna 1 Tablet(s) Oral daily    MEDICATIONS  (PRN):  acetaminophen   Tablet .. 650 milliGRAM(s) Oral every 6 hours PRN Temp greater or equal to 38.5C (101.3F), Mild Pain (1 - 3)  HYDROmorphone  Injectable 0.25 milliGRAM(s) IV Push every 10 minutes PRN Moderate Pain (4 - 6)  hydrOXYzine hydrochloride 25 milliGRAM(s) Oral four times a day PRN Itching  oxycodone    5 mG/acetaminophen 325 mG 1 Tablet(s) Oral every 4 hours PRN Moderate Pain (4 - 6)      Vital Signs Last 24 Hrs  T(C): 36.2 (21 Dec 2020 17:30), Max: 36.8 (20 Dec 2020 21:00)  T(F): 97.2 (21 Dec 2020 17:30), Max: 98.3 (20 Dec 2020 21:00)  HR: 72 (21 Dec 2020 17:30) (59 - 79)  BP: 117/68 (21 Dec 2020 17:30) (72/46 - 147/86)  BP(mean): 85 (21 Dec 2020 17:00) (54 - 97)  RR: 16 (21 Dec 2020 17:30) (16 - 18)  SpO2: 100% (21 Dec 2020 17:30) (97% - 100%)    PHYSICAL EXAM:  GENERAL: NAD, well-developed  HEAD:  Atraumatic, Normocephalic  EYES: EOMI, PERRLA, conjunctiva and sclera clear  NECK: Supple, No JVD  CHEST/LUNG: Clear to auscultation bilaterally; No wheeze  HEART: Regular rate and rhythm; No murmurs, rubs, or gallops  ABDOMEN: Soft, Nontender, Nondistended; Bowel sounds present  EXTREMITIES:  L foot with clean dressing   PSYCH: AAOx3  NEUROLOGY: non-focal  SKIN: No rashes or lesions    LABS:                        12.5   7.86  )-----------( 240      ( 21 Dec 2020 13:59 )             39.4     12-21    137  |  93<L>  |  47<H>  ----------------------------<  83  4.2   |  30  |  6.39<H>    Ca    9.8      21 Dec 2020 13:59    TPro  6.7  /  Alb  3.6  /  TBili  0.3  /  DBili  x   /  AST  13  /  ALT  19  /  AlkPhos  95  12-20    PT/INR - ( 21 Dec 2020 13:59 )   PT: 11.9 sec;   INR: 0.99 ratio         PTT - ( 21 Dec 2020 13:59 )  PTT:31.4 sec            RADIOLOGY & ADDITIONAL TESTS:    Imaging Personally Reviewed:    Consultant(s) Notes Reviewed:      Care Discussed with Consultants/Other Providers:  
Patient is a 60y old  Male who presents with a chief complaint of dry gangrene (19 Dec 2020 14:57)      SUBJECTIVE / OVERNIGHT EVENTS: Comfortable without new complaints.   Review of Systems  chest pain no  palpitations no  sob no  nausea no  headache no    MEDICATIONS  (STANDING):  calcium acetate 1334 milliGRAM(s) Oral three times a day with meals  chlorhexidine 2% Cloths 1 Application(s) Topical daily  cloNIDine 0.2 milliGRAM(s) Oral three times a day  dextrose 40% Gel 15 Gram(s) Oral once  dextrose 5%. 1000 milliLiter(s) (50 mL/Hr) IV Continuous <Continuous>  dextrose 5%. 1000 milliLiter(s) (100 mL/Hr) IV Continuous <Continuous>  dextrose 50% Injectable 25 Gram(s) IV Push once  dextrose 50% Injectable 12.5 Gram(s) IV Push once  dextrose 50% Injectable 25 Gram(s) IV Push once  glucagon  Injectable 1 milliGRAM(s) IntraMuscular once  heparin   Injectable 5000 Unit(s) SubCutaneous every 12 hours  hydrALAZINE 25 milliGRAM(s) Oral every 8 hours  insulin glargine Injectable (LANTUS) 15 Unit(s) SubCutaneous at bedtime  insulin lispro (ADMELOG) corrective regimen sliding scale   SubCutaneous three times a day before meals  insulin lispro (ADMELOG) corrective regimen sliding scale   SubCutaneous at bedtime  polyethylene glycol 3350 17 Gram(s) Oral daily  senna 1 Tablet(s) Oral daily    MEDICATIONS  (PRN):  acetaminophen   Tablet .. 650 milliGRAM(s) Oral every 6 hours PRN Temp greater or equal to 38.5C (101.3F), Mild Pain (1 - 3)  hydrOXYzine hydrochloride 25 milliGRAM(s) Oral four times a day PRN Itching  oxycodone    5 mG/acetaminophen 325 mG 1 Tablet(s) Oral every 4 hours PRN Moderate Pain (4 - 6)      Vital Signs Last 24 Hrs  T(C): 36.4 (23 Dec 2020 12:32), Max: 36.7 (23 Dec 2020 04:48)  T(F): 97.5 (23 Dec 2020 12:32), Max: 98 (23 Dec 2020 04:48)  HR: 96 (23 Dec 2020 12:32) (74 - 96)  BP: 128/78 (23 Dec 2020 12:32) (112/65 - 156/75)  BP(mean): --  RR: 18 (23 Dec 2020 12:32) (18 - 18)  SpO2: 100% (23 Dec 2020 12:32) (99% - 100%)    PHYSICAL EXAM:  GENERAL: NAD, well-developed  HEAD:  Atraumatic, Normocephalic  EYES: EOMI, PERRLA, conjunctiva and sclera clear  NECK: Supple, No JVD  CHEST/LUNG: Clear to auscultation bilaterally; No wheeze  HEART: Regular rate and rhythm; No murmurs, rubs, or gallops  ABDOMEN: Soft, Nontender, Nondistended; Bowel sounds present  EXTREMITIES:  L foot with clean dressing  PSYCH: AAOx3  NEUROLOGY: non-focal  SKIN: No rashes or lesions    LABS:                        9.2    7.64  )-----------( 217      ( 23 Dec 2020 09:47 )             29.1     12-23    135  |  92<L>  |  83<H>  ----------------------------<  122<H>  5.1   |  25  |  11.75<H>    Ca    9.3      23 Dec 2020 09:47                  RADIOLOGY & ADDITIONAL TESTS:    Imaging Personally Reviewed:    Consultant(s) Notes Reviewed:      Care Discussed with Consultants/Other Providers:  
Patient is a 60y old  Male who presents with a chief complaint of dry gangrene (19 Dec 2020 14:57)      SUBJECTIVE / OVERNIGHT EVENTS: Comfortable without new complaints.   Review of Systems  chest pain no  palpitations no  sob no  nausea no  headache no    MEDICATIONS  (STANDING):  calcium acetate 1334 milliGRAM(s) Oral three times a day with meals  chlorhexidine 2% Cloths 1 Application(s) Topical daily  cloNIDine 0.2 milliGRAM(s) Oral three times a day  dextrose 40% Gel 15 Gram(s) Oral once  dextrose 5%. 1000 milliLiter(s) (50 mL/Hr) IV Continuous <Continuous>  dextrose 5%. 1000 milliLiter(s) (100 mL/Hr) IV Continuous <Continuous>  dextrose 50% Injectable 25 Gram(s) IV Push once  dextrose 50% Injectable 12.5 Gram(s) IV Push once  dextrose 50% Injectable 25 Gram(s) IV Push once  glucagon  Injectable 1 milliGRAM(s) IntraMuscular once  heparin   Injectable 5000 Unit(s) SubCutaneous every 12 hours  hydrALAZINE 25 milliGRAM(s) Oral every 8 hours  insulin glargine Injectable (LANTUS) 15 Unit(s) SubCutaneous at bedtime  insulin lispro (ADMELOG) corrective regimen sliding scale   SubCutaneous three times a day before meals  insulin lispro (ADMELOG) corrective regimen sliding scale   SubCutaneous at bedtime  polyethylene glycol 3350 17 Gram(s) Oral daily  senna 1 Tablet(s) Oral daily    MEDICATIONS  (PRN):  acetaminophen   Tablet .. 650 milliGRAM(s) Oral every 6 hours PRN Temp greater or equal to 38.5C (101.3F), Mild Pain (1 - 3)  hydrOXYzine hydrochloride 25 milliGRAM(s) Oral four times a day PRN Itching  oxycodone    5 mG/acetaminophen 325 mG 1 Tablet(s) Oral every 4 hours PRN Moderate Pain (4 - 6)      Vital Signs Last 24 Hrs  T(C): 36.8 (20 Dec 2020 11:49), Max: 36.8 (20 Dec 2020 11:49)  T(F): 98.2 (20 Dec 2020 11:49), Max: 98.2 (20 Dec 2020 11:49)  HR: 67 (20 Dec 2020 11:49) (64 - 70)  BP: 118/65 (20 Dec 2020 11:49) (118/65 - 138/77)  BP(mean): --  RR: 18 (20 Dec 2020 11:49) (17 - 18)  SpO2: 97% (20 Dec 2020 11:49) (96% - 97%)    PHYSICAL EXAM:  GENERAL: NAD, well-developed  HEAD:  Atraumatic, Normocephalic  EYES: EOMI, PERRLA, conjunctiva and sclera clear  NECK: Supple, No JVD  CHEST/LUNG: Clear to auscultation bilaterally; No wheeze  HEART: Regular rate and rhythm; No murmurs, rubs, or gallops  ABDOMEN: Soft, Nontender, Nondistended; Bowel sounds present  EXTREMITIES:  L foot with gangrenous toes  PSYCH: AAOx3  NEUROLOGY: non-focal  SKIN: No rashes or lesions    LABS:                        11.1   7.27  )-----------( 229      ( 20 Dec 2020 07:09 )             33.9     12-20    134<L>  |  92<L>  |  70<H>  ----------------------------<  146<H>  4.9   |  24  |  9.89<H>    Ca    9.7      20 Dec 2020 07:09    TPro  6.7  /  Alb  3.6  /  TBili  0.3  /  DBili  x   /  AST  13  /  ALT  19  /  AlkPhos  95  12-20                RADIOLOGY & ADDITIONAL TESTS:    Imaging Personally Reviewed:    Consultant(s) Notes Reviewed:      Care Discussed with Consultants/Other Providers:  
[facility] DIVISION OF KIDNEY DISEASES AND HYPERTENSION -- FOLLOW UP NOTE  --------------------------------------------------------------------------------    24 hour events/subjective: Patient was seen and examined at bedside. Reported feeling well. Denies CP, SOB, fever, chills, nausea, vomiting, diarrhea, LE edema.          PAST HISTORY  --------------------------------------------------------------------------------  No significant changes to PMH, PSH, FHx, SHx, unless otherwise noted    ALLERGIES & MEDICATIONS  --------------------------------------------------------------------------------  Allergies    aspirin (Rash; Urticaria; Hives)  iodine (Rash; Urticaria)    Intolerances      Standing Inpatient Medications  apixaban 2.5 milliGRAM(s) Oral <User Schedule>  calcium acetate 1334 milliGRAM(s) Oral three times a day with meals  chlorhexidine 2% Cloths 1 Application(s) Topical daily  cloNIDine 0.2 milliGRAM(s) Oral three times a day  dextrose 40% Gel 15 Gram(s) Oral once  dextrose 5%. 1000 milliLiter(s) IV Continuous <Continuous>  dextrose 5%. 1000 milliLiter(s) IV Continuous <Continuous>  dextrose 50% Injectable 25 Gram(s) IV Push once  dextrose 50% Injectable 12.5 Gram(s) IV Push once  dextrose 50% Injectable 25 Gram(s) IV Push once  glucagon  Injectable 1 milliGRAM(s) IntraMuscular once  hydrALAZINE 25 milliGRAM(s) Oral every 8 hours  insulin glargine Injectable (LANTUS) 15 Unit(s) SubCutaneous at bedtime  insulin lispro (ADMELOG) corrective regimen sliding scale   SubCutaneous three times a day before meals  insulin lispro (ADMELOG) corrective regimen sliding scale   SubCutaneous at bedtime  polyethylene glycol 3350 17 Gram(s) Oral daily  senna 1 Tablet(s) Oral daily    PRN Inpatient Medications  acetaminophen   Tablet .. 650 milliGRAM(s) Oral every 6 hours PRN  hydrOXYzine hydrochloride 25 milliGRAM(s) Oral four times a day PRN  oxycodone    5 mG/acetaminophen 325 mG 1 Tablet(s) Oral every 4 hours PRN      REVIEW OF SYSTEMS  --------------------------------------------------------------------------------  Gen: No fevers/chills  Skin: No rashes  Respiratory: No dyspnea, cough  CV: No chest pain  GI: No abdominal pain, diarrhea  MSK: No  edema        All other systems were reviewed and are negative, except as noted.    VITALS/PHYSICAL EXAM  --------------------------------------------------------------------------------  T(C): 36.4 (12-18-20 @ 12:27), Max: 36.8 (12-17-20 @ 19:05)  HR: 72 (12-18-20 @ 12:27) (68 - 78)  BP: 152/79 (12-18-20 @ 12:27) (144/78 - 152/79)  RR: 18 (12-18-20 @ 12:27) (18 - 18)  SpO2: 99% (12-18-20 @ 12:27) (97% - 99%)  Wt(kg): --  Height (cm): 180.3 (12-16-20 @ 21:48)  Weight (kg): 80 (12-16-20 @ 21:48)  BMI (kg/m2): 24.6 (12-16-20 @ 21:48)  BSA (m2): 2 (12-16-20 @ 21:48)      12-17-20 @ 07:01  -  12-18-20 @ 07:00  --------------------------------------------------------  IN: 840 mL / OUT: 0 mL / NET: 840 mL    12-18-20 @ 07:01  -  12-18-20 @ 12:34  --------------------------------------------------------  IN: 240 mL / OUT: 0 mL / NET: 240 mL        Physical Exam:              Gen: NAD, well-appearing on room air  	HEENT: moist mucous membrane  	Pulm: crackles R base, clear Left lung  	CV: RRR, S1S2; no rub/murmur  	GI: +BS, soft, nontender/nondistended  	MSK: Warm, no edema              Neuro: AAOx3  	Psych: Normal affect and mood  	Skin: Warm  	Vascular access: LUE AVF +thrills/bruits      LABS/STUDIES  --------------------------------------------------------------------------------              9.8    7.14  >-----------<  221      [12-18-20 @ 06:59]              30.1     134  |  95  |  65  ----------------------------<  179      [12-18-20 @ 06:59]  4.7   |  23  |  9.42        Ca     9.2     [12-18-20 @ 06:59]    TPro  6.6  /  Alb  3.2  /  TBili  0.3  /  DBili  x   /  AST  12  /  ALT  12  /  AlkPhos  87  [12-18-20 @ 06:59]          Creatinine Trend:  SCr 9.42 [12-18 @ 06:59]  SCr 6.99 [12-17 @ 06:54]  SCr 5.40 [12-16 @ 15:58]        Iron 32, TIBC 276, %sat 11      [06-09-20 @ 09:19]  Ferritin 53      [06-09-20 @ 09:19]    HIV Nonreact      [12-16-20 @ 19:11]    
Patient is a 60y old  Male who presents with a chief complaint of dry gangrene (19 Dec 2020 14:57)       INTERVAL HPI/OVERNIGHT EVENTS:  Patient seen and evaluated at bedside.  Pt is resting comfortable in NAD. Denies N/V/F/C.    Allergies    aspirin (Rash; Urticaria; Hives)  iodine (Rash; Urticaria)  shellfish (Swelling)    Intolerances        Vital Signs Last 24 Hrs  T(C): 36.5 (20 Dec 2020 04:23), Max: 36.7 (19 Dec 2020 21:36)  T(F): 97.7 (20 Dec 2020 04:23), Max: 98.1 (19 Dec 2020 21:36)  HR: 64 (20 Dec 2020 04:23) (64 - 70)  BP: 126/71 (20 Dec 2020 04:23) (126/71 - 138/77)  BP(mean): --  RR: 18 (20 Dec 2020 04:23) (17 - 18)  SpO2: 97% (20 Dec 2020 04:23) (96% - 97%)    LABS:                        11.1   7.27  )-----------( 229      ( 20 Dec 2020 07:09 )             33.9     12-20    134<L>  |  92<L>  |  70<H>  ----------------------------<  146<H>  4.9   |  24  |  9.89<H>    Ca    9.7      20 Dec 2020 07:09    TPro  6.7  /  Alb  3.6  /  TBili  0.3  /  DBili  x   /  AST  13  /  ALT  19  /  AlkPhos  95  12-20        CAPILLARY BLOOD GLUCOSE      POCT Blood Glucose.: 139 mg/dL (20 Dec 2020 08:34)  POCT Blood Glucose.: 201 mg/dL (19 Dec 2020 21:47)  POCT Blood Glucose.: 112 mg/dL (19 Dec 2020 17:41)  POCT Blood Glucose.: 185 mg/dL (19 Dec 2020 12:42)      Lower Extremity Physical Exam:  Vasular: DP/PT non palpable nor dopplerable, B/L, CFT absent L, Temperature gradient WNL B/L  Neuro: Epicritic sensation diminished to the level of midfoot B/L.  Musculoskeletal/Ortho: unremarkable  Skin:  Left forefoot dry gangrene of hallux to 5th digits demarcating past MPJ level and midfoot; no fluctuance, no crepitus, no signs of wet conversion nor acute infection    RADIOLOGY & ADDITIONAL TESTS:  
Patient is a 60y old  Male who presents with a chief complaint of dry gangrene (19 Dec 2020 14:57)      SUBJECTIVE / OVERNIGHT EVENTS: No new complaints.   Review of Systems  chest pain no  palpitations no  sob no  nausea no  headache no    MEDICATIONS  (STANDING):  calcium acetate 1334 milliGRAM(s) Oral three times a day with meals  chlorhexidine 2% Cloths 1 Application(s) Topical daily  cloNIDine 0.2 milliGRAM(s) Oral three times a day  dextrose 40% Gel 15 Gram(s) Oral once  dextrose 5%. 1000 milliLiter(s) (50 mL/Hr) IV Continuous <Continuous>  dextrose 5%. 1000 milliLiter(s) (100 mL/Hr) IV Continuous <Continuous>  dextrose 50% Injectable 25 Gram(s) IV Push once  dextrose 50% Injectable 12.5 Gram(s) IV Push once  dextrose 50% Injectable 25 Gram(s) IV Push once  glucagon  Injectable 1 milliGRAM(s) IntraMuscular once  heparin   Injectable 5000 Unit(s) SubCutaneous every 12 hours  hydrALAZINE 25 milliGRAM(s) Oral every 8 hours  insulin glargine Injectable (LANTUS) 15 Unit(s) SubCutaneous at bedtime  insulin lispro (ADMELOG) corrective regimen sliding scale   SubCutaneous three times a day before meals  insulin lispro (ADMELOG) corrective regimen sliding scale   SubCutaneous at bedtime  polyethylene glycol 3350 17 Gram(s) Oral daily  senna 1 Tablet(s) Oral daily    MEDICATIONS  (PRN):  acetaminophen   Tablet .. 650 milliGRAM(s) Oral every 6 hours PRN Temp greater or equal to 38.5C (101.3F), Mild Pain (1 - 3)  hydrOXYzine hydrochloride 25 milliGRAM(s) Oral four times a day PRN Itching  oxycodone    5 mG/acetaminophen 325 mG 1 Tablet(s) Oral every 4 hours PRN Moderate Pain (4 - 6)      Vital Signs Last 24 Hrs  T(C): 36.6 (19 Dec 2020 05:21), Max: 36.9 (18 Dec 2020 21:17)  T(F): 97.9 (19 Dec 2020 05:21), Max: 98.4 (18 Dec 2020 21:17)  HR: 69 (19 Dec 2020 12:54) (69 - 98)  BP: 138/75 (19 Dec 2020 12:54) (129/77 - 149/78)  BP(mean): --  RR: 18 (19 Dec 2020 12:54) (17 - 18)  SpO2: 97% (19 Dec 2020 12:54) (97% - 99%)    PHYSICAL EXAM:  GENERAL: NAD, well-developed  HEAD:  Atraumatic, Normocephalic  EYES: EOMI, PERRLA, conjunctiva and sclera clear  NECK: Supple, No JVD  CHEST/LUNG: Clear to auscultation bilaterally; No wheeze  HEART: Regular rate and rhythm; No murmurs, rubs, or gallops  ABDOMEN: Soft, Nontender, Nondistended; Bowel sounds present  EXTREMITIES:  L foot with dry gangrene and clean dressing   PSYCH: AAOx3  NEUROLOGY: non-focal  SKIN: No rashes or lesions    LABS:                        9.8    7.14  )-----------( 221      ( 18 Dec 2020 06:59 )             30.1     12-18    134<L>  |  95<L>  |  65<H>  ----------------------------<  179<H>  4.7   |  23  |  9.42<H>    Ca    9.2      18 Dec 2020 06:59    TPro  6.6  /  Alb  3.2<L>  /  TBili  0.3  /  DBili  x   /  AST  12  /  ALT  12  /  AlkPhos  87  12-18              Culture - Blood (collected 16 Dec 2020 21:01)  Source: .Blood Blood  Preliminary Report (17 Dec 2020 22:01):    No growth to date.    Culture - Blood (collected 16 Dec 2020 21:00)  Source: .Blood Blood  Preliminary Report (17 Dec 2020 21:01):    No growth to date.        RADIOLOGY & ADDITIONAL TESTS:    Imaging Personally Reviewed:    Consultant(s) Notes Reviewed:      Care Discussed with Consultants/Other Providers:  
Podiatry pager #: [phone]/ [phone]    Patient is a 60y old  Male who presents with a chief complaint of L foot gangrene (17 Dec 2020 09:48) Podiatry seen for f/u gangrene       INTERVAL HPI/OVERNIGHT EVENTS:  Patient seen and evaluated at bedside.  Pt is resting comfortable in NAD. Denies N/V/F/C.  Pain rated at X/10    Allergies    aspirin (Rash; Urticaria; Hives)  iodine (Rash; Urticaria)    Intolerances        Vital Signs Last 24 Hrs  T(C): 36.8 (17 Dec 2020 19:05), Max: 36.8 (16 Dec 2020 21:48)  T(F): 98.3 (17 Dec 2020 19:05), Max: 98.3 (17 Dec 2020 19:05)  HR: 78 (17 Dec 2020 19:05) (74 - 88)  BP: 147/77 (17 Dec 2020 19:05) (117/74 - 168/86)  BP(mean): --  RR: 18 (17 Dec 2020 19:05) (18 - 18)  SpO2: 98% (17 Dec 2020 19:05) (97% - 98%)    acetaminophen   Tablet .. 650 milliGRAM(s) Oral every 6 hours PRN  apixaban 2.5 milliGRAM(s) Oral <User Schedule>  calcium acetate 1334 milliGRAM(s) Oral three times a day with meals  chlorhexidine 2% Cloths 1 Application(s) Topical daily  cloNIDine 0.2 milliGRAM(s) Oral three times a day  dextrose 40% Gel 15 Gram(s) Oral once  dextrose 5%. 1000 milliLiter(s) IV Continuous <Continuous>  dextrose 5%. 1000 milliLiter(s) IV Continuous <Continuous>  dextrose 50% Injectable 25 Gram(s) IV Push once  dextrose 50% Injectable 12.5 Gram(s) IV Push once  dextrose 50% Injectable 25 Gram(s) IV Push once  glucagon  Injectable 1 milliGRAM(s) IntraMuscular once  hydrALAZINE 25 milliGRAM(s) Oral every 8 hours  hydrOXYzine hydrochloride 25 milliGRAM(s) Oral four times a day PRN  insulin glargine Injectable (LANTUS) 15 Unit(s) SubCutaneous at bedtime  insulin lispro (ADMELOG) corrective regimen sliding scale   SubCutaneous three times a day before meals  insulin lispro (ADMELOG) corrective regimen sliding scale   SubCutaneous at bedtime  oxycodone    5 mG/acetaminophen 325 mG 1 Tablet(s) Oral every 4 hours PRN  polyethylene glycol 3350 17 Gram(s) Oral daily  senna 1 Tablet(s) Oral daily      LABS:                        10.6   7.30  )-----------( 226      ( 17 Dec 2020 06:54 )             32.9     12-17    134<L>  |  95<L>  |  35<H>  ----------------------------<  134<H>  4.5   |  24  |  6.99<H>    Ca    9.1      17 Dec 2020 06:54    TPro  7.7  /  Alb  3.9  /  TBili  0.3  /  DBili  x   /  AST  14  /  ALT  16  /  AlkPhos  88  12-16        CAPILLARY BLOOD GLUCOSE      POCT Blood Glucose.: 231 mg/dL (17 Dec 2020 17:11)  POCT Blood Glucose.: 172 mg/dL (17 Dec 2020 12:43)  POCT Blood Glucose.: 112 mg/dL (17 Dec 2020 08:34)  POCT Blood Glucose.: 146 mg/dL (16 Dec 2020 21:40)      Lower Extremity Physical Exam:  Vasular: DP/PT non palpable nor dopplerable, B/L, CFT absent L, Temperature gradient WNL B/L  Neuro: Epicritic sensation diminished to the level of midfoot B/L.  Musculoskeletal/Ortho: unremarkable  Skin:  Left forefoot dry gangrene of hallux to 5th digits demarcating past MPJ level and midfoot; no fluctuance, no crepitus, no signs of wet conversion nor acute infection    RADIOLOGY & ADDITIONAL TESTS:  < from: Xray Foot AP + Lateral, Left (12.16.20 @ 16:16) >    EXAM:  FOOT 2VIEWS LT                            PROCEDURE DATE:  12/16/2020            INTERPRETATION:  EXAMINATION: 2 views of the left foot    CLINICAL INFORMATION: Left foot pain    IMPRESSION:    No acute fracture or dislocation. Preserved joint spaces. Vascular calcifications.                JAYSON ARCHIBALD MD; Attending Radiologist  This document has been electronically signed. Dec 16 2020  4:33PM    < end of copied text >  
Podiatry pager #: [phone] ([place])/ [phone] ([facility])    Patient is a 60y old  Male who presents with a chief complaint of L foot gangrene (17 Dec 2020 09:48)       INTERVAL HPI/OVERNIGHT EVENTS:  Patient seen and evaluated at bedside.  Pt is resting comfortable in NAD. Denies N/V/F/C.     Allergies    aspirin (Rash; Urticaria; Hives)  iodine (Rash; Urticaria)    Intolerances        Vital Signs Last 24 Hrs  T(C): 36.6 (18 Dec 2020 04:03), Max: 36.8 (17 Dec 2020 19:05)  T(F): 97.8 (18 Dec 2020 04:03), Max: 98.3 (17 Dec 2020 19:05)  HR: 68 (18 Dec 2020 04:03) (68 - 80)  BP: 144/78 (18 Dec 2020 04:03) (117/74 - 147/77)  BP(mean): --  RR: 18 (18 Dec 2020 04:03) (18 - 18)  SpO2: 97% (18 Dec 2020 04:03) (97% - 98%)    LABS:                        9.8    7.14  )-----------( 221      ( 18 Dec 2020 06:59 )             30.1     12-17    134<L>  |  95<L>  |  35<H>  ----------------------------<  134<H>  4.5   |  24  |  6.99<H>    Ca    9.1      17 Dec 2020 06:54    TPro  7.7  /  Alb  3.9  /  TBili  0.3  /  DBili  x   /  AST  14  /  ALT  16  /  AlkPhos  88  12-16        CAPILLARY BLOOD GLUCOSE      POCT Blood Glucose.: 171 mg/dL (17 Dec 2020 21:40)  POCT Blood Glucose.: 231 mg/dL (17 Dec 2020 17:11)  POCT Blood Glucose.: 172 mg/dL (17 Dec 2020 12:43)  POCT Blood Glucose.: 112 mg/dL (17 Dec 2020 08:34)    Lower Extremity Physical Exam:  Vasular: DP/PT non palpable nor dopplerable, B/L, CFT absent L, Temperature gradient WNL B/L  Neuro: Epicritic sensation diminished to the level of midfoot B/L.  Musculoskeletal/Ortho: unremarkable  Skin:  Left forefoot dry gangrene of hallux to 5th digits demarcating past MPJ level and midfoot; no fluctuance, no crepitus, no signs of wet conversion nor acute infection  
CC: F/U for gangrene    Saw/spoke to patient.  No fevers, no chills. No new complaints.    Allergies  aspirin (Rash; Urticaria; Hives)  iodine (Rash; Urticaria)    ANTIMICROBIALS:  Off    PE:    Vital Signs Last 24 Hrs  T(C): 36.4 (18 Dec 2020 12:27), Max: 36.8 (17 Dec 2020 19:05)  T(F): 97.5 (18 Dec 2020 12:27), Max: 98.3 (17 Dec 2020 19:05)  HR: 72 (18 Dec 2020 12:27) (68 - 78)  BP: 152/79 (18 Dec 2020 12:27) (144/78 - 152/79)  RR: 18 (18 Dec 2020 12:27) (18 - 18)  SpO2: 99% (18 Dec 2020 12:27) (97% - 99%)    Gen: AOx3, NAD, non-toxic, pleasant  CV: S1+S2 normal, nontachycardic  Resp: Clear bilat, no resp distress, no crackles/wheezes  Abd: Soft, nontender, +BS  Ext: L foot gangrene    LABS:                        9.8    7.14  )-----------( 221      ( 18 Dec 2020 06:59 )             30.1     12-18    134<L>  |  95<L>  |  65<H>  ----------------------------<  179<H>  4.7   |  23  |  9.42<H>    Ca    9.2      18 Dec 2020 06:59    TPro  6.6  /  Alb  3.2<L>  /  TBili  0.3  /  DBili  x   /  AST  12  /  ALT  12  /  AlkPhos  87  12-18    MICROBIOLOGY:    .Blood Blood  12-16-20   No growth to date.    .Blood Blood  12-16-20   No growth to date.     RADIOLOGY:    12/17 USG    IMPRESSION: Doppler evidence of mild arterial flow limitation in the left lower extremity likely localizing to the infrapopliteal circulation.  
[facility] DIVISION OF KIDNEY DISEASES AND HYPERTENSION -- FOLLOW UP NOTE  --------------------------------------------------------------------------------    24 hour events/subjective: Patient was seen and examined at bedside. Reported feeling well. Denies CP, SOB, fever, chills, nausea, vomiting, diarrhea, LE edema.          PAST HISTORY  --------------------------------------------------------------------------------  No significant changes to PMH, PSH, FHx, SHx, unless otherwise noted    ALLERGIES & MEDICATIONS  --------------------------------------------------------------------------------  Allergies    aspirin (Rash; Urticaria; Hives)  iodine (Rash; Urticaria)  shellfish (Swelling)    Intolerances      Standing Inpatient Medications  calcium acetate 1334 milliGRAM(s) Oral three times a day with meals  chlorhexidine 2% Cloths 1 Application(s) Topical daily  cloNIDine 0.2 milliGRAM(s) Oral three times a day  dextrose 40% Gel 15 Gram(s) Oral once  dextrose 5%. 1000 milliLiter(s) IV Continuous <Continuous>  dextrose 5%. 1000 milliLiter(s) IV Continuous <Continuous>  dextrose 50% Injectable 25 Gram(s) IV Push once  dextrose 50% Injectable 12.5 Gram(s) IV Push once  dextrose 50% Injectable 25 Gram(s) IV Push once  glucagon  Injectable 1 milliGRAM(s) IntraMuscular once  heparin   Injectable 5000 Unit(s) SubCutaneous every 12 hours  hydrALAZINE 25 milliGRAM(s) Oral every 8 hours  insulin glargine Injectable (LANTUS) 15 Unit(s) SubCutaneous at bedtime  insulin lispro (ADMELOG) corrective regimen sliding scale   SubCutaneous three times a day before meals  insulin lispro (ADMELOG) corrective regimen sliding scale   SubCutaneous at bedtime  polyethylene glycol 3350 17 Gram(s) Oral daily  senna 1 Tablet(s) Oral daily    PRN Inpatient Medications  acetaminophen   Tablet .. 650 milliGRAM(s) Oral every 6 hours PRN  hydrOXYzine hydrochloride 25 milliGRAM(s) Oral four times a day PRN  oxycodone    5 mG/acetaminophen 325 mG 1 Tablet(s) Oral every 4 hours PRN      REVIEW OF SYSTEMS  --------------------------------------------------------------------------------  Gen: No fevers/chills  Respiratory: No dyspnea, cough  CV: No chest pain  GI: No abdominal pain, diarrhea  MSK: No  edema  Heme: No easy bruising or bleeding      All other systems were reviewed and are negative, except as noted.    VITALS/PHYSICAL EXAM  --------------------------------------------------------------------------------  T(C): 36.4 (12-23-20 @ 08:45), Max: 36.8 (12-22-20 @ 19:19)  HR: 75 (12-23-20 @ 08:45) (72 - 79)  BP: 156/75 (12-23-20 @ 08:45) (112/62 - 156/75)  RR: 18 (12-23-20 @ 08:45) (18 - 18)  SpO2: 100% (12-23-20 @ 08:45) (95% - 100%)  Wt(kg): --  Height (cm): 180.3 (12-21-20 @ 14:57)  Weight (kg): 78.5 (12-21-20 @ 14:57)  BMI (kg/m2): 24.1 (12-21-20 @ 14:57)  BSA (m2): 1.98 (12-21-20 @ 14:57)      12-22-20 @ 07:01  -  12-23-20 @ 07:00  --------------------------------------------------------  IN: 960 mL / OUT: 0 mL / NET: 960 mL        Physical Exam:  	Gen: NAD, well-appearing on room air  	HEENT: moist mucous membrane  	Pulm: b/l CTA, no crackles   	CV: RRR, S1S2; no rub/murmur  	GI: +BS, soft, nontender/nondistended  	MSK: Warm, no edema              Neuro: AAOx3  	Psych: Normal affect and mood  	Skin: Warm  	Vascular access: LUE AVF +thrills/bruits    LABS/STUDIES  --------------------------------------------------------------------------------              11.1   9.36  >-----------<  243      [12-22-20 @ 07:05]              33.9     138  |  93  |  63  ----------------------------<  140      [12-22-20 @ 07:03]  4.8   |  27  |  8.76        Ca     9.4     [12-22-20 @ 07:03]      PT/INR: PT 11.9 , INR 0.99       [12-21-20 @ 13:59]  PTT: 31.4       [12-21-20 @ 13:59]      Creatinine Trend:  SCr 8.76 [12-22 @ 07:03]  SCr 6.39 [12-21 @ 13:59]  SCr 9.89 [12-20 @ 07:09]  SCr 9.42 [12-18 @ 06:59]  SCr 6.99 [12-17 @ 06:54]        Iron 32, TIBC 276, %sat 11      [06-09-20 @ 09:19]  Ferritin 53      [06-09-20 @ 09:19]    HIV Nonreact      [12-16-20 @ 19:11]    
Patient is a 60y old  Male who presents with a chief complaint of dry gangrene (19 Dec 2020 14:57)       INTERVAL HPI/OVERNIGHT EVENTS:  Patient seen and evaluated at bedside.  Pt is resting comfortable in NAD. Denies N/V/F/C.  Pain rated at X/10    Allergies    aspirin (Rash; Urticaria; Hives)  iodine (Rash; Urticaria)  shellfish (Swelling)    Intolerances        Vital Signs Last 24 Hrs  T(C): 36.7 (22 Dec 2020 06:19), Max: 36.7 (22 Dec 2020 06:19)  T(F): 98.1 (22 Dec 2020 06:19), Max: 98.1 (22 Dec 2020 06:19)  HR: 79 (22 Dec 2020 06:19) (59 - 80)  BP: 137/75 (22 Dec 2020 06:19) (72/46 - 147/86)  BP(mean): 85 (21 Dec 2020 17:00) (54 - 97)  RR: 18 (22 Dec 2020 06:19) (16 - 18)  SpO2: 99% (22 Dec 2020 06:19) (92% - 100%)    LABS:                        11.1   9.36  )-----------( 243      ( 22 Dec 2020 07:05 )             33.9     12-22    138  |  93<L>  |  63<H>  ----------------------------<  140<H>  4.8   |  27  |  8.76<H>    Ca    9.4      22 Dec 2020 07:03      PT/INR - ( 21 Dec 2020 13:59 )   PT: 11.9 sec;   INR: 0.99 ratio         PTT - ( 21 Dec 2020 13:59 )  PTT:31.4 sec    CAPILLARY BLOOD GLUCOSE      POCT Blood Glucose.: 171 mg/dL (22 Dec 2020 08:21)  POCT Blood Glucose.: 195 mg/dL (21 Dec 2020 21:45)  POCT Blood Glucose.: 111 mg/dL (21 Dec 2020 16:56)  POCT Blood Glucose.: 82 mg/dL (21 Dec 2020 13:45)  POCT Blood Glucose.: 91 mg/dL (21 Dec 2020 12:33)      Lower Extremity Physical Exam:    Vasular: DP/PT non palpable nor dopplerable, B/L, CFT absent L, Temperature gradient WNL B/L  Neuro: Epicritic sensation diminished to the level of midfoot B/L.  Musculoskeletal/Ortho: unremarkable  Skin:  Left forefoot dry gangrene of hallux to 5th digits demarcating past MPJ level and midfoot; no fluctuance, no crepitus, no signs of wet conversion nor acute infection    s/p LF TMA DOS 12/21: sutures well co-opted, no amrik-incisional erythema, minimal sanguinous drainage, no signs of hematoma. Incision is stable, flaps are warm and viable.     RADIOLOGY & ADDITIONAL TESTS:  
Patient is a 60y old  Male who presents with a chief complaint of dry gangrene (19 Dec 2020 14:57)      INTERVAL HPI/OVERNIGHT EVENTS:   Pt is scheduled for L foot TMA w/ JOSE GUADALUPE with Dr. Mendez at 2pm. Patient is aware of procedure and is NPO since midnight.    MEDICATIONS  (STANDING):  calcium acetate 1334 milliGRAM(s) Oral three times a day with meals  chlorhexidine 2% Cloths 1 Application(s) Topical daily  cloNIDine 0.2 milliGRAM(s) Oral three times a day  dextrose 40% Gel 15 Gram(s) Oral once  dextrose 5%. 1000 milliLiter(s) (50 mL/Hr) IV Continuous <Continuous>  dextrose 5%. 1000 milliLiter(s) (100 mL/Hr) IV Continuous <Continuous>  dextrose 50% Injectable 25 Gram(s) IV Push once  dextrose 50% Injectable 12.5 Gram(s) IV Push once  dextrose 50% Injectable 25 Gram(s) IV Push once  glucagon  Injectable 1 milliGRAM(s) IntraMuscular once  heparin   Injectable 5000 Unit(s) SubCutaneous every 12 hours  hydrALAZINE 25 milliGRAM(s) Oral every 8 hours  insulin glargine Injectable (LANTUS) 15 Unit(s) SubCutaneous at bedtime  insulin lispro (ADMELOG) corrective regimen sliding scale   SubCutaneous three times a day before meals  insulin lispro (ADMELOG) corrective regimen sliding scale   SubCutaneous at bedtime  polyethylene glycol 3350 17 Gram(s) Oral daily  senna 1 Tablet(s) Oral daily    MEDICATIONS  (PRN):  acetaminophen   Tablet .. 650 milliGRAM(s) Oral every 6 hours PRN Temp greater or equal to 38.5C (101.3F), Mild Pain (1 - 3)  hydrOXYzine hydrochloride 25 milliGRAM(s) Oral four times a day PRN Itching  oxycodone    5 mG/acetaminophen 325 mG 1 Tablet(s) Oral every 4 hours PRN Moderate Pain (4 - 6)      Allergies    aspirin (Rash; Urticaria; Hives)  iodine (Rash; Urticaria)  shellfish (Swelling)    Intolerances        Vital Signs Last 24 Hrs  T(C): 36.8 (21 Dec 2020 05:54), Max: 36.8 (20 Dec 2020 11:49)  T(F): 98.2 (21 Dec 2020 05:54), Max: 98.3 (20 Dec 2020 21:00)  HR: 65 (21 Dec 2020 05:54) (65 - 67)  BP: 133/74 (21 Dec 2020 05:54) (118/65 - 133/74)  BP(mean): --  RR: 18 (21 Dec 2020 05:54) (18 - 18)  SpO2: 97% (21 Dec 2020 05:54) (97% - 98%)    LABS:                        11.1   7.27  )-----------( 229      ( 20 Dec 2020 07:09 )             33.9     12-20    134<L>  |  92<L>  |  70<H>  ----------------------------<  146<H>  4.9   |  24  |  9.89<H>    Ca    9.7      20 Dec 2020 07:09    TPro  6.7  /  Alb  3.6  /  TBili  0.3  /  DBili  x   /  AST  13  /  ALT  19  /  AlkPhos  95  12-20        CAPILLARY BLOOD GLUCOSE      POCT Blood Glucose.: 131 mg/dL (21 Dec 2020 06:02)  POCT Blood Glucose.: 236 mg/dL (20 Dec 2020 21:59)  POCT Blood Glucose.: 188 mg/dL (20 Dec 2020 17:30)  POCT Blood Glucose.: 198 mg/dL (20 Dec 2020 12:17)  POCT Blood Glucose.: 139 mg/dL (20 Dec 2020 08:34)      RADIOLOGY & ADDITIONAL TESTS:    
Patient is a 60y old  Male who presents with a chief complaint of dry gangrene (19 Dec 2020 14:57)      SUBJECTIVE / OVERNIGHT EVENTS: Comfortable without new complaints.   Review of Systems  chest pain no  palpitations no  sob no  nausea no  headache no    MEDICATIONS  (STANDING):  calcium acetate 1334 milliGRAM(s) Oral three times a day with meals  chlorhexidine 2% Cloths 1 Application(s) Topical daily  cloNIDine 0.2 milliGRAM(s) Oral three times a day  dextrose 40% Gel 15 Gram(s) Oral once  dextrose 5%. 1000 milliLiter(s) (50 mL/Hr) IV Continuous <Continuous>  dextrose 5%. 1000 milliLiter(s) (100 mL/Hr) IV Continuous <Continuous>  dextrose 50% Injectable 25 Gram(s) IV Push once  dextrose 50% Injectable 12.5 Gram(s) IV Push once  dextrose 50% Injectable 25 Gram(s) IV Push once  glucagon  Injectable 1 milliGRAM(s) IntraMuscular once  heparin   Injectable 5000 Unit(s) SubCutaneous every 12 hours  hydrALAZINE 25 milliGRAM(s) Oral every 8 hours  insulin glargine Injectable (LANTUS) 15 Unit(s) SubCutaneous at bedtime  insulin lispro (ADMELOG) corrective regimen sliding scale   SubCutaneous three times a day before meals  insulin lispro (ADMELOG) corrective regimen sliding scale   SubCutaneous at bedtime  polyethylene glycol 3350 17 Gram(s) Oral daily  senna 1 Tablet(s) Oral daily    MEDICATIONS  (PRN):  acetaminophen   Tablet .. 650 milliGRAM(s) Oral every 6 hours PRN Temp greater or equal to 38.5C (101.3F), Mild Pain (1 - 3)  hydrOXYzine hydrochloride 25 milliGRAM(s) Oral four times a day PRN Itching      Vital Signs Last 24 Hrs  T(C): 36.7 (24 Dec 2020 11:03), Max: 36.9 (23 Dec 2020 21:09)  T(F): 98 (24 Dec 2020 11:03), Max: 98.4 (23 Dec 2020 21:09)  HR: 79 (24 Dec 2020 11:03) (77 - 83)  BP: 148/87 (24 Dec 2020 11:03) (100/60 - 148/87)  BP(mean): --  RR: 18 (24 Dec 2020 11:03) (17 - 18)  SpO2: 95% (24 Dec 2020 11:03) (95% - 98%)    PHYSICAL EXAM:  GENERAL: NAD, well-developed  HEAD:  Atraumatic, Normocephalic  EYES: EOMI, PERRLA, conjunctiva and sclera clear  NECK: Supple, No JVD  CHEST/LUNG: Clear to auscultation bilaterally; No wheeze  HEART: Regular rate and rhythm; No murmurs, rubs, or gallops  ABDOMEN: Soft, Nontender, Nondistended; Bowel sounds present  EXTREMITIES:  L foot with clean dressing.   PSYCH: AAOx3  NEUROLOGY: non-focal  SKIN: No rashes or lesions    LABS:                        9.4    7.38  )-----------( 209      ( 24 Dec 2020 06:42 )             30.9     12-24    134<L>  |  92<L>  |  61<H>  ----------------------------<  217<H>  5.0   |  28  |  8.92<H>    Ca    9.2      24 Dec 2020 06:42                  RADIOLOGY & ADDITIONAL TESTS:    Imaging Personally Reviewed:    Consultant(s) Notes Reviewed:      Care Discussed with Consultants/Other Providers:  
Patient is a 60y old  Male who presents with a chief complaint of L foot gangrene (17 Dec 2020 09:48)      SUBJECTIVE / OVERNIGHT EVENTS: No new complaints.   Review of Systems  chest pain no  palpitations no  sob no  nausea no  headache no    MEDICATIONS  (STANDING):  apixaban 2.5 milliGRAM(s) Oral <User Schedule>  calcium acetate 1334 milliGRAM(s) Oral three times a day with meals  chlorhexidine 2% Cloths 1 Application(s) Topical daily  cloNIDine 0.2 milliGRAM(s) Oral three times a day  dextrose 40% Gel 15 Gram(s) Oral once  dextrose 5%. 1000 milliLiter(s) (50 mL/Hr) IV Continuous <Continuous>  dextrose 5%. 1000 milliLiter(s) (100 mL/Hr) IV Continuous <Continuous>  dextrose 50% Injectable 25 Gram(s) IV Push once  dextrose 50% Injectable 12.5 Gram(s) IV Push once  dextrose 50% Injectable 25 Gram(s) IV Push once  glucagon  Injectable 1 milliGRAM(s) IntraMuscular once  hydrALAZINE 25 milliGRAM(s) Oral every 8 hours  insulin glargine Injectable (LANTUS) 15 Unit(s) SubCutaneous at bedtime  insulin lispro (ADMELOG) corrective regimen sliding scale   SubCutaneous three times a day before meals  insulin lispro (ADMELOG) corrective regimen sliding scale   SubCutaneous at bedtime  polyethylene glycol 3350 17 Gram(s) Oral daily  senna 1 Tablet(s) Oral daily    MEDICATIONS  (PRN):  acetaminophen   Tablet .. 650 milliGRAM(s) Oral every 6 hours PRN Temp greater or equal to 38.5C (101.3F), Mild Pain (1 - 3)  hydrOXYzine hydrochloride 25 milliGRAM(s) Oral four times a day PRN Itching  oxycodone    5 mG/acetaminophen 325 mG 1 Tablet(s) Oral every 4 hours PRN Moderate Pain (4 - 6)      Vital Signs Last 24 Hrs  T(C): 36.6 (17 Dec 2020 12:22), Max: 37.1 (16 Dec 2020 19:40)  T(F): 97.9 (17 Dec 2020 12:22), Max: 98.7 (16 Dec 2020 19:40)  HR: 80 (17 Dec 2020 12:22) (74 - 88)  BP: 134/71 (17 Dec 2020 12:22) (117/74 - 168/86)  BP(mean): 107 (16 Dec 2020 19:40) (107 - 107)  RR: 18 (17 Dec 2020 12:22) (18 - 18)  SpO2: 98% (17 Dec 2020 12:22) (97% - 99%)    PHYSICAL EXAM:  GENERAL: NAD, well-developed  HEAD:  Atraumatic, Normocephalic  EYES: EOMI, PERRLA, conjunctiva and sclera clear  NECK: Supple, No JVD  CHEST/LUNG: Clear to auscultation bilaterally; No wheeze  HEART: Regular rate and rhythm; No murmurs, rubs, or gallops  ABDOMEN: Soft, Nontender, Nondistended; Bowel sounds present  EXTREMITIES:  L leg with clean dressing  PSYCH: AAOx3  NEUROLOGY: non-focal  SKIN: No rashes or lesions    LABS:                        10.6   7.30  )-----------( 226      ( 17 Dec 2020 06:54 )             32.9     12-17    134<L>  |  95<L>  |  35<H>  ----------------------------<  134<H>  4.5   |  24  |  6.99<H>    Ca    9.1      17 Dec 2020 06:54    TPro  7.7  /  Alb  3.9  /  TBili  0.3  /  DBili  x   /  AST  14  /  ALT  16  /  AlkPhos  88  12-16                RADIOLOGY & ADDITIONAL TESTS:    Imaging Personally Reviewed:    Consultant(s) Notes Reviewed:      Care Discussed with Consultants/Other Providers:  
Patient is a 60y old  Male who presents with a chief complaint of dry gangrene (19 Dec 2020 14:57)      SUBJECTIVE / OVERNIGHT EVENTS: Comfortable without new complaints.   Review of Systems  chest pain no  palpitations no  sob no  nausea no  headache no    MEDICATIONS  (STANDING):  calcium acetate 1334 milliGRAM(s) Oral three times a day with meals  chlorhexidine 2% Cloths 1 Application(s) Topical daily  cloNIDine 0.2 milliGRAM(s) Oral three times a day  dextrose 40% Gel 15 Gram(s) Oral once  dextrose 5%. 1000 milliLiter(s) (50 mL/Hr) IV Continuous <Continuous>  dextrose 5%. 1000 milliLiter(s) (100 mL/Hr) IV Continuous <Continuous>  dextrose 50% Injectable 25 Gram(s) IV Push once  dextrose 50% Injectable 12.5 Gram(s) IV Push once  dextrose 50% Injectable 25 Gram(s) IV Push once  glucagon  Injectable 1 milliGRAM(s) IntraMuscular once  heparin   Injectable 5000 Unit(s) SubCutaneous every 12 hours  hydrALAZINE 25 milliGRAM(s) Oral every 8 hours  insulin glargine Injectable (LANTUS) 15 Unit(s) SubCutaneous at bedtime  insulin lispro (ADMELOG) corrective regimen sliding scale   SubCutaneous three times a day before meals  insulin lispro (ADMELOG) corrective regimen sliding scale   SubCutaneous at bedtime  polyethylene glycol 3350 17 Gram(s) Oral daily  senna 1 Tablet(s) Oral daily    MEDICATIONS  (PRN):  acetaminophen   Tablet .. 650 milliGRAM(s) Oral every 6 hours PRN Temp greater or equal to 38.5C (101.3F), Mild Pain (1 - 3)  hydrOXYzine hydrochloride 25 milliGRAM(s) Oral four times a day PRN Itching  oxycodone    5 mG/acetaminophen 325 mG 1 Tablet(s) Oral every 4 hours PRN Moderate Pain (4 - 6)      Vital Signs Last 24 Hrs  T(C): 36.8 (22 Dec 2020 19:19), Max: 36.8 (22 Dec 2020 19:19)  T(F): 98.2 (22 Dec 2020 19:19), Max: 98.2 (22 Dec 2020 19:19)  HR: 79 (22 Dec 2020 19:19) (72 - 80)  BP: 112/62 (22 Dec 2020 19:19) (102/68 - 153/77)  BP(mean): --  RR: 18 (22 Dec 2020 19:19) (17 - 18)  SpO2: 95% (22 Dec 2020 19:19) (95% - 99%)    PHYSICAL EXAM:  GENERAL: NAD, well-developed  HEAD:  Atraumatic, Normocephalic  EYES: EOMI, PERRLA, conjunctiva and sclera clear  NECK: Supple, No JVD  CHEST/LUNG: Clear to auscultation bilaterally; No wheeze  HEART: Regular rate and rhythm; No murmurs, rubs, or gallops  ABDOMEN: Soft, Nontender, Nondistended; Bowel sounds present  EXTREMITIES:  L foot with clean dressing   PSYCH: AAOx3  NEUROLOGY: non-focal  SKIN: No rashes or lesions    LABS:                        11.1   9.36  )-----------( 243      ( 22 Dec 2020 07:05 )             33.9     12-22    138  |  93<L>  |  63<H>  ----------------------------<  140<H>  4.8   |  27  |  8.76<H>    Ca    9.4      22 Dec 2020 07:03      PT/INR - ( 21 Dec 2020 13:59 )   PT: 11.9 sec;   INR: 0.99 ratio         PTT - ( 21 Dec 2020 13:59 )  PTT:31.4 sec            RADIOLOGY & ADDITIONAL TESTS:    Imaging Personally Reviewed:    Consultant(s) Notes Reviewed:      Care Discussed with Consultants/Other Providers:  
Subjective: Patient seen and examined. No new events except as noted.     REVIEW OF SYSTEMS:    CONSTITUTIONAL: No weakness, fevers or chills  EYES/ENT: No visual changes;  No vertigo or throat pain   NECK: No pain or stiffness  RESPIRATORY: No cough, wheezing, hemoptysis; No shortness of breath  CARDIOVASCULAR: No chest pain or palpitations  GASTROINTESTINAL: No abdominal or epigastric pain. No nausea, vomiting, or hematemesis; No diarrhea or constipation. No melena or hematochezia.  GENITOURINARY: No dysuria, frequency or hematuria  NEUROLOGICAL: No numbness or weakness  SKIN: No itching, burning, rashes, or lesions   All other review of systems is negative unless indicated above.    MEDICATIONS:  MEDICATIONS  (STANDING):  calcium acetate 1334 milliGRAM(s) Oral three times a day with meals  chlorhexidine 2% Cloths 1 Application(s) Topical daily  cloNIDine 0.2 milliGRAM(s) Oral three times a day  dextrose 40% Gel 15 Gram(s) Oral once  dextrose 5%. 1000 milliLiter(s) (50 mL/Hr) IV Continuous <Continuous>  dextrose 5%. 1000 milliLiter(s) (100 mL/Hr) IV Continuous <Continuous>  dextrose 50% Injectable 25 Gram(s) IV Push once  dextrose 50% Injectable 12.5 Gram(s) IV Push once  dextrose 50% Injectable 25 Gram(s) IV Push once  glucagon  Injectable 1 milliGRAM(s) IntraMuscular once  heparin   Injectable 5000 Unit(s) SubCutaneous every 12 hours  hydrALAZINE 25 milliGRAM(s) Oral every 8 hours  insulin glargine Injectable (LANTUS) 15 Unit(s) SubCutaneous at bedtime  insulin lispro (ADMELOG) corrective regimen sliding scale   SubCutaneous three times a day before meals  insulin lispro (ADMELOG) corrective regimen sliding scale   SubCutaneous at bedtime  polyethylene glycol 3350 17 Gram(s) Oral daily  senna 1 Tablet(s) Oral daily      PHYSICAL EXAM:  T(C): 36.5 (12-20-20 @ 04:23), Max: 36.7 (12-19-20 @ 21:36)  HR: 64 (12-20-20 @ 04:23) (64 - 70)  BP: 126/71 (12-20-20 @ 04:23) (126/71 - 138/77)  RR: 18 (12-20-20 @ 04:23) (17 - 18)  SpO2: 97% (12-20-20 @ 04:23) (96% - 97%)  Wt(kg): --  I&O's Summary    19 Dec 2020 07:01  -  20 Dec 2020 07:00  --------------------------------------------------------  IN: 240 mL / OUT: 0 mL / NET: 240 mL    20 Dec 2020 07:01  -  20 Dec 2020 11:08  --------------------------------------------------------  IN: 220 mL / OUT: 0 mL / NET: 220 mL          Appearance: Normal	  HEENT:   Normal oral mucosa, PERRL, EOMI	  Lymphatic: No lymphadenopathy , no edema  Cardiovascular: Normal S1 S2, No JVD, No murmurs , Peripheral pulses palpable 2+ bilaterally  Respiratory: Lungs clear to auscultation, normal effort 	  Gastrointestinal:  Soft, Non-tender, + BS	  Skin: No rashes, No ecchymoses, No cyanosis, warm to touch  Musculoskeletal: Normal range of motion, normal strength  Psychiatry:  Mood & affect appropriate  Ext: L foot gangrene wrapped       LABS:    CARDIAC MARKERS:                                11.1   7.27  )-----------( 229      ( 20 Dec 2020 07:09 )             33.9     12-20    134<L>  |  92<L>  |  70<H>  ----------------------------<  146<H>  4.9   |  24  |  9.89<H>    Ca    9.7      20 Dec 2020 07:09    TPro  6.7  /  Alb  3.6  /  TBili  0.3  /  DBili  x   /  AST  13  /  ALT  19  /  AlkPhos  95  12-20    proBNP:   Lipid Profile:   HgA1c:   TSH:             TELEMETRY: 	    ECG:  	  RADIOLOGY:   DIAGNOSTIC TESTING:  [ ] Echocardiogram:  [ ]  Catheterization:  [ ] Stress Test:    OTHER: 	          
Subjective: Patient seen and examined. No new events except as noted.   s/p Transmetatarsal amputation, left foot      REVIEW OF SYSTEMS:    CONSTITUTIONAL:+ weakness, fevers or chills  EYES/ENT: No visual changes;  No vertigo or throat pain   NECK: No pain or stiffness  RESPIRATORY: No cough, wheezing, hemoptysis; No shortness of breath  CARDIOVASCULAR: No chest pain or palpitations  GASTROINTESTINAL: No abdominal or epigastric pain. No nausea, vomiting, or hematemesis; No diarrhea or constipation. No melena or hematochezia.  GENITOURINARY: No dysuria, frequency or hematuria  NEUROLOGICAL: No numbness or weakness  SKIN: No itching, burning, rashes, or lesions   All other review of systems is negative unless indicated above.    MEDICATIONS:  MEDICATIONS  (STANDING):  calcium acetate 1334 milliGRAM(s) Oral three times a day with meals  chlorhexidine 2% Cloths 1 Application(s) Topical daily  cloNIDine 0.2 milliGRAM(s) Oral three times a day  dextrose 40% Gel 15 Gram(s) Oral once  dextrose 5%. 1000 milliLiter(s) (50 mL/Hr) IV Continuous <Continuous>  dextrose 5%. 1000 milliLiter(s) (100 mL/Hr) IV Continuous <Continuous>  dextrose 50% Injectable 25 Gram(s) IV Push once  dextrose 50% Injectable 12.5 Gram(s) IV Push once  dextrose 50% Injectable 25 Gram(s) IV Push once  glucagon  Injectable 1 milliGRAM(s) IntraMuscular once  heparin   Injectable 5000 Unit(s) SubCutaneous every 12 hours  hydrALAZINE 25 milliGRAM(s) Oral every 8 hours  insulin glargine Injectable (LANTUS) 15 Unit(s) SubCutaneous at bedtime  insulin lispro (ADMELOG) corrective regimen sliding scale   SubCutaneous three times a day before meals  insulin lispro (ADMELOG) corrective regimen sliding scale   SubCutaneous at bedtime  polyethylene glycol 3350 17 Gram(s) Oral daily  senna 1 Tablet(s) Oral daily      PHYSICAL EXAM:  T(C): 36.7 (12-22-20 @ 06:19), Max: 36.7 (12-22-20 @ 06:19)  HR: 79 (12-22-20 @ 06:19) (59 - 80)  BP: 137/75 (12-22-20 @ 06:19) (72/46 - 147/86)  RR: 18 (12-22-20 @ 06:19) (16 - 18)  SpO2: 99% (12-22-20 @ 06:19) (92% - 100%)  Wt(kg): --  I&O's Summary    21 Dec 2020 07:01  -  22 Dec 2020 07:00  --------------------------------------------------------  IN: 0 mL / OUT: 853 mL / NET: -853 mL    22 Dec 2020 07:01  -  22 Dec 2020 11:49  --------------------------------------------------------  IN: 240 mL / OUT: 0 mL / NET: 240 mL      Height (cm): 180.3 (12-21 @ 14:57)  Weight (kg): 78.5 (12-21 @ 14:57)  BMI (kg/m2): 24.1 (12-21 @ 14:57)  BSA (m2): 1.98 (12-21 @ 14:57)    Appearance: NAD  HEENT:   Dry  oral mucosa, PERRL, EOMI	  Lymphatic: No lymphadenopathy , no edema  Cardiovascular: Normal S1 S2, No JVD, No murmurs , Peripheral pulses palpable 2+ bilaterally  Respiratory: Lungs clear to auscultation, normal effort 	  Gastrointestinal:  Soft, Non-tender, + BS	  Skin: No rashes, No ecchymoses, No cyanosis, warm to touch  Musculoskeletal: Normal range of motion, normal strength  Psychiatry:  Mood & affect appropriate  Ext: L foot wrapped       LABS:    CARDIAC MARKERS:                                11.1   9.36  )-----------( 243      ( 22 Dec 2020 07:05 )             33.9     12-22    138  |  93<L>  |  63<H>  ----------------------------<  140<H>  4.8   |  27  |  8.76<H>    Ca    9.4      22 Dec 2020 07:03      proBNP:   Lipid Profile:   HgA1c:   TSH:             TELEMETRY: 	    ECG:  	  RADIOLOGY:   DIAGNOSTIC TESTING:  [ ] Echocardiogram:  [ ]  Catheterization:  [ ] Stress Test:    OTHER: 	          
Subjective: Patient seen and examined. No new events except as noted.     REVIEW OF SYSTEMS:    CONSTITUTIONAL: + weakness, fevers or chills  EYES/ENT: No visual changes;  No vertigo or throat pain   NECK: No pain or stiffness  RESPIRATORY: No cough, wheezing, hemoptysis; No shortness of breath  CARDIOVASCULAR: No chest pain or palpitations  GASTROINTESTINAL: No abdominal or epigastric pain. No nausea, vomiting, or hematemesis; No diarrhea or constipation. No melena or hematochezia.  GENITOURINARY: No dysuria, frequency or hematuria  NEUROLOGICAL: No numbness or weakness  SKIN: No itching, burning, rashes, or lesions   All other review of systems is negative unless indicated above.    MEDICATIONS:  MEDICATIONS  (STANDING):  calcium acetate 1334 milliGRAM(s) Oral three times a day with meals  chlorhexidine 2% Cloths 1 Application(s) Topical daily  cloNIDine 0.2 milliGRAM(s) Oral three times a day  dextrose 40% Gel 15 Gram(s) Oral once  dextrose 5%. 1000 milliLiter(s) (50 mL/Hr) IV Continuous <Continuous>  dextrose 5%. 1000 milliLiter(s) (100 mL/Hr) IV Continuous <Continuous>  dextrose 50% Injectable 25 Gram(s) IV Push once  dextrose 50% Injectable 12.5 Gram(s) IV Push once  dextrose 50% Injectable 25 Gram(s) IV Push once  glucagon  Injectable 1 milliGRAM(s) IntraMuscular once  heparin   Injectable 5000 Unit(s) SubCutaneous every 12 hours  hydrALAZINE 25 milliGRAM(s) Oral every 8 hours  insulin glargine Injectable (LANTUS) 15 Unit(s) SubCutaneous at bedtime  insulin lispro (ADMELOG) corrective regimen sliding scale   SubCutaneous three times a day before meals  insulin lispro (ADMELOG) corrective regimen sliding scale   SubCutaneous at bedtime  polyethylene glycol 3350 17 Gram(s) Oral daily  senna 1 Tablet(s) Oral daily      PHYSICAL EXAM:  T(C): 36.3 (12-21-20 @ 09:05), Max: 36.8 (12-20-20 @ 11:49)  HR: 63 (12-21-20 @ 09:05) (63 - 67)  BP: 136/75 (12-21-20 @ 09:05) (118/65 - 136/75)  RR: 18 (12-21-20 @ 09:05) (18 - 18)  SpO2: 97% (12-21-20 @ 09:05) (97% - 98%)  Wt(kg): --  I&O's Summary    20 Dec 2020 07:01  -  21 Dec 2020 07:00  --------------------------------------------------------  IN: 220 mL / OUT: 0 mL / NET: 220 mL          Appearance: Normal	  HEENT:   Normal oral mucosa, PERRL, EOMI	  Lymphatic: No lymphadenopathy , no edema  Cardiovascular: Normal S1 S2, No JVD, No murmurs , Peripheral pulses palpable 2+ bilaterally  Respiratory: Lungs clear to auscultation, normal effort 	  Gastrointestinal:  Soft, Non-tender, + BS	  Skin: No rashes, No ecchymoses, No cyanosis, warm to touch  Musculoskeletal: Normal range of motion, normal strength  Psychiatry:  Mood & affect appropriate  Ext: L foot gangrene       LABS:    CARDIAC MARKERS:                                11.1   7.27  )-----------( 229      ( 20 Dec 2020 07:09 )             33.9     12-20    134<L>  |  92<L>  |  70<H>  ----------------------------<  146<H>  4.9   |  24  |  9.89<H>    Ca    9.7      20 Dec 2020 07:09    TPro  6.7  /  Alb  3.6  /  TBili  0.3  /  DBili  x   /  AST  13  /  ALT  19  /  AlkPhos  95  12-20    proBNP:   Lipid Profile:   HgA1c:   TSH:             TELEMETRY: 	    ECG:  	  RADIOLOGY:   DIAGNOSTIC TESTING:  [ ] Echocardiogram:  [ ]  Catheterization:  [ ] Stress Test:    OTHER: 	          
Subjective: Patient seen and examined. No new events except as noted.     REVIEW OF SYSTEMS:    CONSTITUTIONAL: + weakness, fevers or chills  EYES/ENT: No visual changes;  No vertigo or throat pain   NECK: No pain or stiffness  RESPIRATORY: No cough, wheezing, hemoptysis; No shortness of breath  CARDIOVASCULAR: No chest pain or palpitations  GASTROINTESTINAL: No abdominal or epigastric pain. No nausea, vomiting, or hematemesis; No diarrhea or constipation. No melena or hematochezia.  GENITOURINARY: No dysuria, frequency or hematuria  NEUROLOGICAL: No numbness or weakness  SKIN: No itching, burning, rashes, or lesions   All other review of systems is negative unless indicated above.    MEDICATIONS:  MEDICATIONS  (STANDING):  calcium acetate 1334 milliGRAM(s) Oral three times a day with meals  chlorhexidine 2% Cloths 1 Application(s) Topical daily  cloNIDine 0.2 milliGRAM(s) Oral three times a day  dextrose 40% Gel 15 Gram(s) Oral once  dextrose 5%. 1000 milliLiter(s) (50 mL/Hr) IV Continuous <Continuous>  dextrose 5%. 1000 milliLiter(s) (100 mL/Hr) IV Continuous <Continuous>  dextrose 50% Injectable 25 Gram(s) IV Push once  dextrose 50% Injectable 12.5 Gram(s) IV Push once  dextrose 50% Injectable 25 Gram(s) IV Push once  glucagon  Injectable 1 milliGRAM(s) IntraMuscular once  heparin   Injectable 5000 Unit(s) SubCutaneous every 12 hours  hydrALAZINE 25 milliGRAM(s) Oral every 8 hours  insulin glargine Injectable (LANTUS) 15 Unit(s) SubCutaneous at bedtime  insulin lispro (ADMELOG) corrective regimen sliding scale   SubCutaneous three times a day before meals  insulin lispro (ADMELOG) corrective regimen sliding scale   SubCutaneous at bedtime  polyethylene glycol 3350 17 Gram(s) Oral daily  senna 1 Tablet(s) Oral daily      PHYSICAL EXAM:  T(C): 36.6 (12-19-20 @ 05:21), Max: 36.9 (12-18-20 @ 21:17)  HR: 69 (12-19-20 @ 12:54) (69 - 98)  BP: 138/75 (12-19-20 @ 12:54) (129/77 - 149/78)  RR: 18 (12-19-20 @ 12:54) (17 - 18)  SpO2: 97% (12-19-20 @ 12:54) (97% - 99%)  Wt(kg): --  I&O's Summary    18 Dec 2020 07:01  -  19 Dec 2020 07:00  --------------------------------------------------------  IN: 830 mL / OUT: 2000 mL / NET: -1170 mL    19 Dec 2020 07:01  -  19 Dec 2020 19:43  --------------------------------------------------------  IN: 240 mL / OUT: 0 mL / NET: 240 mL          Appearance: Normal	  HEENT:   Normal oral mucosa, PERRL, EOMI	  Lymphatic: No lymphadenopathy , no edema  Cardiovascular: Normal S1 S2, No JVD, No murmurs , Peripheral pulses palpable 2+ bilaterally  Respiratory: Lungs clear to auscultation, normal effort 	  Gastrointestinal:  Soft, Non-tender, + BS	  Skin: No rashes, No ecchymoses, No cyanosis, warm to touch  Musculoskeletal: Normal range of motion, normal strength  Psychiatry:  Mood & affect appropriate  Ext: L foot wrapped       LABS:    CARDIAC MARKERS:                                9.8    7.14  )-----------( 221      ( 18 Dec 2020 06:59 )             30.1     12-18    134<L>  |  95<L>  |  65<H>  ----------------------------<  179<H>  4.7   |  23  |  9.42<H>    Ca    9.2      18 Dec 2020 06:59    TPro  6.6  /  Alb  3.2<L>  /  TBili  0.3  /  DBili  x   /  AST  12  /  ALT  12  /  AlkPhos  87  12-18    proBNP:   Lipid Profile:   HgA1c:   TSH:             TELEMETRY: 	    ECG:  	  RADIOLOGY:   DIAGNOSTIC TESTING:  [ ] Echocardiogram:  [ ]  Catheterization:  [ ] Stress Test:    OTHER: 	          
Subjective: Patient seen and examined. No new events except as noted.     REVIEW OF SYSTEMS:    CONSTITUTIONAL: No weakness, fevers or chills  EYES/ENT: No visual changes;  No vertigo or throat pain   NECK: No pain or stiffness  RESPIRATORY: No cough, wheezing, hemoptysis; No shortness of breath  CARDIOVASCULAR: No chest pain or palpitations  GASTROINTESTINAL: No abdominal or epigastric pain. No nausea, vomiting, or hematemesis; No diarrhea or constipation. No melena or hematochezia.  GENITOURINARY: No dysuria, frequency or hematuria  NEUROLOGICAL: No numbness or weakness  SKIN: No itching, burning, rashes, or lesions   All other review of systems is negative unless indicated above.    MEDICATIONS:  MEDICATIONS  (STANDING):  apixaban 2.5 milliGRAM(s) Oral <User Schedule>  calcium acetate 1334 milliGRAM(s) Oral three times a day with meals  chlorhexidine 2% Cloths 1 Application(s) Topical daily  cloNIDine 0.2 milliGRAM(s) Oral three times a day  dextrose 40% Gel 15 Gram(s) Oral once  dextrose 5%. 1000 milliLiter(s) (50 mL/Hr) IV Continuous <Continuous>  dextrose 5%. 1000 milliLiter(s) (100 mL/Hr) IV Continuous <Continuous>  dextrose 50% Injectable 25 Gram(s) IV Push once  dextrose 50% Injectable 12.5 Gram(s) IV Push once  dextrose 50% Injectable 25 Gram(s) IV Push once  glucagon  Injectable 1 milliGRAM(s) IntraMuscular once  hydrALAZINE 25 milliGRAM(s) Oral every 8 hours  insulin glargine Injectable (LANTUS) 15 Unit(s) SubCutaneous at bedtime  insulin lispro (ADMELOG) corrective regimen sliding scale   SubCutaneous three times a day before meals  insulin lispro (ADMELOG) corrective regimen sliding scale   SubCutaneous at bedtime  polyethylene glycol 3350 17 Gram(s) Oral daily  senna 1 Tablet(s) Oral daily      PHYSICAL EXAM:  T(C): 36.6 (12-18-20 @ 04:03), Max: 36.8 (12-17-20 @ 19:05)  HR: 68 (12-18-20 @ 04:03) (68 - 80)  BP: 144/78 (12-18-20 @ 04:03) (117/74 - 147/77)  RR: 18 (12-18-20 @ 04:03) (18 - 18)  SpO2: 97% (12-18-20 @ 04:03) (97% - 98%)  Wt(kg): --  I&O's Summary    17 Dec 2020 07:01  -  18 Dec 2020 07:00  --------------------------------------------------------  IN: 840 mL / OUT: 0 mL / NET: 840 mL          Appearance: Normal	  HEENT:   Normal oral mucosa, PERRL, EOMI	  Lymphatic: No lymphadenopathy , no edema  Cardiovascular: Normal S1 S2, No JVD, No murmurs , Peripheral pulses palpable 2+ bilaterally  Respiratory: Lungs clear to auscultation, normal effort 	  Gastrointestinal:  Soft, Non-tender, + BS	  Skin: No rashes, No ecchymoses, No cyanosis, warm to touch  Musculoskeletal: Normal range of motion, normal strength  Psychiatry:  Mood & affect appropriate  Ext: L foot wrapped       LABS:    CARDIAC MARKERS:                                9.8    7.14  )-----------( 221      ( 18 Dec 2020 06:59 )             30.1     12-18    134<L>  |  95<L>  |  65<H>  ----------------------------<  179<H>  4.7   |  23  |  9.42<H>    Ca    9.2      18 Dec 2020 06:59    TPro  6.6  /  Alb  3.2<L>  /  TBili  0.3  /  DBili  x   /  AST  12  /  ALT  12  /  AlkPhos  87  12-18    proBNP:   Lipid Profile:   HgA1c:   TSH:             TELEMETRY: 	    ECG:  	  RADIOLOGY:   DIAGNOSTIC TESTING:  [ ] Echocardiogram:  [ ]  Catheterization:  [ ] Stress Test:    OTHER: 	          
Subjective: Patient seen and examined. No new events except as noted.     REVIEW OF SYSTEMS:    CONSTITUTIONAL: No weakness, fevers or chills  EYES/ENT: No visual changes;  No vertigo or throat pain   NECK: No pain or stiffness  RESPIRATORY: No cough, wheezing, hemoptysis; No shortness of breath  CARDIOVASCULAR: No chest pain or palpitations  GASTROINTESTINAL: No abdominal or epigastric pain. No nausea, vomiting, or hematemesis; No diarrhea or constipation. No melena or hematochezia.  GENITOURINARY: No dysuria, frequency or hematuria  NEUROLOGICAL: No numbness or weakness  SKIN: No itching, burning, rashes, or lesions   All other review of systems is negative unless indicated above.    MEDICATIONS:  MEDICATIONS  (STANDING):  calcium acetate 1334 milliGRAM(s) Oral three times a day with meals  chlorhexidine 2% Cloths 1 Application(s) Topical daily  cloNIDine 0.2 milliGRAM(s) Oral three times a day  dextrose 40% Gel 15 Gram(s) Oral once  dextrose 5%. 1000 milliLiter(s) (50 mL/Hr) IV Continuous <Continuous>  dextrose 5%. 1000 milliLiter(s) (100 mL/Hr) IV Continuous <Continuous>  dextrose 50% Injectable 25 Gram(s) IV Push once  dextrose 50% Injectable 12.5 Gram(s) IV Push once  dextrose 50% Injectable 25 Gram(s) IV Push once  glucagon  Injectable 1 milliGRAM(s) IntraMuscular once  heparin   Injectable 5000 Unit(s) SubCutaneous every 12 hours  hydrALAZINE 25 milliGRAM(s) Oral every 8 hours  insulin glargine Injectable (LANTUS) 15 Unit(s) SubCutaneous at bedtime  insulin lispro (ADMELOG) corrective regimen sliding scale   SubCutaneous three times a day before meals  insulin lispro (ADMELOG) corrective regimen sliding scale   SubCutaneous at bedtime  polyethylene glycol 3350 17 Gram(s) Oral daily  senna 1 Tablet(s) Oral daily      PHYSICAL EXAM:  T(C): 36.4 (12-23-20 @ 08:45), Max: 36.8 (12-22-20 @ 19:19)  HR: 75 (12-23-20 @ 08:45) (72 - 79)  BP: 156/75 (12-23-20 @ 08:45) (112/62 - 156/75)  RR: 18 (12-23-20 @ 08:45) (18 - 18)  SpO2: 100% (12-23-20 @ 08:45) (95% - 100%)  Wt(kg): --  I&O's Summary    22 Dec 2020 07:01  -  23 Dec 2020 07:00  --------------------------------------------------------  IN: 960 mL / OUT: 0 mL / NET: 960 mL          Appearance: Normal	  HEENT:   Normal oral mucosa, PERRL, EOMI	  Lymphatic: No lymphadenopathy , no edema  Cardiovascular: Normal S1 S2, No JVD, No murmurs , Peripheral pulses palpable 2+ bilaterally  Respiratory: Lungs clear to auscultation, normal effort 	  Gastrointestinal:  Soft, Non-tender, + BS	  Skin: No rashes, No ecchymoses, No cyanosis, warm to touch  Musculoskeletal: Normal range of motion, normal strength  Psychiatry:  Mood & affect appropriate  Ext: L foot wrapped       LABS:    CARDIAC MARKERS:                                9.2    7.64  )-----------( 217      ( 23 Dec 2020 09:47 )             29.1     12-22    138  |  93<L>  |  63<H>  ----------------------------<  140<H>  4.8   |  27  |  8.76<H>    Ca    9.4      22 Dec 2020 07:03      proBNP:   Lipid Profile:   HgA1c:   TSH:             TELEMETRY: 	    ECG:  	  RADIOLOGY:   DIAGNOSTIC TESTING:  [ ] Echocardiogram:  [ ]  Catheterization:  [ ] Stress Test:    OTHER:

## 2020-12-24 NOTE — CHART NOTE - NSCHARTNOTEFT_GEN_A_CORE
Request from Dr. Rios to facilitate patient discharge.  KARIME Walter completed discharge paperwork, including medication reconciliation with Dr. Rios, who has medically cleared patient for discharge with follow up as advised.  Please refer to discharge note for detailed hospital course. Request from Dr. Rios to facilitate patient discharge.  Cleared for discharge by Dr. Aguirre (cardiology) as well.  KARIME Walter completed discharge paperwork, including medication reconciliation with Dr. Rios, who has medically cleared patient for discharge with follow up as advised.  Please refer to discharge note for detailed hospital course.

## 2020-12-28 PROCEDURE — 85027 COMPLETE CBC AUTOMATED: CPT

## 2020-12-28 PROCEDURE — 85610 PROTHROMBIN TIME: CPT

## 2020-12-28 PROCEDURE — 73630 X-RAY EXAM OF FOOT: CPT

## 2020-12-28 PROCEDURE — 96374 THER/PROPH/DIAG INJ IV PUSH: CPT

## 2020-12-28 PROCEDURE — 85025 COMPLETE CBC W/AUTO DIFF WBC: CPT

## 2020-12-28 PROCEDURE — U0003: CPT

## 2020-12-28 PROCEDURE — 87389 HIV-1 AG W/HIV-1&-2 AB AG IA: CPT

## 2020-12-28 PROCEDURE — 80053 COMPREHEN METABOLIC PANEL: CPT

## 2020-12-28 PROCEDURE — 85730 THROMBOPLASTIN TIME PARTIAL: CPT

## 2020-12-28 PROCEDURE — 80048 BASIC METABOLIC PNL TOTAL CA: CPT

## 2020-12-28 PROCEDURE — 97161 PT EVAL LOW COMPLEX 20 MIN: CPT

## 2020-12-28 PROCEDURE — 86900 BLOOD TYPING SEROLOGIC ABO: CPT

## 2020-12-28 PROCEDURE — 97530 THERAPEUTIC ACTIVITIES: CPT

## 2020-12-28 PROCEDURE — 93005 ELECTROCARDIOGRAM TRACING: CPT

## 2020-12-28 PROCEDURE — 99261: CPT

## 2020-12-28 PROCEDURE — 97116 GAIT TRAINING THERAPY: CPT

## 2020-12-28 PROCEDURE — 87040 BLOOD CULTURE FOR BACTERIA: CPT

## 2020-12-28 PROCEDURE — 86769 SARS-COV-2 COVID-19 ANTIBODY: CPT

## 2020-12-28 PROCEDURE — 73620 X-RAY EXAM OF FOOT: CPT

## 2020-12-28 PROCEDURE — 82962 GLUCOSE BLOOD TEST: CPT

## 2020-12-28 PROCEDURE — 99285 EMERGENCY DEPT VISIT HI MDM: CPT | Mod: 25

## 2020-12-28 PROCEDURE — 93923 UPR/LXTR ART STDY 3+ LVLS: CPT

## 2020-12-28 PROCEDURE — 96375 TX/PRO/DX INJ NEW DRUG ADDON: CPT

## 2020-12-28 PROCEDURE — 88305 TISSUE EXAM BY PATHOLOGIST: CPT

## 2020-12-28 PROCEDURE — 86901 BLOOD TYPING SEROLOGIC RH(D): CPT

## 2020-12-28 PROCEDURE — 71045 X-RAY EXAM CHEST 1 VIEW: CPT

## 2020-12-28 PROCEDURE — 86850 RBC ANTIBODY SCREEN: CPT

## 2021-01-07 ENCOUNTER — APPOINTMENT (OUTPATIENT)
Dept: VASCULAR SURGERY | Facility: CLINIC | Age: 61
End: 2021-01-07
Payer: MEDICARE

## 2021-01-07 PROCEDURE — 99213 OFFICE O/P EST LOW 20 MIN: CPT

## 2021-01-07 PROCEDURE — 93926 LOWER EXTREMITY STUDY: CPT | Mod: TC

## 2021-01-07 PROCEDURE — 93926 LOWER EXTREMITY STUDY: CPT

## 2021-01-07 NOTE — HISTORY OF PRESENT ILLNESS
[FreeTextEntry1] : 61 yo male with history of esrd on hd, pad s/p left lower extremity pop to pt artery bypass with gsv and recent angiogram with sfa and pop stents presents today after left TMA \par \par pt states that sensation and mobility is slowly improving of the left foot

## 2021-01-07 NOTE — ASSESSMENT
[FreeTextEntry1] : 61 yo male with history of esrd on hd, pad s/p left lower extremity pop to pt artery bypass with gsv and recent angiogram with sfa and pop stents presents today s/p TMA\par \par pvr shows WNL \par duplex shows patent stents\par \par doing better\par f/u 1 month

## 2021-01-07 NOTE — PHYSICAL EXAM
[Alert] : alert [de-identified] : appears well  [de-identified] : foot is warm with dry gangrenous changes of the distal 1st - 4th digit

## 2021-01-13 ENCOUNTER — OUTPATIENT (OUTPATIENT)
Dept: OUTPATIENT SERVICES | Facility: HOSPITAL | Age: 61
LOS: 1 days | End: 2021-01-13
Payer: MEDICARE

## 2021-01-13 ENCOUNTER — APPOINTMENT (OUTPATIENT)
Dept: PODIATRY | Facility: HOSPITAL | Age: 61
End: 2021-01-13
Payer: MEDICARE

## 2021-01-13 VITALS
BODY MASS INDEX: 23.18 KG/M2 | HEART RATE: 84 BPM | DIASTOLIC BLOOD PRESSURE: 79 MMHG | HEIGHT: 71 IN | SYSTOLIC BLOOD PRESSURE: 154 MMHG | WEIGHT: 165.56 LBS | TEMPERATURE: 98.3 F

## 2021-01-13 DIAGNOSIS — M79.609 PAIN IN UNSPECIFIED LIMB: ICD-10-CM

## 2021-01-13 DIAGNOSIS — I73.9 PERIPHERAL VASCULAR DISEASE, UNSPECIFIED: ICD-10-CM

## 2021-01-13 DIAGNOSIS — Z89.432 ACQUIRED ABSENCE OF LEFT FOOT: ICD-10-CM

## 2021-01-13 DIAGNOSIS — Z98.890 OTHER SPECIFIED POSTPROCEDURAL STATES: Chronic | ICD-10-CM

## 2021-01-13 DIAGNOSIS — E11.9 TYPE 2 DIABETES MELLITUS WITHOUT COMPLICATIONS: ICD-10-CM

## 2021-01-13 DIAGNOSIS — E11.9 TYPE 2 DIABETES MELLITUS W/OUT COMPLICATIONS: ICD-10-CM

## 2021-01-13 PROCEDURE — 11042 DBRDMT SUBQ TIS 1ST 20SQCM/<: CPT

## 2021-01-13 RX ORDER — PREDNISONE 20 MG/1
20 TABLET ORAL
Qty: 6 | Refills: 3 | Status: DISCONTINUED | COMMUNITY
Start: 2020-09-03 | End: 2021-01-13

## 2021-01-13 RX ORDER — CLOTRIMAZOLE AND BETAMETHASONE DIPROPIONATE 10; .5 MG/G; MG/G
1-0.05 CREAM TOPICAL TWICE DAILY
Qty: 1 | Refills: 3 | Status: DISCONTINUED | COMMUNITY
Start: 2020-03-03 | End: 2021-01-13

## 2021-01-13 RX ORDER — PREDNISONE 20 MG/1
20 TABLET ORAL
Qty: 6 | Refills: 3 | Status: DISCONTINUED | COMMUNITY
Start: 2020-03-11 | End: 2021-01-13

## 2021-01-13 RX ORDER — HYDROXYZINE HCL 10 MG
10 TABLET ORAL
Refills: 0 | Status: DISCONTINUED | COMMUNITY
End: 2021-01-13

## 2021-01-13 NOTE — PHYSICAL EXAM
[0] : left foot dorsalis pedis 0 [Diminished Throughout Right Foot] : diminished sensation with monofilament testing throughout right foot [Diminished Throughout Left Foot] : diminished sensation with monofilament testing throughout left foot [Please See PDF for [employer]] : Please See PDF for [employer]. [de-identified] : s/p L foot TMA  [FreeTextEntry1] : s/p L foot TMA sutures intact, flaps warm and viable w/ no evidence of necrosis, no drainage, no acute signs of infection

## 2021-01-13 NOTE — ASSESSMENT
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

## 2021-01-15 NOTE — ED ADULT NURSE NOTE - NS ED PATIENT SAFETY CONCERN
Patient called to confirm the location of her 1/15 follow up with Surya Major, confirmed the [facility] 
No

## 2021-02-16 ENCOUNTER — APPOINTMENT (OUTPATIENT)
Dept: VASCULAR SURGERY | Facility: CLINIC | Age: 61
End: 2021-02-16

## 2021-07-20 ENCOUNTER — APPOINTMENT (OUTPATIENT)
Dept: VASCULAR SURGERY | Facility: CLINIC | Age: 61
End: 2021-07-20
Payer: MEDICARE

## 2021-07-20 PROCEDURE — 99213 OFFICE O/P EST LOW 20 MIN: CPT

## 2021-07-20 PROCEDURE — 93990 DOPPLER FLOW TESTING: CPT

## 2021-07-22 NOTE — DISCUSSION/SUMMARY
[FreeTextEntry1] : 59 yo male with history of esrd on hd presents for follow up of left upper extremity radial cephalic avf\par duplex shows patent avf \par

## 2021-07-22 NOTE — PHYSICAL EXAM
[Thrill] : thrill [Pulsatile Thrill] : no pulsatile thrill [Aneurysm] : no aneurysm [Bleeding] : no bleeding [Hand well perfused] : hand well perfused [Ulcer] : no ulcer [2+] : left 2+ [0] : left 0 [Normal] : coordination grossly intact, no focal deficits [de-identified] : Gangrenous ulcer left first toe [de-identified] : intact

## 2021-07-22 NOTE — HISTORY OF PRESENT ILLNESS
[FreeTextEntry1] : 60 yo male with history of esrd on hd presents for follow up of left upper extremity radial cephalic avf.  pt states that he has been having no trouble with hd via the left upper extremity avf. [] : left radiocephalic fistula

## 2021-08-16 RX ORDER — CLOPIDOGREL 75 MG/1
75 TABLET, FILM COATED ORAL
Refills: 0 | Status: ACTIVE | COMMUNITY

## 2021-08-16 RX ORDER — GABAPENTIN 400 MG/1
CAPSULE ORAL
Refills: 0 | Status: ACTIVE | COMMUNITY

## 2021-08-17 ENCOUNTER — RESULT REVIEW (OUTPATIENT)
Age: 61
End: 2021-08-17

## 2021-08-17 ENCOUNTER — NON-APPOINTMENT (OUTPATIENT)
Age: 61
End: 2021-08-17

## 2021-08-17 ENCOUNTER — APPOINTMENT (OUTPATIENT)
Dept: ENDOVASCULAR SURGERY | Facility: CLINIC | Age: 61
End: 2021-08-17
Payer: MEDICARE

## 2021-08-17 VITALS
HEIGHT: 71 IN | RESPIRATION RATE: 16 BRPM | TEMPERATURE: 98 F | BODY MASS INDEX: 23.1 KG/M2 | WEIGHT: 165 LBS | HEART RATE: 76 BPM | OXYGEN SATURATION: 100 % | DIASTOLIC BLOOD PRESSURE: 92 MMHG | SYSTOLIC BLOOD PRESSURE: 174 MMHG

## 2021-08-17 PROCEDURE — 36902Z: CUSTOM

## 2021-08-17 RX ORDER — APIXABAN 2.5 MG/1
2.5 TABLET, FILM COATED ORAL
Refills: 0 | Status: DISCONTINUED | COMMUNITY
End: 2021-08-17

## 2021-09-01 NOTE — PAST MEDICAL HISTORY
[Increasing age ( >40 years old)] : Increasing age ( >40 years old) [No therapy indicated for cases scheduled for less than one hour] : No therapy indicated for cases scheduled for less than one hour. [FreeTextEntry1] : Malignant Hyperthermia Screening Tool and Risk of Bleeding Assessment\par Mr. MARIETTA MAGALLANES denies family history of unexpected death following Anesthesia or Exercise.\par Denies Family history of Malignant Hyperthermia, Muscle or Neuromuscular disorder and High Temperature following exercise.\par \par Mr. MARIETTA MAGALLANES denies history of Muscle Spasm, Dark or Chocolate - Colored urine and Unanticipated fever immediately following anesthesia or serious exercise. \par Mr. MAGALLANES also denies bleeding tendencies/ Risks of Bleeding.

## 2021-09-01 NOTE — HISTORY OF PRESENT ILLNESS
[] : left radiocephalic fistula [FreeTextEntry1] : 2016 Dr. Camara  [FreeTextEntry4] : yesterday [FreeTextEntry5] : Yesterday 10pm  [FreeTextEntry6] : Dr. Khan

## 2021-09-01 NOTE — PROCEDURE
[Resume diet] : resume diet [Site check for bleeding/hematoma/thrill/bruit] : Site check for bleeding/hematoma/thrill/bruit [Vital signs on admission the q 15 mins x2] : Vital signs on admission the q 15 mins x2 [FreeTextEntry1] : left arm fistula fistulogram/angiogram/angioplasty

## 2021-10-26 ENCOUNTER — APPOINTMENT (OUTPATIENT)
Dept: VASCULAR SURGERY | Facility: CLINIC | Age: 61
End: 2021-10-26

## 2021-11-09 ENCOUNTER — APPOINTMENT (OUTPATIENT)
Dept: VASCULAR SURGERY | Facility: CLINIC | Age: 61
End: 2021-11-09
Payer: MEDICARE

## 2021-11-09 PROCEDURE — 99213 OFFICE O/P EST LOW 20 MIN: CPT

## 2021-11-09 PROCEDURE — 93990 DOPPLER FLOW TESTING: CPT

## 2021-11-09 NOTE — ASSESSMENT
[FreeTextEntry1] : 60 yo male with history of esrd on hd, pad \par \par \par Given the new gangrene of the TMA site would recommend urgent angiogram and given the severe stenosis on duplex would recommend fistulogram.

## 2021-11-09 NOTE — PHYSICAL EXAM
[Alert] : alert [de-identified] : appears well  [de-identified] : foot is warm with dry gangrenous changes of the distal 1st - 4th digit

## 2021-11-09 NOTE — HISTORY OF PRESENT ILLNESS
[FreeTextEntry1] : 60 yo male with history of esrd on hd, pad s/p left lower extremity TMA.  Patient recently noted discoloration of the amputation site.  He now presents for evaluation.\par pt states that sensation and mobility is slowly improving of the left foot

## 2021-11-12 NOTE — HISTORY OF PRESENT ILLNESS
[] : left radiocephalic fistula [FreeTextEntry1] : 2016 Dr. Camara  [FreeTextEntry5] : Yesterday  [FreeTextEntry6] : Dr. Khan

## 2021-11-12 NOTE — PAST MEDICAL HISTORY
[Increasing age ( >40 years old)] : Increasing age ( >40 years old) [No therapy indicated for cases scheduled for less than one hour] : No therapy indicated for cases scheduled for less than one hour. [Altered mobility] : Altered mobility [FreeTextEntry1] : Malignant Hyperthermia Screening Tool and Risk of Bleeding Assessment\par Mr. MARIETTA MAGALLANES denies family history of unexpected death following Anesthesia or Exercise.\par Denies Family history of Malignant Hyperthermia, Muscle or Neuromuscular disorder and High Temperature following exercise.\par \par Mr. MARIETTA MAGALLANES denies history of Muscle Spasm, Dark or Chocolate - Colored urine and Unanticipated fever immediately following anesthesia or serious exercise. \par Mr. MAGALLANES also denies bleeding tendencies/ Risks of Bleeding.

## 2021-11-15 ENCOUNTER — LABORATORY RESULT (OUTPATIENT)
Age: 61
End: 2021-11-15

## 2021-11-15 ENCOUNTER — RESULT REVIEW (OUTPATIENT)
Age: 61
End: 2021-11-15

## 2021-11-15 ENCOUNTER — APPOINTMENT (OUTPATIENT)
Dept: ENDOVASCULAR SURGERY | Facility: CLINIC | Age: 61
End: 2021-11-15
Payer: MEDICARE

## 2021-11-15 VITALS
HEART RATE: 72 BPM | RESPIRATION RATE: 16 BRPM | DIASTOLIC BLOOD PRESSURE: 87 MMHG | WEIGHT: 165 LBS | SYSTOLIC BLOOD PRESSURE: 166 MMHG | OXYGEN SATURATION: 100 % | HEIGHT: 71 IN | TEMPERATURE: 98 F | BODY MASS INDEX: 23.1 KG/M2

## 2021-11-15 PROCEDURE — 37229Z: CUSTOM | Mod: 59,LT

## 2021-11-15 PROCEDURE — 75625 CONTRAST EXAM ABDOMINL AORTA: CPT

## 2021-11-15 PROCEDURE — 37227Z: CUSTOM | Mod: LT

## 2021-11-15 PROCEDURE — 36902Z: CUSTOM

## 2021-11-15 RX ORDER — ESCITALOPRAM OXALATE 5 MG/1
5 TABLET, FILM COATED ORAL
Refills: 0 | Status: ACTIVE | COMMUNITY

## 2021-11-15 RX ORDER — INSULIN GLARGINE 100 [IU]/ML
100 INJECTION, SOLUTION SUBCUTANEOUS
Refills: 0 | Status: ACTIVE | COMMUNITY

## 2021-11-15 NOTE — PHYSICAL EXAM
[Thrill] : thrill [Hand well perfused] : hand well perfused [2+] : left 2+ [0] : left 0 [Normal] : coordination grossly intact, no focal deficits [Alert] : alert [Pulsatile Thrill] : no pulsatile thrill [Aneurysm] : no aneurysm [Bleeding] : no bleeding [Ulcer] : no ulcer [de-identified] : Gangrenous ulcer left first toe [de-identified] : intact  [de-identified] : appears well  [de-identified] : foot is warm with dry gangrenous changes of the distal 1st - 4th digit

## 2021-11-15 NOTE — ASSESSMENT
[FreeTextEntry1] : 60 yo male with history of esrd on hd, pad \par  given the severe stenosis on duplex plan for  fistulogram and possible intervention

## 2021-11-15 NOTE — PROCEDURE
[FSBS] : FSBS [D/C IV on discharge] : D/C IV on discharge [Resume diet] : resume diet [FreeTextEntry1] : left arm fistula fistulogram/angioplasty

## 2021-11-15 NOTE — HISTORY OF PRESENT ILLNESS
[] : left radiocephalic fistula [FreeTextEntry1] : 2016 Dr. Duarte [FreeTextEntry4] : Sautrday 11/13/2021 [FreeTextEntry5] : yesterday at 9pm [FreeTextEntry6] : Dr Khan

## 2021-11-16 ENCOUNTER — APPOINTMENT (OUTPATIENT)
Dept: VASCULAR SURGERY | Facility: CLINIC | Age: 61
End: 2021-11-16

## 2021-11-18 ENCOUNTER — APPOINTMENT (OUTPATIENT)
Dept: VASCULAR SURGERY | Facility: CLINIC | Age: 61
End: 2021-11-18
Payer: MEDICARE

## 2021-11-18 PROCEDURE — 93923 UPR/LXTR ART STDY 3+ LVLS: CPT

## 2021-11-18 PROCEDURE — 99213 OFFICE O/P EST LOW 20 MIN: CPT

## 2021-11-18 NOTE — ASSESSMENT
[FreeTextEntry1] : 60 yo male with history of esrd on hd, pad \par \par In the office patient underwent arterial Dopplers which were improved.\par Patient will follow-up 1 month.  I have recommended that the patient undergo podiatry evaluation for the eschar on the TMA.

## 2021-11-18 NOTE — HISTORY OF PRESENT ILLNESS
[FreeTextEntry1] : 62 yo male with history of esrd on hd, pad s/p left lower extremity TMA.  Patient recently noted discoloration of the amputation site.  The patient underwent left leg angiogram with angioplasty and stenting.\par He is here for follow-up

## 2021-11-18 NOTE — PHYSICAL EXAM
[Alert] : alert [de-identified] : appears well  [de-identified] : foot is warm with dry gangrenous changes of the distal 1st - 4th digit

## 2021-11-19 ENCOUNTER — APPOINTMENT (OUTPATIENT)
Dept: ENDOVASCULAR SURGERY | Facility: CLINIC | Age: 61
End: 2021-11-19

## 2021-11-24 NOTE — HISTORY OF PRESENT ILLNESS
Patient calling to speak to Ashley about changing an appointment she has set up to Tuesday 11/30/21.     Please review and advise patient at [phone].   [FreeTextEntry1] : New patient presents w/ left forefoot gangrene. He reports he had a bypass of the LLE in June, which was a left pop to PT artery bypass. He recently had his toes become black, which began 6 weeks ago. Pt states he then had an angiogram and stenting performed on 11/17 by Dr. Samaniego.  Pt is now s/p L foot TMA 12/24 w/ Dr. PABON

## 2021-12-03 NOTE — ED ADULT NURSE NOTE - EXTENSIONS OF SELF_ADULT
None
What Type Of Note Output Would You Prefer (Optional)?: Standard Output
How Severe Is Your Rash?: mild
Is This A New Presentation, Or A Follow-Up?: Rash

## 2021-12-07 ENCOUNTER — APPOINTMENT (OUTPATIENT)
Dept: VASCULAR SURGERY | Facility: CLINIC | Age: 61
End: 2021-12-07

## 2022-02-15 ENCOUNTER — APPOINTMENT (OUTPATIENT)
Dept: VASCULAR SURGERY | Facility: CLINIC | Age: 62
End: 2022-02-15

## 2022-02-15 ENCOUNTER — APPOINTMENT (OUTPATIENT)
Dept: VASCULAR SURGERY | Facility: CLINIC | Age: 62
End: 2022-02-15
Payer: MEDICARE

## 2022-02-15 PROCEDURE — 99213 OFFICE O/P EST LOW 20 MIN: CPT

## 2022-02-15 PROCEDURE — 93923 UPR/LXTR ART STDY 3+ LVLS: CPT

## 2022-02-15 NOTE — ASSESSMENT
[FreeTextEntry1] : 60 yo male with history of esrd on hd, pad \par \par In the office patient underwent arterial Dopplers which were improved for the left lower extremity.  Right lower extremity Dopplers show severe disease.  Would recommend right leg angiogram.\par

## 2022-02-15 NOTE — PHYSICAL EXAM
[Normal Breath Sounds] : Normal breath sounds [Normal Rate and Rhythm] : normal rate and rhythm [2+] : left 2+ [Ankle Swelling (On Exam)] : not present [Varicose Veins Of Lower Extremities] : not present [] : not present [Abdomen Tenderness] : ~T ~M No abdominal tenderness [Alert] : alert [de-identified] : appears well  [de-identified] : foot is warm with dry gangrenous changes of the distal 1st - 4th digit

## 2022-02-15 NOTE — HISTORY OF PRESENT ILLNESS
[FreeTextEntry1] : 60 yo male with history of esrd on hd, pad s/p left lower extremity TMA.  Patient recently noted discoloration of the amputation site.  The patient underwent left leg angiogram with angioplasty and stenting.\par He is here for follow-up\par now with pain in the right foot along with ulceration of the right first toe.

## 2022-03-02 PROBLEM — H40.9 GLAUCOMA: Status: ACTIVE | Noted: 2022-03-02

## 2022-03-03 ENCOUNTER — RESULT REVIEW (OUTPATIENT)
Age: 62
End: 2022-03-03

## 2022-03-03 ENCOUNTER — APPOINTMENT (OUTPATIENT)
Dept: ENDOVASCULAR SURGERY | Facility: CLINIC | Age: 62
End: 2022-03-03
Payer: MEDICARE

## 2022-03-03 VITALS
BODY MASS INDEX: 23.1 KG/M2 | HEART RATE: 83 BPM | OXYGEN SATURATION: 98 % | WEIGHT: 165 LBS | RESPIRATION RATE: 16 BRPM | DIASTOLIC BLOOD PRESSURE: 82 MMHG | HEIGHT: 71 IN | TEMPERATURE: 97.9 F | SYSTOLIC BLOOD PRESSURE: 155 MMHG

## 2022-03-03 DIAGNOSIS — H40.9 UNSPECIFIED GLAUCOMA: ICD-10-CM

## 2022-03-03 PROCEDURE — 37227Z: CUSTOM | Mod: 82

## 2022-03-03 PROCEDURE — 75625 CONTRAST EXAM ABDOMINL AORTA: CPT

## 2022-03-03 PROCEDURE — 37231Z: CUSTOM | Mod: 59,RT

## 2022-03-03 PROCEDURE — 37231Z: CUSTOM | Mod: 82,59

## 2022-03-03 PROCEDURE — 37227Z: CUSTOM | Mod: RT

## 2022-03-03 RX ORDER — CALCIUM ACETATE 667 MG/1
667 CAPSULE ORAL
Refills: 0 | Status: DISCONTINUED | COMMUNITY
End: 2022-03-03

## 2022-03-03 RX ORDER — CINACALCET HYDROCHLORIDE 30 MG/1
30 TABLET, COATED ORAL
Refills: 0 | Status: DISCONTINUED | COMMUNITY
End: 2022-03-03

## 2022-03-03 RX ORDER — APIXABAN 2.5 MG/1
2.5 TABLET, FILM COATED ORAL
Refills: 0 | Status: ACTIVE | COMMUNITY

## 2022-03-03 RX ORDER — HYDRALAZINE HYDROCHLORIDE 25 MG/1
25 TABLET ORAL
Refills: 0 | Status: DISCONTINUED | COMMUNITY
End: 2022-03-03

## 2022-03-03 RX ORDER — MIDODRINE HYDROCHLORIDE 10 MG/1
TABLET ORAL
Refills: 0 | Status: DISCONTINUED | COMMUNITY
End: 2022-03-03

## 2022-03-03 RX ORDER — CHLORHEXIDINE GLUCONATE 4 %
5 LIQUID (ML) TOPICAL
Refills: 0 | Status: ACTIVE | COMMUNITY

## 2022-03-03 NOTE — PROCEDURE
[Resume diet] : resume diet [D/C IV on discharge] : D/C IV on discharge [FreeTextEntry1] : aortogram, right leg angiogram, angioplasty, atherectomy, stent

## 2022-03-03 NOTE — PAST MEDICAL HISTORY
[Increasing age ( >40 years old)] : Increasing age ( >40 years old) [Altered mobility] : Altered mobility [No therapy indicated for cases scheduled for less than one hour] : No therapy indicated for cases scheduled for less than one hour. [FreeTextEntry1] : Malignant Hyperthermia Screening Tool and Risk of Bleeding Assessment\par Mr. MARIETTA MAGALLANES denies family history of unexpected death following Anesthesia or Exercise.\par Denies Family history of Malignant Hyperthermia, Muscle or Neuromuscular disorder and High Temperature following exercise.\par \par Mr. MARIETTA MAGALLANES denies history of Muscle Spasm, Dark or Chocolate - Colored urine and Unanticipated fever immediately following anesthesia or serious exercise. \par Mr. MAGALLANES also denies bleeding tendencies/ Risks of Bleeding.

## 2022-03-03 NOTE — ASSESSMENT
[FreeTextEntry1] : 62 yo male with history of esrd on hd, pad \par   Right lower extremity Dopplers show severe disease.  Plan for right leg angiogram.\par

## 2022-03-03 NOTE — HISTORY OF PRESENT ILLNESS
[] : left radiocephalic fistula [FreeTextEntry1] : 2016 Dr. Duarte  [FreeTextEntry4] : yesterday [FreeTextEntry5] : Yesterday 6pm [FreeTextEntry6] : Dr. Callahan

## 2022-03-03 NOTE — PHYSICAL EXAM
[Pulsatile Thrill] : pulsatile thrill [Normal Breath Sounds] : Normal breath sounds [Normal Rate and Rhythm] : normal rate and rhythm [2+] : left 2+ [Alert] : alert [Ankle Swelling (On Exam)] : not present [Varicose Veins Of Lower Extremities] : not present [] : not present [Abdomen Tenderness] : ~T ~M No abdominal tenderness [de-identified] : appears well  [de-identified] : foot is warm with dry gangrenous changes of the distal 1st - 4th digit

## 2022-04-12 NOTE — PROGRESS NOTE ADULT - I WAS PHYSICALLY PRESENT FOR THE KEY PORTIONS OF THE EVALUATION AND MANAGEMENT (E/M) SERVICE PROVIDED.  I AGREE WITH THE ABOVE HISTORY, PHYSICAL, AND PLAN WHICH I HAVE REVIEWED AND EDITED WHERE APPROPRIATE
Statement Selected
no

## 2022-05-10 ENCOUNTER — APPOINTMENT (OUTPATIENT)
Dept: VASCULAR SURGERY | Facility: CLINIC | Age: 62
End: 2022-05-10
Payer: MEDICARE

## 2022-05-10 PROCEDURE — 99214 OFFICE O/P EST MOD 30 MIN: CPT

## 2022-05-10 PROCEDURE — 93926 LOWER EXTREMITY STUDY: CPT

## 2022-05-10 NOTE — PHYSICAL EXAM
[Normal Breath Sounds] : Normal breath sounds [Normal Rate and Rhythm] : normal rate and rhythm [2+] : left 2+ [Ankle Swelling (On Exam)] : not present [Varicose Veins Of Lower Extremities] : not present [] : not present [Abdomen Tenderness] : ~T ~M No abdominal tenderness [Alert] : alert [de-identified] : appears well  [de-identified] : foot is warm with dry gangrenous changes of the distal 1st - 4th digit

## 2022-05-10 NOTE — ASSESSMENT
[FreeTextEntry1] : 60 yo male with history of esrd on hd, pad patient currently with oxygen dressing on the left TMA stump.  In the office today patient underwent a right leg duplex which shows patent stents.  The ulcer on the right great toe is completely healed.  Patient will return in 6 weeks for left leg follow-up along with a duplex.  In the interim patient should remain with the left oxygen dressing.\par \par \par \par

## 2022-05-10 NOTE — HISTORY OF PRESENT ILLNESS
[FreeTextEntry1] : 60 yo male with history of esrd on hd, pad s/p left lower extremity TMA.  Patient recently noted discoloration of the amputation site.  The patient underwent right leg angiogram with angioplasty and stenting.\par He is here for follow-up\par Patient currently has oxygen dressing on the left foot.

## 2022-06-21 ENCOUNTER — APPOINTMENT (OUTPATIENT)
Dept: VASCULAR SURGERY | Facility: CLINIC | Age: 62
End: 2022-06-21
Payer: MEDICARE

## 2022-06-21 PROCEDURE — 93990 DOPPLER FLOW TESTING: CPT

## 2022-06-21 PROCEDURE — 99214 OFFICE O/P EST MOD 30 MIN: CPT

## 2022-06-21 PROCEDURE — 93925 LOWER EXTREMITY STUDY: CPT | Mod: 59

## 2022-06-21 NOTE — PHYSICAL EXAM
[Normal Breath Sounds] : Normal breath sounds [Normal Rate and Rhythm] : normal rate and rhythm [2+] : left 2+ [Ankle Swelling (On Exam)] : not present [Varicose Veins Of Lower Extremities] : not present [] : not present [Abdomen Tenderness] : ~T ~M No abdominal tenderness [Alert] : alert [de-identified] : appears well  [de-identified] : foot is warm with dry gangrenous changes of the distal 1st - 4th digit

## 2022-06-21 NOTE — HISTORY OF PRESENT ILLNESS
[FreeTextEntry1] : 62 yo male with history of esrd on hd, pad s/p left lower extremity TMA.  Patient recently noted discoloration of the amputation site.  The patient underwent right leg angiogram with angioplasty and stenting.\par He is here for follow-up\par Patient currently has oxygen dressing on the left foot. left foot has not improved\par also having issues with left fistula

## 2022-06-21 NOTE — ASSESSMENT
[FreeTextEntry1] : 62 yo male with history of esrd on hd, pad patient currently with oxygen dressing on the left TMA stump.  In the office today patient underwent a right leg duplex which shows patent stents.  The ulcer on the right great toe is completely healed.  The left leg duplex shows a greater than 75% in-stent stenosis.  In addition, his fistula shows a high-grade stenosis in its midportion.  Given these findings would recommend a left lower extremity angiogram along with a fistulogram.\par \par \par

## 2022-07-01 ENCOUNTER — RESULT REVIEW (OUTPATIENT)
Age: 62
End: 2022-07-01

## 2022-07-01 ENCOUNTER — APPOINTMENT (OUTPATIENT)
Dept: ENDOVASCULAR SURGERY | Facility: CLINIC | Age: 62
End: 2022-07-01

## 2022-07-01 ENCOUNTER — LABORATORY RESULT (OUTPATIENT)
Age: 62
End: 2022-07-01

## 2022-07-01 VITALS
BODY MASS INDEX: 23.1 KG/M2 | TEMPERATURE: 97.4 F | HEIGHT: 71 IN | RESPIRATION RATE: 16 BRPM | HEART RATE: 88 BPM | DIASTOLIC BLOOD PRESSURE: 98 MMHG | SYSTOLIC BLOOD PRESSURE: 171 MMHG | WEIGHT: 165 LBS

## 2022-07-01 PROCEDURE — 37229Z: CUSTOM | Mod: 59,LT

## 2022-07-01 PROCEDURE — 36215Z: CUSTOM

## 2022-07-01 PROCEDURE — 37227Z: CUSTOM | Mod: LT

## 2022-07-01 PROCEDURE — 75625 CONTRAST EXAM ABDOMINL AORTA: CPT

## 2022-07-01 PROCEDURE — 36902Z: CUSTOM

## 2022-07-01 NOTE — REASON FOR VISIT
[Other ___] : a [unfilled] visit for [Inadequate Flow within AVF] : inadequate flow within AVF [FreeTextEntry2] : stenosis seen on duplex

## 2022-07-01 NOTE — ASSESSMENT
[Arterial/Venous Disease] : arterial/venous disease [FreeTextEntry1] : 60 yo male with history of esrd on hd, pad patient currently with oxygen dressing on the left TMA stump. The left leg duplex shows a greater than 75% in-stent stenosis.Plan for  left lower extremity angiogram.\par \par

## 2022-07-01 NOTE — PROCEDURE
[D/C IV on discharge] : D/C IV on discharge [Resume diet] : resume diet [FreeTextEntry1] : aortogram, left leg angiogram, angioplasty, atherectomy, stent

## 2022-07-01 NOTE — HISTORY OF PRESENT ILLNESS
[] : left radiocephalic fistula [FreeTextEntry1] : 2016 Dr. Duarte  [FreeTextEntry4] : yesterday [FreeTextEntry5] : Yesterday 6pm [FreeTextEntry6] : Dr. Khan

## 2022-07-01 NOTE — HISTORY OF PRESENT ILLNESS
[] : left radiocephalic fistula [FreeTextEntry1] : 2016 Dr. Camara  [FreeTextEntry4] : yesterday [FreeTextEntry5] : Yesterday at 6pm [FreeTextEntry6] : Dr. Khan

## 2022-07-01 NOTE — PHYSICAL EXAM
[Pulsatile Thrill] : pulsatile thrill [Normal Breath Sounds] : Normal breath sounds [Normal Rate and Rhythm] : normal rate and rhythm [2+] : left 2+ [Alert] : alert [Ankle Swelling (On Exam)] : not present [Varicose Veins Of Lower Extremities] : not present [] : not present [Abdomen Tenderness] : ~T ~M No abdominal tenderness [de-identified] : appears well  [de-identified] : foot is warm with dry gangrenous changes of the distal 1st - 4th digit

## 2022-07-14 ENCOUNTER — LABORATORY RESULT (OUTPATIENT)
Age: 62
End: 2022-07-14

## 2022-07-14 PROBLEM — I73.9 PAD (PERIPHERAL ARTERY DISEASE): Status: ACTIVE | Noted: 2020-03-11

## 2022-07-15 ENCOUNTER — APPOINTMENT (OUTPATIENT)
Dept: ENDOVASCULAR SURGERY | Facility: CLINIC | Age: 62
End: 2022-07-15

## 2022-07-15 ENCOUNTER — RESULT REVIEW (OUTPATIENT)
Age: 62
End: 2022-07-15

## 2022-07-15 VITALS
SYSTOLIC BLOOD PRESSURE: 175 MMHG | RESPIRATION RATE: 18 BRPM | HEIGHT: 71 IN | DIASTOLIC BLOOD PRESSURE: 98 MMHG | TEMPERATURE: 97.6 F | OXYGEN SATURATION: 97 % | BODY MASS INDEX: 23.1 KG/M2 | WEIGHT: 165 LBS | HEART RATE: 96 BPM

## 2022-07-15 DIAGNOSIS — I73.9 PERIPHERAL VASCULAR DISEASE, UNSPECIFIED: ICD-10-CM

## 2022-07-15 PROCEDURE — 37225Z: CUSTOM | Mod: RT

## 2022-07-15 PROCEDURE — 75625 CONTRAST EXAM ABDOMINL AORTA: CPT

## 2022-07-15 PROCEDURE — 37229Z: CUSTOM | Mod: 59,RT

## 2022-07-15 RX ORDER — METOPROLOL TARTRATE 50 MG/1
50 TABLET ORAL
Refills: 0 | Status: DISCONTINUED | COMMUNITY
End: 2022-07-15

## 2022-07-15 RX ORDER — IRON/IRON ASP GLY/FA/MV-MIN 38 125-25-1MG
TABLET ORAL
Refills: 0 | Status: DISCONTINUED | COMMUNITY
End: 2022-07-15

## 2022-07-15 RX ORDER — METOPROLOL TARTRATE 25 MG/1
25 TABLET, FILM COATED ORAL
Refills: 0 | Status: DISCONTINUED | COMMUNITY
End: 2022-07-15

## 2022-07-15 RX ORDER — AMLODIPINE BESYLATE 10 MG/1
10 TABLET ORAL
Refills: 0 | Status: DISCONTINUED | COMMUNITY
End: 2022-07-15

## 2022-07-15 NOTE — PROCEDURE
[Resume diet] : resume diet [FreeTextEntry1] : aortogram, right leg angiogram, angioplasty, atherectomy

## 2022-07-15 NOTE — HISTORY OF PRESENT ILLNESS
[] : left radiocephalic fistula [FreeTextEntry1] : 2016 Dr. Duarte  [FreeTextEntry4] : yesterday [FreeTextEntry5] : Yesterday at 7pm [FreeTextEntry6] : Dr. Khan

## 2022-08-04 ENCOUNTER — APPOINTMENT (OUTPATIENT)
Dept: VASCULAR SURGERY | Facility: CLINIC | Age: 62
End: 2022-08-04

## 2022-08-04 PROCEDURE — 99213 OFFICE O/P EST LOW 20 MIN: CPT

## 2022-08-04 PROCEDURE — 93925 LOWER EXTREMITY STUDY: CPT

## 2022-08-04 NOTE — ASSESSMENT
[FreeTextEntry1] : 62 yo male with history of esrd on hd, pad patient currently with oxygen dressing on the left TMA stump.  In the office today patient underwent a right leg duplex which shows patent stents.  The ulcer on the right great toe is  healing.  The left leg duplex shows a greater than < 75% CFA stenosis.  \par wounds are much improved\par f/u 3 months\par \par \par \par

## 2022-08-04 NOTE — PHYSICAL EXAM
[Normal Breath Sounds] : Normal breath sounds [Normal Rate and Rhythm] : normal rate and rhythm [2+] : left 2+ [Ankle Swelling (On Exam)] : not present [Varicose Veins Of Lower Extremities] : not present [] : not present [Abdomen Tenderness] : ~T ~M No abdominal tenderness [Alert] : alert [de-identified] : appears well  [de-identified] : foot is warm with dry gangrenous changes of the distal 1st - 4th digit

## 2022-09-15 ENCOUNTER — APPOINTMENT (OUTPATIENT)
Dept: VASCULAR SURGERY | Facility: CLINIC | Age: 62
End: 2022-09-15

## 2022-11-03 ENCOUNTER — APPOINTMENT (OUTPATIENT)
Dept: VASCULAR SURGERY | Facility: CLINIC | Age: 62
End: 2022-11-03

## 2022-11-22 NOTE — REASON FOR VISIT
[Other ___] : a [unfilled] visit for [High Arterial/Negative Pressure] : high arterial/negative pressure [Inadequate Flow within AVF] : inadequate flow within AVF

## 2022-11-23 ENCOUNTER — APPOINTMENT (OUTPATIENT)
Dept: ENDOVASCULAR SURGERY | Facility: CLINIC | Age: 62
End: 2022-11-23

## 2022-11-23 ENCOUNTER — RESULT REVIEW (OUTPATIENT)
Age: 62
End: 2022-11-23

## 2022-11-23 VITALS
TEMPERATURE: 97.4 F | SYSTOLIC BLOOD PRESSURE: 182 MMHG | BODY MASS INDEX: 23.1 KG/M2 | HEART RATE: 81 BPM | DIASTOLIC BLOOD PRESSURE: 83 MMHG | WEIGHT: 165 LBS | OXYGEN SATURATION: 100 % | HEIGHT: 71 IN | RESPIRATION RATE: 18 BRPM

## 2022-11-23 PROCEDURE — 99213 OFFICE O/P EST LOW 20 MIN: CPT | Mod: 25

## 2022-11-23 PROCEDURE — 36902Z: CUSTOM

## 2022-12-06 ENCOUNTER — APPOINTMENT (OUTPATIENT)
Dept: VASCULAR SURGERY | Facility: CLINIC | Age: 62
End: 2022-12-06
Payer: MEDICARE

## 2022-12-06 PROCEDURE — 93925 LOWER EXTREMITY STUDY: CPT | Mod: 26

## 2022-12-06 PROCEDURE — 93925 LOWER EXTREMITY STUDY: CPT

## 2022-12-06 PROCEDURE — 99214 OFFICE O/P EST MOD 30 MIN: CPT

## 2022-12-09 NOTE — PHYSICAL EXAM
[Normal Breath Sounds] : Normal breath sounds [Normal Rate and Rhythm] : normal rate and rhythm [2+] : left 2+ [Ankle Swelling (On Exam)] : not present [Varicose Veins Of Lower Extremities] : not present [] : not present [Abdomen Tenderness] : ~T ~M No abdominal tenderness [Alert] : alert [de-identified] : appears well  [de-identified] : foot is warm with dry gangrenous changes of the distal 1st - 4th digit

## 2022-12-09 NOTE — HISTORY OF PRESENT ILLNESS
[FreeTextEntry1] : 63 yo male with history of ESRD on HD, pad s/p left lower extremity TMA.  Patient recently noted discoloration of the amputation site.  The patient underwent bilateral leg angiogram with angioplasty and stenting.\par He is here for follow-up\par  left foot has improved\par no issues with left fistula

## 2022-12-09 NOTE — ASSESSMENT
[FreeTextEntry1] : 63 yo male with history of esrd on hd, pad patient currently with oxygen dressing on the left TMA stump.  In the office today patient underwent a right leg duplex which shows patent stents.  The ulcer on the right great toe is  healing.  The left leg duplex shows a greater than < 75% CFA stenosis.  \par wounds are much improved\par f/u 3 months\par Continue Eliquis and Plavix.\par \par \par

## 2022-12-15 NOTE — HISTORY OF PRESENT ILLNESS
[] : left radiocephalic fistula [FreeTextEntry1] : 2016 Dr. Duarte  [FreeTextEntry4] : yesterday [FreeTextEntry5] : Yesterday at 7pm [FreeTextEntry6] : Dr. Khna

## 2022-12-15 NOTE — PROCEDURE
[Resume diet] : resume diet [Site check for bleeding/hematoma/thrill/bruit] : Site check for bleeding/hematoma/thrill/bruit [Vital signs on admission the q 15 mins x2] : Vital signs on admission the q 15 mins x2 [FSBS] : FSBS [FreeTextEntry1] : left arm fistula fistulogram/angioplasty

## 2022-12-29 ENCOUNTER — APPOINTMENT (OUTPATIENT)
Dept: VASCULAR SURGERY | Facility: CLINIC | Age: 62
End: 2022-12-29

## 2023-01-17 NOTE — PROGRESS NOTE ADULT - PROBLEM/PLAN-1
Anesthesia Evaluation     Patient summary reviewed   History of anesthetic complications:  Reports shaking following ERCP.  NPO Solid Status: > 8 hours  NPO Liquid Status: > 2 hours           Airway   Mallampati: II  TM distance: >3 FB  Neck ROM: full  No difficulty expected  Dental      Pulmonary     breath sounds clear to auscultation  (+) a smoker Former,   (-) shortness of breath, recent URI  Cardiovascular     ECG reviewed  Patient on routine beta blocker and Beta blocker given within 24 hours of surgery  Rhythm: regular  Rate: normal    (+) pacemaker ([employer], Ap/ @ 60 bpm. PPM DDD @ 60 bpm) pacemaker interrogated <1 month ago, hypertension, valvular problems/murmurs AI and MR, CAD, CABG (2019) >6 Months, dysrhythmias (SSS), hyperlipidemia,   (-) angina    ROS comment: Sinus rhythm  Non-Specific STT wave changes  Prolonged IA interval  Minimal ST elevation, anterior leads  No change from previous tracing  Electronically Signed By: Benjamin Daily ([facility]) 12-Jan-2023 17:08:37  Date and Time of Study: 2023-01-12 10:51:16    2019 - Interpretation Summary    ? Left ventricular wall thickness is consistent with hypertrophy. Sigmoid-shaped ventricular septum is present.  ? Left ventricular systolic function is normal.  ? There is calcification of the aortic valve.  ? Mild tricuspid valve regurgitation is present.  ? Mild mitral valve regurgitation is present  ? Left ventricular diastolic dysfunction (grade I) consistent with impaired relaxation.  ? Rest EF= 60%  ? Normal stress echo with no significant echocardiographic evidence for myocardial ischemia.  ? Post EF=69%.    PE comment: paced    Neuro/Psych  (+) numbness (CTD; periph neurop), psychiatric history Anxiety,    (-) seizures, CVA  GI/Hepatic/Renal/Endo    (+)  GERD,  diabetes mellitus type 2, thyroid problem hypothyroidism  (-)  obesity    Musculoskeletal     (-) neck stiffness  Abdominal    Substance History      OB/GYN          Other   arthritis,     history of cancer (Cheek malignancy)                  Anesthesia Plan    ASA 3     general     (+ACB USGSS for POPC)  intravenous induction     Anesthetic plan, risks, benefits, and alternatives have been provided, discussed and informed consent has been obtained with: patient.    Use of blood products discussed with patient .   Plan discussed with CRNA.        CODE STATUS:        DISPLAY PLAN FREE TEXT

## 2023-02-23 ENCOUNTER — APPOINTMENT (OUTPATIENT)
Dept: VASCULAR SURGERY | Facility: CLINIC | Age: 63
End: 2023-02-23
Payer: MEDICARE

## 2023-02-23 VITALS — DIASTOLIC BLOOD PRESSURE: 84 MMHG | SYSTOLIC BLOOD PRESSURE: 192 MMHG | HEART RATE: 80 BPM

## 2023-02-23 VITALS — HEIGHT: 71 IN | WEIGHT: 182.54 LBS | BODY MASS INDEX: 25.56 KG/M2

## 2023-02-23 PROCEDURE — 93990 DOPPLER FLOW TESTING: CPT | Mod: 59

## 2023-02-23 PROCEDURE — 93923 UPR/LXTR ART STDY 3+ LVLS: CPT

## 2023-02-23 PROCEDURE — 99214 OFFICE O/P EST MOD 30 MIN: CPT

## 2023-02-23 NOTE — HISTORY OF PRESENT ILLNESS
[FreeTextEntry1] : 63 yo male with history of ESRD on HD, pad s/p left lower extremity TMA.  Patient recently noted discoloration of the amputation site.  The patient underwent bilateral leg angiogram with angioplasty and stenting.\par He is here for follow-up\par  left foot has improved  s/p right TMA \par bleeding  issues with left fistula

## 2023-02-23 NOTE — PHYSICAL EXAM
[Normal Thyroid] : the thyroid was normal [Normal Breath Sounds] : Normal breath sounds [Normal Rate and Rhythm] : normal rate and rhythm [2+] : left 2+ [Ankle Swelling (On Exam)] : not present [Varicose Veins Of Lower Extremities] : not present [] : not present [Abdomen Tenderness] : ~T ~M No abdominal tenderness [Alert] : alert [Calm] : calm [de-identified] : appears well  [de-identified] : foot is warm with dry gangrenous changes of the distal 1st - 4th digit

## 2023-02-23 NOTE — ASSESSMENT
[FreeTextEntry1] : 61 yo male with history of esrd on hd, pad patient currently with oxygen dressing on the left TMA stump.  In the office today patient underwent a right leg duplex which shows patent stents.  The ulcer on the right great toe is  healing.  The left leg duplex shows a greater than < 75% CFA stenosis.  \par wounds are much improved AVF with high grade stenosis\par f/u 3 months\par Continue Eliquis and Plavix.\par \par \par

## 2023-03-08 NOTE — REASON FOR VISIT
[Other ___] : a [unfilled] visit for [Inadequate Flow within AVF] : inadequate flow within AVF [Prolonged Bleeding] : prolonged bleeding

## 2023-03-09 ENCOUNTER — APPOINTMENT (OUTPATIENT)
Dept: ENDOVASCULAR SURGERY | Facility: CLINIC | Age: 63
End: 2023-03-09
Payer: MEDICARE

## 2023-03-09 ENCOUNTER — RESULT REVIEW (OUTPATIENT)
Age: 63
End: 2023-03-09

## 2023-03-09 VITALS
HEART RATE: 80 BPM | DIASTOLIC BLOOD PRESSURE: 77 MMHG | WEIGHT: 182 LBS | HEIGHT: 71 IN | RESPIRATION RATE: 16 BRPM | SYSTOLIC BLOOD PRESSURE: 133 MMHG | TEMPERATURE: 97.7 F | OXYGEN SATURATION: 99 % | BODY MASS INDEX: 25.48 KG/M2

## 2023-03-09 PROCEDURE — 36902Z: CUSTOM

## 2023-03-09 RX ORDER — ATORVASTATIN CALCIUM 40 MG/1
40 TABLET, FILM COATED ORAL
Refills: 0 | Status: ACTIVE | COMMUNITY

## 2023-03-09 RX ORDER — AMLODIPINE BESYLATE 5 MG/1
5 TABLET ORAL
Refills: 0 | Status: ACTIVE | COMMUNITY

## 2023-03-09 RX ORDER — CALCIUM ACETATE 667 MG/1
667 CAPSULE ORAL
Refills: 0 | Status: ACTIVE | COMMUNITY

## 2023-03-09 RX ORDER — METOPROLOL SUCCINATE 25 MG/1
25 TABLET, EXTENDED RELEASE ORAL
Refills: 0 | Status: ACTIVE | COMMUNITY

## 2023-03-09 RX ORDER — SEVELAMER CARBONATE 800 MG/1
800 TABLET, FILM COATED ORAL
Refills: 0 | Status: DISCONTINUED | COMMUNITY
End: 2023-03-09

## 2023-03-09 NOTE — HISTORY OF PRESENT ILLNESS
[] : left radiocephalic fistula [FreeTextEntry1] : 2016 Dr. Duarte  [FreeTextEntry4] : yesterday [FreeTextEntry5] : Yesterday at 8pm [FreeTextEntry6] : Dr. Khan

## 2023-05-05 NOTE — CONSULT NOTE ADULT - PROBLEM/RECOMMENDATION-1
DISPLAY PLAN FREE TEXT
DISPLAY PLAN FREE TEXT

## 2023-05-30 ENCOUNTER — APPOINTMENT (OUTPATIENT)
Dept: VASCULAR SURGERY | Facility: CLINIC | Age: 63
End: 2023-05-30

## 2023-05-30 ENCOUNTER — APPOINTMENT (OUTPATIENT)
Dept: VASCULAR SURGERY | Facility: CLINIC | Age: 63
End: 2023-05-30
Payer: MEDICARE

## 2023-05-30 PROCEDURE — 99213 OFFICE O/P EST LOW 20 MIN: CPT

## 2023-05-30 PROCEDURE — 93990 DOPPLER FLOW TESTING: CPT

## 2023-05-30 NOTE — DISCUSSION/SUMMARY
[FreeTextEntry1] : 62-year-old male with ESRD currently on hemodialysis via left upper extremity AV fistula and peripheral arterial disease s/p left lower extremity TMA.\par \par In the office today, patient underwent duplex of the left upper extremity AV fistula which demonstrated severe stenosis in the forearm with an adequate flow.\par We will schedule patient for left arm fistulogram.\par \par

## 2023-05-30 NOTE — HISTORY OF PRESENT ILLNESS
[FreeTextEntry1] : Patient is a 62-year-old male with ESRD currently on hemodialysis via left upper extremity AV fistula and peripheral arterial disease s/p left lower extremity TMA.  Patient denies any issues with cannulation or excessive bleeding during hemodialysis.  Denies rest pain or claudication symptoms.

## 2023-05-30 NOTE — PHYSICAL EXAM
[Pulsatile Thrill] : pulsatile thrill [Hand well perfused] : hand well perfused [2+] : left 2+ [Normal] : coordination grossly intact, no focal deficits [de-identified] : intact [Ankle Swelling (On Exam)] : not present [Varicose Veins Of Lower Extremities] : not present [] : not present [Abdomen Tenderness] : ~T ~M No abdominal tenderness

## 2023-06-06 ENCOUNTER — APPOINTMENT (OUTPATIENT)
Dept: VASCULAR SURGERY | Facility: CLINIC | Age: 63
End: 2023-06-06

## 2023-06-27 ENCOUNTER — APPOINTMENT (OUTPATIENT)
Dept: ENDOVASCULAR SURGERY | Facility: CLINIC | Age: 63
End: 2023-06-27
Payer: MEDICARE

## 2023-06-27 ENCOUNTER — RESULT REVIEW (OUTPATIENT)
Age: 63
End: 2023-06-27

## 2023-06-27 VITALS
OXYGEN SATURATION: 98 % | SYSTOLIC BLOOD PRESSURE: 189 MMHG | RESPIRATION RATE: 18 BRPM | HEIGHT: 71 IN | HEART RATE: 82 BPM | BODY MASS INDEX: 26.36 KG/M2 | WEIGHT: 188.27 LBS | DIASTOLIC BLOOD PRESSURE: 95 MMHG | TEMPERATURE: 97.9 F

## 2023-06-27 PROCEDURE — 36902Z: CUSTOM

## 2023-06-27 NOTE — HISTORY OF PRESENT ILLNESS
[] : left radiocephalic fistula [FreeTextEntry1] : 2016 Dr. Duarte  [FreeTextEntry4] : yesterday [FreeTextEntry5] : Yesterday at 9pm  [FreeTextEntry6] : Dr. Khan

## 2023-06-27 NOTE — REASON FOR VISIT
[Other ___] : a [unfilled] visit for [Inadequate Flow within AVF] : inadequate flow within AVF [Prolonged Bleeding] : prolonged bleeding [FreeTextEntry2] : stenosis on duplex

## 2023-06-27 NOTE — ASSESSMENT
[Other: _____] : [unfilled] [FreeTextEntry1] : stenosis on duplex, plan for left fistulogram and intervention

## 2023-10-19 ENCOUNTER — APPOINTMENT (OUTPATIENT)
Dept: VASCULAR SURGERY | Facility: CLINIC | Age: 63
End: 2023-10-19
Payer: MEDICARE

## 2023-10-19 PROCEDURE — 99214 OFFICE O/P EST MOD 30 MIN: CPT

## 2023-10-19 PROCEDURE — 93990 DOPPLER FLOW TESTING: CPT

## 2023-10-19 PROCEDURE — 93925 LOWER EXTREMITY STUDY: CPT | Mod: 59

## 2023-11-21 ENCOUNTER — APPOINTMENT (OUTPATIENT)
Dept: ENDOVASCULAR SURGERY | Facility: CLINIC | Age: 63
End: 2023-11-21
Payer: MEDICARE

## 2023-11-21 ENCOUNTER — RESULT REVIEW (OUTPATIENT)
Age: 63
End: 2023-11-21

## 2023-11-21 VITALS
RESPIRATION RATE: 20 BRPM | OXYGEN SATURATION: 96 % | WEIGHT: 189.59 LBS | SYSTOLIC BLOOD PRESSURE: 118 MMHG | HEART RATE: 87 BPM | TEMPERATURE: 98.5 F | DIASTOLIC BLOOD PRESSURE: 65 MMHG | HEIGHT: 70 IN | BODY MASS INDEX: 27.14 KG/M2

## 2023-11-21 PROCEDURE — 36905Z: CUSTOM

## 2023-11-21 PROCEDURE — 36215Z: CUSTOM | Mod: 59

## 2023-11-21 PROCEDURE — 99213 OFFICE O/P EST LOW 20 MIN: CPT | Mod: 25

## 2023-11-21 PROCEDURE — 36907Z: CUSTOM | Mod: 59

## 2023-11-21 RX ORDER — SITAGLIPTIN 25 MG/1
25 TABLET, FILM COATED ORAL
Refills: 0 | Status: DISCONTINUED | COMMUNITY
End: 2023-11-21

## 2024-02-01 ENCOUNTER — APPOINTMENT (OUTPATIENT)
Dept: VASCULAR SURGERY | Facility: CLINIC | Age: 64
End: 2024-02-01
Payer: MEDICARE

## 2024-02-01 DIAGNOSIS — T82.898A OTHER SPECIFIED COMPLICATION OF VASCULAR PROSTHETIC DEVICES, IMPLANTS AND GRAFTS, INITIAL ENCOUNTER: ICD-10-CM

## 2024-02-01 DIAGNOSIS — T82.858A STENOSIS OF OTHER VASCULAR PROSTHETIC, INITIAL ENCOUNTER: ICD-10-CM

## 2024-02-01 PROCEDURE — 93990 DOPPLER FLOW TESTING: CPT

## 2024-02-01 PROCEDURE — 99213 OFFICE O/P EST LOW 20 MIN: CPT

## 2024-02-01 RX ORDER — PREDNISONE 20 MG/1
20 TABLET ORAL
Qty: 6 | Refills: 2 | Status: ACTIVE | OUTPATIENT

## 2024-02-01 NOTE — PHYSICAL EXAM
[Pulsatile Thrill] : pulsatile thrill [Bleeding] : bleeding [Hand well perfused] : hand well perfused [2+] : left 2+ [Normal] : coordination grossly intact, no focal deficits [Aneurysm] : no aneurysm [de-identified] : intact

## 2024-02-01 NOTE — DISCUSSION/SUMMARY
[FreeTextEntry1] : 63-year-old male with ESRD currently on hemodialysis via left radiocephalic AV fistula.  In the office today, patient underwent duplex which demonstrates severe stenosis at the proximal arm with inadequate flow.  Given these findings along with several episodes of prolonged bleeding, we will schedule patient for intervention.  Patient did not follow-up in 3 months with fistula duplex and evaluation lower extremity stents.  Patient prescribed prednisone for contrast allergy.

## 2024-02-01 NOTE — HISTORY OF PRESENT ILLNESS
[] : left radiocephalic fistula [FreeTextEntry1] : Patient is a 63-year-old male with ESRD currently on hemodialysis via left upper extremity AV fistula and peripheral arterial disease s/p left lower extremity TMA.  Patient reports several episodes of prolonged bleeding after dialysis.  Patient denies any issues with cannulation.  Denies rest pain or claudication symptoms.  Patient states he is starting to ambulate.  His wounds have healed.

## 2024-02-07 NOTE — PROGRESS NOTE ADULT - ASSESSMENT
60 m with    L foot gangrene  - wound care  - Podiatry evaluation  - Vascular evaluation  - for TM amputation pending   - off antibiotics   - ID follow noted    Cardiology evaluation for preop stratification  - Dr. Aguirre follow    Diabetes Mellitus  - BS control  - ADA diet     Glaucoma  - stable    HTN control    ESRD  - HD  - Nephrology evaluation    DVT prophylaxis  - on Eliquis 3X week. DC for OR.  - Heparin sq    No acute medical condition identified to postpone planned surgical intervention.     Vj Rios MD pager [phone]  symmetrical

## 2024-02-26 NOTE — PRE-OP CHECKLIST - NS PREOP CHK HIBICLENS NA
Called patient to follow-up. Left voicemail with CTTS contact information for patient to return call.    N/A

## 2024-03-08 ENCOUNTER — APPOINTMENT (OUTPATIENT)
Dept: ENDOVASCULAR SURGERY | Facility: CLINIC | Age: 64
End: 2024-03-08
Payer: MEDICARE

## 2024-03-29 ENCOUNTER — RESULT REVIEW (OUTPATIENT)
Age: 64
End: 2024-03-29

## 2024-03-29 ENCOUNTER — APPOINTMENT (OUTPATIENT)
Dept: ENDOVASCULAR SURGERY | Facility: CLINIC | Age: 64
End: 2024-03-29
Payer: MEDICARE

## 2024-03-29 VITALS
HEIGHT: 71 IN | OXYGEN SATURATION: 96 % | BODY MASS INDEX: 26.23 KG/M2 | SYSTOLIC BLOOD PRESSURE: 136 MMHG | DIASTOLIC BLOOD PRESSURE: 71 MMHG | TEMPERATURE: 98.2 F | WEIGHT: 187.39 LBS | RESPIRATION RATE: 20 BRPM | HEART RATE: 87 BPM

## 2024-03-29 PROCEDURE — 36902Z: CUSTOM

## 2024-03-29 RX ORDER — SITAGLIPTIN 25 MG/1
25 TABLET, FILM COATED ORAL
Refills: 0 | Status: ACTIVE | COMMUNITY

## 2024-03-29 RX ORDER — LORATADINE 10 MG/1
10 CAPSULE, LIQUID FILLED ORAL
Refills: 0 | Status: ACTIVE | COMMUNITY

## 2024-03-29 RX ORDER — ALLOPURINOL 100 MG/1
100 TABLET ORAL
Refills: 0 | Status: ACTIVE | COMMUNITY

## 2024-03-29 NOTE — HISTORY OF PRESENT ILLNESS
[FreeTextEntry1] : alert and oriented accompanied by ANGELICA Williamson [phone] no reported falls no meds today/last dose of eliquis is last night 5pm  Dr Samaniego aware Hep C BS  = 134 last dose of insulin last night reside in [facility] meds confirmed with nurse on floor unit [address]   [phone] prednisone protocol for contrast allergy/not taken at facility [FreeTextEntry4] : this AM [FreeTextEntry5] : yesterday at 6pm [FreeTextEntry6] : Dr Khan

## 2024-03-29 NOTE — ASSESSMENT
[FreeTextEntry1] : Patient presents with prolonged bleeding and stenosis noted on duplex, plan for left fistulogram and possible intervention

## 2024-03-29 NOTE — DISCUSSION/SUMMARY
[FreeTextEntry1] : 63-year-old male with ESRD currently on hemodialysis via left upper extremity AV fistula and peripheral arterial disease s/p left lower extremity TMA.  In the office today patient underwent a duplex of his fistula which shows 75% stenosis at the proximal forearm however, flow is acceptable and he is having no issues.  In addition, he underwent lower extremity arterial Dopplers which shows widely patent stents bilaterally.  At this time no intervention is necessary. Would recommend continued Eliquis at a low dose.  He will follow-up in 3 months.

## 2024-03-29 NOTE — PAST MEDICAL HISTORY
[FreeTextEntry1] : Malignant Hyperthermia Screening Tool and Risk of Bleeding Assessment  MARIETTA MAGALLANES  denies family of unexpected death following Anesthesia or Exercise. Denies family history of Malignant Hyperthermia, Muscle or Neuromuscular disorder and High Temperature following exercise.   KIANSAMMSO DINDIAL Patient denies history of Muscle Spasm, Dark or Chocolate- Colored and unanticipated fever immediately following anesthesia or serious exercise.   GORGEMMSO DINDIAL Patient also denies bleeding tendencies/risks of bleeding.

## 2024-04-15 PROBLEM — N18.6 END STAGE RENAL DISEASE: Status: ACTIVE | Noted: 2019-09-19

## 2024-04-16 ENCOUNTER — APPOINTMENT (OUTPATIENT)
Dept: ENDOVASCULAR SURGERY | Facility: CLINIC | Age: 64
End: 2024-04-16
Payer: MEDICARE

## 2024-04-16 DIAGNOSIS — N18.6 END STAGE RENAL DISEASE: ICD-10-CM

## 2024-04-22 NOTE — PHYSICAL THERAPY INITIAL EVALUATION ADULT - GENERAL OBSERVATIONS, REHAB EVAL
Ok to refer pt to Gyn.    Pt rec'ed supine in bed, +off-loading bootie to LLE, +dressing to medial LLE, sarai PT eval fair. 20

## 2024-04-23 ENCOUNTER — RESULT REVIEW (OUTPATIENT)
Age: 64
End: 2024-04-23

## 2024-04-23 ENCOUNTER — APPOINTMENT (OUTPATIENT)
Dept: ENDOVASCULAR SURGERY | Facility: CLINIC | Age: 64
End: 2024-04-23
Payer: MEDICARE

## 2024-04-23 PROCEDURE — 36902Z: CUSTOM

## 2024-04-23 NOTE — HISTORY OF PRESENT ILLNESS
[FreeTextEntry1] : alert and oriented accompanied by aid Midrenne [phone] no reported falls no meds today/last dose of eliquis on sunday  Hep C BS  = 519/538 pt recieved 12units of insulin this morning at 7am - gave him 12 units of novolin at 1045  reside in [facility] meds confirmed with nurse on floor unit [address]   [phone] prednisone protocol for contrast allergy/not taken at facility  [FreeTextEntry4] : yesterday  [FreeTextEntry5] : yesterday at 11:30pm  [FreeTextEntry6] : Dr Khan

## 2024-04-23 NOTE — ASSESSMENT
[FreeTextEntry1] : Patient presents with increased venous and arterial pressure, plan for left fistulogram and possible intervention

## 2024-04-29 NOTE — ED PROVIDER NOTE - NEUROLOGICAL, MLM
Please advise as Juan R is out of the office, she does have an appointment with him Wednesday Alert and oriented, no focal deficits, no motor or sensory deficits.

## 2024-06-27 ENCOUNTER — APPOINTMENT (OUTPATIENT)
Dept: VASCULAR SURGERY | Facility: CLINIC | Age: 64
End: 2024-06-27

## 2024-06-27 PROCEDURE — 93990 DOPPLER FLOW TESTING: CPT

## 2024-07-09 ENCOUNTER — RESULT REVIEW (OUTPATIENT)
Age: 64
End: 2024-07-09

## 2024-07-09 ENCOUNTER — APPOINTMENT (OUTPATIENT)
Dept: ENDOVASCULAR SURGERY | Facility: CLINIC | Age: 64
End: 2024-07-09
Payer: MEDICARE

## 2024-07-09 VITALS
BODY MASS INDEX: 25.99 KG/M2 | OXYGEN SATURATION: 96 % | HEIGHT: 71 IN | HEART RATE: 86 BPM | DIASTOLIC BLOOD PRESSURE: 92 MMHG | RESPIRATION RATE: 16 BRPM | SYSTOLIC BLOOD PRESSURE: 174 MMHG | TEMPERATURE: 97.8 F | WEIGHT: 185.63 LBS

## 2024-07-09 DIAGNOSIS — N18.6 END STAGE RENAL DISEASE: ICD-10-CM

## 2024-07-09 DIAGNOSIS — T82.898A OTHER SPECIFIED COMPLICATION OF VASCULAR PROSTHETIC DEVICES, IMPLANTS AND GRAFTS, INITIAL ENCOUNTER: ICD-10-CM

## 2024-07-09 PROCEDURE — 36902Z: CUSTOM

## 2024-07-09 PROCEDURE — 36215Z: CUSTOM | Mod: 59

## 2024-07-25 ENCOUNTER — APPOINTMENT (OUTPATIENT)
Dept: VASCULAR SURGERY | Facility: CLINIC | Age: 64
End: 2024-07-25

## 2024-09-06 ENCOUNTER — RESULT REVIEW (OUTPATIENT)
Age: 64
End: 2024-09-06

## 2024-09-06 ENCOUNTER — APPOINTMENT (OUTPATIENT)
Dept: ENDOVASCULAR SURGERY | Facility: CLINIC | Age: 64
End: 2024-09-06
Payer: MEDICARE

## 2024-09-06 VITALS
RESPIRATION RATE: 20 BRPM | HEIGHT: 70 IN | DIASTOLIC BLOOD PRESSURE: 90 MMHG | HEART RATE: 97 BPM | BODY MASS INDEX: 26.34 KG/M2 | TEMPERATURE: 97.9 F | WEIGHT: 184 LBS | SYSTOLIC BLOOD PRESSURE: 176 MMHG | OXYGEN SATURATION: 97 %

## 2024-09-06 DIAGNOSIS — T82.49XA OTHER COMPLICATION OF VASCULAR DIALYSIS CATHETER, INITIAL ENCOUNTER: ICD-10-CM

## 2024-09-06 DIAGNOSIS — N18.6 END STAGE RENAL DISEASE: ICD-10-CM

## 2024-09-06 PROCEDURE — 36215Z: CUSTOM | Mod: 59

## 2024-09-06 PROCEDURE — 36905Z: CUSTOM

## 2024-09-06 NOTE — HISTORY OF PRESENT ILLNESS
[] : left brachiocephalic fistula [FreeTextEntry1] : alert and oriented accompanied by aid praveen no reported falls no meds today/last dose of eliquis last night sent from dialysis Hep C BS  reside in [facility], [address]   [phone]  [FreeTextEntry4] : 9/4/2024 wednesday [FreeTextEntry5] : yesterday at 6:30 [FreeTextEntry6] : Dr Khan

## 2024-09-06 NOTE — PAST MEDICAL HISTORY
[Increasing age ( >40 years old)] : Increasing age ( >40 years old) [Altered mobility] : Altered mobility [Previous history of DVT or PE] : Previous history of DVT or PE [No therapy indicated for cases scheduled for less than one hour] : No therapy indicated for cases scheduled for less than one hour. [FreeTextEntry1] : Malignant Hyperthermia Screening Tool and Risk of Bleeding Assessment  MARIETTA MAGALLANES  denies family of unexpected death following Anesthesia or Exercise. Denies family history of Malignant Hyperthermia, Muscle or Neuromuscular disorder and High Temperature following exercise.   KIANSAMMSO DINDIAL Patient denies history of Muscle Spasm, Dark or Chocolate- Colored and unanticipated fever immediately following anesthesia or serious exercise.   GORGEMMSO DINDIAL Patient also denies bleeding tendencies/risks of bleeding.

## 2024-09-06 NOTE — PHYSICAL EXAM
[Hand well perfused] : hand well perfused [2+] : left 2+ [Normal] : coordination grossly intact, no focal deficits [Thrill] : no thrill [de-identified] : intact

## 2024-09-06 NOTE — ASSESSMENT
[Other: _____] : [unfilled] [FreeTextEntry1] : Patient presents with stenosis on last duplex, plan for left fistulogram and possible intervention

## 2024-09-06 NOTE — REASON FOR VISIT
[Other ___] : a [unfilled] visit for [Clotted Access] : clotted access [Referred by:  ___] : Referred by: [unfilled]

## 2024-09-06 NOTE — PROCEDURE
[FSBS] : FSBS [D/C IV on discharge] : D/C IV on discharge [Resume diet] : resume diet [Site check for bleeding/hematoma/thrill/bruit] : Site check for bleeding/hematoma/thrill/bruit [Vital signs on admission the q 15 mins x2] : Vital signs on admission the q 15 mins x2 [FreeTextEntry1] : thrombectomy of the Left fistula accessed by Dr Samaniego with a 10 cm uni flow catheter, placed TPA 2mg in 3 ml of sterile water @ 12 ;20 pm

## 2024-09-17 NOTE — DISCHARGE NOTE NURSING/CASE MANAGEMENT/SOCIAL WORK - NSTRANSFERBELONGINGSDISPO_GEN_A_NUR
Patient Education     Well Child Exam 9 to 10 Years   About this topic   Your child's well child exam is a visit with the doctor to check your child's health. The doctor measures your child's weight and height, and may measure your child's body mass index (BMI). The doctor plots these numbers on a growth curve. The growth curve gives a picture of your child's growth at each visit. The doctor may listen to your child's heart, lungs, and belly. Your doctor will do a full exam of your child from the head to the toes.  Your child may also need shots or blood tests during this visit.  General   Growth and Development   Your doctor will ask you how your child is developing. The doctor will focus on the skills that most children your child's age are expected to do. During this time of your child's life, here are some things you can expect.  Movement - Your child may:  Be getting stronger  Be able to use tools  Be independent when taking a bath or shower  Enjoy team or organized sports  Have better hand-eye coordination  Hearing, seeing, and talking - Your child will likely:  Have a longer attention span  Be able to memorize facts  Enjoy reading to learn new things  Be able to talk almost at the level of an adult  Feelings and behavior - Your child will likely:  Be more independent  Work to get better at a skill or school work  Begin to understand the consequences of actions  Start to worry and may rebel  Need encouragement and positive feedback  Want to spend more time with friends instead of family  Feeding - Your child needs:  3 servings of low-fat or fat-free milk each day  5 servings of fruits and vegetables each day  To start each day with a healthy breakfast  To be given a variety of healthy foods. Many children like to help cook and make food fun.  To limit fruit juice, soda, chips, candy, and foods that are high in sugar and fats  To eat meals as a part of the family. Turn the TV and cell phones off while eating.  Talk about your day, rather than focusing on what your child is eating.  Sleep - Your child:  Is likely sleeping about 10 hours in a row at night.  Should have a consistent routine before bedtime. Read to, or spend time with, your child each night before bed. When your child is able to read, encourage reading before bedtime as part of a routine.  Needs to brush and floss teeth before going to bed.  Should not have electronic devices like TVs, phones, and tablets on in the bedrooms overnight.  Shots or vaccines - It is important for your child to get a flu vaccine each year. Your child may need a COVID -19 vaccine. Your child may need other shots as well, either at this visit or their next check up.  Help for Parents   Play.  Encourage your child to spend at least 1 hour each day being physically active.  Offer your child a variety of activities to take part in. Include music, sports, arts and crafts, and other things your child is interested in. Take care not to over schedule your child. One to 2 activities a week outside of school is often a good number for your child.  Make sure your child wears a helmet when using anything with wheels like skates, skateboard, bike, etc.  Encourage time spent playing with friends. Provide a safe area for play.  Read to your child. Have your child read to you.  Here are some things you can do to help keep your child safe and healthy.  Have your child brush the teeth 2 to 3 times each day. Children this age are able to floss teeth as well. Your child should also see a dentist 1 to 2 times each year for a cleaning and checkup.  Talk to your child about the dangers of smoking, drinking alcohol, and using drugs. Do not allow anyone to smoke in your home or around your child.  A booster seat is needed until your child is at least 4 feet 9 inches (145 cm) tall. After that, make sure your child uses a seat belt when riding in the car. Your child should ride in the back seat until 13 years  of age.  Talk with your child about peer pressure. Help your child learn how to handle risky things friends may want to do.  Never leave your child alone. Do not leave your child in the car or at home alone, even for a few minutes.  Protect your child from gun injuries. If you have a gun, use a trigger lock. Keep the gun locked up and the bullets kept in a separate place.  Limit screen time for children to 1 to 2 hours per day. This includes TV, phones, computers, and video games.  Talk about social media safety.  Discuss bike and skateboard safety.  Parents need to think about:  Teaching your child what to do in case of an emergency  Monitoring your child’s computer use, especially when on the Internet  Talking to your child about strangers, unwanted touch, and keeping private body parts safe  How to continue to talk about puberty  Having your child help with some family chores to encourage responsibility within the family  The next well child visit will most likely be when your child is 9 years old. At this visit, your doctor may:  Do a full check up on your child  Talk about school, friends, and social skills  Talk about sexuality and sexually transmitted diseases  Give needed vaccines  When do I need to call the doctor?   Fever of 100.4°F (38°C) or higher  Having trouble eating or sleeping  Trouble in school  You are worried about your child's development  Last Reviewed Date   2021-11-04  Consumer Information Use and Disclaimer   This generalized information is a limited summary of diagnosis, treatment, and/or medication information. It is not meant to be comprehensive and should be used as a tool to help the user understand and/or assess potential diagnostic and treatment options. It does NOT include all information about conditions, treatments, medications, side effects, or risks that may apply to a specific patient. It is not intended to be medical advice or a substitute for the medical advice, diagnosis, or  treatment of a health care provider based on the health care provider's examination and assessment of a patient’s specific and unique circumstances. Patients must speak with a health care provider for complete information about their health, medical questions, and treatment options, including any risks or benefits regarding use of medications. This information does not endorse any treatments or medications as safe, effective, or approved for treating a specific patient. [employer] and its affiliates disclaim any warranty or liability relating to this information or the use thereof. The use of this information is governed by the Terms of Use, available at https://www.[employer].com/en/know/clinical-effectiveness-terms   Copyright   Copyright © 2024 [employer] and its affiliates and/or licensors. All rights reserved.     with patient

## 2024-10-08 ENCOUNTER — RESULT REVIEW (OUTPATIENT)
Age: 64
End: 2024-10-08

## 2024-10-08 ENCOUNTER — APPOINTMENT (OUTPATIENT)
Dept: ENDOVASCULAR SURGERY | Facility: CLINIC | Age: 64
End: 2024-10-08
Payer: MEDICARE

## 2024-10-08 VITALS
RESPIRATION RATE: 18 BRPM | HEIGHT: 70 IN | OXYGEN SATURATION: 99 % | TEMPERATURE: 97.8 F | HEART RATE: 84 BPM | BODY MASS INDEX: 26.34 KG/M2 | SYSTOLIC BLOOD PRESSURE: 184 MMHG | WEIGHT: 184 LBS | DIASTOLIC BLOOD PRESSURE: 81 MMHG

## 2024-10-08 DIAGNOSIS — N18.6 END STAGE RENAL DISEASE: ICD-10-CM

## 2024-10-08 PROCEDURE — 36907Z: CUSTOM | Mod: 59

## 2024-10-08 PROCEDURE — 36905Z: CUSTOM

## 2024-10-08 PROCEDURE — 36215Z: CUSTOM | Mod: 59

## 2024-10-08 PROCEDURE — 99213 OFFICE O/P EST LOW 20 MIN: CPT | Mod: 25

## 2024-11-12 ENCOUNTER — APPOINTMENT (OUTPATIENT)
Dept: VASCULAR SURGERY | Facility: CLINIC | Age: 64
End: 2024-11-12

## 2024-11-12 ENCOUNTER — APPOINTMENT (OUTPATIENT)
Dept: VASCULAR SURGERY | Facility: CLINIC | Age: 64
End: 2024-11-12
Payer: MEDICARE

## 2024-11-12 DIAGNOSIS — T82.858A STENOSIS OF OTHER VASCULAR PROSTHETIC, INITIAL ENCOUNTER: ICD-10-CM

## 2024-11-12 DIAGNOSIS — N18.6 END STAGE RENAL DISEASE: ICD-10-CM

## 2024-11-12 PROCEDURE — 93990 DOPPLER FLOW TESTING: CPT

## 2024-11-12 PROCEDURE — 99213 OFFICE O/P EST LOW 20 MIN: CPT

## 2024-12-11 ENCOUNTER — RESULT REVIEW (OUTPATIENT)
Age: 64
End: 2024-12-11

## 2024-12-11 ENCOUNTER — APPOINTMENT (OUTPATIENT)
Dept: ENDOVASCULAR SURGERY | Facility: CLINIC | Age: 64
End: 2024-12-11
Payer: MEDICARE

## 2024-12-11 VITALS
RESPIRATION RATE: 18 BRPM | OXYGEN SATURATION: 100 % | TEMPERATURE: 97.8 F | BODY MASS INDEX: 27.14 KG/M2 | DIASTOLIC BLOOD PRESSURE: 90 MMHG | HEIGHT: 70 IN | WEIGHT: 189.59 LBS | HEART RATE: 80 BPM | SYSTOLIC BLOOD PRESSURE: 188 MMHG

## 2024-12-11 DIAGNOSIS — N18.6 END STAGE RENAL DISEASE: ICD-10-CM

## 2024-12-11 PROCEDURE — 36905Z: CUSTOM

## 2024-12-11 PROCEDURE — 36907Z: CUSTOM | Mod: 59

## 2024-12-11 PROCEDURE — 36215Z: CUSTOM | Mod: 59

## 2024-12-11 PROCEDURE — 37246Z: CUSTOM | Mod: 59

## 2024-12-11 PROCEDURE — 99213 OFFICE O/P EST LOW 20 MIN: CPT | Mod: 25

## 2024-12-19 ENCOUNTER — APPOINTMENT (OUTPATIENT)
Dept: VASCULAR SURGERY | Facility: CLINIC | Age: 64
End: 2024-12-19
Payer: MEDICARE

## 2024-12-19 DIAGNOSIS — Z95.828 PRESENCE OF OTHER VASCULAR IMPLANTS AND GRAFTS: ICD-10-CM

## 2024-12-19 DIAGNOSIS — I73.9 PERIPHERAL VASCULAR DISEASE, UNSPECIFIED: ICD-10-CM

## 2024-12-19 PROCEDURE — 99214 OFFICE O/P EST MOD 30 MIN: CPT

## 2024-12-19 PROCEDURE — 93925 LOWER EXTREMITY STUDY: CPT

## 2024-12-19 PROCEDURE — G2211 COMPLEX E/M VISIT ADD ON: CPT

## 2025-01-08 RX ORDER — AMLODIPINE BESYLATE 5 MG/1
5 TABLET ORAL
Refills: 0 | Status: ACTIVE | COMMUNITY

## 2025-01-08 RX ORDER — LORATADINE 10 MG/1
10 CAPSULE, LIQUID FILLED ORAL
Refills: 0 | Status: ACTIVE | COMMUNITY

## 2025-01-08 RX ORDER — SITAGLIPTIN 25 MG/1
25 TABLET ORAL
Refills: 0 | Status: ACTIVE | COMMUNITY

## 2025-01-08 RX ORDER — INSULIN LISPRO 100 [IU]/ML
100 INJECTION, SOLUTION INTRAVENOUS; SUBCUTANEOUS
Refills: 0 | Status: ACTIVE | COMMUNITY

## 2025-01-08 RX ORDER — ESCITALOPRAM OXALATE 10 MG/1
10 TABLET ORAL
Refills: 0 | Status: ACTIVE | COMMUNITY

## 2025-01-08 RX ORDER — SEVELAMER CARBONATE 800 MG/1
800 TABLET, FILM COATED ORAL
Refills: 0 | Status: ACTIVE | COMMUNITY

## 2025-01-09 ENCOUNTER — APPOINTMENT (OUTPATIENT)
Dept: ENDOVASCULAR SURGERY | Facility: CLINIC | Age: 65
End: 2025-01-09
Payer: MEDICARE

## 2025-01-09 ENCOUNTER — LABORATORY RESULT (OUTPATIENT)
Age: 65
End: 2025-01-09

## 2025-01-09 ENCOUNTER — RESULT REVIEW (OUTPATIENT)
Age: 65
End: 2025-01-09

## 2025-01-09 VITALS
RESPIRATION RATE: 16 BRPM | TEMPERATURE: 98.3 F | WEIGHT: 189 LBS | HEIGHT: 70 IN | SYSTOLIC BLOOD PRESSURE: 184 MMHG | OXYGEN SATURATION: 100 % | DIASTOLIC BLOOD PRESSURE: 79 MMHG | HEART RATE: 99 BPM | BODY MASS INDEX: 27.06 KG/M2

## 2025-01-09 DIAGNOSIS — I73.9 PERIPHERAL VASCULAR DISEASE, UNSPECIFIED: ICD-10-CM

## 2025-01-09 PROCEDURE — 37229Z: CUSTOM | Mod: 59,LT

## 2025-01-09 PROCEDURE — 37186Z: CUSTOM | Mod: 59,LT

## 2025-01-09 PROCEDURE — 37225Z: CUSTOM | Mod: LT

## 2025-01-09 PROCEDURE — 75625 CONTRAST EXAM ABDOMINL AORTA: CPT

## 2025-01-30 ENCOUNTER — APPOINTMENT (OUTPATIENT)
Dept: VASCULAR SURGERY | Facility: CLINIC | Age: 65
End: 2025-01-30
Payer: MEDICARE

## 2025-01-30 PROCEDURE — 99213 OFFICE O/P EST LOW 20 MIN: CPT

## 2025-01-30 PROCEDURE — 93926 LOWER EXTREMITY STUDY: CPT | Mod: LT

## 2025-02-05 ENCOUNTER — RESULT REVIEW (OUTPATIENT)
Age: 65
End: 2025-02-05

## 2025-02-05 ENCOUNTER — APPOINTMENT (OUTPATIENT)
Dept: ENDOVASCULAR SURGERY | Facility: CLINIC | Age: 65
End: 2025-02-05
Payer: MEDICARE

## 2025-02-05 VITALS
BODY MASS INDEX: 27.06 KG/M2 | SYSTOLIC BLOOD PRESSURE: 196 MMHG | DIASTOLIC BLOOD PRESSURE: 91 MMHG | OXYGEN SATURATION: 98 % | RESPIRATION RATE: 16 BRPM | TEMPERATURE: 98.7 F | HEART RATE: 78 BPM | HEIGHT: 70 IN | WEIGHT: 189 LBS

## 2025-02-05 DIAGNOSIS — N18.6 END STAGE RENAL DISEASE: ICD-10-CM

## 2025-02-05 DIAGNOSIS — T82.49XA OTHER COMPLICATION OF VASCULAR DIALYSIS CATHETER, INITIAL ENCOUNTER: ICD-10-CM

## 2025-02-05 PROCEDURE — 99213 OFFICE O/P EST LOW 20 MIN: CPT | Mod: 25

## 2025-02-05 PROCEDURE — 36905Z: CUSTOM

## 2025-02-05 PROCEDURE — 36907Z: CUSTOM | Mod: 59

## 2025-02-05 PROCEDURE — 36215Z: CUSTOM | Mod: 59

## 2025-02-25 ENCOUNTER — APPOINTMENT (OUTPATIENT)
Dept: VASCULAR SURGERY | Facility: CLINIC | Age: 65
End: 2025-02-25
Payer: MEDICARE

## 2025-02-25 DIAGNOSIS — Z99.2 END STAGE RENAL DISEASE: ICD-10-CM

## 2025-02-25 DIAGNOSIS — N18.6 END STAGE RENAL DISEASE: ICD-10-CM

## 2025-02-25 DIAGNOSIS — I73.9 PERIPHERAL VASCULAR DISEASE, UNSPECIFIED: ICD-10-CM

## 2025-02-25 DIAGNOSIS — Z95.828 PRESENCE OF OTHER VASCULAR IMPLANTS AND GRAFTS: ICD-10-CM

## 2025-02-25 PROCEDURE — 93926 LOWER EXTREMITY STUDY: CPT | Mod: RT

## 2025-02-25 PROCEDURE — 99214 OFFICE O/P EST MOD 30 MIN: CPT | Mod: 25

## 2025-02-25 PROCEDURE — G0506: CPT

## 2025-04-04 ENCOUNTER — RESULT REVIEW (OUTPATIENT)
Age: 65
End: 2025-04-04

## 2025-04-04 ENCOUNTER — APPOINTMENT (OUTPATIENT)
Dept: ENDOVASCULAR SURGERY | Facility: CLINIC | Age: 65
End: 2025-04-04
Payer: MEDICARE

## 2025-04-04 VITALS
DIASTOLIC BLOOD PRESSURE: 95 MMHG | HEIGHT: 70 IN | WEIGHT: 190 LBS | SYSTOLIC BLOOD PRESSURE: 172 MMHG | BODY MASS INDEX: 27.2 KG/M2 | HEART RATE: 84 BPM | TEMPERATURE: 98.3 F | OXYGEN SATURATION: 100 % | RESPIRATION RATE: 20 BRPM

## 2025-04-04 PROCEDURE — 36905Z: CUSTOM

## 2025-04-04 PROCEDURE — 36215Z: CUSTOM | Mod: 59

## 2025-04-29 ENCOUNTER — APPOINTMENT (OUTPATIENT)
Dept: ENDOVASCULAR SURGERY | Facility: CLINIC | Age: 65
End: 2025-04-29
Payer: MEDICARE

## 2025-04-29 ENCOUNTER — RESULT REVIEW (OUTPATIENT)
Age: 65
End: 2025-04-29

## 2025-04-29 VITALS
TEMPERATURE: 97.4 F | BODY MASS INDEX: 27.2 KG/M2 | OXYGEN SATURATION: 98 % | RESPIRATION RATE: 18 BRPM | HEIGHT: 70 IN | SYSTOLIC BLOOD PRESSURE: 175 MMHG | DIASTOLIC BLOOD PRESSURE: 84 MMHG | WEIGHT: 190 LBS | HEART RATE: 86 BPM

## 2025-04-29 PROCEDURE — 36902Z: CUSTOM

## 2025-04-29 PROCEDURE — 99213 OFFICE O/P EST LOW 20 MIN: CPT | Mod: 25

## 2025-05-27 ENCOUNTER — APPOINTMENT (OUTPATIENT)
Dept: VASCULAR SURGERY | Facility: CLINIC | Age: 65
End: 2025-05-27
Payer: MEDICARE

## 2025-05-27 PROCEDURE — G0506: CPT

## 2025-05-27 PROCEDURE — 93925 LOWER EXTREMITY STUDY: CPT

## 2025-05-27 PROCEDURE — 99214 OFFICE O/P EST MOD 30 MIN: CPT | Mod: 25

## 2025-06-05 ENCOUNTER — APPOINTMENT (OUTPATIENT)
Dept: VASCULAR SURGERY | Facility: CLINIC | Age: 65
End: 2025-06-05

## 2025-06-12 ENCOUNTER — APPOINTMENT (OUTPATIENT)
Dept: VASCULAR SURGERY | Facility: CLINIC | Age: 65
End: 2025-06-12
Payer: MEDICARE

## 2025-06-12 PROCEDURE — 93990 DOPPLER FLOW TESTING: CPT

## 2025-08-26 ENCOUNTER — APPOINTMENT (OUTPATIENT)
Dept: VASCULAR SURGERY | Facility: CLINIC | Age: 65
End: 2025-08-26
Payer: MEDICARE

## 2025-08-26 PROCEDURE — 93926 LOWER EXTREMITY STUDY: CPT | Mod: RT

## 2025-08-26 PROCEDURE — 99215 OFFICE O/P EST HI 40 MIN: CPT

## 2025-09-04 ENCOUNTER — RESULT REVIEW (OUTPATIENT)
Age: 65
End: 2025-09-04

## 2025-09-04 ENCOUNTER — APPOINTMENT (OUTPATIENT)
Dept: ENDOVASCULAR SURGERY | Facility: CLINIC | Age: 65
End: 2025-09-04
Payer: MEDICARE

## 2025-09-04 PROCEDURE — 75625 CONTRAST EXAM ABDOMINL AORTA: CPT

## 2025-09-04 PROCEDURE — 37227Z: CUSTOM | Mod: RT

## 2025-09-04 PROCEDURE — 37229Z: CUSTOM | Mod: 59,RT
